# Patient Record
Sex: FEMALE | Race: WHITE | NOT HISPANIC OR LATINO | Employment: FULL TIME | URBAN - METROPOLITAN AREA
[De-identification: names, ages, dates, MRNs, and addresses within clinical notes are randomized per-mention and may not be internally consistent; named-entity substitution may affect disease eponyms.]

---

## 2018-10-17 ENCOUNTER — OFFICE VISIT (OUTPATIENT)
Dept: FAMILY MEDICINE | Facility: CLINIC | Age: 53
End: 2018-10-17
Payer: COMMERCIAL

## 2018-10-17 VITALS
OXYGEN SATURATION: 97 % | BODY MASS INDEX: 40.85 KG/M2 | SYSTOLIC BLOOD PRESSURE: 128 MMHG | HEART RATE: 95 BPM | HEIGHT: 66 IN | WEIGHT: 254.19 LBS | DIASTOLIC BLOOD PRESSURE: 66 MMHG

## 2018-10-17 DIAGNOSIS — E78.2 MIXED HYPERLIPIDEMIA: ICD-10-CM

## 2018-10-17 DIAGNOSIS — I10 ESSENTIAL HYPERTENSION: Primary | ICD-10-CM

## 2018-10-17 DIAGNOSIS — K21.9 GASTROESOPHAGEAL REFLUX DISEASE, ESOPHAGITIS PRESENCE NOT SPECIFIED: ICD-10-CM

## 2018-10-17 DIAGNOSIS — Z23 NEED FOR INFLUENZA VACCINATION: ICD-10-CM

## 2018-10-17 DIAGNOSIS — E11.8 TYPE 2 DIABETES MELLITUS WITH COMPLICATION, WITHOUT LONG-TERM CURRENT USE OF INSULIN: ICD-10-CM

## 2018-10-17 DIAGNOSIS — Z23 NEED FOR TDAP VACCINATION: ICD-10-CM

## 2018-10-17 DIAGNOSIS — J44.9 CHRONIC OBSTRUCTIVE PULMONARY DISEASE, UNSPECIFIED COPD TYPE: ICD-10-CM

## 2018-10-17 DIAGNOSIS — E66.01 CLASS 3 SEVERE OBESITY DUE TO EXCESS CALORIES WITH SERIOUS COMORBIDITY AND BODY MASS INDEX (BMI) OF 40.0 TO 44.9 IN ADULT: ICD-10-CM

## 2018-10-17 DIAGNOSIS — R45.89 DEPRESSED MOOD: ICD-10-CM

## 2018-10-17 PROBLEM — G89.29 CHRONIC BACK PAIN: Status: ACTIVE | Noted: 2018-10-17

## 2018-10-17 PROBLEM — E66.813 CLASS 3 SEVERE OBESITY DUE TO EXCESS CALORIES WITH SERIOUS COMORBIDITY AND BODY MASS INDEX (BMI) OF 40.0 TO 44.9 IN ADULT: Status: ACTIVE | Noted: 2018-10-17

## 2018-10-17 PROBLEM — M54.9 CHRONIC BACK PAIN: Status: ACTIVE | Noted: 2018-10-17

## 2018-10-17 PROCEDURE — 90715 TDAP VACCINE 7 YRS/> IM: CPT | Mod: ,,, | Performed by: NURSE PRACTITIONER

## 2018-10-17 PROCEDURE — 90471 IMMUNIZATION ADMIN: CPT | Mod: ,,, | Performed by: NURSE PRACTITIONER

## 2018-10-17 PROCEDURE — 99204 OFFICE O/P NEW MOD 45 MIN: CPT | Mod: 25,,, | Performed by: NURSE PRACTITIONER

## 2018-10-17 PROCEDURE — 90686 IIV4 VACC NO PRSV 0.5 ML IM: CPT | Mod: ,,, | Performed by: NURSE PRACTITIONER

## 2018-10-17 PROCEDURE — 90472 IMMUNIZATION ADMIN EACH ADD: CPT | Mod: ,,, | Performed by: NURSE PRACTITIONER

## 2018-10-17 RX ORDER — GABAPENTIN 300 MG/1
300 CAPSULE ORAL 2 TIMES DAILY PRN
COMMUNITY
End: 2019-04-12

## 2018-10-17 RX ORDER — ROSUVASTATIN CALCIUM 20 MG/1
20 TABLET, COATED ORAL DAILY
Qty: 90 TABLET | Refills: 1 | Status: SHIPPED | OUTPATIENT
Start: 2018-10-17 | End: 2019-05-10 | Stop reason: SDUPTHER

## 2018-10-17 RX ORDER — HYDROCODONE BITARTRATE AND ACETAMINOPHEN 10; 325 MG/1; MG/1
1 TABLET ORAL 3 TIMES DAILY
COMMUNITY
End: 2019-06-25

## 2018-10-17 RX ORDER — AMLODIPINE BESYLATE 10 MG/1
10 TABLET ORAL DAILY
COMMUNITY
End: 2018-10-17 | Stop reason: SDUPTHER

## 2018-10-17 RX ORDER — FLUTICASONE PROPIONATE AND SALMETEROL 500; 50 UG/1; UG/1
1 POWDER RESPIRATORY (INHALATION) 2 TIMES DAILY
Qty: 60 EACH | Refills: 5 | Status: SHIPPED | OUTPATIENT
Start: 2018-10-17 | End: 2020-01-22 | Stop reason: SDUPTHER

## 2018-10-17 RX ORDER — CLOTRIMAZOLE AND BETAMETHASONE DIPROPIONATE 10; .64 MG/G; MG/G
CREAM TOPICAL
COMMUNITY
End: 2021-06-29 | Stop reason: SDUPTHER

## 2018-10-17 RX ORDER — ACETAMINOPHEN 500 MG
10 TABLET ORAL NIGHTLY
COMMUNITY

## 2018-10-17 RX ORDER — VALSARTAN AND HYDROCHLOROTHIAZIDE 160; 25 MG/1; MG/1
1 TABLET ORAL DAILY
COMMUNITY
End: 2018-10-17 | Stop reason: SDUPTHER

## 2018-10-17 RX ORDER — VALSARTAN AND HYDROCHLOROTHIAZIDE 160; 25 MG/1; MG/1
1 TABLET ORAL DAILY
Qty: 90 TABLET | Refills: 1 | Status: SHIPPED | OUTPATIENT
Start: 2018-10-17 | End: 2019-05-10 | Stop reason: SDUPTHER

## 2018-10-17 RX ORDER — BUPROPION HYDROCHLORIDE 150 MG/1
150 TABLET ORAL DAILY
Qty: 30 TABLET | Refills: 2 | Status: SHIPPED | OUTPATIENT
Start: 2018-10-17 | End: 2018-11-13 | Stop reason: SDUPTHER

## 2018-10-17 RX ORDER — MONTELUKAST SODIUM 10 MG/1
10 TABLET ORAL DAILY
COMMUNITY
End: 2018-10-17 | Stop reason: SDUPTHER

## 2018-10-17 RX ORDER — PANTOPRAZOLE SODIUM 40 MG/1
40 TABLET, DELAYED RELEASE ORAL DAILY
COMMUNITY
End: 2018-10-17 | Stop reason: SDUPTHER

## 2018-10-17 RX ORDER — PANTOPRAZOLE SODIUM 40 MG/1
40 TABLET, DELAYED RELEASE ORAL DAILY
Qty: 90 TABLET | Refills: 1 | Status: SHIPPED | OUTPATIENT
Start: 2018-10-17 | End: 2019-03-15 | Stop reason: SDUPTHER

## 2018-10-17 RX ORDER — FLUTICASONE PROPIONATE AND SALMETEROL 500; 50 UG/1; UG/1
1 POWDER RESPIRATORY (INHALATION) 2 TIMES DAILY
COMMUNITY
End: 2018-10-17 | Stop reason: SDUPTHER

## 2018-10-17 RX ORDER — IPRATROPIUM BROMIDE AND ALBUTEROL SULFATE 2.5; .5 MG/3ML; MG/3ML
3 SOLUTION RESPIRATORY (INHALATION) EVERY 6 HOURS PRN
COMMUNITY
End: 2018-10-17 | Stop reason: SDUPTHER

## 2018-10-17 RX ORDER — AMLODIPINE BESYLATE 10 MG/1
10 TABLET ORAL DAILY
Qty: 90 TABLET | Refills: 1 | Status: SHIPPED | OUTPATIENT
Start: 2018-10-17 | End: 2019-07-01 | Stop reason: SDUPTHER

## 2018-10-17 RX ORDER — MONTELUKAST SODIUM 10 MG/1
10 TABLET ORAL DAILY
Qty: 90 TABLET | Refills: 1 | Status: SHIPPED | OUTPATIENT
Start: 2018-10-17 | End: 2019-02-21 | Stop reason: SDUPTHER

## 2018-10-17 RX ORDER — ROSUVASTATIN CALCIUM 20 MG/1
20 TABLET, COATED ORAL DAILY
COMMUNITY
End: 2018-10-17 | Stop reason: SDUPTHER

## 2018-10-17 RX ORDER — IPRATROPIUM BROMIDE AND ALBUTEROL SULFATE 2.5; .5 MG/3ML; MG/3ML
3 SOLUTION RESPIRATORY (INHALATION) EVERY 6 HOURS PRN
Qty: 1 BOX | Refills: 2 | Status: SHIPPED | OUTPATIENT
Start: 2018-10-17 | End: 2019-04-12 | Stop reason: SDUPTHER

## 2018-10-17 RX ORDER — BACLOFEN 10 MG/1
10 TABLET ORAL 3 TIMES DAILY PRN
COMMUNITY
End: 2019-04-12

## 2018-10-17 NOTE — PROGRESS NOTES
Subjective:       Patient ID: Kemi Dominguez is a 53 y.o. female.    Chief Complaint: Establish Care and Medication Refill    Patient is a new patient to me and to the practice here today to establish care and obtain treatment for chronic illnesses.  Patient has diagnoses of COPD, hypertension, hyperlipidemia, type 2 diabetes, chronic back pain and obesity.  Patient was previously seeing Dr. Burris and states her PCP left recently so she is in search of a new provider.  Patient states she has not had labs in a while and is not sure how her chronic conditions are at this time.  Patient states she is not great about taking her medication routinely but is attempting to improve this.  Patient does have an orthopedic that she sees in [place] that treats her chronic back pain.   Patient is a current daily smoker of 2 packs per day.  Patient states she is aware she needs to reduce the smoking and quit altogether.      Hypertension   This is a chronic problem. The current episode started more than 1 month ago. The problem has been waxing and waning since onset. The problem is controlled. Associated symptoms include anxiety, headaches, malaise/fatigue and shortness of breath. Pertinent negatives include no chest pain, palpitations or PND. Agents associated with hypertension include estrogens. Risk factors for coronary artery disease include smoking/tobacco exposure, sedentary lifestyle, obesity, diabetes mellitus, dyslipidemia and stress. Past treatments include calcium channel blockers. The current treatment provides moderate improvement. Compliance problems include diet and exercise.    Shortness of Breath   This is a recurrent problem. The current episode started more than 1 month ago. The problem occurs daily. The problem has been waxing and waning. Associated symptoms include headaches, sputum production and wheezing. Pertinent negatives include no abdominal pain, chest pain, coryza, ear pain, fever, hemoptysis,  PND, rash, rhinorrhea, sore throat, swollen glands or vomiting. The symptoms are aggravated by exercise, any activity, weather changes and smoke. Risk factors include smoking. She has tried beta agonist inhalers, body position changes, steroid inhalers and rest for the symptoms. The treatment provided moderate relief. Her past medical history is significant for COPD.   Hyperlipidemia   This is a chronic problem. The current episode started more than 1 month ago. The problem is uncontrolled. Recent lipid tests were reviewed and are high. Exacerbating diseases include diabetes and obesity. Factors aggravating her hyperlipidemia include fatty foods. Associated symptoms include shortness of breath. Pertinent negatives include no chest pain. Current antihyperlipidemic treatment includes statins and diet change. The current treatment provides mild improvement of lipids. Compliance problems include adherence to exercise and adherence to diet.  Risk factors for coronary artery disease include hypertension, obesity, dyslipidemia, diabetes mellitus, a sedentary lifestyle and stress.   Depression   Visit Type: initial  Onset of symptoms: more than 6 months ago  Progression since onset: gradually worsening (was previously taking prozac but stopped 2 months ago)  Patient presents with the following symptoms: anhedonia, excessive worry, insomnia, irritability and shortness of breath.  Patient is not experiencing: confusion, palpitations, suicidal ideas, suicidal planning and thoughts of death.  Frequency of symptoms: most days   Severity: moderate   Sleep quality: fair  Nighttime awakenings: occasional  Treatment tried: lifestyle changes and SSRI  Compliance with treatment: good  Improvement on treatment: mild        Review of Systems   Constitutional: Positive for irritability and malaise/fatigue. Negative for activity change, appetite change and fever.        Obesity   HENT: Negative for congestion, ear discharge, ear pain,  rhinorrhea, sore throat, trouble swallowing and voice change.    Eyes: Negative for photophobia, pain, discharge and visual disturbance.   Respiratory: Positive for sputum production, shortness of breath and wheezing. Negative for cough, hemoptysis and chest tightness.         COPD, smoker   Cardiovascular: Negative for chest pain, palpitations and PND.        Hypertension, hyperlipidemia   Gastrointestinal: Negative for abdominal pain, nausea and vomiting.   Endocrine: Negative for cold intolerance and heat intolerance.        Diabetes type 2   Genitourinary: Negative for difficulty urinating and dysuria.   Musculoskeletal: Positive for back pain (chronic). Negative for arthralgias and gait problem.   Skin: Negative for rash.   Allergic/Immunologic: Negative for immunocompromised state.   Neurological: Positive for headaches. Negative for speech difficulty.   Psychiatric/Behavioral: Positive for depression and dysphoric mood. Negative for confusion, self-injury and suicidal ideas. The patient has insomnia.        Past Medical History:   Diagnosis Date    COPD (chronic obstructive pulmonary disease)     Diabetes mellitus, type 2     GERD (gastroesophageal reflux disease)     Hyperlipidemia     Hypertension     Vitamin D deficiency       Past Surgical History:   Procedure Laterality Date    ADENOIDECTOMY      BREAST SURGERY Right     breast biopsy    TONSILLECTOMY         Family History   Problem Relation Age of Onset    Stroke Mother     Emphysema Mother     Diabetes Father     Coronary artery disease Father     COPD Father     Diabetes Sister        Social History     Socioeconomic History    Marital status: Single     Spouse name: None    Number of children: None    Years of education: None    Highest education level: None   Social Needs    Financial resource strain: None    Food insecurity - worry: None    Food insecurity - inability: None    Transportation needs - medical: None     Transportation needs - non-medical: None   Occupational History    None   Tobacco Use    Smoking status: Current Every Day Smoker     Packs/day: 2.00     Types: Cigarettes    Smokeless tobacco: Never Used   Substance and Sexual Activity    Alcohol use: Yes     Comment: rarely    Drug use: Yes     Types: Hydrocodone    Sexual activity: No   Other Topics Concern    None   Social History Narrative    None       Current Outpatient Medications   Medication Sig Dispense Refill    albuterol-ipratropium (DUO-NEB) 2.5 mg-0.5 mg/3 mL nebulizer solution Take 3 mLs by nebulization every 6 (six) hours as needed for Wheezing. Rescue 1 Box 2    amLODIPine (NORVASC) 10 MG tablet Take 1 tablet (10 mg total) by mouth once daily. 90 tablet 1    aspirin/salicylamide/caffeine (BC HEADACHE POWDER ORAL) Take by mouth as needed.      baclofen (LIORESAL) 10 MG tablet Take 10 mg by mouth 3 (three) times daily.      clotrimazole-betamethasone 1-0.05% (LOTRISONE) cream Apply topically as needed.      ergocalciferol, vitamin D2, (VITAMIN D2 ORAL) Take 1.25 mg by mouth once a week.      fluticasone-salmeterol 500-50 mcg/dose (ADVAIR DISKUS) 500-50 mcg/dose DsDv diskus inhaler Inhale 1 puff into the lungs 2 (two) times daily. Controller 60 each 5    gabapentin (NEURONTIN) 300 MG capsule Take 300 mg by mouth 2 (two) times daily.      HYDROcodone-acetaminophen (NORCO)  mg per tablet Take 1 tablet by mouth 3 (three) times daily.      ipratropium-albuterol (COMBIVENT RESPIMAT)  mcg/actuation inhaler Inhale 2 puffs into the lungs every 6 (six) hours as needed for Wheezing. Rescue 4 g 6    melatonin 5 mg Tab Take 1 tablet by mouth nightly.      montelukast (SINGULAIR) 10 mg tablet Take 1 tablet (10 mg total) by mouth once daily. 90 tablet 1    pantoprazole (PROTONIX) 40 MG tablet Take 1 tablet (40 mg total) by mouth once daily. 90 tablet 1    rosuvastatin (CRESTOR) 20 MG tablet Take 1 tablet (20 mg total) by mouth once  "daily. 90 tablet 1    SITagliptan-metformin (JANUMET XR) 100-1,000 mg TM24 Take 1 tablet by mouth once daily. 90 tablet 1    valsartan-hydrochlorothiazide (DIOVAN-HCT) 160-25 mg per tablet Take 1 tablet by mouth once daily. 90 tablet 1    buPROPion (WELLBUTRIN XL) 150 MG TB24 tablet Take 1 tablet (150 mg total) by mouth once daily. 30 tablet 2     No current facility-administered medications for this visit.        Review of patient's allergies indicates:  No Known Allergies  Objective:      Blood pressure 128/66, pulse 95, height 5' 6" (1.676 m), weight 115.3 kg (254 lb 3.2 oz), SpO2 97 %. Body mass index is 41.03 kg/m².   Physical Exam   Constitutional: She is oriented to person, place, and time. She appears well-developed. She is cooperative. No distress.   obesity   HENT:   Head: Normocephalic and atraumatic.   Right Ear: Tympanic membrane normal.   Left Ear: Tympanic membrane normal.   Nose: Nose normal.   Mouth/Throat: Oropharynx is clear and moist.   Eyes: Conjunctivae, EOM and lids are normal. Pupils are equal, round, and reactive to light. Lids are everted and swept, no foreign bodies found. Right pupil is round and reactive. Left pupil is round and reactive.   Neck: Trachea normal and normal range of motion. Neck supple.   Cardiovascular: Normal rate, regular rhythm, S1 normal, S2 normal and normal heart sounds.   No murmur heard.  Pulmonary/Chest: Effort normal and breath sounds normal. She has no wheezes.   Abdominal: Soft. Bowel sounds are normal. There is no rigidity and no guarding.   Musculoskeletal: Normal range of motion.   Lymphadenopathy:     She has no cervical adenopathy.     She has no axillary adenopathy.   Neurological: She is alert and oriented to person, place, and time.   Skin: Skin is warm and dry. Capillary refill takes less than 2 seconds.   Psychiatric: Her behavior is normal. Judgment and thought content normal. She exhibits a depressed mood. She expresses no homicidal and no " suicidal ideation. She expresses no suicidal plans and no homicidal plans.   Nursing note and vitals reviewed.          Assessment:       1. Essential hypertension    2. Mixed hyperlipidemia    3. Type 2 diabetes mellitus with complication, without long-term current use of insulin    4. Class 3 severe obesity due to excess calories with serious comorbidity and body mass index (BMI) of 40.0 to 44.9 in adult    5. Chronic obstructive pulmonary disease, unspecified COPD type    6. Gastroesophageal reflux disease, esophagitis presence not specified    7. Need for Tdap vaccination    8. Need for influenza vaccination    9. Depressed mood        Plan:       Kemi was seen today for establish care and medication refill.    Diagnoses and all orders for this visit:    Essential hypertension  -     valsartan-hydrochlorothiazide (DIOVAN-HCT) 160-25 mg per tablet; Take 1 tablet by mouth once daily.  -     amLODIPine (NORVASC) 10 MG tablet; Take 1 tablet (10 mg total) by mouth once daily.  -     CBC auto differential; Future  -     Comprehensive metabolic panel; Future  -     Urinalysis; Future  -     CBC auto differential  -     Comprehensive metabolic panel  -     Urinalysis  -Lifestyle changes: Reduce the amount of salt in your diet; Lose weight; Avoid drinking too much alcohol; Exercise at least 30 minutes per day most days of the week.  Continue current medications and home BP monitoring.     Mixed hyperlipidemia  -     rosuvastatin (CRESTOR) 20 MG tablet; Take 1 tablet (20 mg total) by mouth once daily.  -     Lipid panel; Future  -     Lipid panel  -Limit red meat, butter, fried foods, cheese, and other foods that have a lot of saturated fat. Consume more: lean meats, fish, fruits, vegetables, whole grains, beans, lentils, and nuts.  Weight loss, and 30-45 min of cardiovascular exercise daily.     Type 2 diabetes mellitus with complication, without long-term current use of insulin  -     SITagliptan-metformin (JANUMET  XR) 100-1,000 mg TM24; Take 1 tablet by mouth once daily.  -     Microalbumin/creatinine urine ratio; Future  -     Comprehensive metabolic panel; Future  -     Urinalysis; Future  -     Hemoglobin A1c; Future  -     Microalbumin/creatinine urine ratio  -     Comprehensive metabolic panel  -     Urinalysis  -     Hemoglobin A1c  - Discussed the following complications of DM Type 2: CAD, CVD, Retinopathy, Nephropathy, Neuropathy.    Discussed Target A1C Goal <7.0% and Target fasting blood sugars () before each meal.    Discussed Lifestyle Modifications: Low glycemic index diet, Exercise (30-45 min) daily, Weight loss, and Smoking cessation     Class 3 severe obesity due to excess calories with serious comorbidity and body mass index (BMI) of 40.0 to 44.9 in adult  -     TSH; Future  -     TSH    Chronic obstructive pulmonary disease, unspecified COPD type  -     montelukast (SINGULAIR) 10 mg tablet; Take 1 tablet (10 mg total) by mouth once daily.  -     fluticasone-salmeterol 500-50 mcg/dose (ADVAIR DISKUS) 500-50 mcg/dose DsDv diskus inhaler; Inhale 1 puff into the lungs 2 (two) times daily. Controller  -     albuterol-ipratropium (DUO-NEB) 2.5 mg-0.5 mg/3 mL nebulizer solution; Take 3 mLs by nebulization every 6 (six) hours as needed for Wheezing. Rescue  -     ipratropium-albuterol (COMBIVENT RESPIMAT)  mcg/actuation inhaler; Inhale 2 puffs into the lungs every 6 (six) hours as needed for Wheezing. Rescue    Gastroesophageal reflux disease, esophagitis presence not specified  -     pantoprazole (PROTONIX) 40 MG tablet; Take 1 tablet (40 mg total) by mouth once daily.    Need for Tdap vaccination  -     Tdap Vaccine    Need for influenza vaccination  -     Influenza - Quadrivalent (3 years & older) (PF)    Depressed mood  -     buPROPion (WELLBUTRIN XL) 150 MG TB24 tablet; Take 1 tablet (150 mg total) by mouth once daily.       Follow up in 1 month for lab review and evaluation of new depression  medication.

## 2018-10-17 NOTE — PATIENT INSTRUCTIONS
Diabetes: Activity Tips    Being more active can help you manage your diabetes. The tips on this sheet can help you get the most from your exercise. They can also help you stay safe.  Staying Active  Its important for adults to spend less time sitting and being inactive. This is especially true if you have type 2 diabetes. When you are sitting for long periods of time, get up for short sessions of light activity every 30 minutes.  You should aim for at least 150 minutes a week of exercise or physical activity. Dont let more than 2 days go by without being active.  Benefit from briskness  Brisk activity gets your heart beating faster. This can help you increase your fitness, lose extra weight, and manage your blood sugar level. Try brisk walking. Or, if you have foot or leg problems, you can try swimming or bike riding. You can break up your exercise into chunks throughout the day. Work up to at least 30 minutes of steady, brisk exercise on most days.  Warm up and cool down  Warming up and cooling down reduce your risk of injury. They also help limit muscle soreness. Do a mild version of your activity for 5 minutes before and after your routine. You can also learn stretches that will help keep your muscles loose. Your healthcare provider may show you good ways to warm up and stretch.  Do the talk-sing test  The talk-sing test is a simple way to tell how hard youre exercising. If you can talk while exercising, youre in a safe range. If youre out of breath, slow down. If you can carry a tune, its time to  the pace. Walk up a hill. Increase the resistance on your stationary bike. Or swim faster.  What about eating?  You may be told to plan your exercise for 1 to 2 hours after a meal. In most cases, you dont need to eat while being active. If you take insulin or medicine that can cause low blood sugar, test your blood sugar before exercising. And carry a fast-acting sugar that will raise your blood sugar  level quickly. This includes glucose tablets or hard candy. Use it if you feel low blood sugar symptoms.  Safety tips  These tips can help you stay safe as you become fit:  · Exercise with a friend or carry a cell phone if you have one.  · Carry or wear identification, such as a necklace or bracelet, that says you have diabetes.  · Use the proper footwear and safety equipment for your activity.  · Drink water before, during, and after exercise.  · Dress properly for the weather.  · Dont exercise in very hot or very cold weather.  · Dont exercise if you are sick.  · If you are instructed to do so, test your blood sugar before and after you exercise. Have a small carbohydrate snack if your blood sugar is low before you start exercising.   When to stop exercising and call your healthcare provider  Stop exercising and call your healthcare provider right away if you notice any of the following:  · Pain, pressure, tightness, or heaviness in the chest  · Pain or heaviness in the neck, shoulders, back, arms, legs, or feet  · Unusual shortness of breath  · Dizziness or lightheadedness  · Unusually rapid or slow pulse  · Increased joint or muscle pain  · Nausea or vomiting  Date Last Reviewed: 5/1/2016  © 0083-0816 [employer]. [address] [place] [address], [place] [address]. All rights reserved. This information is not intended as a substitute for professional medical care. Always follow your healthcare professional's instructions.        Using a Blood Sugar Log    You have diabetes. This means your body has trouble regulating a sugar called glucose. To help manage your diabetes, youll need to check your blood sugar level as directed by your healthcare provider. Keeping a log of your blood sugar levels will help you track your blood sugar readings. Its a simple and easy way to see how well you are controlling your diabetes.  Checking your blood sugar level  You can check your blood sugar level with a blood  glucose meter. Youll first prick the side of your finger with a tiny lancet to draw a tiny drop of blood onto the test strip. Some glucose meters let you use another place on your body to test. But these other places should not be used in some cases as they may be inaccurate. Follow the instructions for your glucose meter. And talk with your healthcare provider before doing the test on other places.  The strip goes into the meter first, then a drop of blood is placed on the tip of the strip. The meter then shows a reading that tells you the level of your blood sugar. Your readings should be in your target range as often as possible. This means not too high or too low. Staying in this range helps lower your risk for complications. Your healthcare provider will help you figure out the target range that is best for you.  Tracking your readings  Every time you check your blood sugar, use your log to keep track of your readings. Your meter will also probably have a memory feature that your healthcare provider can check at your next visit. You may be advised by your healthcare provider to check your blood sugar in the morning, at bedtime, and before and after meals. Be sure to write down all of your numbers. Also use your log to record things that might have affected your blood sugar. Some examples include being sick, certain medicines, being physically active, feeling stressed, or skipping meals.   Lessons learned from your readings  Tracking your blood sugar readings helps you see patterns. These patterns tell you how your actions affect your blood sugar. For instance, you may have higher numbers after eating certain foods or lower numbers after exercise. They just help you understand how to stay in your target range more often, so that your diabetes remains in good control.  Sharing your log with your healthcare team  Bring your blood sugar log and glucose meter with you to all of your healthcare appointments. This  "can help your healthcare team make changes to your treatment plan, if needed. This may involve making changes in what you eat, what medicines you take, or how much you exercise.  To learn more  The resources below can help you learn more:  · American Diabetes Association 676-397-0289 www.diabetes.org  · [employer] 490-952-0046 www.lighthouse.org  · [employer] [place] 710-040-3872 www.nei.nih.gov  · [employer] 086-735-2210 www.hormone.org  Date Last Reviewed: 5/1/2016  © 1017-4466 [employer]. [address] [place] [address]. All rights reserved. This information is not intended as a substitute for professional medical care. Always follow your healthcare professional's instructions.        Understanding Body Mass Index (BMI)  Body mass index (BMI) is a method of screening for a weight category using the ratio of your height to your weight. The BMI is a measure of overweight that is corrected for height. Knowing your BMI is a way to tell if you are at a healthy weight. The higher your BMI, the greater your risk for weight-related health problems.  What BMI means  · BMI below 18.5: Underweight  · BMI 18.5 to 24.9: Healthy weight or "ideal body weight"   · BMI 25 to 29.9: Overweight  · BMI 30 and over: Obese  · BMI 40 and over: Severe obesity   Online BMI Calculators  Find your BMI with an online BMI calculator tool, such as these from the CDC:  · BMI calculator for adults  · BMI calculator for children and teens   Using the BMI chart  To figure out your BMI, find your height and weight (or the numbers closest to them) on the table below. Follow each column of numbers to where your height and weight meet on the table. That is your BMI.    Date Last Reviewed: 7/1/2016  © 6323-5113 [employer]. [address] [place] [address]. All rights reserved. This information is not intended as a substitute for professional medical care. Always follow your " healthcare professional's instructions.

## 2018-11-07 LAB
ALBUMIN SERPL-MCNC: 3.8 G/DL (ref 3.1–4.7)
ALP SERPL-CCNC: 89 IU/L (ref 40–104)
ALT (SGPT): 18 IU/L (ref 3–33)
AST SERPL-CCNC: 15 IU/L (ref 10–40)
BASOPHILS NFR BLD: 0 K/UL (ref 0–0.2)
BASOPHILS NFR BLD: 0.5 %
BILIRUB SERPL-MCNC: 0.6 MG/DL (ref 0.3–1)
BUN SERPL-MCNC: 15 MG/DL (ref 8–20)
CALCIUM SERPL-MCNC: 9.1 MG/DL (ref 7.7–10.4)
CHLORIDE: 99 MMOL/L (ref 98–110)
CO2 SERPL-SCNC: 32.1 MMOL/L (ref 22.8–31.6)
CREATININE RANDOM URINE: 299 MG/DL
CREATININE: 0.69 MG/DL (ref 0.6–1.4)
EOSINOPHIL NFR BLD: 0.3 K/UL (ref 0–0.7)
EOSINOPHIL NFR BLD: 3.9 %
ERYTHROCYTE [DISTWIDTH] IN BLOOD BY AUTOMATED COUNT: 13 % (ref 11.7–14.9)
GLUCOSE: 129 MG/DL (ref 70–99)
GRAN #: 5.4 K/UL (ref 1.4–6.5)
GRAN%: 66.3 %
HCT VFR BLD AUTO: 44.4 % (ref 36–48)
HGB BLD-MCNC: 14.5 G/DL (ref 12–15)
IMMATURE GRANS (ABS): 0 K/UL (ref 0–1)
IMMATURE GRANULOCYTES: 0.2 %
LYMPH #: 1.7 K/UL (ref 1.2–3.4)
LYMPH%: 20.6 %
MCH RBC QN AUTO: 29.5 PG (ref 25–35)
MCHC RBC AUTO-ENTMCNC: 32.7 G/DL (ref 31–36)
MCV RBC AUTO: 90.2 FL (ref 79–98)
MICROALBUM.,U,RANDOM: 32.3 MCG/ML (ref 0–19.9)
MICROALBUMIN/CREATININE RATIO: 11 (ref 0–30)
MONO #: 0.7 K/UL (ref 0.1–0.6)
MONO%: 8.5 %
NUCLEATED RBCS: 0 %
PLATELET # BLD AUTO: 304 K/UL (ref 140–440)
PMV BLD AUTO: 10 FL (ref 8.8–12.7)
POTASSIUM SERPL-SCNC: 3.5 MMOL/L (ref 3.5–5)
PROT SERPL-MCNC: 7 G/DL (ref 6–8.2)
RBC # BLD AUTO: 4.92 M/UL (ref 3.5–5.5)
SODIUM: 138 MMOL/L (ref 134–144)
TSH SERPL DL<=0.005 MIU/L-ACNC: 2.23 ULU/ML (ref 0.3–5.6)
WBC # BLD AUTO: 8.1 K/UL (ref 5–10)

## 2018-11-08 LAB — HBA1C MFR BLD: 6.4 % (ref 3.1–6.5)

## 2018-11-13 ENCOUNTER — OFFICE VISIT (OUTPATIENT)
Dept: FAMILY MEDICINE | Facility: CLINIC | Age: 53
End: 2018-11-13
Payer: COMMERCIAL

## 2018-11-13 VITALS
SYSTOLIC BLOOD PRESSURE: 132 MMHG | OXYGEN SATURATION: 98 % | HEIGHT: 66 IN | WEIGHT: 251.13 LBS | BODY MASS INDEX: 40.36 KG/M2 | DIASTOLIC BLOOD PRESSURE: 74 MMHG | HEART RATE: 86 BPM

## 2018-11-13 DIAGNOSIS — E11.9 ENCOUNTER FOR DIABETIC FOOT EXAM: ICD-10-CM

## 2018-11-13 DIAGNOSIS — J44.9 CHRONIC OBSTRUCTIVE PULMONARY DISEASE, UNSPECIFIED COPD TYPE: ICD-10-CM

## 2018-11-13 DIAGNOSIS — E78.2 MIXED HYPERLIPIDEMIA: ICD-10-CM

## 2018-11-13 DIAGNOSIS — E66.01 CLASS 3 SEVERE OBESITY DUE TO EXCESS CALORIES WITH SERIOUS COMORBIDITY AND BODY MASS INDEX (BMI) OF 40.0 TO 44.9 IN ADULT: ICD-10-CM

## 2018-11-13 DIAGNOSIS — I10 ESSENTIAL HYPERTENSION: Primary | ICD-10-CM

## 2018-11-13 DIAGNOSIS — R45.89 DEPRESSED MOOD: ICD-10-CM

## 2018-11-13 DIAGNOSIS — E11.8 TYPE 2 DIABETES MELLITUS WITH COMPLICATION, WITHOUT LONG-TERM CURRENT USE OF INSULIN: ICD-10-CM

## 2018-11-13 PROCEDURE — 99214 OFFICE O/P EST MOD 30 MIN: CPT | Mod: ,,, | Performed by: NURSE PRACTITIONER

## 2018-11-13 RX ORDER — BUPROPION HYDROCHLORIDE 300 MG/1
300 TABLET ORAL DAILY
Qty: 30 TABLET | Refills: 2 | Status: SHIPPED | OUTPATIENT
Start: 2018-11-13 | End: 2019-01-08 | Stop reason: SDUPTHER

## 2018-11-13 NOTE — PROGRESS NOTES
Subjective:       Patient ID: Kemi Dominguez is a 53 y.o. female.    Chief Complaint: Follow-up (labs)    Patient presents today for evaluation of lab results for management of chronic health conditions.  Patient has diagnoses of COPD, hypertension, hyperlipidemia, type 2 diabetes, chronic back pain and obesity.  Patient is a current daily smoker of 2 packs per day.  Patient states she is aware she needs to reduce the smoking and quit altogether.  Patient states she is trying to reduce her amount of cigarettes smoked on a daily basis.  Patient's BMI is 40.53 and consistent with obesity.      Hypertension   This is a chronic problem. The current episode started more than 1 month ago. The problem has been waxing and waning since onset. The problem is controlled. Associated symptoms include anxiety, headaches and malaise/fatigue. Pertinent negatives include no chest pain, neck pain, palpitations, PND or shortness of breath. Agents associated with hypertension include estrogens. Risk factors for coronary artery disease include smoking/tobacco exposure, sedentary lifestyle, obesity, diabetes mellitus, dyslipidemia and stress. Past treatments include calcium channel blockers. The current treatment provides moderate improvement. Compliance problems include diet and exercise.    Shortness of Breath   This is a recurrent problem. The current episode started more than 1 month ago. The problem occurs daily. The problem has been waxing and waning. Associated symptoms include headaches and sputum production. Pertinent negatives include no abdominal pain, chest pain, coryza, ear pain, fever, hemoptysis, neck pain, PND, rash, rhinorrhea, sore throat, swollen glands, vomiting or wheezing. The symptoms are aggravated by exercise, any activity, weather changes and smoke. Risk factors include smoking. She has tried beta agonist inhalers, body position changes, steroid inhalers and rest for the symptoms. The treatment provided  moderate relief. Her past medical history is significant for COPD.   Hyperlipidemia   This is a chronic problem. The current episode started more than 1 month ago. The problem is uncontrolled. Recent lipid tests were reviewed and are high. Exacerbating diseases include diabetes and obesity. Factors aggravating her hyperlipidemia include fatty foods. Pertinent negatives include no chest pain or shortness of breath. Current antihyperlipidemic treatment includes statins and diet change. The current treatment provides mild improvement of lipids. Compliance problems include adherence to exercise and adherence to diet.  Risk factors for coronary artery disease include hypertension, obesity, dyslipidemia, diabetes mellitus, a sedentary lifestyle and stress.   Depression   Visit Type: follow-up  Patient presents with the following symptoms: anhedonia, excessive worry, insomnia and irritability.  Patient is not experiencing: confusion, palpitations, shortness of breath, suicidal ideas, suicidal planning and thoughts of death.  Frequency of symptoms: most days   Severity: moderate   Sleep quality: fair  Nighttime awakenings: occasional      Review of Systems   Constitutional: Positive for irritability and malaise/fatigue. Negative for activity change, appetite change, fever and unexpected weight change.        Obesity   HENT: Negative for congestion, ear discharge, ear pain, hearing loss, rhinorrhea, sore throat, trouble swallowing and voice change.    Eyes: Negative for photophobia, pain, discharge and visual disturbance.   Respiratory: Positive for sputum production. Negative for cough, hemoptysis, chest tightness, shortness of breath and wheezing.         COPD, smoker   Cardiovascular: Negative for chest pain, palpitations and PND.        Hypertension, hyperlipidemia   Gastrointestinal: Negative for abdominal pain, blood in stool, constipation, diarrhea, nausea and vomiting.   Endocrine: Negative for cold intolerance, heat  intolerance, polydipsia and polyuria.        Diabetes type 2   Genitourinary: Negative for difficulty urinating, dysuria, hematuria and menstrual problem.   Musculoskeletal: Positive for back pain (chronic). Negative for arthralgias, gait problem, joint swelling and neck pain.   Skin: Negative for rash.   Allergic/Immunologic: Negative for immunocompromised state.   Neurological: Positive for headaches. Negative for speech difficulty and weakness.   Psychiatric/Behavioral: Positive for depression and dysphoric mood. Negative for confusion, self-injury and suicidal ideas. The patient has insomnia.        Past Medical History:   Diagnosis Date    COPD (chronic obstructive pulmonary disease)     Diabetes mellitus, type 2     GERD (gastroesophageal reflux disease)     Hyperlipidemia     Hypertension     Vitamin D deficiency       Past Surgical History:   Procedure Laterality Date    ADENOIDECTOMY      BREAST SURGERY Right     breast biopsy    TONSILLECTOMY         Family History   Problem Relation Age of Onset    Stroke Mother     Emphysema Mother     Diabetes Father     Coronary artery disease Father     COPD Father     Diabetes Sister        Social History     Socioeconomic History    Marital status: Single     Spouse name: None    Number of children: None    Years of education: None    Highest education level: None   Social Needs    Financial resource strain: None    Food insecurity - worry: None    Food insecurity - inability: None    Transportation needs - medical: None    Transportation needs - non-medical: None   Occupational History    None   Tobacco Use    Smoking status: Current Every Day Smoker     Packs/day: 2.00     Types: Cigarettes    Smokeless tobacco: Never Used   Substance and Sexual Activity    Alcohol use: Yes     Comment: rarely    Drug use: Yes     Types: Hydrocodone    Sexual activity: No   Other Topics Concern    None   Social History Narrative    None        Current Outpatient Medications   Medication Sig Dispense Refill    albuterol-ipratropium (DUO-NEB) 2.5 mg-0.5 mg/3 mL nebulizer solution Take 3 mLs by nebulization every 6 (six) hours as needed for Wheezing. Rescue 1 Box 2    amLODIPine (NORVASC) 10 MG tablet Take 1 tablet (10 mg total) by mouth once daily. 90 tablet 1    aspirin/salicylamide/caffeine (BC HEADACHE POWDER ORAL) Take by mouth as needed.      baclofen (LIORESAL) 10 MG tablet Take 10 mg by mouth 3 (three) times daily as needed.       buPROPion (WELLBUTRIN XL) 300 MG 24 hr tablet Take 1 tablet (300 mg total) by mouth once daily. 30 tablet 2    clotrimazole-betamethasone 1-0.05% (LOTRISONE) cream Apply topically as needed.      ergocalciferol, vitamin D2, (VITAMIN D2 ORAL) Take 1.25 mg by mouth once a week.      fluticasone-salmeterol 500-50 mcg/dose (ADVAIR DISKUS) 500-50 mcg/dose DsDv diskus inhaler Inhale 1 puff into the lungs 2 (two) times daily. Controller 60 each 5    gabapentin (NEURONTIN) 300 MG capsule Take 300 mg by mouth 2 (two) times daily as needed.       HYDROcodone-acetaminophen (NORCO)  mg per tablet Take 1 tablet by mouth 3 (three) times daily.      ipratropium-albuterol (COMBIVENT RESPIMAT)  mcg/actuation inhaler Inhale 2 puffs into the lungs every 6 (six) hours as needed for Wheezing. Rescue 4 g 6    melatonin 5 mg Tab Take 1 tablet by mouth nightly.      montelukast (SINGULAIR) 10 mg tablet Take 1 tablet (10 mg total) by mouth once daily. 90 tablet 1    pantoprazole (PROTONIX) 40 MG tablet Take 1 tablet (40 mg total) by mouth once daily. 90 tablet 1    rosuvastatin (CRESTOR) 20 MG tablet Take 1 tablet (20 mg total) by mouth once daily. 90 tablet 1    SITagliptan-metformin (JANUMET XR) 100-1,000 mg TM24 Take 1 tablet by mouth once daily. 90 tablet 1    valsartan-hydrochlorothiazide (DIOVAN-HCT) 160-25 mg per tablet Take 1 tablet by mouth once daily. 90 tablet 1     No current facility-administered  "medications for this visit.        Review of patient's allergies indicates:  No Known Allergies  Objective:      Blood pressure 132/74, pulse 86, height 5' 6" (1.676 m), weight 113.9 kg (251 lb 1.6 oz), SpO2 98 %. Body mass index is 40.53 kg/m².   Physical Exam   Constitutional: She is oriented to person, place, and time. She appears well-developed. She is cooperative. No distress.   obesity   HENT:   Head: Normocephalic and atraumatic.   Right Ear: Tympanic membrane normal.   Left Ear: Tympanic membrane normal.   Nose: Nose normal.   Mouth/Throat: Oropharynx is clear and moist.   Eyes: Conjunctivae, EOM and lids are normal. Pupils are equal, round, and reactive to light. Lids are everted and swept, no foreign bodies found. Right pupil is round and reactive. Left pupil is round and reactive.   Neck: Trachea normal and normal range of motion. Neck supple.   Cardiovascular: Normal rate, regular rhythm, S1 normal, S2 normal and normal heart sounds.   No murmur heard.  Pulmonary/Chest: Effort normal and breath sounds normal. She has no wheezes.   Abdominal: Soft. Bowel sounds are normal. There is no rigidity and no guarding.   Musculoskeletal: Normal range of motion.   Lymphadenopathy:     She has no cervical adenopathy.     She has no axillary adenopathy.   Neurological: She is alert and oriented to person, place, and time.   Skin: Skin is warm and dry. Capillary refill takes less than 2 seconds.   Psychiatric: Her behavior is normal. Judgment and thought content normal. She exhibits a depressed mood. She expresses no homicidal and no suicidal ideation. She expresses no suicidal plans and no homicidal plans.   Nursing note and vitals reviewed.          Assessment:       1. Essential hypertension    2. Mixed hyperlipidemia    3. Type 2 diabetes mellitus with complication, without long-term current use of insulin    4. Class 3 severe obesity due to excess calories with serious comorbidity and body mass index (BMI) of " 40.0 to 44.9 in adult    5. Chronic obstructive pulmonary disease, unspecified COPD type    6. Depressed mood    7. Encounter for diabetic foot exam        Plan:       Kemi was seen today for follow-up.    Diagnoses and all orders for this visit:    Essential hypertension  -Lifestyle changes: Reduce the amount of salt in your diet; Lose weight; Avoid drinking too much alcohol; Exercise at least 30 minutes per day most days of the week.  Continue current medications and home BP monitoring.     Mixed hyperlipidemia  -Limit red meat, butter, fried foods, cheese, and other foods that have a lot of saturated fat. Consume more: lean meats, fish, fruits, vegetables, whole grains, beans, lentils, and nuts.  Weight loss, and 30-45 min of cardiovascular exercise daily.     Type 2 diabetes mellitus with complication, without long-term current use of insulin  - Discussed the following complications of DM Type 2: CAD, CVD, Retinopathy, Nephropathy, Neuropathy.    Discussed Target A1C Goal <7.0% and Target fasting blood sugars () before each meal.    Discussed Lifestyle Modifications: Low glycemic index diet, Exercise (30-45 min) daily, Weight loss, and Smoking cessation     Class 3 severe obesity due to excess calories with serious comorbidity and body mass index (BMI) of 40.0 to 44.9 in adult  -The patient is asked to make an attempt to improve diet and exercise patterns to aid in medical management of this problem.    Chronic obstructive pulmonary disease, unspecified COPD type  -Continue current therapy.  Reduce or eliminate smoking.    Depressed mood  -     buPROPion (WELLBUTRIN XL) 300 MG 24 hr tablet; Take 1 tablet (300 mg total) by mouth once daily.    Encounter for diabetic foot exam  -     Cancel: Ambulatory referral to Podiatry  -     Ambulatory referral to Podiatry    Labs reviewed and stable currently.   Patient's dose increase to 300 from 150 mg of wellbutrin.  Follow up in 6 weeks since medication dose  change.

## 2018-11-13 NOTE — PATIENT INSTRUCTIONS
Diabetes: Activity Tips    Being more active can help you manage your diabetes. The tips on this sheet can help you get the most from your exercise. They can also help you stay safe.  Staying Active  Its important for adults to spend less time sitting and being inactive. This is especially true if you have type 2 diabetes. When you are sitting for long periods of time, get up for short sessions of light activity every 30 minutes.  You should aim for at least 150 minutes a week of exercise or physical activity. Dont let more than 2 days go by without being active.  Benefit from briskness  Brisk activity gets your heart beating faster. This can help you increase your fitness, lose extra weight, and manage your blood sugar level. Try brisk walking. Or, if you have foot or leg problems, you can try swimming or bike riding. You can break up your exercise into chunks throughout the day. Work up to at least 30 minutes of steady, brisk exercise on most days.  Warm up and cool down  Warming up and cooling down reduce your risk of injury. They also help limit muscle soreness. Do a mild version of your activity for 5 minutes before and after your routine. You can also learn stretches that will help keep your muscles loose. Your healthcare provider may show you good ways to warm up and stretch.  Do the talk-sing test  The talk-sing test is a simple way to tell how hard youre exercising. If you can talk while exercising, youre in a safe range. If youre out of breath, slow down. If you can carry a tune, its time to  the pace. Walk up a hill. Increase the resistance on your stationary bike. Or swim faster.  What about eating?  You may be told to plan your exercise for 1 to 2 hours after a meal. In most cases, you dont need to eat while being active. If you take insulin or medicine that can cause low blood sugar, test your blood sugar before exercising. And carry a fast-acting sugar that will raise your blood sugar  level quickly. This includes glucose tablets or hard candy. Use it if you feel low blood sugar symptoms.  Safety tips  These tips can help you stay safe as you become fit:  · Exercise with a friend or carry a cell phone if you have one.  · Carry or wear identification, such as a necklace or bracelet, that says you have diabetes.  · Use the proper footwear and safety equipment for your activity.  · Drink water before, during, and after exercise.  · Dress properly for the weather.  · Dont exercise in very hot or very cold weather.  · Dont exercise if you are sick.  · If you are instructed to do so, test your blood sugar before and after you exercise. Have a small carbohydrate snack if your blood sugar is low before you start exercising.   When to stop exercising and call your healthcare provider  Stop exercising and call your healthcare provider right away if you notice any of the following:  · Pain, pressure, tightness, or heaviness in the chest  · Pain or heaviness in the neck, shoulders, back, arms, legs, or feet  · Unusual shortness of breath  · Dizziness or lightheadedness  · Unusually rapid or slow pulse  · Increased joint or muscle pain  · Nausea or vomiting  Date Last Reviewed: 5/1/2016  © 7577-3304 [employer]. [address] [place] [address], [place] [id number]. All rights reserved. This information is not intended as a substitute for professional medical care. Always follow your healthcare professional's instructions.        Taking Your Blood Pressure  Blood pressure is the force of blood against the artery wall as it moves from the heart through the blood vessels. You can take your own blood pressure reading using a digital monitor. Take your readings the same each time, using the same arm. Take readings as often as your healthcare provider instructs.  About blood pressure monitors  Blood pressure monitors are designed for certain ages and cases. You can find monitors for older adults, for pregnant  women, and for children. Make sure the one you choose is the right one for your age and situation.  The American Heart Association recommends an automatic cuff monitor that fits on your upper arm (bicep). The cuff should fit your arm size. A cuff thats too large or too small will not give an accurate reading. Measure around your upper arm to find your size.  Monitors that attach to your finger or wrist are not as accurate as monitors for your upper arm.  Ask your healthcare provider for help in choosing a monitor. Bring your monitor to your next provider visit if you need help in using it the correct way.  The steps below are general instructions for using an automatic digital monitor.  Step 1. Relax    · Take your blood pressure at the same time every day, such as in the morning or evening, or at the time your healthcare provider recommends.  · Wait at least a half-hour after smoking, eating, or exercising. Don't drink coffee, tea, soda, or other caffeinated beverages before checking your blood pressure.  · Sit comfortably at a table with both feet on the floor. Do not cross your legs or feet. Place the monitor near you.  · Rest for a few minutes before you begin.  Step 2. Wrap the cuff    · Place your arm on the table, palm up. Your arm should be at the level of your heart. Wrap the cuff around your upper arm, just above your elbow. Its best done on bare skin, not over clothing. Most cuffs will indicate where the brachial artery (the blood vessel in the middle of the arm at the inner side of the elbow) should line up with the cuff. Look in your monitor's instruction booklet for an illustration. You can also bring your cuff to your healthcare provider and have them show you how to correctly place the cuff.  Step 3. Inflate the cuff    · Push the button that starts the pump.  · The cuff will tighten, then loosen.  · The numbers will change. When they stop changing, your blood pressure reading will appear.  · Take  2 or 3 readings one minute apart.  Step 4. Write down the results of each reading    · Write down your blood pressure numbers for each reading. Note the date and time. Keep your results in one place, such as a notebook. Even if your monitor has a built-in memory, keep a hard copy of the readings.  · Remove the cuff from your arm. Turn off the machine.  · Bring your blood pressure records with your healthcare providers at each visit.  · If you start a new blood pressure medicine, note the day you started the new medicine. Also note the day if you change the dose of your medicine. This information goes on your blood pressure recording sheet. This will help your healthcare provider monitor how well the medicine changes are working.  · Ask your healthcare provider what numbers should prompt you to call him or her. Also ask what numbers should prompt you to get help right away.  Date Last Reviewed: 11/1/2016  © 3423-4324 [employer]. [address] [place] [address], [place] [address]. All rights reserved. This information is not intended as a substitute for professional medical care. Always follow your healthcare professional's instructions.

## 2018-11-14 ENCOUNTER — PATIENT MESSAGE (OUTPATIENT)
Dept: FAMILY MEDICINE | Facility: CLINIC | Age: 53
End: 2018-11-14

## 2019-01-08 ENCOUNTER — OFFICE VISIT (OUTPATIENT)
Dept: FAMILY MEDICINE | Facility: CLINIC | Age: 54
End: 2019-01-08
Payer: COMMERCIAL

## 2019-01-08 VITALS
WEIGHT: 244.13 LBS | HEIGHT: 66 IN | OXYGEN SATURATION: 97 % | HEART RATE: 85 BPM | BODY MASS INDEX: 39.24 KG/M2 | SYSTOLIC BLOOD PRESSURE: 130 MMHG | DIASTOLIC BLOOD PRESSURE: 82 MMHG

## 2019-01-08 DIAGNOSIS — F17.210 CIGARETTE NICOTINE DEPENDENCE WITHOUT COMPLICATION: ICD-10-CM

## 2019-01-08 DIAGNOSIS — R45.89 DEPRESSED MOOD: Primary | ICD-10-CM

## 2019-01-08 DIAGNOSIS — L73.9 FOLLICULITIS: ICD-10-CM

## 2019-01-08 DIAGNOSIS — E66.09 CLASS 2 OBESITY DUE TO EXCESS CALORIES WITHOUT SERIOUS COMORBIDITY WITH BODY MASS INDEX (BMI) OF 39.0 TO 39.9 IN ADULT: ICD-10-CM

## 2019-01-08 DIAGNOSIS — I10 ESSENTIAL HYPERTENSION: ICD-10-CM

## 2019-01-08 PROCEDURE — 3079F DIAST BP 80-89 MM HG: CPT | Mod: ,,, | Performed by: NURSE PRACTITIONER

## 2019-01-08 PROCEDURE — 3008F BODY MASS INDEX DOCD: CPT | Mod: ,,, | Performed by: NURSE PRACTITIONER

## 2019-01-08 PROCEDURE — 3008F PR BODY MASS INDEX (BMI) DOCUMENTED: ICD-10-PCS | Mod: ,,, | Performed by: NURSE PRACTITIONER

## 2019-01-08 PROCEDURE — 3079F PR MOST RECENT DIASTOLIC BLOOD PRESSURE 80-89 MM HG: ICD-10-PCS | Mod: ,,, | Performed by: NURSE PRACTITIONER

## 2019-01-08 PROCEDURE — 3075F PR MOST RECENT SYSTOLIC BLOOD PRESS GE 130-139MM HG: ICD-10-PCS | Mod: ,,, | Performed by: NURSE PRACTITIONER

## 2019-01-08 PROCEDURE — 99214 PR OFFICE/OUTPT VISIT, EST, LEVL IV, 30-39 MIN: ICD-10-PCS | Mod: ,,, | Performed by: NURSE PRACTITIONER

## 2019-01-08 PROCEDURE — 3075F SYST BP GE 130 - 139MM HG: CPT | Mod: ,,, | Performed by: NURSE PRACTITIONER

## 2019-01-08 PROCEDURE — 99214 OFFICE O/P EST MOD 30 MIN: CPT | Mod: ,,, | Performed by: NURSE PRACTITIONER

## 2019-01-08 RX ORDER — BUPROPION HYDROCHLORIDE 300 MG/1
300 TABLET ORAL DAILY
Qty: 30 TABLET | Refills: 5 | Status: SHIPPED | OUTPATIENT
Start: 2019-01-08 | End: 2019-07-09 | Stop reason: SDUPTHER

## 2019-01-08 RX ORDER — VARENICLINE TARTRATE 0.5 (11)-1
KIT ORAL
Qty: 1 PACKAGE | Refills: 0 | Status: SHIPPED | OUTPATIENT
Start: 2019-01-08 | End: 2019-02-12

## 2019-01-08 RX ORDER — MUPIROCIN 20 MG/G
OINTMENT TOPICAL 3 TIMES DAILY
Qty: 22 G | Refills: 1 | Status: SHIPPED | OUTPATIENT
Start: 2019-01-08 | End: 2019-04-12

## 2019-01-08 NOTE — PROGRESS NOTES
Subjective:       Patient ID: Kemi Dominguez is a 53 y.o. female.    Chief Complaint: Depression (f/u)    Patient presents today following up for depressed mood. At her last visit, patient was placed on Wellbutrin 300 mg increased from 150 mg daily.  Patient states she has improved with her mood somewhat but has not noticed a significant improvement in her mood.  Patient feels the mood is more a situational cause with her mood.  Patient is open to counseling to help with her mood.  Patient states she is ready to stop smoking.  Patient is smoking about 1.5-2 packs per day still.  Patient has taken chantix before and stopped smoking.  Patient tolerated chantix well in the past with only complaints of bad dreams.  Patient requests to try this medication again.  Patient also complains of sores in the nose for a few weeks.  Sores are worsening with congestion noted.  Patient has used moisturizing spray in the nose with has helped minimally.    Patient is obese with a BMI of 39.40.        Hypertension   This is a chronic problem. The current episode started more than 1 month ago. The problem has been waxing and waning since onset. The problem is controlled. Associated symptoms include anxiety and malaise/fatigue. Pertinent negatives include no palpitations. Agents associated with hypertension include estrogens. Risk factors for coronary artery disease include smoking/tobacco exposure, sedentary lifestyle, obesity, diabetes mellitus, dyslipidemia and stress. Past treatments include calcium channel blockers. The current treatment provides moderate improvement. Compliance problems include diet and exercise.    Depression   Visit Type: follow-up  Patient presents with the following symptoms: anhedonia, excessive worry, insomnia and irritability.  Patient is not experiencing: confusion, palpitations, suicidal ideas, suicidal planning and thoughts of death.  Frequency of symptoms: most days   Severity: moderate   Sleep  quality: fair  Nighttime awakenings: occasional    Nicotine Dependence   Presents for initial visit. Symptoms include cravings, insomnia and irritability. Preferred tobacco types include cigarettes. Preferred cigarette types include filtered. Preferred strength is light. Preferred cigarettes are non-menthol. Preferred brands include [employer]. Her urge triggers include company of smokers, disruptive behavior, drinking alcohol, drinking coffee, driving, meal time and stress. Risk factors do not include perceived media message about smoking.Her first smoke is before 6 AM. She smokes 2 packs of cigarettes per day. She started smoking when she was 15-17 years old. Past treatments include buproprion, varenicline and nicotine patch. The treatment provided complete relief. Compliance with prior treatments has been variable. Kemi is ready to quit. Kemi has tried to quit 5 or more times. There is no history of alcohol abuse and drug use.     Review of Systems   Constitutional: Positive for irritability and malaise/fatigue. Negative for activity change, appetite change and unexpected weight change.        Obesity   HENT: Negative for congestion, ear discharge, hearing loss, trouble swallowing and voice change.    Eyes: Negative for photophobia, pain, discharge and visual disturbance.   Respiratory: Negative for cough and chest tightness.         COPD, smoker   Cardiovascular: Negative for palpitations.        Hypertension, hyperlipidemia   Gastrointestinal: Negative for blood in stool, constipation, diarrhea and nausea.   Endocrine: Negative for cold intolerance, heat intolerance, polydipsia and polyuria.        Diabetes type 2   Genitourinary: Negative for difficulty urinating, dysuria, hematuria and menstrual problem.   Musculoskeletal: Positive for back pain (chronic). Negative for arthralgias, gait problem and joint swelling.   Allergic/Immunologic: Negative for immunocompromised state.   Neurological: Negative for  speech difficulty and weakness.   Psychiatric/Behavioral: Positive for depression and dysphoric mood. Negative for confusion, self-injury and suicidal ideas. The patient has insomnia.        Past Medical History:   Diagnosis Date    COPD (chronic obstructive pulmonary disease)     Diabetes mellitus, type 2     GERD (gastroesophageal reflux disease)     Hyperlipidemia     Hypertension     Vitamin D deficiency       Past Surgical History:   Procedure Laterality Date    ADENOIDECTOMY      BREAST SURGERY Right     breast biopsy    TONSILLECTOMY         Family History   Problem Relation Age of Onset    Stroke Mother     Emphysema Mother     Diabetes Father     Coronary artery disease Father     COPD Father     Diabetes Sister        Social History     Socioeconomic History    Marital status: Significant Other     Spouse name: None    Number of children: None    Years of education: None    Highest education level: None   Social Needs    Financial resource strain: None    Food insecurity - worry: None    Food insecurity - inability: None    Transportation needs - medical: None    Transportation needs - non-medical: None   Occupational History    None   Tobacco Use    Smoking status: Current Every Day Smoker     Packs/day: 2.00     Types: Cigarettes    Smokeless tobacco: Never Used   Substance and Sexual Activity    Alcohol use: Yes     Comment: rarely    Drug use: Yes     Types: Hydrocodone    Sexual activity: No   Other Topics Concern    None   Social History Narrative    None       Current Outpatient Medications   Medication Sig Dispense Refill    albuterol-ipratropium (DUO-NEB) 2.5 mg-0.5 mg/3 mL nebulizer solution Take 3 mLs by nebulization every 6 (six) hours as needed for Wheezing. Rescue 1 Box 2    amLODIPine (NORVASC) 10 MG tablet Take 1 tablet (10 mg total) by mouth once daily. 90 tablet 1    aspirin/salicylamide/caffeine (BC HEADACHE POWDER ORAL) Take by mouth as needed.       baclofen (LIORESAL) 10 MG tablet Take 10 mg by mouth 3 (three) times daily as needed.       buPROPion (WELLBUTRIN XL) 300 MG 24 hr tablet Take 1 tablet (300 mg total) by mouth once daily. 30 tablet 5    clotrimazole-betamethasone 1-0.05% (LOTRISONE) cream Apply topically as needed.      ergocalciferol, vitamin D2, (VITAMIN D2 ORAL) Take 1.25 mg by mouth once a week.      fluticasone-salmeterol 500-50 mcg/dose (ADVAIR DISKUS) 500-50 mcg/dose DsDv diskus inhaler Inhale 1 puff into the lungs 2 (two) times daily. Controller 60 each 5    gabapentin (NEURONTIN) 300 MG capsule Take 300 mg by mouth 2 (two) times daily as needed.       HYDROcodone-acetaminophen (NORCO)  mg per tablet Take 1 tablet by mouth 3 (three) times daily.      ipratropium-albuterol (COMBIVENT RESPIMAT)  mcg/actuation inhaler Inhale 2 puffs into the lungs every 6 (six) hours as needed for Wheezing. Rescue 4 g 6    melatonin 5 mg Tab Take 1 tablet by mouth nightly.      montelukast (SINGULAIR) 10 mg tablet Take 1 tablet (10 mg total) by mouth once daily. 90 tablet 1    pantoprazole (PROTONIX) 40 MG tablet Take 1 tablet (40 mg total) by mouth once daily. 90 tablet 1    rosuvastatin (CRESTOR) 20 MG tablet Take 1 tablet (20 mg total) by mouth once daily. 90 tablet 1    SITagliptan-metformin (JANUMET XR) 100-1,000 mg TM24 Take 1 tablet by mouth once daily. 90 tablet 1    valsartan-hydrochlorothiazide (DIOVAN-HCT) 160-25 mg per tablet Take 1 tablet by mouth once daily. 90 tablet 1    mupirocin (BACTROBAN) 2 % ointment by Nasal route 3 (three) times daily. 22 g 1    varenicline (CHANTIX STARTING MONTH BOX) 0.5 mg (11)- 1 mg (42) tablet Take one 0.5mg tab by mouth once daily X3 days,then increase to one 0.5mg tab twice daily X4 days,then increase to one 1mg tab twice daily 1 Package 0     No current facility-administered medications for this visit.        Review of patient's allergies indicates:  No Known Allergies  Objective:     "  Blood pressure 130/82, pulse 85, height 5' 6" (1.676 m), weight 110.7 kg (244 lb 1.6 oz), SpO2 97 %. Body mass index is 39.4 kg/m².   Physical Exam   Constitutional: She is oriented to person, place, and time. She appears well-developed. She is cooperative. No distress.   obesity   HENT:   Head: Normocephalic and atraumatic.   Right Ear: Tympanic membrane normal.   Left Ear: Tympanic membrane normal.   Nose: Nose normal.   Mouth/Throat: Oropharynx is clear and moist.   Eyes: Conjunctivae, EOM and lids are normal. Pupils are equal, round, and reactive to light. Lids are everted and swept, no foreign bodies found. Right pupil is round and reactive. Left pupil is round and reactive.   Neck: Trachea normal and normal range of motion. Neck supple.   Cardiovascular: Normal rate, regular rhythm, S1 normal, S2 normal and normal heart sounds.   No murmur heard.  Pulmonary/Chest: Effort normal. No stridor. She has no decreased breath sounds. She has wheezes (end expirator wheezes) in the right lower field and the left lower field. She has no rhonchi. She has no rales.   Abdominal: Soft. Bowel sounds are normal. There is no rigidity and no guarding.   Musculoskeletal: Normal range of motion.   Lymphadenopathy:     She has no cervical adenopathy.     She has no axillary adenopathy.   Neurological: She is alert and oriented to person, place, and time.   Skin: Skin is warm and dry. Capillary refill takes less than 2 seconds.   Psychiatric: Her behavior is normal. Judgment and thought content normal. She exhibits a depressed mood. She expresses no homicidal and no suicidal ideation. She expresses no suicidal plans and no homicidal plans.   Nursing note and vitals reviewed.          Assessment:       1. Depressed mood    2. Cigarette nicotine dependence without complication    3. Folliculitis    4. Essential hypertension    5. Class 2 obesity due to excess calories without serious comorbidity with body mass index (BMI) of 39.0 to " 39.9 in adult        Plan:       Kemi was seen today for depression.    Diagnoses and all orders for this visit:    Depressed mood  -     Ambulatory referral to Psychology  -     buPROPion (WELLBUTRIN XL) 300 MG 24 hr tablet; Take 1 tablet (300 mg total) by mouth once daily.  -Continue current medication and follow up with with counseling.     Cigarette nicotine dependence without complication  -     varenicline (CHANTIX STARTING MONTH BOX) 0.5 mg (11)- 1 mg (42) tablet; Take one 0.5mg tab by mouth once daily X3 days,then increase to one 0.5mg tab twice daily X4 days,then increase to one 1mg tab twice daily    Folliculitis  -     mupirocin (BACTROBAN) 2 % ointment; by Nasal route 3 (three) times daily.    Essential hypertension  -Lifestyle changes: Reduce the amount of salt in your diet; Lose weight; Avoid drinking too much alcohol; Exercise at least 30 minutes per day most days of the week.  Continue current medications and home BP monitoring.     Class 2 obesity due to excess calories without serious comorbidity with body mass index (BMI) of 39.0 to 39.9 in adult  -The patient is asked to make an attempt to improve diet and exercise patterns to aid in medical management of this problem.    Follow up in 1 month with smoking cessation since start of chantix.

## 2019-01-08 NOTE — PATIENT INSTRUCTIONS
Depression  Depression is one of the most common mental health problems today. It is not just a state of unhappiness or sadness. It is a true disease. The cause seems to be related to a decrease in chemicals that transmit signals in the brain. Having a family history of depression, alcoholism, or suicide increases the risk. Chronic illness, chronic pain, migraine headaches and high emotional stress also increase the risk.  Depression is something we tend to recognize in others, but may have a hard time seeing in ourselves. It can show in many physical and emotional ways:  · Loss of appetite  · Over-eating  · Not being able to sleep  · Sleeping too much  · Tiredness not related to physical exertion  · Restlessness or irritability  · Slowness of movement or speech  · Feeling depressed or withdrawn  · Loss of interest in things you once enjoyed  · Trouble concentrating, poor memory, trouble making decisions  · Thoughts of harming or killing oneself, or thoughts that life is not worth living  · Low self-esteem  The treatment for depression may include both medicine and psychotherapy. Antidepressants can reduce suffering and can improve the ability to function during the depressed period. Therapy can offer emotional support and help you understand emotional factors that may be causing the depression.  Home care  · On-going care and support helps people manage this disease.  Find a healthcare provider and therapist who meet your needs. Seek help when you feel like you may be getting ill.  · Be kind to yourself. Make it a point to do things that you enjoy (gardening, walking in nature, going to a movie, etc.). Reward yourself for small successes.  · Take care of your physical body. Eat a balanced diet (low in saturated fat and high in fruits and vegetables). Exercise at least 3 times a week for 30 minutes. Even mild-moderate exercise (like brisk walking) can make you feel better.  · Avoid alcohol, which can make  depression worse.  · Take medicine as prescribed.  · Tell each of your healthcare providers about all of the prescription drugs, over-the-counter medicines, vitamins, and supplements you take. Certain supplements interact with medicines and can result in dangerous side effects. Ask your pharmacist when you have questions about drug interactions.  · Talk with your family and trusted friends about your feelings and thoughts. Ask them to help you recognize behavior changes early so you can get help and, if needed, medicine can be adjusted.  Follow-up care  Follow up with your healthcare provider, or as advised.  Call 911  Call 911 if you:  · Have suicidal thoughts, a suicide plan, and the means to carry out the plan  · Have trouble breathing  · Are very confused  · Feel very drowsy or have trouble awakening  · Faint or lose consciousness  · Have new chest pain that becomes more severe, lasts longer, or spreads into your shoulder, arm, neck, jaw or back  When to seek medical advice  Call your healthcare provider right away if any of these occur:  · Feeling extreme depression, fear, anxiety, or anger toward yourself or others  · Feeling out of control  · Feeling that you may try to harm yourself or another  · Hearing voices that others do not hear  · Seeing things that others do not see  · Cant sleep or eat for 3 days in a row  · Friends or family express concern over your behavior and ask you to seek help  Date Last Reviewed: 9/29/2015  © 1177-6463 [employer]. [address] [place] [address], [place] [address]. All rights reserved. This information is not intended as a substitute for professional medical care. Always follow your healthcare professional's instructions.

## 2019-02-12 ENCOUNTER — OFFICE VISIT (OUTPATIENT)
Dept: FAMILY MEDICINE | Facility: CLINIC | Age: 54
End: 2019-02-12
Payer: COMMERCIAL

## 2019-02-12 VITALS
HEIGHT: 66 IN | OXYGEN SATURATION: 96 % | WEIGHT: 241.81 LBS | BODY MASS INDEX: 38.86 KG/M2 | DIASTOLIC BLOOD PRESSURE: 78 MMHG | HEART RATE: 91 BPM | SYSTOLIC BLOOD PRESSURE: 132 MMHG

## 2019-02-12 DIAGNOSIS — F17.210 CIGARETTE NICOTINE DEPENDENCE WITHOUT COMPLICATION: Primary | ICD-10-CM

## 2019-02-12 DIAGNOSIS — J44.9 CHRONIC OBSTRUCTIVE PULMONARY DISEASE, UNSPECIFIED COPD TYPE: ICD-10-CM

## 2019-02-12 DIAGNOSIS — E66.01 CLASS 2 SEVERE OBESITY DUE TO EXCESS CALORIES WITH SERIOUS COMORBIDITY AND BODY MASS INDEX (BMI) OF 39.0 TO 39.9 IN ADULT: ICD-10-CM

## 2019-02-12 DIAGNOSIS — R06.2 WHEEZING: ICD-10-CM

## 2019-02-12 PROCEDURE — 3008F PR BODY MASS INDEX (BMI) DOCUMENTED: ICD-10-PCS | Mod: ,,, | Performed by: NURSE PRACTITIONER

## 2019-02-12 PROCEDURE — 3075F PR MOST RECENT SYSTOLIC BLOOD PRESS GE 130-139MM HG: ICD-10-PCS | Mod: ,,, | Performed by: NURSE PRACTITIONER

## 2019-02-12 PROCEDURE — 3008F BODY MASS INDEX DOCD: CPT | Mod: ,,, | Performed by: NURSE PRACTITIONER

## 2019-02-12 PROCEDURE — 99213 OFFICE O/P EST LOW 20 MIN: CPT | Mod: ,,, | Performed by: NURSE PRACTITIONER

## 2019-02-12 PROCEDURE — 99213 PR OFFICE/OUTPT VISIT, EST, LEVL III, 20-29 MIN: ICD-10-PCS | Mod: ,,, | Performed by: NURSE PRACTITIONER

## 2019-02-12 PROCEDURE — 3078F PR MOST RECENT DIASTOLIC BLOOD PRESSURE < 80 MM HG: ICD-10-PCS | Mod: ,,, | Performed by: NURSE PRACTITIONER

## 2019-02-12 PROCEDURE — 3075F SYST BP GE 130 - 139MM HG: CPT | Mod: ,,, | Performed by: NURSE PRACTITIONER

## 2019-02-12 PROCEDURE — 3078F DIAST BP <80 MM HG: CPT | Mod: ,,, | Performed by: NURSE PRACTITIONER

## 2019-02-12 RX ORDER — VARENICLINE TARTRATE 1 MG/1
1 TABLET, FILM COATED ORAL 2 TIMES DAILY
Qty: 60 TABLET | Refills: 1 | Status: SHIPPED | OUTPATIENT
Start: 2019-02-12 | End: 2019-04-05 | Stop reason: SDUPTHER

## 2019-02-12 NOTE — PATIENT INSTRUCTIONS
How to Quit Smoking  Smoking is one of the hardest habits to break. About half of all people who have ever smoked have been able to quit. Most people who still smoke want to quit. Here are some of the best ways to stop smoking.    Keep trying  Most smokers make many attempts at quitting before they are successful. Its important not to give up.  Go cold turkey  Most former smokers quit cold turkey (all at once). Trying to cut back gradually doesn't seem to work as well, perhaps because it continues the smoking habit. Also, it is possible to inhale more while smoking fewer cigarettes. This results in the same amount of nicotine in your body.  Get support  Support programs can be a big help, especially for heavy smokers. These groups offer lectures, ways to change behavior, and peer support. Here are some ways to find a support program:  · Free national quitline: 800-QUIT-NOW (476-387-6487).  · Hospital quit-smoking programs.  · American Lung Association: (411.663.9888).  · American Cancer Society (679-213-7414).  Support at home is important too. Nonsmokers can offer praise and encouragement. If the smoker in your life finds it hard to quit, encourage them to keep trying.  Over-the-counter medicines  Nicotine replacement therapy may make quitting easier. Certain aids, such as the nicotine patch, gum, and lozenges, are available without a prescription. It is best to use these under a doctors care, though. The skin patch provides a steady supply of nicotine. Nicotine gum and lozenges give temporary bursts of low levels of nicotine. Both methods reduce the craving for cigarettes. Warning: If you have nausea, vomiting, dizziness, weakness, or a fast heartbeat, stop using these products and see your doctor.  Prescription medicines  After reviewing your smoking patterns and past attempts to quit, your doctor may offer a prescription medicine such as bupropion, varenicline, a nicotine inhaler, or nasal spray. Each has  "advantages and side effects. Your doctor can review these with you.  Health benefits of quitting  The benefits of quitting start right away and keep improving the longer you go without smoking. These benefits occur at any age.  So whether you are 17 or 70, quitting is a good decision. Some of the benefits include:  · 20 minutes: Blood pressure and pulse return to normal.  · 8 hours: Oxygen levels return to normal.  · 2 days: Ability to smell and taste begin to improve as damaged nerves regrow.  · 2 to 3 weeks: Circulation and lung function improve.  · 1 to 9 months: Coughing, congestion, and shortness of breath decrease; tiredness decreases.  · 1 year: Risk of heart attack decreases by half.  · 5 years: Risk of lung cancer decreases by half; risk of stroke becomes the same as a nonsmokers.  For more on how to quit smoking, try these online resources:   · [employer]  · "Clearing the Air" booklet from the National Cancer Cando: Corimmun.gov/sites/default/files/pdf/clearing-the-air-accessible.pdf  Date Last Reviewed: 3/1/2017  © 7543-2078 [employer]. [address] [place] [address]. All rights reserved. This information is not intended as a substitute for professional medical care. Always follow your healthcare professional's instructions.        Planning to Quit Smoking  Your healthcare provider may have told you that you need to give up tobacco. Only you can decide if and when you are ready to quit. Quitting is hard to do. But the benefits will be worth it. When you  decide to quit, come up with a plan thats right for you. Discuss your plan with your healthcare provider. And talk to your provider about medicines to help you quit.    Line up support  To quit smoking, youll need a plan and some help. Pick a date within the next 2 to 4 weeks to quit. Use the time between now and that date to arrange for support.  · Classes and counselors. Quit-smoking classes  people like you " through the process. Get to know others in a class, and support each other beyond the class. Telephone counseling also helps you keep on track. Ask your healthcare provider, local hospital, or public health department to put you in touch with a class and a phone counselor.  · Family and friends. Tell your family and friends about your quit date. Ask them to support your change. If they smoke, arrange to see them in smoke-free places. Forbid smoking in your home and car.     Finding something to replace cigarettes may be hard to do. Be aware that some things you choose may be as harmful as cigarettes:  · Smokeless (chewing) tobacco is just as harmful as regular tobacco. Tobacco should not be used as a substitute for cigarettes.  · Herbal medicines or teas may affect how your body handles nicotine. Talk to your healthcare provider before using these products.  · E-cigarettes have less toxins than the smoke from a regular cigarette. But the FDA says that these devices may still have substances that can cause cancer. E-cigarettes are not well regulated. They have not been studied enough to know if they are a good aid to help you stop smoking. Talk with your healthcare provider before using these products.      Quit-smoking products  Many products can help you quit smoking. Some are prescription medicines that help curb your cravings and withdrawal symptoms. Other products slowly lessen the level of nicotine your body absorbs. Nicotine is the highly addictive substance found in cigarettes, cigars, and chewing tobacco. Nicotine replacement products can help get your body used to slowly decreasing amounts of nicotine after you quit smoking. These products include a nicotine patch, gum, lozenge, nasal spray, and inhaler. Be sure you follow the directions for your medicine or product carefully. Your healthcare provider may tell you to start taking the prescription medicine a week before you plan to quit. Do not smoke while  you use nicotine products. Doing so can be very harmful to your health.  For more information  · https://smokefree.gov/dvlr-bg-yb-expert  · National Cancer [facility] Smoking Quitline: 877-44U-QUIT (695-421-9521)   Date Last Reviewed: 2/1/2017  © 2725-5528 [employer]. [address] [place] [address]. All rights reserved. This information is not intended as a substitute for professional medical care. Always follow your healthcare professional's instructions.

## 2019-02-12 NOTE — PROGRESS NOTES
Subjective:       Patient ID: Kemi Dominguez is a 53 y.o. female.    Chief Complaint: Nicotine Dependence (1 month f/u)    Patient presents today for smoking cessation follow up.  Patient was started on chantix starter pack 1 month ago to help with stopping smoking.  Patient was a 2 pack per day smoker.  Patient now smokes 7 cigarettes on average daily.  Patient states her urge to smoke has greatly decreased with the satisfaction diminishing as well.  Patient states she has had a few dreams for side effects of the chantix but no depression, suicidal thoughts, or other side effects.  Patient is obese with a BMI of 39.03      Nicotine Dependence   Presents for follow-up visit. Symptoms are negative for sore throat. Her urge triggers include company of smokers. The symptoms have been improving. Her first smoke is from 6 to 8 AM. She smokes < 1/2 a pack of cigarettes per day. Compliance with prior treatments has been good.     Review of Systems   Constitutional: Negative for activity change, appetite change, fever and unexpected weight change.        Obesity   HENT: Negative for congestion, ear discharge, ear pain, hearing loss, rhinorrhea, sore throat, trouble swallowing and voice change.    Eyes: Negative for photophobia, pain, discharge and visual disturbance.   Respiratory: Negative for cough, chest tightness, shortness of breath and wheezing.         Smoker, asthma   Cardiovascular: Negative for chest pain and palpitations.   Gastrointestinal: Negative for abdominal pain, blood in stool, constipation, diarrhea, nausea and vomiting.   Endocrine: Negative for cold intolerance, heat intolerance, polydipsia and polyuria.   Genitourinary: Negative for difficulty urinating, dysuria, hematuria and menstrual problem.   Musculoskeletal: Negative for arthralgias, gait problem, joint swelling and neck pain.   Skin: Negative for rash.   Allergic/Immunologic: Negative for immunocompromised state.   Neurological: Negative for  speech difficulty, weakness and headaches.   Psychiatric/Behavioral: Positive for dysphoric mood. Negative for confusion, self-injury and suicidal ideas.       Past Medical History:   Diagnosis Date    COPD (chronic obstructive pulmonary disease)     Diabetes mellitus, type 2     GERD (gastroesophageal reflux disease)     Hyperlipidemia     Hypertension     Vitamin D deficiency       Past Surgical History:   Procedure Laterality Date    ADENOIDECTOMY      BREAST SURGERY Right     breast biopsy    TONSILLECTOMY         Family History   Problem Relation Age of Onset    Stroke Mother     Emphysema Mother     Diabetes Father     Coronary artery disease Father     COPD Father     Diabetes Sister        Social History     Socioeconomic History    Marital status: Significant Other     Spouse name: None    Number of children: None    Years of education: None    Highest education level: None   Social Needs    Financial resource strain: None    Food insecurity - worry: None    Food insecurity - inability: None    Transportation needs - medical: None    Transportation needs - non-medical: None   Occupational History    None   Tobacco Use    Smoking status: Current Every Day Smoker     Packs/day: 2.00     Types: Cigarettes    Smokeless tobacco: Never Used   Substance and Sexual Activity    Alcohol use: Yes     Comment: rarely    Drug use: Yes     Types: Hydrocodone    Sexual activity: No   Other Topics Concern    None   Social History Narrative    None       Current Outpatient Medications   Medication Sig Dispense Refill    albuterol-ipratropium (DUO-NEB) 2.5 mg-0.5 mg/3 mL nebulizer solution Take 3 mLs by nebulization every 6 (six) hours as needed for Wheezing. Rescue 1 Box 2    amLODIPine (NORVASC) 10 MG tablet Take 1 tablet (10 mg total) by mouth once daily. 90 tablet 1    aspirin/salicylamide/caffeine (BC HEADACHE POWDER ORAL) Take by mouth as needed.      baclofen (LIORESAL) 10 MG tablet  Take 10 mg by mouth 3 (three) times daily as needed.       buPROPion (WELLBUTRIN XL) 300 MG 24 hr tablet Take 1 tablet (300 mg total) by mouth once daily. 30 tablet 5    clotrimazole-betamethasone 1-0.05% (LOTRISONE) cream Apply topically as needed.      ergocalciferol, vitamin D2, (VITAMIN D2 ORAL) Take 1.25 mg by mouth once a week.      fluticasone-salmeterol 500-50 mcg/dose (ADVAIR DISKUS) 500-50 mcg/dose DsDv diskus inhaler Inhale 1 puff into the lungs 2 (two) times daily. Controller 60 each 5    gabapentin (NEURONTIN) 300 MG capsule Take 300 mg by mouth 2 (two) times daily as needed.       HYDROcodone-acetaminophen (NORCO)  mg per tablet Take 1 tablet by mouth 3 (three) times daily.      ipratropium-albuterol (COMBIVENT RESPIMAT)  mcg/actuation inhaler Inhale 2 puffs into the lungs every 6 (six) hours as needed for Wheezing. Rescue 4 g 6    melatonin 5 mg Tab Take 1 tablet by mouth nightly.      montelukast (SINGULAIR) 10 mg tablet Take 1 tablet (10 mg total) by mouth once daily. 90 tablet 1    mupirocin (BACTROBAN) 2 % ointment by Nasal route 3 (three) times daily. 22 g 1    pantoprazole (PROTONIX) 40 MG tablet Take 1 tablet (40 mg total) by mouth once daily. 90 tablet 1    rosuvastatin (CRESTOR) 20 MG tablet Take 1 tablet (20 mg total) by mouth once daily. 90 tablet 1    SITagliptan-metformin (JANUMET XR) 100-1,000 mg TM24 Take 1 tablet by mouth once daily. 90 tablet 1    valsartan-hydrochlorothiazide (DIOVAN-HCT) 160-25 mg per tablet Take 1 tablet by mouth once daily. 90 tablet 1    varenicline (CHANTIX) 1 mg Tab Take 1 tablet (1 mg total) by mouth 2 (two) times daily. 60 tablet 1     No current facility-administered medications for this visit.        Review of patient's allergies indicates:  No Known Allergies  Objective:    HPI     Nicotine Dependence      Additional comments: 1 month f/u          Last edited by Kate Hernandez LPN on 2/12/2019  1:09 PM. (History)      Blood  "pressure 132/78, pulse 91, height 5' 6" (1.676 m), weight 109.7 kg (241 lb 12.8 oz), SpO2 96 %. Body mass index is 39.03 kg/m².   Physical Exam   Constitutional: She is oriented to person, place, and time. She appears well-developed. She is cooperative. No distress.   HENT:   Head: Normocephalic and atraumatic.   Right Ear: Tympanic membrane normal.   Left Ear: Tympanic membrane normal.   Mouth/Throat: Oropharynx is clear and moist.   Eyes: Conjunctivae, EOM and lids are normal. Pupils are equal, round, and reactive to light. Lids are everted and swept, no foreign bodies found. Right pupil is round and reactive. Left pupil is round and reactive.   Neck: Trachea normal and normal range of motion. Neck supple.   Cardiovascular: Normal rate, regular rhythm, S1 normal, S2 normal and normal heart sounds.   Pulmonary/Chest: Effort normal. She has wheezes in the left middle field and the left lower field.   Abdominal: Soft. Bowel sounds are normal. There is no rigidity and no guarding.   Musculoskeletal: Normal range of motion.   Lymphadenopathy:     She has no cervical adenopathy.     She has no axillary adenopathy.   Neurological: She is alert and oriented to person, place, and time.   Skin: Skin is warm and dry. Capillary refill takes less than 2 seconds.   Psychiatric: She has a normal mood and affect. Her behavior is normal. Judgment and thought content normal.   Nursing note and vitals reviewed.          Assessment:       1. Cigarette nicotine dependence without complication    2. Class 2 severe obesity due to excess calories with serious comorbidity and body mass index (BMI) of 39.0 to 39.9 in adult    3. Chronic obstructive pulmonary disease, unspecified COPD type    4. Wheezing        Plan:       Kemi was seen today for nicotine dependence.    Diagnoses and all orders for this visit:    Cigarette nicotine dependence without complication  -     varenicline (CHANTIX) 1 mg Tab; Take 1 tablet (1 mg total) by mouth 2 " (two) times daily.  -Continue chantix for 2 more months 2 times daily and follow up in 2 months.  Completely stop smoking within the next month.    Class 2 severe obesity due to excess calories with serious comorbidity and body mass index (BMI) of 39.0 to 39.9 in adult  -The patient is asked to make an attempt to improve diet and exercise patterns to aid in medical management of this problem.    Chronic obstructive pulmonary disease, unspecified COPD type  -Continue inhalers as prescribed.    Wheezing  -Continue inhalers as prescribed.       Follow up in 2 months for smoking cessation follow up.

## 2019-02-21 DIAGNOSIS — J44.9 CHRONIC OBSTRUCTIVE PULMONARY DISEASE, UNSPECIFIED COPD TYPE: ICD-10-CM

## 2019-02-23 RX ORDER — MONTELUKAST SODIUM 10 MG/1
10 TABLET ORAL DAILY
Qty: 90 TABLET | Refills: 1 | Status: SHIPPED | OUTPATIENT
Start: 2019-02-23 | End: 2019-08-26 | Stop reason: SDUPTHER

## 2019-03-07 DIAGNOSIS — J44.9 CHRONIC OBSTRUCTIVE PULMONARY DISEASE, UNSPECIFIED COPD TYPE: ICD-10-CM

## 2019-03-15 DIAGNOSIS — K21.9 GASTROESOPHAGEAL REFLUX DISEASE, ESOPHAGITIS PRESENCE NOT SPECIFIED: ICD-10-CM

## 2019-03-18 RX ORDER — PANTOPRAZOLE SODIUM 40 MG/1
40 TABLET, DELAYED RELEASE ORAL DAILY
Qty: 90 TABLET | Refills: 1 | Status: SHIPPED | OUTPATIENT
Start: 2019-03-18 | End: 2019-09-30 | Stop reason: SDUPTHER

## 2019-04-05 DIAGNOSIS — F17.210 CIGARETTE NICOTINE DEPENDENCE WITHOUT COMPLICATION: ICD-10-CM

## 2019-04-05 RX ORDER — VARENICLINE TARTRATE 1 MG/1
1 TABLET, FILM COATED ORAL 2 TIMES DAILY
Qty: 60 TABLET | Refills: 0 | Status: SHIPPED | OUTPATIENT
Start: 2019-04-05 | End: 2019-04-29 | Stop reason: SDUPTHER

## 2019-04-08 ENCOUNTER — OFFICE VISIT (OUTPATIENT)
Dept: PAIN MEDICINE | Facility: CLINIC | Age: 54
End: 2019-04-08
Payer: COMMERCIAL

## 2019-04-08 VITALS
SYSTOLIC BLOOD PRESSURE: 141 MMHG | HEART RATE: 89 BPM | HEIGHT: 66 IN | DIASTOLIC BLOOD PRESSURE: 82 MMHG | WEIGHT: 241 LBS | BODY MASS INDEX: 38.73 KG/M2

## 2019-04-08 DIAGNOSIS — M54.16 LUMBAR RADICULOPATHY: Primary | ICD-10-CM

## 2019-04-08 DIAGNOSIS — M51.36 DDD (DEGENERATIVE DISC DISEASE), LUMBAR: ICD-10-CM

## 2019-04-08 DIAGNOSIS — M54.16 LUMBAR RADICULITIS: Primary | ICD-10-CM

## 2019-04-08 PROCEDURE — 3079F PR MOST RECENT DIASTOLIC BLOOD PRESSURE 80-89 MM HG: ICD-10-PCS | Mod: CPTII,S$GLB,, | Performed by: ANESTHESIOLOGY

## 2019-04-08 PROCEDURE — 3008F PR BODY MASS INDEX (BMI) DOCUMENTED: ICD-10-PCS | Mod: CPTII,S$GLB,, | Performed by: ANESTHESIOLOGY

## 2019-04-08 PROCEDURE — 3077F SYST BP >= 140 MM HG: CPT | Mod: CPTII,S$GLB,, | Performed by: ANESTHESIOLOGY

## 2019-04-08 PROCEDURE — 99999 PR PBB SHADOW E&M-NEW PATIENT-LVL III: CPT | Mod: PBBFAC,,, | Performed by: ANESTHESIOLOGY

## 2019-04-08 PROCEDURE — 3008F BODY MASS INDEX DOCD: CPT | Mod: CPTII,S$GLB,, | Performed by: ANESTHESIOLOGY

## 2019-04-08 PROCEDURE — 3079F DIAST BP 80-89 MM HG: CPT | Mod: CPTII,S$GLB,, | Performed by: ANESTHESIOLOGY

## 2019-04-08 PROCEDURE — 99204 PR OFFICE/OUTPT VISIT, NEW, LEVL IV, 45-59 MIN: ICD-10-PCS | Mod: S$GLB,,, | Performed by: ANESTHESIOLOGY

## 2019-04-08 PROCEDURE — 99999 PR PBB SHADOW E&M-NEW PATIENT-LVL III: ICD-10-PCS | Mod: PBBFAC,,, | Performed by: ANESTHESIOLOGY

## 2019-04-08 PROCEDURE — 99204 OFFICE O/P NEW MOD 45 MIN: CPT | Mod: S$GLB,,, | Performed by: ANESTHESIOLOGY

## 2019-04-08 PROCEDURE — 3077F PR MOST RECENT SYSTOLIC BLOOD PRESSURE >= 140 MM HG: ICD-10-PCS | Mod: CPTII,S$GLB,, | Performed by: ANESTHESIOLOGY

## 2019-04-08 RX ORDER — IBUPROFEN 800 MG/1
800 TABLET ORAL 3 TIMES DAILY
COMMUNITY
Start: 2019-04-02 | End: 2021-11-21

## 2019-04-08 RX ORDER — TIZANIDINE HYDROCHLORIDE 4 MG/1
1 CAPSULE, GELATIN COATED ORAL 3 TIMES DAILY PRN
COMMUNITY
Start: 2019-04-02 | End: 2023-03-21 | Stop reason: SDUPTHER

## 2019-04-08 NOTE — H&P (VIEW-ONLY)
This note was completed with dictation software and grammatical errors may exist.    Referring Physician: Self, Tommyreferral    PCP: Fiorella Melchor, PARAG      CC:  Low back and left leg pain    HPI:   Kemi Dominguez is a 54 y.o. female referred to us for low back and left leg pain.  Pain has gradually worsened over past 2 months.  No recent traumatic incident.  She presents with a constant aching, throbbing, sharp pain in her lower back.  Pain radiates down her left lateral leg into her left foot.  Pain worsens standing, walking, getting.  Pain improves with rest.  She recently had updated lumbar MRI.  She currently takes baclofen and tizanidine with minimal benefit.  She is prescribed hydrocodone by her orthopedic for chronic back pain in the past.  Medicine does not help with her radicular symptoms currently.  She is currently not taking anti neuropathic medications. She was seen by outside pain provider who recommended the lumbar CORINE, however, her insurance was not covered.  She wishes to proceed with interventions with us.  She denies any worsening weakness.  No bowel bladder changes.    ROS:  CONSTITUTIONAL: No fevers, chills, night sweats, wt. loss, appetite changes  SKIN: no rashes or itching  ENT: No headaches, head trauma, vision changes, or eye pain  LYMPH NODES: None noticed   CV: No chest pain, palpitations.   RESP: No shortness of breath, dyspnea on exertion, cough, wheezing, or hemoptysis  GI: No nausea, emesis, diarrhea, constipation, melena, hematochezia, pain.    : No dysuria, hematuria, urgency, or frequency   HEME: No easy bruising, bleeding problems  PSYCHIATRIC: No depression, anxiety, psychosis, hallucinations.  NEURO: No seizures, memory loss, dizziness or difficulty sleeping  MSK:  Positive HPI      Past Medical History:   Diagnosis Date    COPD (chronic obstructive pulmonary disease)     Diabetes mellitus, type 2     GERD (gastroesophageal reflux disease)     Hyperlipidemia      "Hypertension     Vitamin D deficiency      Past Surgical History:   Procedure Laterality Date    ADENOIDECTOMY      BREAST SURGERY Right     breast biopsy    TONSILLECTOMY       Family History   Problem Relation Age of Onset    Stroke Mother     Emphysema Mother     Diabetes Father     Coronary artery disease Father     COPD Father     Diabetes Sister      Social History     Socioeconomic History    Marital status: Significant Other     Spouse name: Not on file    Number of children: Not on file    Years of education: Not on file    Highest education level: Not on file   Occupational History    Not on file   Social Needs    Financial resource strain: Not on file    Food insecurity:     Worry: Not on file     Inability: Not on file    Transportation needs:     Medical: Not on file     Non-medical: Not on file   Tobacco Use    Smoking status: Current Every Day Smoker     Packs/day: 2.00     Types: Cigarettes    Smokeless tobacco: Never Used   Substance and Sexual Activity    Alcohol use: Yes     Comment: rarely    Drug use: Yes     Types: Hydrocodone    Sexual activity: Never   Lifestyle    Physical activity:     Days per week: Not on file     Minutes per session: Not on file    Stress: Not on file   Relationships    Social connections:     Talks on phone: Not on file     Gets together: Not on file     Attends Yazidism service: Not on file     Active member of club or organization: Not on file     Attends meetings of clubs or organizations: Not on file     Relationship status: Not on file   Other Topics Concern    Not on file   Social History Narrative    Not on file         Medications/Allergies: See med card    Vitals:    04/08/19 0926   BP: (!) 141/82   Pulse: 89   Weight: 109.3 kg (241 lb)   Height: 5' 6" (1.676 m)   PainSc:   6   PainLoc: Back         Physical exam:    GENERAL: A and O x3, the patient appears well groomed and is in no acute distress.  Skin: No rashes or obvious " lesions  HEENT: normocephalic, atraumatic  CARDIOVASCULAR:  Palpable peripheral pulses  LUNGS: easy work of breathing  ABDOMEN: soft, nontender   UPPER EXTREMITIES: Normal alignment, normal range of motion, no atrophy, no skin changes,  hair growth and nail growth normal and equal bilaterally. No swelling, no tenderness.    LOWER EXTREMITIES:  Normal alignment, normal range of motion, no atrophy, no skin changes,  hair growth and nail growth normal and equal bilaterally. No swelling, no tenderness.  LUMBAR SPINE  Lumbar spine: ROM is limited with flexion extension and oblique extension with moderate increased pain.    Huy's test causes no increased pain on either side.    Supine straight leg raise is positive on the left at 60°   Internal and external rotation of the hip causes no increased pain on either side.  Myofascial exam: No tenderness to palpation across lumbar paraspinous muscles.      MENTAL STATUS: normal orientation, speech, language, and fund of knowledge for social situation.  Emotional state appropriate.    CRANIAL NERVES:  II:  PERRL bilaterally,   III,IV,VI: EOMI.    V:  Facial sensation equal bilaterally  VII:  Facial motor function normal.  VIII:  Hearing equal to finger rub bilaterally  IX/X: Gag normal, palate symmetric  XI:  Shoulder shrug equal, head turn equal  XII:  Tongue midline without fasciculations      MOTOR: Tone and bulk: normal bilateral upper and lower Strength: normal   Delt Bi Tri WE WF     R 5 5 5 5 5 5   L 5 5 5 5 5 5     IP ADD ABD Quad TA Gas HAM  R 5 5 5 5 5 5 5  L 5 5 5 5 5 5 5    SENSATION: Light touch and pinprick intact bilaterally  REFLEXES: normal, symmetric, nonbrisk.  Toes down, no clonus. No hoffmans.  GAIT: normal rise, base, steps, and arm swing.        Imaging:  MRI lumbar spine without contrast 01/23/2019 (S )  L3-4, shallow broad-based disc bulge.  Bilateral facet arthropathy.  Mild degree of spinal canal encroachment.  Moderate right and mild left  foraminal narrowing.  L4-5, broad-based disc bulge.  Bilateral facet arthropathy.  Moderate degree of spinal canal encroachment and moderate degrees of bilateral neural foraminal narrowing.  L5-S1, broad-based disc bulge.  Protruded disc margin contacts bilateral S1 nerve roots asymmetric to the left.  Moderate degree of spinal canal stenosis and bilateral neural foraminal narrowing    Assessment:  Patient referred for low back and left leg pain  1. Lumbar radiculitis    2. DDD (degenerative disc disease), lumbar          Plan:  1. I have stressed the importance of physical activity and exercise to improve overall health  2. Reviewed pertinent imaging and records with patient  3. I think that the patient's back pain and radicular leg symptoms are due to degenerative disc disease and have recommended a lumbar epidural steroid injection to the Left L5-S1 and S1 level(s).  4. May consider adding gabapentin for anti neuropathic adjunct.  5.  Follow-up after the procedure      Thank you for referring this interesting patient, and I look forward to continuing to collaborate in her care.

## 2019-04-08 NOTE — PROGRESS NOTES
This note was completed with dictation software and grammatical errors may exist.    Referring Physician: Self, Tommyreferral    PCP: Fiorella Melchor, PARAG      CC:  Low back and left leg pain    HPI:   Kemi Dominguez is a 54 y.o. female referred to us for low back and left leg pain.  Pain has gradually worsened over past 2 months.  No recent traumatic incident.  She presents with a constant aching, throbbing, sharp pain in her lower back.  Pain radiates down her left lateral leg into her left foot.  Pain worsens standing, walking, getting.  Pain improves with rest.  She recently had updated lumbar MRI.  She currently takes baclofen and tizanidine with minimal benefit.  She is prescribed hydrocodone by her orthopedic for chronic back pain in the past.  Medicine does not help with her radicular symptoms currently.  She is currently not taking anti neuropathic medications. She was seen by outside pain provider who recommended the lumbar CORINE, however, her insurance was not covered.  She wishes to proceed with interventions with us.  She denies any worsening weakness.  No bowel bladder changes.    ROS:  CONSTITUTIONAL: No fevers, chills, night sweats, wt. loss, appetite changes  SKIN: no rashes or itching  ENT: No headaches, head trauma, vision changes, or eye pain  LYMPH NODES: None noticed   CV: No chest pain, palpitations.   RESP: No shortness of breath, dyspnea on exertion, cough, wheezing, or hemoptysis  GI: No nausea, emesis, diarrhea, constipation, melena, hematochezia, pain.    : No dysuria, hematuria, urgency, or frequency   HEME: No easy bruising, bleeding problems  PSYCHIATRIC: No depression, anxiety, psychosis, hallucinations.  NEURO: No seizures, memory loss, dizziness or difficulty sleeping  MSK:  Positive HPI      Past Medical History:   Diagnosis Date    COPD (chronic obstructive pulmonary disease)     Diabetes mellitus, type 2     GERD (gastroesophageal reflux disease)     Hyperlipidemia      "Hypertension     Vitamin D deficiency      Past Surgical History:   Procedure Laterality Date    ADENOIDECTOMY      BREAST SURGERY Right     breast biopsy    TONSILLECTOMY       Family History   Problem Relation Age of Onset    Stroke Mother     Emphysema Mother     Diabetes Father     Coronary artery disease Father     COPD Father     Diabetes Sister      Social History     Socioeconomic History    Marital status: Significant Other     Spouse name: Not on file    Number of children: Not on file    Years of education: Not on file    Highest education level: Not on file   Occupational History    Not on file   Social Needs    Financial resource strain: Not on file    Food insecurity:     Worry: Not on file     Inability: Not on file    Transportation needs:     Medical: Not on file     Non-medical: Not on file   Tobacco Use    Smoking status: Current Every Day Smoker     Packs/day: 2.00     Types: Cigarettes    Smokeless tobacco: Never Used   Substance and Sexual Activity    Alcohol use: Yes     Comment: rarely    Drug use: Yes     Types: Hydrocodone    Sexual activity: Never   Lifestyle    Physical activity:     Days per week: Not on file     Minutes per session: Not on file    Stress: Not on file   Relationships    Social connections:     Talks on phone: Not on file     Gets together: Not on file     Attends Jewish service: Not on file     Active member of club or organization: Not on file     Attends meetings of clubs or organizations: Not on file     Relationship status: Not on file   Other Topics Concern    Not on file   Social History Narrative    Not on file         Medications/Allergies: See med card    Vitals:    04/08/19 0926   BP: (!) 141/82   Pulse: 89   Weight: 109.3 kg (241 lb)   Height: 5' 6" (1.676 m)   PainSc:   6   PainLoc: Back         Physical exam:    GENERAL: A and O x3, the patient appears well groomed and is in no acute distress.  Skin: No rashes or obvious " lesions  HEENT: normocephalic, atraumatic  CARDIOVASCULAR:  Palpable peripheral pulses  LUNGS: easy work of breathing  ABDOMEN: soft, nontender   UPPER EXTREMITIES: Normal alignment, normal range of motion, no atrophy, no skin changes,  hair growth and nail growth normal and equal bilaterally. No swelling, no tenderness.    LOWER EXTREMITIES:  Normal alignment, normal range of motion, no atrophy, no skin changes,  hair growth and nail growth normal and equal bilaterally. No swelling, no tenderness.  LUMBAR SPINE  Lumbar spine: ROM is limited with flexion extension and oblique extension with moderate increased pain.    Huy's test causes no increased pain on either side.    Supine straight leg raise is positive on the left at 60°   Internal and external rotation of the hip causes no increased pain on either side.  Myofascial exam: No tenderness to palpation across lumbar paraspinous muscles.      MENTAL STATUS: normal orientation, speech, language, and fund of knowledge for social situation.  Emotional state appropriate.    CRANIAL NERVES:  II:  PERRL bilaterally,   III,IV,VI: EOMI.    V:  Facial sensation equal bilaterally  VII:  Facial motor function normal.  VIII:  Hearing equal to finger rub bilaterally  IX/X: Gag normal, palate symmetric  XI:  Shoulder shrug equal, head turn equal  XII:  Tongue midline without fasciculations      MOTOR: Tone and bulk: normal bilateral upper and lower Strength: normal   Delt Bi Tri WE WF     R 5 5 5 5 5 5   L 5 5 5 5 5 5     IP ADD ABD Quad TA Gas HAM  R 5 5 5 5 5 5 5  L 5 5 5 5 5 5 5    SENSATION: Light touch and pinprick intact bilaterally  REFLEXES: normal, symmetric, nonbrisk.  Toes down, no clonus. No hoffmans.  GAIT: normal rise, base, steps, and arm swing.        Imaging:  MRI lumbar spine without contrast 01/23/2019 (S )  L3-4, shallow broad-based disc bulge.  Bilateral facet arthropathy.  Mild degree of spinal canal encroachment.  Moderate right and mild left  foraminal narrowing.  L4-5, broad-based disc bulge.  Bilateral facet arthropathy.  Moderate degree of spinal canal encroachment and moderate degrees of bilateral neural foraminal narrowing.  L5-S1, broad-based disc bulge.  Protruded disc margin contacts bilateral S1 nerve roots asymmetric to the left.  Moderate degree of spinal canal stenosis and bilateral neural foraminal narrowing    Assessment:  Patient referred for low back and left leg pain  1. Lumbar radiculitis    2. DDD (degenerative disc disease), lumbar          Plan:  1. I have stressed the importance of physical activity and exercise to improve overall health  2. Reviewed pertinent imaging and records with patient  3. I think that the patient's back pain and radicular leg symptoms are due to degenerative disc disease and have recommended a lumbar epidural steroid injection to the Left L5-S1 and S1 level(s).  4. May consider adding gabapentin for anti neuropathic adjunct.  5.  Follow-up after the procedure      Thank you for referring this interesting patient, and I look forward to continuing to collaborate in her care.

## 2019-04-12 ENCOUNTER — OFFICE VISIT (OUTPATIENT)
Dept: FAMILY MEDICINE | Facility: CLINIC | Age: 54
End: 2019-04-12
Payer: COMMERCIAL

## 2019-04-12 VITALS
HEIGHT: 66 IN | WEIGHT: 243 LBS | DIASTOLIC BLOOD PRESSURE: 70 MMHG | OXYGEN SATURATION: 97 % | HEART RATE: 90 BPM | BODY MASS INDEX: 39.05 KG/M2 | SYSTOLIC BLOOD PRESSURE: 134 MMHG

## 2019-04-12 DIAGNOSIS — E11.8 TYPE 2 DIABETES MELLITUS WITH COMPLICATION, WITHOUT LONG-TERM CURRENT USE OF INSULIN: ICD-10-CM

## 2019-04-12 DIAGNOSIS — G89.29 CHRONIC MIDLINE LOW BACK PAIN WITH LEFT-SIDED SCIATICA: ICD-10-CM

## 2019-04-12 DIAGNOSIS — E55.9 VITAMIN D DEFICIENCY: ICD-10-CM

## 2019-04-12 DIAGNOSIS — I10 ESSENTIAL HYPERTENSION: ICD-10-CM

## 2019-04-12 DIAGNOSIS — E78.2 MIXED HYPERLIPIDEMIA: ICD-10-CM

## 2019-04-12 DIAGNOSIS — Z87.891 FORMER SMOKER: ICD-10-CM

## 2019-04-12 DIAGNOSIS — Z71.6 ENCOUNTER FOR SMOKING CESSATION COUNSELING: Primary | ICD-10-CM

## 2019-04-12 DIAGNOSIS — M54.42 CHRONIC MIDLINE LOW BACK PAIN WITH LEFT-SIDED SCIATICA: ICD-10-CM

## 2019-04-12 DIAGNOSIS — J44.9 CHRONIC OBSTRUCTIVE PULMONARY DISEASE, UNSPECIFIED COPD TYPE: ICD-10-CM

## 2019-04-12 PROCEDURE — 3008F BODY MASS INDEX DOCD: CPT | Mod: ,,, | Performed by: NURSE PRACTITIONER

## 2019-04-12 PROCEDURE — 3078F DIAST BP <80 MM HG: CPT | Mod: ,,, | Performed by: NURSE PRACTITIONER

## 2019-04-12 PROCEDURE — 3078F PR MOST RECENT DIASTOLIC BLOOD PRESSURE < 80 MM HG: ICD-10-PCS | Mod: ,,, | Performed by: NURSE PRACTITIONER

## 2019-04-12 PROCEDURE — 99214 PR OFFICE/OUTPT VISIT, EST, LEVL IV, 30-39 MIN: ICD-10-PCS | Mod: ,,, | Performed by: NURSE PRACTITIONER

## 2019-04-12 PROCEDURE — 3008F PR BODY MASS INDEX (BMI) DOCUMENTED: ICD-10-PCS | Mod: ,,, | Performed by: NURSE PRACTITIONER

## 2019-04-12 PROCEDURE — 99214 OFFICE O/P EST MOD 30 MIN: CPT | Mod: ,,, | Performed by: NURSE PRACTITIONER

## 2019-04-12 PROCEDURE — 3044F PR MOST RECENT HEMOGLOBIN A1C LEVEL <7.0%: ICD-10-PCS | Mod: ,,, | Performed by: NURSE PRACTITIONER

## 2019-04-12 PROCEDURE — 3075F PR MOST RECENT SYSTOLIC BLOOD PRESS GE 130-139MM HG: ICD-10-PCS | Mod: ,,, | Performed by: NURSE PRACTITIONER

## 2019-04-12 PROCEDURE — 99406 PR TOBACCO USE CESSATION INTERMEDIATE 3-10 MINUTES: ICD-10-PCS | Mod: ,,, | Performed by: NURSE PRACTITIONER

## 2019-04-12 PROCEDURE — 3075F SYST BP GE 130 - 139MM HG: CPT | Mod: ,,, | Performed by: NURSE PRACTITIONER

## 2019-04-12 PROCEDURE — 99406 BEHAV CHNG SMOKING 3-10 MIN: CPT | Mod: ,,, | Performed by: NURSE PRACTITIONER

## 2019-04-12 PROCEDURE — 3044F HG A1C LEVEL LT 7.0%: CPT | Mod: ,,, | Performed by: NURSE PRACTITIONER

## 2019-04-12 RX ORDER — ERGOCALCIFEROL 1.25 MG/1
50000 CAPSULE ORAL WEEKLY
Qty: 12 CAPSULE | Refills: 7 | Status: SHIPPED | OUTPATIENT
Start: 2019-04-12 | End: 2019-09-25 | Stop reason: SDUPTHER

## 2019-04-12 RX ORDER — IPRATROPIUM BROMIDE AND ALBUTEROL SULFATE 2.5; .5 MG/3ML; MG/3ML
3 SOLUTION RESPIRATORY (INHALATION) EVERY 6 HOURS PRN
Qty: 1 BOX | Refills: 2 | Status: SHIPPED | OUTPATIENT
Start: 2019-04-12 | End: 2020-01-20 | Stop reason: SDUPTHER

## 2019-04-12 NOTE — PATIENT INSTRUCTIONS
How Your Back Works  A healthy back allows you to bend and stretch without pain. The spine has three natural curves, which keep your body balanced. Strong, flexible muscles support your spine. Soft, cushioning disks separate the hard bones of your spine, allowing it to bend and move.    The parts of the spine  · The vertebrae are the 24 bones that make up the spine.  · The spinous process is the part of each vertebra you can feel through your skin.  · Each of these bones has a canal that runs top to bottom. Together these canals form a tunnel called the spinal canal.  · The lamina of each vertebra forms the back of the spinal canal.  · Running through the canal are nerves.  · A foramen is a small opening where a nerve leaves the spinal canal.  · Disks serve as cushions between vertebrae. A disks soft center absorbs shock during movement.     Two vertebrae and a disk     The supporting muscles  Strong, flexible muscles help maintain your three natural curves. They hold your spine in proper alignment. This helps support your upper body. Strong core muscular including the stomach, buttock, and thigh muscles help take the strain off your back.  Date Last Reviewed: 8/31/2015 © 2000-2017 [employer]. [address] [place] [address]. All rights reserved. This information is not intended as a substitute for professional medical care. Always follow your healthcare professional's instructions.        Exercise for a Healthier Heart  You may wonder how you can improve the health of your heart. If youre thinking about exercise, youre on the right track. You dont need to become an athlete, but you do need a certain amount of brisk exercise to help strengthen your heart. If you have been diagnosed with a heart condition, your doctor may recommend exercise to help stabilize your condition. To help make exercise a habit, choose safe, fun activities.     Exercise with a friend. When activity is fun,  you're more likely to stick with it.     Be sure to check with your healthcare provider before starting an exercise program.   Why exercise?  Exercising regularly offers many healthy rewards. It can help you do all of the following:  · Improve your blood cholesterol level to help prevent further heart trouble  · Lower your blood pressure to help prevent a stroke or heart attack  · Control diabetes, or reduce your risk of getting this disease  · Improve your heart and lung function  · Reach and maintain a healthy weight  · Make your muscles stronger and more limber so you can stay active  · Prevent falls and fractures by slowing the loss of bone mass (osteoporosis)  · Manage stress better  · Reduce your blood pressure  · Improve your sense of self and your body image  Exercise tips  Ease into your routine. Set small goals. Then build on them.  Exercise on most days. Aim for a total of 150 or more minutes of moderate to  vigorous intensity activity each week. Consider 40 minutes, 3 to 4 times a week. For best results, activity should last for 40 minutes on average. It is OK to work up to the 40 minute period over time. Examples of moderate-intensity activity is walking 1 mile in 15 minutes or 30 to 45 minutes of yard work.  Step up your daily activity level. Along with your exercise program, try being more active throughout the day. Walk instead of drive. Do more household tasks or yard work.  Choose one or more activities you enjoy. Walking is one of the easiest things you can do. You can also try swimming, riding a bike, dancing, or taking an exercise class.  Stop exercising and call your doctor if you:  · Have chest pain or feel dizzy or lightheaded  · Feel burning, tightness, pressure, or heaviness in your chest, neck, shoulders, back, or arms  · Have unusual shortness of breath  · Have increased joint or muscle pain  · Have palpitations or an irregular heartbeat   Date Last Reviewed: 5/1/2016  © 3748-7561 [employer]. [address] [place] [address], [place] [address]. All rights reserved. This information is not intended as a substitute for professional medical care. Always follow your healthcare professional's instructions.

## 2019-04-12 NOTE — PROGRESS NOTES
Subjective:       Patient ID: Kemi Dominguez is a 54 y.o. female.    Chief Complaint: Nicotine Dependence    Patient presents today for smoking cessation follow up in her routine chronic medical conditions including COPD, hypertension, diabetes, hyperlipidemia, and obesity with a BMI of 39.22.  Patient states she has completely stopped smoking. Patient has stopped smoking since the end of February 2019. Patient has continued Chantix but states she will discontinue soon. Patient has completed almost 12 weeks of therapy with the Chantix. Patient has successfully stopped smoking at this point. All of her other chronic medical conditions are controlled with need for lab recheck in about 2 months. Patient is having no side effects to any medication she is taking. Patient is wheezing and has been in the last few weeks. Patient states she is scheduled to see Dr. Truong for pulmonology for evaluation of her COPD.    Nicotine Dependence   Presents for follow-up visit. Symptoms are negative for sore throat. Her urge triggers include company of smokers. The symptoms have been improving. Her first smoke is from 6 to 8 AM. She smokes < 1/2 a pack of cigarettes per day. Compliance with prior treatments has been good.     Review of Systems   Constitutional: Negative for activity change, appetite change, fever and unexpected weight change.        Obesity   HENT: Negative for congestion, ear discharge, ear pain, hearing loss, rhinorrhea, sore throat, trouble swallowing and voice change.    Eyes: Negative for photophobia, pain, discharge and visual disturbance.   Respiratory: Negative for cough, chest tightness, shortness of breath and wheezing.         Previous smoker, COPD   Cardiovascular: Negative for chest pain and palpitations.        Hypertension, hyperlipidemia   Gastrointestinal: Negative for abdominal pain, blood in stool, constipation, diarrhea, nausea and vomiting.        Vitamin d deficiency   Endocrine: Negative for  cold intolerance, heat intolerance, polydipsia and polyuria.        Diabetes type 2   Genitourinary: Negative for difficulty urinating, dysuria, hematuria and menstrual problem.   Musculoskeletal: Negative for arthralgias, gait problem, joint swelling and neck pain.   Skin: Negative for rash.   Allergic/Immunologic: Negative for immunocompromised state.   Neurological: Negative for speech difficulty, weakness and headaches.   Psychiatric/Behavioral: Positive for dysphoric mood. Negative for confusion, self-injury and suicidal ideas.       Past Medical History:   Diagnosis Date    COPD (chronic obstructive pulmonary disease)     Diabetes mellitus, type 2     GERD (gastroesophageal reflux disease)     Hyperlipidemia     Hypertension     Vitamin D deficiency       Past Surgical History:   Procedure Laterality Date    ADENOIDECTOMY      BREAST SURGERY Right     breast biopsy    TONSILLECTOMY         Family History   Problem Relation Age of Onset    Stroke Mother     Emphysema Mother     Diabetes Father     Coronary artery disease Father     COPD Father     Diabetes Sister        Social History     Socioeconomic History    Marital status: Significant Other     Spouse name: Not on file    Number of children: Not on file    Years of education: Not on file    Highest education level: Not on file   Occupational History    Not on file   Social Needs    Financial resource strain: Not on file    Food insecurity:     Worry: Not on file     Inability: Not on file    Transportation needs:     Medical: Not on file     Non-medical: Not on file   Tobacco Use    Smoking status: Former Smoker     Packs/day: 2.00     Types: Cigarettes     Last attempt to quit: 2019     Years since quittin.1    Smokeless tobacco: Never Used   Substance and Sexual Activity    Alcohol use: Yes     Comment: rarely    Drug use: Yes     Types: Hydrocodone    Sexual activity: Never   Lifestyle    Physical activity:      Days per week: Not on file     Minutes per session: Not on file    Stress: Not on file   Relationships    Social connections:     Talks on phone: Not on file     Gets together: Not on file     Attends [employer] service: Not on file     Active member of club or organization: Not on file     Attends meetings of clubs or organizations: Not on file     Relationship status: Not on file   Other Topics Concern    Not on file   Social History Narrative    Not on file       Current Outpatient Medications   Medication Sig Dispense Refill    albuterol-ipratropium (DUO-NEB) 2.5 mg-0.5 mg/3 mL nebulizer solution Take 3 mLs by nebulization every 6 (six) hours as needed for Wheezing. Rescue 1 Box 2    amLODIPine (NORVASC) 10 MG tablet Take 1 tablet (10 mg total) by mouth once daily. 90 tablet 1    aspirin/salicylamide/caffeine (BC HEADACHE POWDER ORAL) Take by mouth as needed.      buPROPion (WELLBUTRIN XL) 300 MG 24 hr tablet Take 1 tablet (300 mg total) by mouth once daily. 30 tablet 5    clotrimazole-betamethasone 1-0.05% (LOTRISONE) cream Apply topically as needed.      fluticasone-salmeterol 500-50 mcg/dose (ADVAIR DISKUS) 500-50 mcg/dose DsDv diskus inhaler Inhale 1 puff into the lungs 2 (two) times daily. Controller 60 each 5    HYDROcodone-acetaminophen (NORCO)  mg per tablet Take 1 tablet by mouth 3 (three) times daily.      ibuprofen (ADVIL,MOTRIN) 800 MG tablet Take 800 mg by mouth 3 (three) times daily.       ipratropium-albuterol (COMBIVENT RESPIMAT)  mcg/actuation inhaler Inhale 2 puffs into the lungs every 6 (six) hours as needed for Wheezing. Rescue 4 g 6    melatonin 5 mg Tab Take 1 tablet by mouth nightly.      montelukast (SINGULAIR) 10 mg tablet Take 1 tablet (10 mg total) by mouth once daily. 90 tablet 1    pantoprazole (PROTONIX) 40 MG tablet Take 1 tablet (40 mg total) by mouth once daily. 90 tablet 1    rosuvastatin (CRESTOR) 20 MG tablet Take 1 tablet (20 mg total) by mouth  "once daily. 90 tablet 1    SITagliptan-metformin (JANUMET XR) 100-1,000 mg TM24 Take 1 tablet by mouth once daily. 90 tablet 1    tiZANidine 4 mg Cap Take 1 capsule by mouth 3 (three) times daily.       valsartan-hydrochlorothiazide (DIOVAN-HCT) 160-25 mg per tablet Take 1 tablet by mouth once daily. 90 tablet 1    varenicline (CHANTIX) 1 mg Tab Take 1 tablet (1 mg total) by mouth 2 (two) times daily. 60 tablet 0    ergocalciferol (VITAMIN D2) 50,000 unit Cap Take 1 capsule (50,000 Units total) by mouth once a week. 12 capsule 7     No current facility-administered medications for this visit.        Review of patient's allergies indicates:  No Known Allergies  Objective:      Blood pressure 134/70, pulse 90, height 5' 6" (1.676 m), weight 110.2 kg (243 lb), SpO2 97 %. Body mass index is 39.22 kg/m².   Physical Exam   Constitutional: She is oriented to person, place, and time. She appears well-developed. She is cooperative. No distress.   HENT:   Head: Normocephalic and atraumatic.   Right Ear: Tympanic membrane and ear canal normal.   Left Ear: Tympanic membrane and ear canal normal.   Nose: Nose normal.   Mouth/Throat: Oropharynx is clear and moist.   Eyes: Pupils are equal, round, and reactive to light. Conjunctivae, EOM and lids are normal. Lids are everted and swept, no foreign bodies found. Right pupil is round and reactive. Left pupil is round and reactive.   Neck: Trachea normal and normal range of motion. Neck supple.   Cardiovascular: Normal rate, regular rhythm, S1 normal, S2 normal and normal heart sounds.   No murmur heard.  Pulmonary/Chest: Effort normal. No stridor. No respiratory distress. She has wheezes (light expiratory wheezing noted) in the right upper field, the right middle field, the right lower field, the left upper field, the left middle field and the left lower field. She has no rales. She exhibits no tenderness.   Abdominal: Soft. Bowel sounds are normal. There is no rigidity and no " guarding.   Musculoskeletal: Normal range of motion.   Lymphadenopathy:     She has no cervical adenopathy.     She has no axillary adenopathy.   Neurological: She is alert and oriented to person, place, and time.   Skin: Skin is warm and dry. Capillary refill takes less than 2 seconds.   Psychiatric: She has a normal mood and affect. Her behavior is normal. Judgment and thought content normal.   Nursing note and vitals reviewed.          Assessment:       1. Encounter for smoking cessation counseling    2. Former smoker    3. Chronic obstructive pulmonary disease, unspecified COPD type    4. Type 2 diabetes mellitus with complication, without long-term current use of insulin    5. Essential hypertension    6. Mixed hyperlipidemia    7. Vitamin D deficiency    8. Chronic midline low back pain with left-sided sciatica        Plan:       Kemi was seen today for nicotine dependence.    Diagnoses and all orders for this visit:    Encounter for smoking cessation counseling  -Patient has stopped smoking completely.    Former smoker  -Continue smoking abstinence.    Chronic obstructive pulmonary disease, unspecified COPD type  -     albuterol-ipratropium (DUO-NEB) 2.5 mg-0.5 mg/3 mL nebulizer solution; Take 3 mLs by nebulization every 6 (six) hours as needed for Wheezing. Rescue  -     Comprehensive metabolic panel; Future  -     Comprehensive metabolic panel    Type 2 diabetes mellitus with complication, without long-term current use of insulin  -     Microalbumin/creatinine urine ratio; Future  -     Hemoglobin A1c; Future  -     Comprehensive metabolic panel; Future  -     Microalbumin/creatinine urine ratio  -     Hemoglobin A1c  -     Comprehensive metabolic panel    Essential hypertension  -     CBC auto differential; Future  -     Comprehensive metabolic panel; Future  -     Urinalysis; Future  -     CBC auto differential  -     Comprehensive metabolic panel  -     Urinalysis    Mixed hyperlipidemia  -     Lipid  panel; Future  -     Lipid panel    Vitamin D deficiency  -     ergocalciferol (VITAMIN D2) 50,000 unit Cap; Take 1 capsule (50,000 Units total) by mouth once a week.  -     Vitamin D; Future  -     Vitamin D    Chronic midline low back pain with left-sided sciatica  -Patient is following up with Dr. Michelle for pain management.  Suggested physical therapy.  Patient would like to defer for now and see Dr. Michelle first for suggestions.    Follow up in 2 months for chronic conditions and labs to be completed 1 week prior to office visit.

## 2019-04-16 ENCOUNTER — HOSPITAL ENCOUNTER (OUTPATIENT)
Facility: AMBULARY SURGERY CENTER | Age: 54
Discharge: HOME | End: 2019-04-16
Attending: ANESTHESIOLOGY | Admitting: ANESTHESIOLOGY
Payer: COMMERCIAL

## 2019-04-16 DIAGNOSIS — M54.16 LUMBAR RADICULITIS: Primary | ICD-10-CM

## 2019-04-16 LAB — POCT GLUCOSE: 154 MG/DL (ref 70–110)

## 2019-04-16 PROCEDURE — 64484 NJX AA&/STRD TFRM EPI L/S EA: CPT | Mod: LT,,, | Performed by: ANESTHESIOLOGY

## 2019-04-16 PROCEDURE — 64483 NJX AA&/STRD TFRM EPI L/S 1: CPT | Performed by: ANESTHESIOLOGY

## 2019-04-16 PROCEDURE — 64484 NJX AA&/STRD TFRM EPI L/S EA: CPT | Performed by: ANESTHESIOLOGY

## 2019-04-16 PROCEDURE — 64484 PRA INJECT ANES/STEROID FORAMEN LUMBAR/SACRAL W IMG GUIDE ,EA ADD LEVEL: ICD-10-PCS | Mod: LT,,, | Performed by: ANESTHESIOLOGY

## 2019-04-16 PROCEDURE — 64483 PR EPIDURAL INJ, ANES/STEROID, TRANSFORAMINAL, LUMB/SACR, SNGL LEVL: ICD-10-PCS | Mod: LT,,, | Performed by: ANESTHESIOLOGY

## 2019-04-16 PROCEDURE — 64483 NJX AA&/STRD TFRM EPI L/S 1: CPT | Mod: LT,,, | Performed by: ANESTHESIOLOGY

## 2019-04-16 RX ORDER — BUPIVACAINE HYDROCHLORIDE 2.5 MG/ML
INJECTION, SOLUTION EPIDURAL; INFILTRATION; INTRACAUDAL
Status: DISCONTINUED | OUTPATIENT
Start: 2019-04-16 | End: 2019-04-16 | Stop reason: HOSPADM

## 2019-04-16 RX ORDER — DEXAMETHASONE SODIUM PHOSPHATE 10 MG/ML
INJECTION INTRAMUSCULAR; INTRAVENOUS
Status: DISCONTINUED
Start: 2019-04-16 | End: 2019-04-16 | Stop reason: HOSPADM

## 2019-04-16 RX ORDER — DEXAMETHASONE SODIUM PHOSPHATE 10 MG/ML
INJECTION INTRAMUSCULAR; INTRAVENOUS
Status: DISCONTINUED | OUTPATIENT
Start: 2019-04-16 | End: 2019-04-16 | Stop reason: HOSPADM

## 2019-04-16 RX ORDER — ALPRAZOLAM 1 MG/1
TABLET ORAL
Status: COMPLETED
Start: 2019-04-16 | End: 2019-04-16

## 2019-04-16 RX ORDER — LIDOCAINE HYDROCHLORIDE 10 MG/ML
INJECTION, SOLUTION EPIDURAL; INFILTRATION; INTRACAUDAL; PERINEURAL
Status: DISCONTINUED | OUTPATIENT
Start: 2019-04-16 | End: 2019-04-16 | Stop reason: HOSPADM

## 2019-04-16 RX ORDER — SODIUM CHLORIDE, SODIUM LACTATE, POTASSIUM CHLORIDE, CALCIUM CHLORIDE 600; 310; 30; 20 MG/100ML; MG/100ML; MG/100ML; MG/100ML
INJECTION, SOLUTION INTRAVENOUS ONCE AS NEEDED
Status: DISCONTINUED | OUTPATIENT
Start: 2019-04-16 | End: 2019-04-16 | Stop reason: HOSPADM

## 2019-04-16 RX ORDER — GABAPENTIN 300 MG/1
300 CAPSULE ORAL 2 TIMES DAILY
COMMUNITY
End: 2020-01-22

## 2019-04-16 RX ORDER — ALPRAZOLAM 1 MG/1
1 TABLET ORAL ONCE
Status: COMPLETED | OUTPATIENT
Start: 2019-04-16 | End: 2019-04-16

## 2019-04-16 RX ADMIN — ALPRAZOLAM 1 MG: 1 TABLET ORAL at 01:04

## 2019-04-16 NOTE — DISCHARGE INSTRUCTIONS
Anesthesia information    Anesthesia Safety      You have been given medicine  to sedate you during your procedure today. This may have included both a pain medicine and sleeping medicine. Most of the effects have worn off; however, you may continue to have some drowsiness for the next  24 hours. Anesthesia and pain medicines can cause nausea, sleepiness, dizziness and  constipation.    HOME CARE:  1) For the next EIGHT HOURS, you should be watched by a responsible adult to look for any worsening of your condition.  2) DO NOT DRINK any ALCOHOL for the next 24 HOURS.  3) DO NOT DRIVE or operate dangerous machinery during the next 24 HOURS.  FOLLOW UP with your doctor or this facility if you are not alert and back to your usual level of activity within 24 hrs.  GET PROMPT MEDICAL ATTENTION if any of the following occur:  -- Increased drowsiness  -- Increased weakness or dizziness  -- Repeated vomiting  -- If you cannot be awakened    Before leaving, please make sure you have all your personal belongings such as glasses, purses, wallets, keys, cell phones, jewelry, jackets etc Pain injection instructions:     This procedure may take a couple weeks to relieve pain  You may get some pain relief from the local anesthetic initally.    No driving for 24 hrs.   Activity as tolerated- gradually increase activities.  Dont lift over 10 lbs for 24 hrs   No heat at injection sites x 2 days. No heating pads, hot tubs, saunas, or swimming in any body of water or pool for 2 days.  Use ice pack for mild swelling and for comfort , apply for 20 minutes, remove for 20 minute intervals. No direct contact of ice itself  to skin.  May shower today. No tub baths for two days.      Resume Aspirin, Plavix, or Coumadin the day after the procedure unless otherwise instructed.   If diabetic,monitor your glucose carefully as steroids can increase your glucose level    Seek immediate medical help for:   Severe increase in your usual pain or  appearance of new pain.  Prolonged (mor than 8 hours) or increasing weakness or numbness in the legs or arms. Numbing medicine was injected and can affect the messages to and from the brain and legs or arms.  .    Fever above 101 ,Drainage,redness,active bleeding, or increased swelling at the injection site.  Headache, shortness of breath, chest pain, or breathing problems.

## 2019-04-16 NOTE — PLAN OF CARE
Vss, sarah po fluids, denies pain, ambulates easily. IV removed, catheter intact. Discharge instructions provided and states understanding. States ready to go home.  Discharged from facility with family per wheelchair.

## 2019-04-16 NOTE — DISCHARGE SUMMARY
[facility]  Discharge Note  Short Stay    Admit Date: 4/16/2019    Discharge Date and Time: 4/16/2019    Attending Physician: Gilberto Michelle MD     Discharge Provider: Gilberto Michelle    Diagnoses:  Active Hospital Problems    Diagnosis  POA    *Lumbar radiculitis [M54.16]  Yes      Resolved Hospital Problems   No resolved problems to display.       Hospital Course: Lumbar CORINE  Discharged Condition: Good    Final Diagnoses:   Active Hospital Problems    Diagnosis  POA    *Lumbar radiculitis [M54.16]  Yes      Resolved Hospital Problems   No resolved problems to display.       Disposition: Home or Self Care    Follow up/Patient Instructions:    Medications:  Reconciled Home Medications:      Medication List      CONTINUE taking these medications    amLODIPine 10 MG tablet  Commonly known as:  NORVASC  Take 1 tablet (10 mg total) by mouth once daily.     BC HEADACHE POWDER ORAL  Take by mouth as needed.     buPROPion 300 MG 24 hr tablet  Commonly known as:  WELLBUTRIN XL  Take 1 tablet (300 mg total) by mouth once daily.     clotrimazole-betamethasone 1-0.05% cream  Commonly known as:  LOTRISONE  Apply topically as needed.     ergocalciferol 50,000 unit Cap  Commonly known as:  VITAMIN D2  Take 1 capsule (50,000 Units total) by mouth once a week.     fluticasone-salmeterol 500-50 mcg/dose 500-50 mcg/dose Dsdv diskus inhaler  Commonly known as:  ADVAIR DISKUS  Inhale 1 puff into the lungs 2 (two) times daily. Controller     gabapentin 300 MG capsule  Commonly known as:  NEURONTIN  Take 300 mg by mouth 2 (two) times daily.     HYDROcodone-acetaminophen  mg per tablet  Commonly known as:  NORCO  Take 1 tablet by mouth 3 (three) times daily.     ibuprofen 800 MG tablet  Commonly known as:  ADVIL,MOTRIN  Take 800 mg by mouth 3 (three) times daily.     * ipratropium-albuterol  mcg/actuation inhaler  Commonly known as:  COMBIVENT RESPIMAT  Inhale 2 puffs into the lungs every 6 (six) hours as needed for  Wheezing. Rescue     * albuterol-ipratropium 2.5 mg-0.5 mg/3 mL nebulizer solution  Commonly known as:  DUO-NEB  Take 3 mLs by nebulization every 6 (six) hours as needed for Wheezing. Rescue     melatonin 5 mg Tab  Take 1 tablet by mouth nightly.     montelukast 10 mg tablet  Commonly known as:  SINGULAIR  Take 1 tablet (10 mg total) by mouth once daily.     pantoprazole 40 MG tablet  Commonly known as:  PROTONIX  Take 1 tablet (40 mg total) by mouth once daily.     rosuvastatin 20 MG tablet  Commonly known as:  CRESTOR  Take 1 tablet (20 mg total) by mouth once daily.     SITagliptan-metformin 100-1,000 mg Tm24  Commonly known as:  JANUMET XR  Take 1 tablet by mouth once daily.     tiZANidine 4 mg Cap  Take 1 capsule by mouth 3 (three) times daily.     valsartan-hydrochlorothiazide 160-25 mg per tablet  Commonly known as:  DIOVAN-HCT  Take 1 tablet by mouth once daily.     varenicline 1 mg Tab  Commonly known as:  CHANTIX  Take 1 tablet (1 mg total) by mouth 2 (two) times daily.         * This list has 2 medication(s) that are the same as other medications prescribed for you. Read the directions carefully, and ask your doctor or other care provider to review them with you.              Discharge Procedure Orders   Call MD for:  temperature >100.4     Call MD for:  persistent nausea and vomiting or diarrhea     Call MD for:  severe uncontrolled pain     Call MD for:  redness, tenderness, or signs of infection (pain, swelling, redness, odor or green/yellow discharge around incision site)     Call MD for:  difficulty breathing or increased cough     Call MD for:  severe persistent headache        Follow up with MD in 2-3 weeks    Discharge Procedure Orders (must include Diet, Follow-up, Activity):   Discharge Procedure Orders (must include Diet, Follow-up, Activity)   Call MD for:  temperature >100.4     Call MD for:  persistent nausea and vomiting or diarrhea     Call MD for:  severe uncontrolled pain     Call MD  for:  redness, tenderness, or signs of infection (pain, swelling, redness, odor or green/yellow discharge around incision site)     Call MD for:  difficulty breathing or increased cough     Call MD for:  severe persistent headache

## 2019-04-16 NOTE — OP NOTE
PROCEDURE DATE: 4/16/2019    PROCEDURE: Left L5-S1 and S1 transforaminal epidural steroid injection under fluoroscopy    DIAGNOSIS: Lumbar disc displacement without myelopathy  Post op diagnosis: Same    PHYSICIAN: Gilberto Michelle MD    MEDICATIONS INJECTED:  Dexamethasone 5mg (0.5ml) and 1.5ml 0.25% bupivicaine at each nerve root.     LOCAL ANESTHETIC INJECTED:  Lidocaine 1%. 2 ml per site.    SEDATION MEDICATIONS: None    ESTIMATED BLOOD LOSS:  None    COMPLICATIONS:  None    TECHNIQUE:   A time-out was taken to identify patient and procedure side prior to starting the procedure. The patient was placed in a prone position, prepped and draped in the usual sterile fashion using ChloraPrep and sterile towels.  The area to be injected was determined under fluoroscopic guidance in AP and oblique view.  Local anesthetic was given by raising a wheal and going down to the hub of a 25-gauge 1.5 inch needle.  In oblique view, a 5 inch 22-gauge bent-tip spinal needle was introduced towards 6 oclock position of the pedicle of each above named nerve root level.  The needle was walked medially then hinged into the neural foramen and position was confirmed in AP and lateral views.  1ml contrast dye was injected to confirm appropriate placement and that there was no vascular uptake.  After negative aspiration for blood or CSF, the medication was then injected. This was performed at the left L5-S1 and S1 level(s). The patient tolerated the procedure well.    The patient was monitored after the procedure.  Patient was given post procedure and discharge instructions to follow at home. The patient was discharged in a stable condition.

## 2019-04-17 VITALS
OXYGEN SATURATION: 94 % | HEIGHT: 66 IN | WEIGHT: 241 LBS | TEMPERATURE: 99 F | HEART RATE: 83 BPM | DIASTOLIC BLOOD PRESSURE: 81 MMHG | RESPIRATION RATE: 16 BRPM | SYSTOLIC BLOOD PRESSURE: 117 MMHG | BODY MASS INDEX: 38.73 KG/M2

## 2019-04-29 DIAGNOSIS — F17.210 CIGARETTE NICOTINE DEPENDENCE WITHOUT COMPLICATION: ICD-10-CM

## 2019-05-01 RX ORDER — VARENICLINE TARTRATE 1 MG/1
1 TABLET, FILM COATED ORAL 2 TIMES DAILY
Qty: 60 TABLET | Refills: 0 | Status: SHIPPED | OUTPATIENT
Start: 2019-05-01 | End: 2019-06-04 | Stop reason: SDUPTHER

## 2019-05-06 ENCOUNTER — OFFICE VISIT (OUTPATIENT)
Dept: PAIN MEDICINE | Facility: CLINIC | Age: 54
End: 2019-05-06
Payer: COMMERCIAL

## 2019-05-06 VITALS
SYSTOLIC BLOOD PRESSURE: 127 MMHG | BODY MASS INDEX: 39.05 KG/M2 | HEIGHT: 66 IN | DIASTOLIC BLOOD PRESSURE: 78 MMHG | HEART RATE: 81 BPM | WEIGHT: 243 LBS

## 2019-05-06 DIAGNOSIS — M54.16 LUMBAR RADICULITIS: Primary | ICD-10-CM

## 2019-05-06 DIAGNOSIS — M51.36 DDD (DEGENERATIVE DISC DISEASE), LUMBAR: ICD-10-CM

## 2019-05-06 PROCEDURE — 3074F PR MOST RECENT SYSTOLIC BLOOD PRESSURE < 130 MM HG: ICD-10-PCS | Mod: CPTII,S$GLB,, | Performed by: PHYSICIAN ASSISTANT

## 2019-05-06 PROCEDURE — 3008F BODY MASS INDEX DOCD: CPT | Mod: CPTII,S$GLB,, | Performed by: PHYSICIAN ASSISTANT

## 2019-05-06 PROCEDURE — 99999 PR PBB SHADOW E&M-EST. PATIENT-LVL V: CPT | Mod: PBBFAC,,, | Performed by: PHYSICIAN ASSISTANT

## 2019-05-06 PROCEDURE — 99999 PR PBB SHADOW E&M-EST. PATIENT-LVL V: ICD-10-PCS | Mod: PBBFAC,,, | Performed by: PHYSICIAN ASSISTANT

## 2019-05-06 PROCEDURE — 3008F PR BODY MASS INDEX (BMI) DOCUMENTED: ICD-10-PCS | Mod: CPTII,S$GLB,, | Performed by: PHYSICIAN ASSISTANT

## 2019-05-06 PROCEDURE — 3078F DIAST BP <80 MM HG: CPT | Mod: CPTII,S$GLB,, | Performed by: PHYSICIAN ASSISTANT

## 2019-05-06 PROCEDURE — 99214 PR OFFICE/OUTPT VISIT, EST, LEVL IV, 30-39 MIN: ICD-10-PCS | Mod: S$GLB,,, | Performed by: PHYSICIAN ASSISTANT

## 2019-05-06 PROCEDURE — 3074F SYST BP LT 130 MM HG: CPT | Mod: CPTII,S$GLB,, | Performed by: PHYSICIAN ASSISTANT

## 2019-05-06 PROCEDURE — 3078F PR MOST RECENT DIASTOLIC BLOOD PRESSURE < 80 MM HG: ICD-10-PCS | Mod: CPTII,S$GLB,, | Performed by: PHYSICIAN ASSISTANT

## 2019-05-06 PROCEDURE — 99214 OFFICE O/P EST MOD 30 MIN: CPT | Mod: S$GLB,,, | Performed by: PHYSICIAN ASSISTANT

## 2019-05-06 NOTE — PROGRESS NOTES
Referring Physician: No ref. provider found    PCP: Fiorella Melchor, PARAG      CC:  Low back and left leg pain    Interval History:  Kemi Dominguez is a 54 y.o. female with a history of low back pain with radiation into her left leg who presents today for f/u s/p left TF CORINE at L5-S1, S1. Reports 90% improvement in symptoms. She continues to have some numbness, tingling, burning that is tolerable. She is currently taking Gabapentin 600  Mg TID with benefit. Denies SEs. Denies LE weakness. Denies b/b changes. Pain today is rated 1/10.  Pt has been seen in the clinic before, however pt is new to me.     History below per Dr. Michelle    HPI:   Kemi Dominguez is a 54 y.o. female referred to us for low back and left leg pain.  Pain has gradually worsened over past 2 months.  No recent traumatic incident.  She presents with a constant aching, throbbing, sharp pain in her lower back.  Pain radiates down her left lateral leg into her left foot.  Pain worsens standing, walking, getting.  Pain improves with rest.  She recently had updated lumbar MRI.  She currently takes baclofen and tizanidine with minimal benefit.  She is prescribed hydrocodone by her orthopedic for chronic back pain in the past.  Medicine does not help with her radicular symptoms currently.  She is currently not taking anti neuropathic medications. She was seen by outside pain provider who recommended the lumbar CORINE, however, her insurance was not covered.  She wishes to proceed with interventions with us.  She denies any worsening weakness.  No bowel bladder changes.    ROS:  CONSTITUTIONAL: No fevers, chills, night sweats, wt. loss, appetite changes  SKIN: no rashes or itching  ENT: No headaches, head trauma, vision changes, or eye pain  LYMPH NODES: None noticed   CV: No chest pain, palpitations.   RESP: No shortness of breath, dyspnea on exertion, cough, wheezing, or hemoptysis  GI: No nausea, emesis, diarrhea, constipation, melena, hematochezia, pain.     : No dysuria, hematuria, urgency, or frequency   HEME: No easy bruising, bleeding problems  PSYCHIATRIC: No depression, anxiety, psychosis, hallucinations.  NEURO: No seizures, memory loss, dizziness or difficulty sleeping  MSK:  Positive HPI      Past Medical History:   Diagnosis Date    COPD (chronic obstructive pulmonary disease)     Diabetes mellitus, type 2     GERD (gastroesophageal reflux disease)     Hyperlipidemia     Hypertension     Vitamin D deficiency      Past Surgical History:   Procedure Laterality Date    ADENOIDECTOMY      BREAST SURGERY Right     breast biopsy    Injection,steroid,epidural,transforaminal approach Left 2019    Performed by Gilberto Michelle [facility] at [facility]    TONSILLECTOMY       Family History   Problem Relation Age of Onset    Stroke Mother     Emphysema Mother     Diabetes Father     Coronary artery disease Father     COPD Father     Diabetes Sister      Social History     Socioeconomic History    Marital status: Significant Other     Spouse name: Not on file    Number of children: Not on file    Years of education: Not on file    Highest education level: Not on file   Occupational History    Not on file   Social Needs    Financial resource strain: Not on file    Food insecurity:     Worry: Not on file     Inability: Not on file    Transportation needs:     Medical: Not on file     Non-medical: Not on file   Tobacco Use    Smoking status: Former Smoker     Packs/day: 2.00     Types: Cigarettes     Last attempt to quit: 2019     Years since quittin.1    Smokeless tobacco: Never Used   Substance and Sexual Activity    Alcohol use: Yes     Comment: rarely    Drug use: Yes     Types: Hydrocodone    Sexual activity: Never   Lifestyle    Physical activity:     Days per week: Not on file     Minutes per session: Not on file    Stress: Not on file   Relationships    Social connections:     Talks on phone: Not on file     Gets together: Not on  "file     Attends Jainism service: Not on file     Active member of club or organization: Not on file     Attends meetings of clubs or organizations: Not on file     Relationship status: Not on file   Other Topics Concern    Not on file   Social History Narrative    Not on file         Medications/Allergies: See med card    Vitals:    05/06/19 0947   BP: 127/78   Pulse: 81   Weight: 110.2 kg (243 lb)   Height: 5' 6" (1.676 m)   PainSc:   1   PainLoc: Back         Physical exam:    GENERAL: A and O x3, the patient appears well groomed and is in no acute distress.  Skin: No rashes or obvious lesions  HEENT: normocephalic, atraumatic  CARDIOVASCULAR:  RRR  LUNGS: non labored breathing  ABDOMEN: soft, nontender   UPPER EXTREMITIES: Normal alignment, normal range of motion, no atrophy, no skin changes,  hair growth and nail growth normal and equal bilaterally. No swelling, no tenderness.    LOWER EXTREMITIES:  Normal alignment, normal range of motion, no atrophy, no skin changes,  hair growth and nail growth normal and equal bilaterally. No swelling, no tenderness.  LUMBAR SPINE  Lumbar spine: ROM is limited with flexion extension and oblique extension with mildly increased pain.    Huy's test causes no increased pain on either side.    Supine straight leg raise is negative  Internal and external rotation of the hip causes no increased pain on either side.  Myofascial exam: No tenderness to palpation across lumbar paraspinous muscles.      MENTAL STATUS: normal orientation, speech, language, and fund of knowledge for social situation.  Emotional state appropriate.    CRANIAL NERVES:  II:  PERRL bilaterally,   III,IV,VI: EOMI.    V:  Facial sensation equal bilaterally  VII:  Facial motor function normal.  VIII:  Hearing equal to finger rub bilaterally  IX/X: Gag normal, palate symmetric  XI:  Shoulder shrug equal, head turn equal  XII:  Tongue midline without fasciculations      MOTOR: Tone and bulk: normal " bilateral upper and lower Strength: normal   Delt Bi Tri WE WF     R 5 5 5 5 5 5   L 5 5 5 5 5 5     IP ADD ABD Quad TA Gas HAM  R 5 5 5 5 5 5 5  L 5 5 5 5 5 5 5    SENSATION: Light touch and pinprick intact bilaterally  REFLEXES: normal, symmetric, nonbrisk.  Toes down, no clonus. No hoffmans.  GAIT: normal rise, base, steps, and arm swing.        Imaging:  MRI lumbar spine without contrast 01/23/2019 (S )  L3-4, shallow broad-based disc bulge.  Bilateral facet arthropathy.  Mild degree of spinal canal encroachment.  Moderate right and mild left foraminal narrowing.  L4-5, broad-based disc bulge.  Bilateral facet arthropathy.  Moderate degree of spinal canal encroachment and moderate degrees of bilateral neural foraminal narrowing.  L5-S1, broad-based disc bulge.  Protruded disc margin contacts bilateral S1 nerve roots asymmetric to the left.  Moderate degree of spinal canal stenosis and bilateral neural foraminal narrowing    Assessment:  Kemi Dominguez is a 54 y.o. female with   1. Lumbar radiculitis    2. DDD (degenerative disc disease), lumbar      Plan:  1. I have stressed the importance of physical activity and exercise to improve overall health  2. Monitor progress and consider repeat lumbar epidural steroid injection to the Left L5-S1 and S1 level(s).  3. Continue Gabapentin 600 mg TID. Can titrate further in the future if needed  4. F/u prn

## 2019-05-06 NOTE — PATIENT INSTRUCTIONS
Exercises To Strengthen Your Lower Back  Strong lower-back and abdominal muscles work together to support your spine. These exercises will help strengthen the muscles of the lower back. It is important that you begin exercising slowly and increase levels gradually.  Always begin any exercise program with stretching. If you feel pain while doing any of these exercises, stop and talk to your doctor about a more specific exercise program that suits your condition better.  Low Back Stretch  · Lie on your back with your knees bent and both feet on the ground.  ·   Slowly raise your left knee to your chest as you flatten your lower back against the floor. Hold for 5 seconds.  · Relax and repeat the exercise with your right knee.  · Do 10 of these exercises for each leg.  · Repeat hugging both knees to your chest at the same time.  Building Lower Back Strength  1. Kneeling Lumbar Extension: Begin on your hands and knees. Simultaneously raise and straighten your right arm and left leg until they are parallel to the ground. Hold for 2 seconds and come back slowly to a starting position. Repeat with left arm and right leg, alternating 10 times.  2. Prone Lumbar Extension: Lie face down, arms extended overhead, palms on the floor. Simultaneously raise your right arm and left leg as high as comfortably possible. Hold for 10 seconds and slowly return to start. Repeat with left arm and right leg, alternating 10 times. Gradually build up to 20 times. (Advanced: Repeat this exercise raising both arms and both legs a few inches off the floor at the same time. Hold for 5 seconds and release.)  3. Pelvic Tilt: Lie on the floor on your back with your knees bent at 90 degrees. Your feet should be flat on the floor. Inhale, exhale, then slowly contract your abdominal muscles bringing your navel toward your spine. Let your pelvis rock back until your lower back is flat on the floor. Hold for 10 seconds while breathing  smoothly.  4. Abdominal Crunch: Perform a Pelvic Tilt (above) flattening your lower back against the floor. Holding the tension in your abdominal muscles, take another breath and raise your shoulder blades off the ground (this is not a full sit-up). Keep your head in line with your body (dont bend your neck forward). Hold for 2 seconds, then slowly lower.      Back Exercises: Abdominal Lift  The Abdominal Lift strengthens your lower abdominal muscles, helping you keep your pelvis and back stable.  · Lie on the floor with both knees bent. Put your feet flat on the floor and your arms by your sides. Tighten your abdominal muscles.  · Lift one bent knee and move it toward your upper body. Keep your abdominal muscles tight and your back flat on the floor. Hold for 10 seconds.  · Repeat 3 times. Then, switch legs.         Back Exercises: Bridge  The Bridge exercise strengthens your abdominal, buttocks, and hamstring muscles. This helps keep your back stable and aligned when you walk.  · Lie on the floor with your back and palms flat. Bend your knees. Keep your feet flat on the floor.  · Contract your abdominal and buttocks muscles. Slowly lift your buttocks off the floor until there is a straight line from your knees to your shoulders.  · Hold for 5  seconds. Repeat 10 times.          Back Exercises: Hip Lift        To start, lie on your back with your knees bent and feet flat on the floor. Dont press your neck or lower back to the floor. Breathe deeply. You should feel comfortable and relaxed in this position.  · Slowly raise your hips upward.  · Tighten your abdomen and buttocks. Be careful not to arch your back.  · Hold for 5 seconds. Lower your hips to the floor.  · Repeat 10 times.  For your safety, check with your healthcare provider before starting an exercise program.       Back Exercises: Hip Rotator Stretch        To start, lie on your back with your knees bent and feet flat on the floor. Dont press your  neck or lower back to the floor. Breathe deeply. You should feel comfortable and relaxed in this position.  · Rest your right ankle on your left knee.  · Place a towel behind your left thigh and use it to pull the knee toward your chest. Feel the stretch in your buttocks.  · Hold for 30-60 seconds. Release.  · Repeat 2 times.  · Switch legs.   For your safety, check with your healthcare provider before starting an exercise program.       Back Exercises: Knee Lift        To start, lie on your back with your knees bent and feet flat on the floor. Dont press your neck or lower back to the floor. Breathe deeply. You should feel comfortable and relaxed in this position.  · Lift one bent knee and move it toward your upper body. Keep your abdominal muscles tight and your back flat on the floor.  · Hold for 10 seconds. Return to start position.  · Repeat 3 times.  · Switch legs.        Back Exercises: Leg Pull        To start, lie on your back with your knees bent and feet flat on the floor. Dont press your neck or lower back to the floor. Breathe deeply. You should feel comfortable and relaxed in this position.  · Pull one knee to your chest.  · Hold for 30-60 seconds. Return to starting position.  · Repeat 2 times.  · Switch legs.  · For a double leg pull, pull both legs to your chest at the same time. Repeat 2 times.  For your safety, check with your healthcare provider before starting an exercise program.       Back Exercises: Leg Reach        Do this exercise on your hands and knees. Keep your knees under your hips and your hands under your shoulders. Keep your spine in a neutral position (not arched or sagging). Be sure to maintain your necks natural curve.  · Extend one leg straight back. Dont arch your back or let your head or body sag.  · Hold for 5 seconds. Return to starting position.  · Repeat 5 times.  · Switch legs.       Back Exercises: Lower Back Rotation  To start, lie on your back with your knees bent  and feet flat on the floor. Dont press your neck or lower back to the floor. Breathe deeply. You should feel comfortable and relaxed in this position.  · Drop both knees to one side. Turn your head to the other side. Keep your shoulders flat on the floor.  · Hold for 20 seconds.  · Slowly switch sides.  · Repeat 2 times.                                   Back Exercises: Lower Back Stretch                        To start, sit in a chair with your feet flat on the floor. Shift your weight slightly forward to avoid rounding your back. Relax, and keep your ears, shoulders, and hips aligned.  · Sit with your feet well apart.  · Bend forward and touch the floor with the backs of your hands. Relax and let your body drop.  · Hold for 20 seconds. Return to starting position.  · Repeat 2 times.       Back Exercises: Partial Curl-Ups        To start, lie on your back with your knees bent and feet flat on the floor. Dont press your neck or lower back to the floor. Breathe deeply. You should feel comfortable and relaxed in this position.  · Cross your arms loosely.  · Tighten your abdomen and curl MCC up, keeping your head in line with your shoulders.  · Hold for 5 seconds. Uncurl to lie down.  · Repeat 5 times.       Back Exercises: Pelvic Tilt  To start, lie on your back with your knees bent and feet flat on the floor. Dont press your neck or lower back to the floor. Breathe deeply. You should feel comfortable and relaxed in this position.  · Tighten your abdomen and buttocks, and press your lower back toward the floor. This should be a small, subtle movement.  · Hold for 5 seconds. Release.  · Repeat 5 times.                           Back Exercises: Seated Rotation      To start, sit in a chair with your feet flat on the floor. Shift your weight slightly forward to avoid rounding your back. Relax, and keep your ears, shoulders, and hips aligned.  · Fold your arms, elbows just below shoulder height.  · Turn from the  waist with hips forward. Turn your head last.  · Hold for a count of 5. Return to starting position.  · Repeat 5 times on one side. Then switch sides.          Back Exercises: Side Stretch  To start, sit in a chair with your feet flat on the floor. Shift your weight slightly forward to avoid rounding your back. Relax. Keep your ears, shoulders, and hips aligned.  · Stretch your right arm overhead.  · Slowly bend to the left. Dont twist your torso.  · Hold for 20 seconds. Return to starting position.  · Repeat 2 times. Then, switch to the other side.      © 8474-0575 [employer]. [address] [place] [address], [place] [address]. All rights reserved. This information is not intended as a substitute for professional medical care. Always follow your healthcare professional's instructions.

## 2019-05-10 DIAGNOSIS — E78.2 MIXED HYPERLIPIDEMIA: ICD-10-CM

## 2019-05-10 DIAGNOSIS — E11.8 TYPE 2 DIABETES MELLITUS WITH COMPLICATION, WITHOUT LONG-TERM CURRENT USE OF INSULIN: ICD-10-CM

## 2019-05-10 DIAGNOSIS — I10 ESSENTIAL HYPERTENSION: ICD-10-CM

## 2019-05-10 RX ORDER — ROSUVASTATIN CALCIUM 20 MG/1
20 TABLET, COATED ORAL DAILY
Qty: 90 TABLET | Refills: 1 | Status: SHIPPED | OUTPATIENT
Start: 2019-05-10 | End: 2019-11-12 | Stop reason: SDUPTHER

## 2019-05-10 RX ORDER — VALSARTAN AND HYDROCHLOROTHIAZIDE 160; 25 MG/1; MG/1
1 TABLET ORAL DAILY
Qty: 90 TABLET | Refills: 1 | Status: SHIPPED | OUTPATIENT
Start: 2019-05-10 | End: 2019-11-12 | Stop reason: SDUPTHER

## 2019-06-04 DIAGNOSIS — F17.210 CIGARETTE NICOTINE DEPENDENCE WITHOUT COMPLICATION: ICD-10-CM

## 2019-06-04 RX ORDER — VARENICLINE TARTRATE 1 MG/1
1 TABLET, FILM COATED ORAL 2 TIMES DAILY
Qty: 60 TABLET | Refills: 0 | Status: SHIPPED | OUTPATIENT
Start: 2019-06-04 | End: 2019-06-25 | Stop reason: ALTCHOICE

## 2019-06-21 LAB
ALBUMIN SERPL-MCNC: 3.8 G/DL (ref 3.1–4.7)
ALP SERPL-CCNC: 85 IU/L (ref 40–104)
ALT (SGPT): 19 IU/L (ref 3–33)
AST SERPL-CCNC: 20 IU/L (ref 10–40)
BASOPHILS NFR BLD: 0.1 K/UL (ref 0–0.2)
BASOPHILS NFR BLD: 0.7 %
BILIRUB SERPL-MCNC: 0.7 MG/DL (ref 0.3–1)
BUN SERPL-MCNC: 16 MG/DL (ref 8–20)
CALCIUM SERPL-MCNC: 9.8 MG/DL (ref 7.7–10.4)
CHLORIDE: 100 MMOL/L (ref 98–110)
CO2 SERPL-SCNC: 29.9 MMOL/L (ref 22.8–31.6)
CREATININE RANDOM URINE: 63 MG/DL
CREATININE: 0.78 MG/DL (ref 0.6–1.4)
EOSINOPHIL NFR BLD: 0.4 K/UL (ref 0–0.7)
EOSINOPHIL NFR BLD: 5.2 %
ERYTHROCYTE [DISTWIDTH] IN BLOOD BY AUTOMATED COUNT: 12.9 % (ref 11.7–14.9)
GLUCOSE: 142 MG/DL (ref 70–99)
GRAN #: 3.6 K/UL (ref 1.4–6.5)
GRAN%: 53.7 %
HBA1C MFR BLD: 7.2 % (ref 3.1–6.5)
HCT VFR BLD AUTO: 40 % (ref 36–48)
HGB BLD-MCNC: 13.4 G/DL (ref 12–15)
IMMATURE GRANS (ABS): 0 K/UL (ref 0–1)
IMMATURE GRANULOCYTES: 0.3 %
LYMPH #: 2 K/UL (ref 1.2–3.4)
LYMPH%: 29.2 %
MCH RBC QN AUTO: 28.8 PG (ref 25–35)
MCHC RBC AUTO-ENTMCNC: 33.5 G/DL (ref 31–36)
MCV RBC AUTO: 85.8 FL (ref 79–98)
MICROALBUM.,U,RANDOM: <2 MCG/ML (ref 0–19.9)
MICROALBUMIN/CREATININE RATIO: NORMAL (ref 0–30)
MONO #: 0.7 K/UL (ref 0.1–0.6)
MONO%: 10.9 %
NUCLEATED RBCS: 0 %
PLATELET # BLD AUTO: 286 K/UL (ref 140–440)
PMV BLD AUTO: 9.6 FL (ref 8.8–12.7)
POTASSIUM SERPL-SCNC: 3.6 MMOL/L (ref 3.5–5)
PROT SERPL-MCNC: 6.9 G/DL (ref 6–8.2)
RBC # BLD AUTO: 4.66 M/UL (ref 3.5–5.5)
SODIUM: 140 MMOL/L (ref 134–144)
VITAMIN D, 1,25 (OH)2: 43.5 NG/ML (ref 30–100)
WBC # BLD AUTO: 6.8 K/UL (ref 5–10)

## 2019-06-25 ENCOUNTER — OFFICE VISIT (OUTPATIENT)
Dept: FAMILY MEDICINE | Facility: CLINIC | Age: 54
End: 2019-06-25
Payer: COMMERCIAL

## 2019-06-25 VITALS
OXYGEN SATURATION: 97 % | HEART RATE: 95 BPM | DIASTOLIC BLOOD PRESSURE: 70 MMHG | SYSTOLIC BLOOD PRESSURE: 120 MMHG | HEIGHT: 66 IN | WEIGHT: 252 LBS | BODY MASS INDEX: 40.5 KG/M2

## 2019-06-25 DIAGNOSIS — I10 ESSENTIAL HYPERTENSION: ICD-10-CM

## 2019-06-25 DIAGNOSIS — Z87.891 FORMER SMOKER: ICD-10-CM

## 2019-06-25 DIAGNOSIS — E11.8 TYPE 2 DIABETES MELLITUS WITH COMPLICATION, WITHOUT LONG-TERM CURRENT USE OF INSULIN: Primary | ICD-10-CM

## 2019-06-25 DIAGNOSIS — E78.2 MIXED HYPERLIPIDEMIA: ICD-10-CM

## 2019-06-25 DIAGNOSIS — R51.9 RECURRENT HEADACHE: ICD-10-CM

## 2019-06-25 DIAGNOSIS — E66.01 CLASS 3 SEVERE OBESITY DUE TO EXCESS CALORIES WITH SERIOUS COMORBIDITY AND BODY MASS INDEX (BMI) OF 40.0 TO 44.9 IN ADULT: ICD-10-CM

## 2019-06-25 PROCEDURE — 3045F PR MOST RECENT HEMOGLOBIN A1C LEVEL 7.0-9.0%: ICD-10-PCS | Mod: ,,, | Performed by: NURSE PRACTITIONER

## 2019-06-25 PROCEDURE — 3074F SYST BP LT 130 MM HG: CPT | Mod: ,,, | Performed by: NURSE PRACTITIONER

## 2019-06-25 PROCEDURE — 99214 PR OFFICE/OUTPT VISIT, EST, LEVL IV, 30-39 MIN: ICD-10-PCS | Mod: ,,, | Performed by: NURSE PRACTITIONER

## 2019-06-25 PROCEDURE — 99214 OFFICE O/P EST MOD 30 MIN: CPT | Mod: ,,, | Performed by: NURSE PRACTITIONER

## 2019-06-25 PROCEDURE — 3074F PR MOST RECENT SYSTOLIC BLOOD PRESSURE < 130 MM HG: ICD-10-PCS | Mod: ,,, | Performed by: NURSE PRACTITIONER

## 2019-06-25 PROCEDURE — 3008F BODY MASS INDEX DOCD: CPT | Mod: ,,, | Performed by: NURSE PRACTITIONER

## 2019-06-25 PROCEDURE — 3078F DIAST BP <80 MM HG: CPT | Mod: ,,, | Performed by: NURSE PRACTITIONER

## 2019-06-25 PROCEDURE — 3008F PR BODY MASS INDEX (BMI) DOCUMENTED: ICD-10-PCS | Mod: ,,, | Performed by: NURSE PRACTITIONER

## 2019-06-25 PROCEDURE — 3078F PR MOST RECENT DIASTOLIC BLOOD PRESSURE < 80 MM HG: ICD-10-PCS | Mod: ,,, | Performed by: NURSE PRACTITIONER

## 2019-06-25 PROCEDURE — 3045F PR MOST RECENT HEMOGLOBIN A1C LEVEL 7.0-9.0%: CPT | Mod: ,,, | Performed by: NURSE PRACTITIONER

## 2019-06-25 RX ORDER — SUMATRIPTAN 50 MG/1
TABLET, FILM COATED ORAL
Qty: 30 TABLET | Refills: 1 | Status: ON HOLD | OUTPATIENT
Start: 2019-06-25 | End: 2019-08-23

## 2019-06-25 NOTE — PATIENT INSTRUCTIONS
Diabetes: Activity Tips    Being more active can help you manage your diabetes. The tips on this sheet can help you get the most from your exercise. They can also help you stay safe.  Staying Active  Its important for adults to spend less time sitting and being inactive. This is especially true if you have type 2 diabetes. When you are sitting for long periods of time, get up for short sessions of light activity every 30 minutes.  You should aim for at least 150 minutes a week of exercise or physical activity. Dont let more than 2 days go by without being active.  Benefit from briskness  Brisk activity gets your heart beating faster. This can help you increase your fitness, lose extra weight, and manage your blood sugar level. Try brisk walking. Or, if you have foot or leg problems, you can try swimming or bike riding. You can break up your exercise into chunks throughout the day. Work up to at least 30 minutes of steady, brisk exercise on most days.  Warm up and cool down  Warming up and cooling down reduce your risk of injury. They also help limit muscle soreness. Do a mild version of your activity for 5 minutes before and after your routine. You can also learn stretches that will help keep your muscles loose. Your healthcare provider may show you good ways to warm up and stretch.  Do the talk-sing test  The talk-sing test is a simple way to tell how hard youre exercising. If you can talk while exercising, youre in a safe range. If youre out of breath, slow down. If you can carry a tune, its time to  the pace. Walk up a hill. Increase the resistance on your stationary bike. Or swim faster.  What about eating?  You may be told to plan your exercise for 1 to 2 hours after a meal. In most cases, you dont need to eat while being active. If you take insulin or medicine that can cause low blood sugar, test your blood sugar before exercising. And carry a fast-acting sugar that will raise your blood sugar  level quickly. This includes glucose tablets or hard candy. Use it if you feel low blood sugar symptoms.  Safety tips  These tips can help you stay safe as you become fit:  · Exercise with a friend or carry a cell phone if you have one.  · Carry or wear identification, such as a necklace or bracelet, that says you have diabetes.  · Use the proper footwear and safety equipment for your activity.  · Drink water before, during, and after exercise.  · Dress properly for the weather.  · Dont exercise in very hot or very cold weather.  · Dont exercise if you are sick.  · If you are instructed to do so, test your blood sugar before and after you exercise. Have a small carbohydrate snack if your blood sugar is low before you start exercising.   When to stop exercising and call your healthcare provider  Stop exercising and call your healthcare provider right away if you notice any of the following:  · Pain, pressure, tightness, or heaviness in the chest  · Pain or heaviness in the neck, shoulders, back, arms, legs, or feet  · Unusual shortness of breath  · Dizziness or lightheadedness  · Unusually rapid or slow pulse  · Increased joint or muscle pain  · Nausea or vomiting  Date Last Reviewed: 5/1/2016  © 9475-8501 [employer]. [address] [place] [address], [place] [id number]. All rights reserved. This information is not intended as a substitute for professional medical care. Always follow your healthcare professional's instructions.        Diet: Diabetes  Food is an important tool that you can use to control diabetes and stay healthy. Eating well-balanced meals in the correct amounts will help you control your blood glucose levels and prevent low blood sugar reactions. It will also help you reduce the health risks of diabetes. There is no one specific diet that is right for everyone with diabetes. But there are general guidelines to follow. A registered dietitian (RD) will create a tailored diet approach thats just  right for you. He or she will also help you plan healthy meals and snacks. If you have any questions, call your dietitian for advice.     Guidelines for success  Talk with your healthcare provider before starting a diabetes diet or weight loss program. If you haven't talked with a dietitian yet, ask your provider for a referral. The following guidelines can help you succeed:  · Select foods from the 6 food groups below. Your dietitian will help you find food choices within each group. He or she will also show you serving sizes and how many servings you can have at each meal.  ¨ Grains, beans, and starchy vegetables  ¨ Vegetables  ¨ Fruit  ¨ Milk or yogurt  ¨ Meat, poultry, fish, or tofu  ¨ Healthy fats  · Check your blood sugar levels as directed by your provider. Take any medicine as prescribed by your provider.  · Learn to read food labels and pick the right portion sizes.  · Eat only the amount of food in your meal plan. Eat about the same amount of food at regular times each day. Dont skip meals. Eat meals 4 to 5 hours apart, with snacks in between.  · Limit alcohol. It raises blood sugar levels. Drink water or calorie-free diet drinks that use safe sweeteners.  · Eat less fat to help lower your risk of heart disease. Use nonfat or low-fat dairy products and lean meats. Avoid fried foods. Use cooking oils that are unsaturated, such as olive, canola, or peanut oil.  · Talk with your dietitian about safe sugar substitutes.  · Avoid added salt. It can contribute to high blood pressure, which can cause heart disease. People with diabetes already have a risk of high blood pressure and heart disease.  · Stay at a healthy weight. If you need to lose weight, cut down on your portion sizes. But dont skip meals. Exercise is an important part of any weight management program. Talk with your provider about an exercise program thats right for you.  · For more information about the best diet plan for you, talk with a  registered dietitian (RD). To find an RD in your area, contact:  ¨ Academy of Nutrition and Dietetics www.eatright.org  ¨ The American Diabetes Association [phone] www.diabetes.org  Date Last Reviewed: 8/1/2016 © 2000-2017 [employer]. [address] [place] [address], [place] [address]. All rights reserved. This information is not intended as a substitute for professional medical care. Always follow your healthcare professional's instructions.

## 2019-06-25 NOTE — PROGRESS NOTES
Subjective:       Patient ID: Kemi Dominguez is a 54 y.o. female.    Chief Complaint: Follow-up (labs)    Patient presents today following up for chronic conditions including hypertension, hyperlipidemia, diabetes, and depressed mood.  Patient has stopped smoking since her last visit.  Patient's last cigarette was about 1.5-2 months ago.  Patient states she will not start smoking again and is currently trying to get her partner to stop smoking.    Patient is tolerating all medications well without side effects.  Patient's labs have been received and will be reviewed today.  Patient is obese with a BMI of 40.67      Depression   Visit Type: follow-up  Patient presents with the following symptoms: anhedonia, excessive worry, insomnia and irritability.  Patient is not experiencing: confusion, palpitations, suicidal ideas, suicidal planning and thoughts of death.  Frequency of symptoms: most days   Severity: mild   Sleep quality: fair  Nighttime awakenings: occasional    Hypertension   This is a chronic problem. The current episode started more than 1 month ago. The problem has been waxing and waning since onset. The problem is controlled. Associated symptoms include anxiety, headaches and malaise/fatigue. Pertinent negatives include no blurred vision, chest pain, neck pain or palpitations. Agents associated with hypertension include estrogens. Risk factors for coronary artery disease include smoking/tobacco exposure, sedentary lifestyle, obesity, diabetes mellitus, dyslipidemia and stress. Past treatments include calcium channel blockers, angiotensin blockers and direct vasodilators. The current treatment provides significant improvement. Compliance problems include diet and exercise.  There is no history of angina. There is no history of chronic renal disease.   Nicotine Dependence   Presents for follow-up visit. Symptoms include cravings, insomnia and irritability. Preferred tobacco types include cigarettes.  Preferred cigarette types include filtered. Preferred strength is light. Preferred cigarettes are non-menthol. Preferred brands include [employer]. Her urge triggers include company of smokers, disruptive behavior, drinking alcohol, drinking coffee, driving, meal time and stress. Risk factors do not include perceived media message about smoking.The symptoms have been resolved (Patient has completely stopped smoking). Number of cigarettes per day: 0. She started smoking when she was 15-17 years old. Past treatments include buproprion, varenicline and nicotine patch. The treatment provided complete relief. Compliance with prior treatments has been good. Kemi is ready to quit. Kemi has tried to quit 5 or more times. There is no history of alcohol abuse and drug use.   Headache    This is a recurrent problem. The current episode started more than 1 month ago. The problem occurs intermittently. The problem has been waxing and waning. The pain is located in the left unilateral region. The pain does not radiate. The pain quality is similar to prior headaches. The quality of the pain is described as pulsating. The pain is moderate. Associated symptoms include back pain (chronic), insomnia, phonophobia and photophobia. Pertinent negatives include no blurred vision, coughing, eye pain, hearing loss, nausea, neck pain, rhinorrhea, seizures, sinus pressure, visual change, vomiting or weakness. The symptoms are aggravated by activity, bright light and caffeine withdrawal. She has tried NSAIDs (bc powders) for the symptoms. The treatment provided significant relief. There is no history of recent head traumas.   Diabetes   She presents for her follow-up diabetic visit. She has type 2 diabetes mellitus. Her disease course has been stable. Hypoglycemia symptoms include headaches. Pertinent negatives for hypoglycemia include no confusion, hunger, mood changes, seizures or speech difficulty. Pertinent negatives for diabetes include  no blurred vision, no chest pain, no foot ulcerations, no polydipsia, no polyuria, no visual change and no weakness. There are no hypoglycemic complications. Symptoms are worsening. There are no diabetic complications. Risk factors for coronary artery disease include hypertension, stress, sedentary lifestyle, obesity, dyslipidemia and diabetes mellitus. Current diabetic treatment includes oral agent (dual therapy). She is compliant with treatment most of the time. Her weight is stable. She is following a generally healthy diet. Meal planning includes avoidance of concentrated sweets. She participates in exercise intermittently. Her home blood glucose trend is fluctuating minimally. An ACE inhibitor/angiotensin II receptor blocker is being taken. She does not see a podiatrist.Eye exam is not current.     Review of Systems   Constitutional: Positive for irritability and malaise/fatigue. Negative for activity change, appetite change and unexpected weight change.        Obesity   HENT: Negative for congestion, ear discharge, hearing loss, rhinorrhea, sinus pressure, trouble swallowing and voice change.    Eyes: Positive for photophobia. Negative for blurred vision, pain, discharge and visual disturbance.   Respiratory: Negative for cough, chest tightness and wheezing.         COPD, former smoker   Cardiovascular: Negative for chest pain and palpitations.        Hypertension, hyperlipidemia   Gastrointestinal: Negative for blood in stool, constipation, diarrhea, nausea and vomiting.   Endocrine: Negative for cold intolerance, heat intolerance, polydipsia and polyuria.        Diabetes type 2   Genitourinary: Negative for difficulty urinating, dysuria, hematuria and menstrual problem.   Musculoskeletal: Positive for back pain (chronic). Negative for arthralgias, gait problem, joint swelling and neck pain.   Allergic/Immunologic: Negative for immunocompromised state.   Neurological: Positive for headaches. Negative for  seizures, speech difficulty and weakness.   Psychiatric/Behavioral: Positive for depression and dysphoric mood. Negative for confusion, self-injury and suicidal ideas. The patient has insomnia.        Past Medical History:   Diagnosis Date    COPD (chronic obstructive pulmonary disease)     Diabetes mellitus, type 2     GERD (gastroesophageal reflux disease)     Hyperlipidemia     Hypertension     Vitamin D deficiency       Past Surgical History:   Procedure Laterality Date    ADENOIDECTOMY      BREAST SURGERY Right     breast biopsy    Injection,steroid,epidural,transforaminal approach Left 2019    Performed by Gilberto Michlele [facility] at [facility]    TONSILLECTOMY         Family History   Problem Relation Age of Onset    Stroke Mother     Emphysema Mother     Diabetes Father     Coronary artery disease Father     COPD Father     Diabetes Sister        Social History     Socioeconomic History    Marital status: Significant Other     Spouse name: Not on file    Number of children: Not on file    Years of education: Not on file    Highest education level: Not on file   Occupational History    Not on file   Social Needs    Financial resource strain: Not on file    Food insecurity:     Worry: Not on file     Inability: Not on file    Transportation needs:     Medical: Not on file     Non-medical: Not on file   Tobacco Use    Smoking status: Former Smoker     Packs/day: 2.00     Types: Cigarettes     Last attempt to quit: 2019     Years since quittin.3    Smokeless tobacco: Never Used   Substance and Sexual Activity    Alcohol use: Yes     Comment: rarely    Drug use: Yes     Types: Hydrocodone    Sexual activity: Never   Lifestyle    Physical activity:     Days per week: Not on file     Minutes per session: Not on file    Stress: Not on file   Relationships    Social connections:     Talks on phone: Not on file     Gets together: Not on file     Attends [employer] service: Not on file      Active member of club or organization: Not on file     Attends meetings of clubs or organizations: Not on file     Relationship status: Not on file   Other Topics Concern    Not on file   Social History Narrative    Not on file       Current Outpatient Medications   Medication Sig Dispense Refill    albuterol-ipratropium (DUO-NEB) 2.5 mg-0.5 mg/3 mL nebulizer solution Take 3 mLs by nebulization every 6 (six) hours as needed for Wheezing. Rescue 1 Box 2    amLODIPine (NORVASC) 10 MG tablet Take 1 tablet (10 mg total) by mouth once daily. 90 tablet 1    aspirin/salicylamide/caffeine (BC HEADACHE POWDER ORAL) Take by mouth as needed.      buPROPion (WELLBUTRIN XL) 300 MG 24 hr tablet Take 1 tablet (300 mg total) by mouth once daily. 30 tablet 5    clotrimazole-betamethasone 1-0.05% (LOTRISONE) cream Apply topically as needed.      ergocalciferol (VITAMIN D2) 50,000 unit Cap Take 1 capsule (50,000 Units total) by mouth once a week. 12 capsule 7    fluticasone-salmeterol 500-50 mcg/dose (ADVAIR DISKUS) 500-50 mcg/dose DsDv diskus inhaler Inhale 1 puff into the lungs 2 (two) times daily. Controller 60 each 5    gabapentin (NEURONTIN) 300 MG capsule Take 300 mg by mouth 2 (two) times daily.      ibuprofen (ADVIL,MOTRIN) 800 MG tablet Take 800 mg by mouth 3 (three) times daily.       ipratropium-albuterol (COMBIVENT RESPIMAT)  mcg/actuation inhaler Inhale 2 puffs into the lungs every 6 (six) hours as needed for Wheezing. Rescue 4 g 6    melatonin 5 mg Tab Take 1 tablet by mouth nightly.      montelukast (SINGULAIR) 10 mg tablet Take 1 tablet (10 mg total) by mouth once daily. 90 tablet 1    pantoprazole (PROTONIX) 40 MG tablet Take 1 tablet (40 mg total) by mouth once daily. 90 tablet 1    rosuvastatin (CRESTOR) 20 MG tablet Take 1 tablet (20 mg total) by mouth once daily. 90 tablet 1    SITagliptan-metformin (JANUMET XR) 100-1,000 mg TM24 Take 1 tablet by mouth once daily. 90 tablet 1     "tiZANidine 4 mg Cap Take 1 capsule by mouth 3 (three) times daily as needed.       valsartan-hydrochlorothiazide (DIOVAN-HCT) 160-25 mg per tablet Take 1 tablet by mouth once daily. 90 tablet 1    empagliflozin (JARDIANCE) 10 mg Tab Take 10 mg by mouth once daily. 90 tablet 1    sumatriptan (IMITREX) 50 MG tablet 1 tab PO at onset of headache.  Repeat in 2 hours if not relieved.  Max 2 tabs in 24 hours. 30 tablet 1     No current facility-administered medications for this visit.        Review of patient's allergies indicates:  No Known Allergies  Objective:      Blood pressure 120/70, pulse 95, height 5' 6" (1.676 m), weight 114.3 kg (252 lb), SpO2 97 %. Body mass index is 40.67 kg/m².   Physical Exam   Constitutional: She is oriented to person, place, and time. She appears well-developed. She is cooperative. No distress.   obesity   HENT:   Head: Normocephalic and atraumatic.   Right Ear: Tympanic membrane normal.   Left Ear: Tympanic membrane normal.   Nose: Nose normal.   Mouth/Throat: Oropharynx is clear and moist.   Eyes: Pupils are equal, round, and reactive to light. Conjunctivae, EOM and lids are normal. Lids are everted and swept, no foreign bodies found. Right pupil is round and reactive. Left pupil is round and reactive.   Neck: Trachea normal and normal range of motion. Neck supple.   Cardiovascular: Normal rate, regular rhythm, S1 normal, S2 normal and normal heart sounds.   No murmur heard.  Pulmonary/Chest: Effort normal and breath sounds normal. No stridor. She has no decreased breath sounds. She has no wheezes. She has no rhonchi. She has no rales.   Abdominal: Soft. Bowel sounds are normal. There is no rigidity and no guarding.   Musculoskeletal: Normal range of motion.   Lymphadenopathy:     She has no cervical adenopathy.     She has no axillary adenopathy.   Neurological: She is alert and oriented to person, place, and time.   Skin: Skin is warm and dry. Capillary refill takes less than 2 " seconds.   Psychiatric: Her speech is normal and behavior is normal. Judgment and thought content normal. She exhibits a depressed mood (controlled). She expresses no homicidal and no suicidal ideation. She expresses no suicidal plans and no homicidal plans.   Nursing note and vitals reviewed.          Assessment:       1. Type 2 diabetes mellitus with complication, without long-term current use of insulin    2. Recurrent headache    3. Essential hypertension    4. Mixed hyperlipidemia    5. Class 3 severe obesity due to excess calories with serious comorbidity and body mass index (BMI) of 40.0 to 44.9 in adult    6. Former smoker        Plan:       Kemi was seen today for follow-up.    Diagnoses and all orders for this visit:    Type 2 diabetes mellitus with complication, without long-term current use of insulin  -     empagliflozin (JARDIANCE) 10 mg Tab; Take 10 mg by mouth once daily.  -Continue Janumet XR and add jardiance daily.  -Diet changes.  - Discussed the following complications of DM Type 2: CAD, CVD, Retinopathy, Nephropathy, Neuropathy.    Discussed Target A1C Goal <7.0% and Target fasting blood sugars () before each meal.    Discussed Lifestyle Modifications: Low glycemic index diet, Exercise (30-45 min) daily, Weight loss, and Smoking cessation     Recurrent headache  -     sumatriptan (IMITREX) 50 MG tablet; 1 tab PO at onset of headache.  Repeat in 2 hours if not relieved.  Max 2 tabs in 24 hours.  -Wean off of taking BC powders everytime you have a headache.  This could be causing rebound headaches.  Educated on alternating medications to treat a headache.    Essential hypertension  -Lifestyle changes: Reduce the amount of salt in your diet; Lose weight; Avoid drinking too much alcohol; Exercise at least 30 minutes per day most days of the week.  Continue current medications and home BP monitoring.   -Controlled.  Continue current medications.    Mixed hyperlipidemia  -Limit red meat,  butter, fried foods, cheese, and other foods that have a lot of saturated fat. Consume more: lean meats, fish, fruits, vegetables, whole grains, beans, lentils, and nuts.  Weight loss, and 30-45 min of cardiovascular exercise daily.     Class 3 severe obesity due to excess calories with serious comorbidity and body mass index (BMI) of 40.0 to 44.9 in adult  -The patient is asked to make an attempt to improve diet and exercise patterns to aid in medical management of this problem.    Former smoker  -Continue smoking abstinence.    Follow up in 3 months for in office HBA1C.

## 2019-07-01 DIAGNOSIS — I10 ESSENTIAL HYPERTENSION: ICD-10-CM

## 2019-07-01 RX ORDER — VARENICLINE TARTRATE 1 MG/1
1 TABLET, FILM COATED ORAL 2 TIMES DAILY
COMMUNITY
End: 2019-07-01 | Stop reason: SDUPTHER

## 2019-07-01 RX ORDER — AMLODIPINE BESYLATE 10 MG/1
10 TABLET ORAL DAILY
Qty: 90 TABLET | Refills: 1 | Status: SHIPPED | OUTPATIENT
Start: 2019-07-01 | End: 2019-12-16 | Stop reason: SDUPTHER

## 2019-07-01 RX ORDER — VARENICLINE TARTRATE 1 MG/1
1 TABLET, FILM COATED ORAL 2 TIMES DAILY
Qty: 60 TABLET | Refills: 0 | Status: ON HOLD | OUTPATIENT
Start: 2019-07-01 | End: 2019-08-23

## 2019-07-09 DIAGNOSIS — R45.89 DEPRESSED MOOD: ICD-10-CM

## 2019-07-09 RX ORDER — BUPROPION HYDROCHLORIDE 300 MG/1
300 TABLET ORAL DAILY
Qty: 30 TABLET | Refills: 5 | Status: SHIPPED | OUTPATIENT
Start: 2019-07-09 | End: 2020-01-22 | Stop reason: SDUPTHER

## 2019-07-20 DIAGNOSIS — R92.8 ABNORMAL MAMMOGRAPHY: Primary | ICD-10-CM

## 2019-08-05 ENCOUNTER — TELEPHONE (OUTPATIENT)
Dept: PAIN MEDICINE | Facility: CLINIC | Age: 54
End: 2019-08-05

## 2019-08-05 NOTE — TELEPHONE ENCOUNTER
----- Message from Gaudencio Ag sent at 8/5/2019  1:04 PM CDT -----  Contact: same  Patient called in and stated she is trying to schedule an epidural.  Patient call back number is [phone]

## 2019-08-05 NOTE — TELEPHONE ENCOUNTER
Pt requesting repeat lumbar epidural steroid injection to the Left L5-S1 and S1 level(s). Please advise if ok to schedule. Last injection 4/16/19

## 2019-08-09 DIAGNOSIS — M54.16 LUMBAR RADICULOPATHY: Primary | ICD-10-CM

## 2019-08-13 ENCOUNTER — HOSPITAL ENCOUNTER (OUTPATIENT)
Dept: RADIOLOGY | Facility: HOSPITAL | Age: 54
Discharge: HOME | End: 2019-08-13
Attending: NURSE PRACTITIONER
Payer: COMMERCIAL

## 2019-08-13 VITALS — WEIGHT: 252 LBS | HEIGHT: 66 IN | BODY MASS INDEX: 40.5 KG/M2

## 2019-08-13 DIAGNOSIS — R92.8 ABNORMAL MAMMOGRAPHY: ICD-10-CM

## 2019-08-13 PROCEDURE — 77065 DX MAMMO INCL CAD UNI: CPT | Mod: TC,LT

## 2019-08-14 DIAGNOSIS — J44.9 CHRONIC OBSTRUCTIVE PULMONARY DISEASE, UNSPECIFIED COPD TYPE: ICD-10-CM

## 2019-08-23 ENCOUNTER — HOSPITAL ENCOUNTER (OUTPATIENT)
Facility: AMBULARY SURGERY CENTER | Age: 54
Discharge: HOME | End: 2019-08-23
Attending: ANESTHESIOLOGY | Admitting: ANESTHESIOLOGY
Payer: COMMERCIAL

## 2019-08-23 DIAGNOSIS — M54.16 LUMBAR RADICULITIS: Primary | ICD-10-CM

## 2019-08-23 LAB — GLUCOSE SERPL-MCNC: 138 MG/DL (ref 70–110)

## 2019-08-23 PROCEDURE — 64483 NJX AA&/STRD TFRM EPI L/S 1: CPT | Performed by: ANESTHESIOLOGY

## 2019-08-23 PROCEDURE — 64483 PR EPIDURAL INJ, ANES/STEROID, TRANSFORAMINAL, LUMB/SACR, SNGL LEVL: ICD-10-PCS | Mod: LT,,, | Performed by: ANESTHESIOLOGY

## 2019-08-23 PROCEDURE — 64484 NJX AA&/STRD TFRM EPI L/S EA: CPT | Performed by: ANESTHESIOLOGY

## 2019-08-23 PROCEDURE — 64484 NJX AA&/STRD TFRM EPI L/S EA: CPT | Mod: LT,,, | Performed by: ANESTHESIOLOGY

## 2019-08-23 PROCEDURE — 64483 NJX AA&/STRD TFRM EPI L/S 1: CPT | Mod: LT,,, | Performed by: ANESTHESIOLOGY

## 2019-08-23 PROCEDURE — 64484 PRA INJECT ANES/STEROID FORAMEN LUMBAR/SACRAL W IMG GUIDE ,EA ADD LEVEL: ICD-10-PCS | Mod: LT,,, | Performed by: ANESTHESIOLOGY

## 2019-08-23 RX ORDER — DEXAMETHASONE SODIUM PHOSPHATE 10 MG/ML
INJECTION INTRAMUSCULAR; INTRAVENOUS
Status: DISCONTINUED | OUTPATIENT
Start: 2019-08-23 | End: 2019-08-23 | Stop reason: HOSPADM

## 2019-08-23 RX ORDER — LIDOCAINE HYDROCHLORIDE 10 MG/ML
INJECTION, SOLUTION EPIDURAL; INFILTRATION; INTRACAUDAL; PERINEURAL
Status: DISCONTINUED | OUTPATIENT
Start: 2019-08-23 | End: 2019-08-23 | Stop reason: HOSPADM

## 2019-08-23 RX ORDER — SODIUM CHLORIDE, SODIUM LACTATE, POTASSIUM CHLORIDE, CALCIUM CHLORIDE 600; 310; 30; 20 MG/100ML; MG/100ML; MG/100ML; MG/100ML
INJECTION, SOLUTION INTRAVENOUS ONCE AS NEEDED
Status: DISCONTINUED | OUTPATIENT
Start: 2019-08-23 | End: 2019-08-23 | Stop reason: HOSPADM

## 2019-08-23 RX ORDER — BUPIVACAINE HYDROCHLORIDE 2.5 MG/ML
INJECTION, SOLUTION EPIDURAL; INFILTRATION; INTRACAUDAL
Status: DISCONTINUED | OUTPATIENT
Start: 2019-08-23 | End: 2019-08-23 | Stop reason: HOSPADM

## 2019-08-23 RX ORDER — ALPRAZOLAM 1 MG/1
1 TABLET, ORALLY DISINTEGRATING ORAL ONCE
Status: COMPLETED | OUTPATIENT
Start: 2019-08-23 | End: 2019-08-23

## 2019-08-23 RX ADMIN — ALPRAZOLAM 1 MG: 1 TABLET, ORALLY DISINTEGRATING ORAL at 11:08

## 2019-08-23 NOTE — PLAN OF CARE
Patient awake alert and standing up and ambulating without dizziness or weakness. She denies pain or nausea. Vital signs stable and injection sites without swelling, redness or  Drainage. Patient states she is ready to go home. All belongings listed on pre op check list have been returned to patient. Patient's significant other present and states she is ready to take p[atient home and that she is driving patient home.

## 2019-08-23 NOTE — DISCHARGE SUMMARY
[facility]  Discharge Note  Short Stay    Admit Date: 8/23/2019    Discharge Date and Time: 8/23/2019    Attending Physician: Gilberto Michelle MD     Discharge Provider: Gilberto Michelle    Diagnoses:  Active Hospital Problems    Diagnosis  POA    *Lumbar radiculitis [M54.16]  Yes      Resolved Hospital Problems   No resolved problems to display.       Hospital Course: Lumbar CORINE  Discharged Condition: Good    Final Diagnoses:   Active Hospital Problems    Diagnosis  POA    *Lumbar radiculitis [M54.16]  Yes      Resolved Hospital Problems   No resolved problems to display.       Disposition: Home or Self Care    Follow up/Patient Instructions:    Medications:  Reconciled Home Medications:      Medication List      CONTINUE taking these medications    * albuterol-ipratropium 2.5 mg-0.5 mg/3 mL nebulizer solution  Commonly known as:  DUO-NEB  Take 3 mLs by nebulization every 6 (six) hours as needed for Wheezing. Rescue     * ipratropium-albuterol  mcg/actuation inhaler  Commonly known as:  COMBIVENT RESPIMAT  Inhale 2 puffs into the lungs every 6 (six) hours as needed for Wheezing. Rescue     amLODIPine 10 MG tablet  Commonly known as:  NORVASC  Take 1 tablet (10 mg total) by mouth once daily.     BC HEADACHE POWDER ORAL  Take by mouth as needed.     buPROPion 300 MG 24 hr tablet  Commonly known as:  WELLBUTRIN XL  Take 1 tablet (300 mg total) by mouth once daily.     clotrimazole-betamethasone 1-0.05% cream  Commonly known as:  LOTRISONE  Apply topically as needed.     empagliflozin 10 mg Tab  Commonly known as:  JARDIANCE  Take 10 mg by mouth once daily.     ergocalciferol 50,000 unit Cap  Commonly known as:  VITAMIN D2  Take 1 capsule (50,000 Units total) by mouth once a week.     fluticasone-salmeterol 500-50 mcg/dose 500-50 mcg/dose Dsdv diskus inhaler  Commonly known as:  ADVAIR DISKUS  Inhale 1 puff into the lungs 2 (two) times daily. Controller     gabapentin 300 MG capsule  Commonly known as:   NEURONTIN  Take 300 mg by mouth 2 (two) times daily.     ibuprofen 800 MG tablet  Commonly known as:  ADVIL,MOTRIN  Take 800 mg by mouth 3 (three) times daily.     melatonin 5 mg Tab  Take 1 tablet by mouth nightly.     montelukast 10 mg tablet  Commonly known as:  SINGULAIR  Take 1 tablet (10 mg total) by mouth once daily.     pantoprazole 40 MG tablet  Commonly known as:  PROTONIX  Take 1 tablet (40 mg total) by mouth once daily.     rosuvastatin 20 MG tablet  Commonly known as:  CRESTOR  Take 1 tablet (20 mg total) by mouth once daily.     SITagliptan-metformin 100-1,000 mg Tm24  Commonly known as:  JANUMET XR  Take 1 tablet by mouth once daily.     tiZANidine 4 mg Cap  Take 1 capsule by mouth 3 (three) times daily as needed.     valsartan-hydrochlorothiazide 160-25 mg per tablet  Commonly known as:  DIOVAN-HCT  Take 1 tablet by mouth once daily.         * This list has 2 medication(s) that are the same as other medications prescribed for you. Read the directions carefully, and ask your doctor or other care provider to review them with you.              Discharge Procedure Orders   Call MD for:  temperature >100.4     Call MD for:  persistent nausea and vomiting or diarrhea     Call MD for:  severe uncontrolled pain     Call MD for:  redness, tenderness, or signs of infection (pain, swelling, redness, odor or green/yellow discharge around incision site)     Call MD for:  difficulty breathing or increased cough     Call MD for:  severe persistent headache        Follow up with MD in 2-3 weeks    Discharge Procedure Orders (must include Diet, Follow-up, Activity):   Discharge Procedure Orders (must include Diet, Follow-up, Activity)   Call MD for:  temperature >100.4     Call MD for:  persistent nausea and vomiting or diarrhea     Call MD for:  severe uncontrolled pain     Call MD for:  redness, tenderness, or signs of infection (pain, swelling, redness, odor or green/yellow discharge around incision site)     Call  MD for:  difficulty breathing or increased cough     Call MD for:  severe persistent headache

## 2019-08-23 NOTE — OP NOTE
PROCEDURE DATE: 8/23/2019    PROCEDURE: Left L5-S1 and S1 transforaminal epidural steroid injection under fluoroscopy    DIAGNOSIS: Lumbar disc displacement without myelopathy  Post op diagnosis: Same    PHYSICIAN: Gilberto Michelle MD    MEDICATIONS INJECTED:  Dexamethasone 5mg (0.5ml) and 1.5ml 0.25% bupivicaine at each nerve root.     LOCAL ANESTHETIC INJECTED:  Lidocaine 1%. 2 ml per site.    SEDATION MEDICATIONS: None    ESTIMATED BLOOD LOSS:  None    COMPLICATIONS:  None    TECHNIQUE:   A time-out was taken to identify patient and procedure side prior to starting the procedure. The patient was placed in a prone position, prepped and draped in the usual sterile fashion using ChloraPrep and sterile towels.  The area to be injected was determined under fluoroscopic guidance in AP and oblique view.  Local anesthetic was given by raising a wheal and going down to the hub of a 25-gauge 1.5 inch needle.  In oblique view, a 5 inch 22-gauge bent-tip spinal needle was introduced towards 6 oclock position of the pedicle of each above named nerve root level.  The needle was walked medially then hinged into the neural foramen and position was confirmed in AP and lateral views.  1ml contrast dye was injected to confirm appropriate placement and that there was no vascular uptake.  After negative aspiration for blood or CSF, the medication was then injected. This was performed at the left L5-S1 and S1 level(s). The patient tolerated the procedure well.    The patient was monitored after the procedure.  Patient was given post procedure and discharge instructions to follow at home. The patient was discharged in a stable condition.

## 2019-08-23 NOTE — H&P
CC: low back and leg pain    HPI: The patient is a 54 y.o. female with a history of lumbar DDD here for lumbar Wendy. There are no major changes in history and physical from 19 by Jessie.    Past Medical History:   Diagnosis Date    COPD (chronic obstructive pulmonary disease)     Diabetes mellitus, type 2     GERD (gastroesophageal reflux disease)     Hyperlipidemia     Hypertension     Vitamin D deficiency        Past Surgical History:   Procedure Laterality Date    ADENOIDECTOMY      BREAST SURGERY Right     breast biopsy    Injection,steroid,epidural,transforaminal approach Left 2019    Performed by Gilberto Michelle MD at [facility] OR    TONSILLECTOMY         Family History   Problem Relation Age of Onset    Stroke Mother     Emphysema Mother     Diabetes Father     Coronary artery disease Father     COPD Father     Diabetes Sister        Social History     Socioeconomic History    Marital status: Significant Other     Spouse name: Not on file    Number of children: Not on file    Years of education: Not on file    Highest education level: Not on file   Occupational History    Not on file   Social Needs    Financial resource strain: Not on file    Food insecurity:     Worry: Not on file     Inability: Not on file    Transportation needs:     Medical: Not on file     Non-medical: Not on file   Tobacco Use    Smoking status: Former Smoker     Packs/day: 2.00     Types: Cigarettes     Last attempt to quit: 2019     Years since quittin.4    Smokeless tobacco: Never Used   Substance and Sexual Activity    Alcohol use: Yes     Comment: rarely    Drug use: Yes     Types: Hydrocodone    Sexual activity: Never   Lifestyle    Physical activity:     Days per week: Not on file     Minutes per session: Not on file    Stress: Not on file   Relationships    Social connections:     Talks on phone: Not on file     Gets together: Not on file     Attends Yazdanism service: Not on file      "Active member of club or organization: Not on file     Attends meetings of clubs or organizations: Not on file     Relationship status: Not on file   Other Topics Concern    Not on file   Social History Narrative    Not on file       Current Facility-Administered Medications   Medication Dose Route Frequency Provider Last Rate Last Dose    bupivacaine (PF) 0.25% (2.5 mg/ml) injection    PRN Gilberto Michelle MD   3 mL at 08/23/19 1152    dexAMETHasone sodium phos (PF) injection    PRN Gilberto Michelle MD   10 mg at 08/23/19 1152    iohexol (OMNIPAQUE 300) injection    PRN Gilberto Michelle MD   5 mL at 08/23/19 1151    lactated ringers infusion   Intravenous Once PRN Gilberto Michelle MD        lidocaine (PF) 10 mg/ml (1%) injection    PRN Gilberto Michelle MD   5 mL at 08/23/19 1150       Review of patient's allergies indicates:  No Known Allergies    Vitals:    08/22/19 0940 08/23/19 1150   BP:  (!) 126/59   Pulse:  90   Resp:  18   Temp:  98.8 °F (37.1 °C)   TempSrc:  Skin   SpO2:  96%   Weight: 114.3 kg (252 lb)    Height: 5' 6" (1.676 m)        REVIEW OF SYSTEMS:     GENERAL: No weight loss, malaise or fevers.  HEENT:  No recent changes in vision or hearing  NECK: Negative for lumps, no difficulty with swallowing.  RESPIRATORY: Negative for cough, wheezing or shortness of breath, patient denies any recent URI.  CARDIOVASCULAR: Negative for chest pain, leg swelling or palpitations.  GI: Negative for abdominal discomfort, blood in stools or black stools or change in bowel habits.  MUSCULOSKELETAL: See HPI.  SKIN: Negative for lesions, rash, and itching.  PSYCH: No suicidal or homicidal ideations, no current mood disturbances.  HEMATOLOGY/LYMPHOLOGY: Negative for prolonged bleeding, bruising easily or swollen nodes. Patient is not currently taking any anti-coagulants  ENDO: No history of diabetes or thyroid dysfunction  NEURO: No history of syncope, paralysis, seizures or tremors.All other reviewed and negative other than " HPI.    Physical exam:  Gen: A and O x3, pleasant, well-groomed  Skin: No rashes or obvious lesions  HEENT: PERRLA, no obvious deformities on ears or in canals. No thyroid masses, trachea midline, no palpable lymph nodes in neck, axilla.  CVS: Regular rate and rhythm, normal S1 and S2, no murmurs.  Resp: Clear to auscultation bilaterally.  Abdomen: Soft, NT/ND, normal bowel sounds present.  Musculoskeletal/Neuro: Moving all extremities    Assessment:  Lumbar radiculitis  -     Place in Outpatient; Standing  -     Vital signs; Standing  -     Insert peripheral IV; Standing  -     Verify informed consent; Standing  -     Notify physician ; Standing  -     Notify physician ; Standing  -     Notify physician (specify); Standing  -     Diet NPO; Standing    Other orders  -     lactated ringers infusion  -     IP VTE LOW RISK PATIENT; Standing  -     ALPRAZolam dissolvable tablet 1 mg  -     lidocaine (PF) 10 mg/ml (1%) injection  -     iohexol (OMNIPAQUE 300) injection  -     bupivacaine (PF) 0.25% (2.5 mg/ml) injection  -     dexAMETHasone sodium phos (PF) injection          PLAN: Lumbar CORINE      This patient has been cleared for surgery in an ambulatory surgical facility    ASA 3,  Mallampatti Score 3  No history of anesthetic complications  Plan for RN IV sedation

## 2019-08-23 NOTE — DISCHARGE INSTRUCTIONS
Before leaving, please make sure you have all your personal belongings such as glasses, purses, wallets, keys, cell phones, jewelry, jackets etc    Anesthesia information    Anesthesia Safety      You have been given medicine  to sedate you during your procedure today. This may have included both a pain medicine and sleeping medicine. Most of the effects have worn off; however, you may continue to have some drowsiness for the next  24 hours. Anesthesia and pain medicines can cause nausea, sleepiness, dizziness and  constipation.    HOME CARE:  1) For the next EIGHT HOURS, you should be watched by a responsible adult to look for any worsening of your condition.  2) DO NOT DRINK any ALCOHOL for the next 24 HOURS.  3) DO NOT DRIVE or operate dangerous machinery during the next 24 HOURS.  FOLLOW UP with your doctor or this facility if you are not alert and back to your usual level of activity within 24 hrs.  GET PROMPT MEDICAL ATTENTION if any of the following occur:  -- Increased drowsiness  -- Increased weakness or dizziness  -- Repeated vomiting  -- If you cannot be awakened    Pain injection instructions:     This procedure may take a couple weeks to relieve pain  You may get some pain relief from the local anesthetic initally.    No driving for 24 hrs.   Activity as tolerated- gradually increase activities.  Dont lift over 10 lbs for 24 hrs   No heat at injection sites x 2 days. No heating pads, hot tubs, saunas, or swimming in any body of water or pool for 2 days.  Use ice pack for mild swelling and for comfort , apply for 20 minutes, remove for 20 minute intervals. No direct contact of ice itself  to skin.  May shower today. Do not allow shower water to hit on injection sites for 2 days.No tub baths for two days.      Resume Aspirin, Plavix, or Coumadin the day after the procedure unless otherwise instructed.   If diabetic,monitor your glucose carefully as steroids can increase your glucose level    Seek  immediate medical help for:   Severe increase in your usual pain or appearance of new pain.  Prolonged (more than 8 hours) or increasing weakness or numbness in the legs or arms. Numbing medicine was injected and can affect the messages to and from the brain and legs or arms.  .    Fever above 100.4F,Drainage,redness,active bleeding, or increased swelling at the injection site.  Headache, shortness of breath, chest pain, or breathing problems.

## 2019-08-26 DIAGNOSIS — J44.9 CHRONIC OBSTRUCTIVE PULMONARY DISEASE, UNSPECIFIED COPD TYPE: ICD-10-CM

## 2019-08-26 RX ORDER — MONTELUKAST SODIUM 10 MG/1
10 TABLET ORAL DAILY
Qty: 90 TABLET | Refills: 1 | Status: SHIPPED | OUTPATIENT
Start: 2019-08-26 | End: 2020-01-20 | Stop reason: SDUPTHER

## 2019-08-27 VITALS
SYSTOLIC BLOOD PRESSURE: 109 MMHG | WEIGHT: 252 LBS | DIASTOLIC BLOOD PRESSURE: 67 MMHG | HEART RATE: 87 BPM | BODY MASS INDEX: 40.5 KG/M2 | HEIGHT: 66 IN | OXYGEN SATURATION: 94 % | RESPIRATION RATE: 18 BRPM | TEMPERATURE: 98 F

## 2019-09-13 ENCOUNTER — OFFICE VISIT (OUTPATIENT)
Dept: PAIN MEDICINE | Facility: CLINIC | Age: 54
End: 2019-09-13
Payer: COMMERCIAL

## 2019-09-13 VITALS
HEART RATE: 84 BPM | SYSTOLIC BLOOD PRESSURE: 132 MMHG | HEIGHT: 66 IN | BODY MASS INDEX: 40.5 KG/M2 | WEIGHT: 252 LBS | DIASTOLIC BLOOD PRESSURE: 79 MMHG

## 2019-09-13 DIAGNOSIS — M51.36 DDD (DEGENERATIVE DISC DISEASE), LUMBAR: Primary | ICD-10-CM

## 2019-09-13 DIAGNOSIS — M54.16 LUMBAR RADICULITIS: ICD-10-CM

## 2019-09-13 PROCEDURE — 3008F BODY MASS INDEX DOCD: CPT | Mod: CPTII,S$GLB,, | Performed by: PHYSICIAN ASSISTANT

## 2019-09-13 PROCEDURE — 99214 PR OFFICE/OUTPT VISIT, EST, LEVL IV, 30-39 MIN: ICD-10-PCS | Mod: S$GLB,,, | Performed by: PHYSICIAN ASSISTANT

## 2019-09-13 PROCEDURE — 3075F SYST BP GE 130 - 139MM HG: CPT | Mod: CPTII,S$GLB,, | Performed by: PHYSICIAN ASSISTANT

## 2019-09-13 PROCEDURE — 99999 PR PBB SHADOW E&M-EST. PATIENT-LVL III: CPT | Mod: PBBFAC,,, | Performed by: PHYSICIAN ASSISTANT

## 2019-09-13 PROCEDURE — 99999 PR PBB SHADOW E&M-EST. PATIENT-LVL III: ICD-10-PCS | Mod: PBBFAC,,, | Performed by: PHYSICIAN ASSISTANT

## 2019-09-13 PROCEDURE — 99214 OFFICE O/P EST MOD 30 MIN: CPT | Mod: S$GLB,,, | Performed by: PHYSICIAN ASSISTANT

## 2019-09-13 PROCEDURE — 3075F PR MOST RECENT SYSTOLIC BLOOD PRESS GE 130-139MM HG: ICD-10-PCS | Mod: CPTII,S$GLB,, | Performed by: PHYSICIAN ASSISTANT

## 2019-09-13 PROCEDURE — 3078F DIAST BP <80 MM HG: CPT | Mod: CPTII,S$GLB,, | Performed by: PHYSICIAN ASSISTANT

## 2019-09-13 PROCEDURE — 3078F PR MOST RECENT DIASTOLIC BLOOD PRESSURE < 80 MM HG: ICD-10-PCS | Mod: CPTII,S$GLB,, | Performed by: PHYSICIAN ASSISTANT

## 2019-09-13 PROCEDURE — 3008F PR BODY MASS INDEX (BMI) DOCUMENTED: ICD-10-PCS | Mod: CPTII,S$GLB,, | Performed by: PHYSICIAN ASSISTANT

## 2019-09-13 NOTE — PROGRESS NOTES
Referring Physician: No ref. provider found    PCP: Fiorella Melchor, PARAG      CC:  Low back and left leg pain    Interval History:  Kemi Dominguez is a 54 y.o. female with a history of low back pain with radiation into her left leg who presents today for f/u s/p left TF CORINE at L5-S1, S1. Reports 90% improvement in symptoms. Numbness, tingling, burning is resolved. She discontinued Gabapentin 600 mg TID. Denies SEs. Denies LE weakness. Denies b/b changes. Pain today is rated 0/10.    HPI:   Kemi Dominguez is a 54 y.o. female referred to us for low back and left leg pain.  Pain has gradually worsened over past 2 months.  No recent traumatic incident.  She presents with a constant aching, throbbing, sharp pain in her lower back.  Pain radiates down her left lateral leg into her left foot.  Pain worsens standing, walking, getting.  Pain improves with rest.  She recently had updated lumbar MRI.  She currently takes baclofen and tizanidine with minimal benefit.  She is prescribed hydrocodone by her orthopedic for chronic back pain in the past.  Medicine does not help with her radicular symptoms currently.  She is currently not taking anti neuropathic medications. She was seen by outside pain provider who recommended the lumbar CORINE, however, her insurance was not covered.  She wishes to proceed with interventions with us.  She denies any worsening weakness.  No bowel bladder changes.    ROS:  CONSTITUTIONAL: No fevers, chills, night sweats, wt. loss, appetite changes  SKIN: no rashes or itching  ENT: No headaches, head trauma, vision changes, or eye pain  LYMPH NODES: None noticed   CV: No chest pain, palpitations.   RESP: No shortness of breath, dyspnea on exertion, cough, wheezing, or hemoptysis  GI: No nausea, emesis, diarrhea, constipation, melena, hematochezia, pain.    : No dysuria, hematuria, urgency, or frequency   HEME: No easy bruising, bleeding problems  PSYCHIATRIC: No depression, anxiety, psychosis,  hallucinations.  NEURO: No seizures, memory loss, dizziness or difficulty sleeping  MSK:  Positive HPI      Past Medical History:   Diagnosis Date    COPD (chronic obstructive pulmonary disease)     Diabetes mellitus, type 2     GERD (gastroesophageal reflux disease)     Hyperlipidemia     Hypertension     Vitamin D deficiency      Past Surgical History:   Procedure Laterality Date    ADENOIDECTOMY      BREAST SURGERY Right     breast biopsy    Injection,steroid,epidural,transforaminal approach Left 2019    [facility]    Injection,steroid,epidural,transforaminal approach Left 2019    [facility] at [facility]    TONSILLECTOMY       Family History   Problem Relation Age of Onset    Stroke Mother     Emphysema Mother     Diabetes Father     Coronary artery disease Father     COPD Father     Diabetes Sister      Social History     Socioeconomic History    Marital status: Significant Other     Spouse name: Not on file    Number of children: Not on file    Years of education: Not on file    Highest education level: Not on file   Occupational History    Not on file   Social Needs    Financial resource strain: Not on file    Food insecurity:     Worry: Not on file     Inability: Not on file    Transportation needs:     Medical: Not on file     Non-medical: Not on file   Tobacco Use    Smoking status: Former Smoker     Packs/day: 2.00     Types: Cigarettes     Last attempt to quit: 2019     Years since quittin.5    Smokeless tobacco: Never Used   Substance and Sexual Activity    Alcohol use: Yes     Comment: rarely    Drug use: Yes     Types: Hydrocodone    Sexual activity: Never   Lifestyle    Physical activity:     Days per week: Not on file     Minutes per session: Not on file    Stress: Not on file   Relationships    Social connections:     Talks on phone: Not on file     Gets together: Not on file     Attends Caodaism service:  "Not on file     Active member of club or organization: Not on file     Attends meetings of clubs or organizations: Not on file     Relationship status: Not on file   Other Topics Concern    Not on file   Social History Narrative    Not on file         Medications/Allergies: See med card    Vitals:    09/13/19 1052   BP: 132/79   Pulse: 84   Weight: 114.3 kg (252 lb)   Height: 5' 6" (1.676 m)   PainSc: 0-No pain   PainLoc: Back     Physical exam:    GENERAL: A and O x3, the patient appears well groomed and is in no acute distress.  Skin: No rashes or obvious lesions  HEENT: normocephalic, atraumatic  CARDIOVASCULAR:  RRR  LUNGS: non labored breathing  ABDOMEN: soft, nontender   UPPER EXTREMITIES: Normal alignment, normal range of motion, no atrophy, no skin changes,  hair growth and nail growth normal and equal bilaterally. No swelling, no tenderness.    LOWER EXTREMITIES:  Normal alignment, normal range of motion, no atrophy, no skin changes,  hair growth and nail growth normal and equal bilaterally. No swelling, no tenderness.  LUMBAR SPINE  Lumbar spine: ROM is limited with flexion extension and oblique extension with mildly increased pain.    Huy's test causes no increased pain on either side.    Supine straight leg raise is negative  Internal and external rotation of the hip causes no increased pain on either side.  Myofascial exam: No tenderness to palpation across lumbar paraspinous muscles.      MENTAL STATUS: normal orientation, speech, language, and fund of knowledge for social situation.  Emotional state appropriate.    CRANIAL NERVES:  II:  PERRL bilaterally,   III,IV,VI: EOMI.    V:  Facial sensation equal bilaterally  VII:  Facial motor function normal.  VIII:  Hearing equal to finger rub bilaterally  IX/X: Gag normal, palate symmetric  XI:  Shoulder shrug equal, head turn equal  XII:  Tongue midline without fasciculations      MOTOR: Tone and bulk: normal bilateral upper and lower Strength: " normal   Delt Bi Tri WE WF     R 5 5 5 5 5 5   L 5 5 5 5 5 5     IP ADD ABD Quad TA Gas HAM  R 5 5 5 5 5 5 5  L 5 5 5 5 5 5 5    SENSATION: Light touch and pinprick intact bilaterally  REFLEXES: normal, symmetric, nonbrisk.  Toes down, no clonus. No hoffmans.  GAIT: normal rise, base, steps, and arm swing.        Imaging:  MRI lumbar spine without contrast 01/23/2019 (S )  L3-4, shallow broad-based disc bulge.  Bilateral facet arthropathy.  Mild degree of spinal canal encroachment.  Moderate right and mild left foraminal narrowing.  L4-5, broad-based disc bulge.  Bilateral facet arthropathy.  Moderate degree of spinal canal encroachment and moderate degrees of bilateral neural foraminal narrowing.  L5-S1, broad-based disc bulge.  Protruded disc margin contacts bilateral S1 nerve roots asymmetric to the left.  Moderate degree of spinal canal stenosis and bilateral neural foraminal narrowing    Assessment:  Kemi Dominguez is a 54 y.o. female with   1. DDD (degenerative disc disease), lumbar    2. Lumbar radiculitis      Plan:  1. I have stressed the importance of physical activity and exercise to improve overall health  2. Monitor progress and consider repeat lumbar epidural steroid injection to the Left L5-S1 and S1 level(s).  3. Consider restarting Gabapentin in the future if radicular pain returns  4. F/u prn

## 2019-09-25 ENCOUNTER — OFFICE VISIT (OUTPATIENT)
Dept: FAMILY MEDICINE | Facility: CLINIC | Age: 54
End: 2019-09-25
Payer: COMMERCIAL

## 2019-09-25 VITALS
SYSTOLIC BLOOD PRESSURE: 124 MMHG | WEIGHT: 245 LBS | HEART RATE: 87 BPM | OXYGEN SATURATION: 99 % | HEIGHT: 66 IN | BODY MASS INDEX: 39.37 KG/M2 | DIASTOLIC BLOOD PRESSURE: 72 MMHG

## 2019-09-25 DIAGNOSIS — E66.01 CLASS 2 SEVERE OBESITY DUE TO EXCESS CALORIES WITH SERIOUS COMORBIDITY AND BODY MASS INDEX (BMI) OF 39.0 TO 39.9 IN ADULT: ICD-10-CM

## 2019-09-25 DIAGNOSIS — E11.8 TYPE 2 DIABETES MELLITUS WITH COMPLICATION, WITHOUT LONG-TERM CURRENT USE OF INSULIN: Primary | ICD-10-CM

## 2019-09-25 DIAGNOSIS — R51.9 RECURRENT HEADACHE: ICD-10-CM

## 2019-09-25 DIAGNOSIS — E55.9 VITAMIN D DEFICIENCY: ICD-10-CM

## 2019-09-25 DIAGNOSIS — J44.9 CHRONIC OBSTRUCTIVE PULMONARY DISEASE, UNSPECIFIED COPD TYPE: ICD-10-CM

## 2019-09-25 DIAGNOSIS — I10 ESSENTIAL HYPERTENSION: ICD-10-CM

## 2019-09-25 DIAGNOSIS — E78.2 MIXED HYPERLIPIDEMIA: ICD-10-CM

## 2019-09-25 PROBLEM — E66.812 CLASS 2 SEVERE OBESITY DUE TO EXCESS CALORIES WITH SERIOUS COMORBIDITY AND BODY MASS INDEX (BMI) OF 39.0 TO 39.9 IN ADULT: Status: ACTIVE | Noted: 2018-10-17

## 2019-09-25 PROBLEM — E66.813 CLASS 3 SEVERE OBESITY DUE TO EXCESS CALORIES WITH SERIOUS COMORBIDITY AND BODY MASS INDEX (BMI) OF 40.0 TO 44.9 IN ADULT: Status: RESOLVED | Noted: 2018-10-17 | Resolved: 2019-09-25

## 2019-09-25 LAB — HBA1C MFR BLD: 7.5 %

## 2019-09-25 PROCEDURE — 99999 PR PBB SHADOW E&M-EST. PATIENT-LVL IV: ICD-10-PCS | Mod: PBBFAC,,, | Performed by: NURSE PRACTITIONER

## 2019-09-25 PROCEDURE — 3045F PR MOST RECENT HEMOGLOBIN A1C LEVEL 7.0-9.0%: CPT | Mod: S$GLB,,, | Performed by: NURSE PRACTITIONER

## 2019-09-25 PROCEDURE — 83036 POCT HEMOGLOBIN A1C: ICD-10-PCS | Mod: QW,S$GLB,, | Performed by: NURSE PRACTITIONER

## 2019-09-25 PROCEDURE — 83036 HEMOGLOBIN GLYCOSYLATED A1C: CPT | Mod: QW,S$GLB,, | Performed by: NURSE PRACTITIONER

## 2019-09-25 PROCEDURE — 3074F PR MOST RECENT SYSTOLIC BLOOD PRESSURE < 130 MM HG: ICD-10-PCS | Mod: S$GLB,,, | Performed by: NURSE PRACTITIONER

## 2019-09-25 PROCEDURE — 3078F PR MOST RECENT DIASTOLIC BLOOD PRESSURE < 80 MM HG: ICD-10-PCS | Mod: S$GLB,,, | Performed by: NURSE PRACTITIONER

## 2019-09-25 PROCEDURE — 99214 OFFICE O/P EST MOD 30 MIN: CPT | Mod: S$GLB,,, | Performed by: NURSE PRACTITIONER

## 2019-09-25 PROCEDURE — 3045F PR MOST RECENT HEMOGLOBIN A1C LEVEL 7.0-9.0%: ICD-10-PCS | Mod: S$GLB,,, | Performed by: NURSE PRACTITIONER

## 2019-09-25 PROCEDURE — 3008F PR BODY MASS INDEX (BMI) DOCUMENTED: ICD-10-PCS | Mod: S$GLB,,, | Performed by: NURSE PRACTITIONER

## 2019-09-25 PROCEDURE — 3078F DIAST BP <80 MM HG: CPT | Mod: S$GLB,,, | Performed by: NURSE PRACTITIONER

## 2019-09-25 PROCEDURE — 3008F BODY MASS INDEX DOCD: CPT | Mod: S$GLB,,, | Performed by: NURSE PRACTITIONER

## 2019-09-25 PROCEDURE — 3074F SYST BP LT 130 MM HG: CPT | Mod: S$GLB,,, | Performed by: NURSE PRACTITIONER

## 2019-09-25 PROCEDURE — 99214 PR OFFICE/OUTPT VISIT, EST, LEVL IV, 30-39 MIN: ICD-10-PCS | Mod: S$GLB,,, | Performed by: NURSE PRACTITIONER

## 2019-09-25 PROCEDURE — 99999 PR PBB SHADOW E&M-EST. PATIENT-LVL IV: CPT | Mod: PBBFAC,,, | Performed by: NURSE PRACTITIONER

## 2019-09-25 RX ORDER — ERGOCALCIFEROL 1.25 MG/1
50000 CAPSULE ORAL WEEKLY
Qty: 12 CAPSULE | Refills: 1 | Status: SHIPPED | OUTPATIENT
Start: 2019-09-25 | End: 2020-01-22 | Stop reason: SDUPTHER

## 2019-09-25 NOTE — PATIENT INSTRUCTIONS
Diabetes: Activity Tips    Being more active can help you manage your diabetes. The tips on this sheet can help you get the most from your exercise. They can also help you stay safe.  Staying Active  Its important for adults to spend less time sitting and being inactive. This is especially true if you have type 2 diabetes. When you are sitting for long periods of time, get up for short sessions of light activity every 30 minutes.  You should aim for at least 150 minutes a week of exercise or physical activity. Dont let more than 2 days go by without being active.  Benefit from briskness  Brisk activity gets your heart beating faster. This can help you increase your fitness, lose extra weight, and manage your blood sugar level. Try brisk walking. Or, if you have foot or leg problems, you can try swimming or bike riding. You can break up your exercise into chunks throughout the day. Work up to at least 30 minutes of steady, brisk exercise on most days.  Warm up and cool down  Warming up and cooling down reduce your risk of injury. They also help limit muscle soreness. Do a mild version of your activity for 5 minutes before and after your routine. You can also learn stretches that will help keep your muscles loose. Your healthcare provider may show you good ways to warm up and stretch.  Do the talk-sing test  The talk-sing test is a simple way to tell how hard youre exercising. If you can talk while exercising, youre in a safe range. If youre out of breath, slow down. If you can carry a tune, its time to  the pace. Walk up a hill. Increase the resistance on your stationary bike. Or swim faster.  What about eating?  You may be told to plan your exercise for 1 to 2 hours after a meal. In most cases, you dont need to eat while being active. If you take insulin or medicine that can cause low blood sugar, test your blood sugar before exercising. And carry a fast-acting sugar that will raise your blood sugar  level quickly. This includes glucose tablets or hard candy. Use it if you feel low blood sugar symptoms.  Safety tips  These tips can help you stay safe as you become fit:  · Exercise with a friend or carry a cell phone if you have one.  · Carry or wear identification, such as a necklace or bracelet, that says you have diabetes.  · Use the proper footwear and safety equipment for your activity.  · Drink water before, during, and after exercise.  · Dress properly for the weather.  · Dont exercise in very hot or very cold weather.  · Dont exercise if you are sick.  · If you are instructed to do so, test your blood sugar before and after you exercise. Have a small carbohydrate snack if your blood sugar is low before you start exercising.   When to stop exercising and call your healthcare provider  Stop exercising and call your healthcare provider right away if you notice any of the following:  · Pain, pressure, tightness, or heaviness in the chest  · Pain or heaviness in the neck, shoulders, back, arms, legs, or feet  · Unusual shortness of breath  · Dizziness or lightheadedness  · Unusually rapid or slow pulse  · Increased joint or muscle pain  · Nausea or vomiting  Date Last Reviewed: 5/1/2016  © 4865-7882 [employer]. [address] [place] [address], [place] [id number]. All rights reserved. This information is not intended as a substitute for professional medical care. Always follow your healthcare professional's instructions.        Taking Your Blood Pressure  Blood pressure is the force of blood against the artery wall as it moves from the heart through the blood vessels. You can take your own blood pressure reading using a digital monitor. Take your readings the same each time, using the same arm. Take readings as often as your healthcare provider instructs.  About blood pressure monitors  Blood pressure monitors are designed for certain ages and cases. You can find monitors for older adults, for pregnant  women, and for children. Make sure the one you choose is the right one for your age and situation.  The American Heart Association recommends an automatic cuff monitor that fits on your upper arm (bicep). The cuff should fit your arm size. A cuff thats too large or too small will not give an accurate reading. Measure around your upper arm to find your size.  Monitors that attach to your finger or wrist are not as accurate as monitors for your upper arm.  Ask your healthcare provider for help in choosing a monitor. Bring your monitor to your next provider visit if you need help in using it the correct way.  The steps below are general instructions for using an automatic digital monitor.  Step 1. Relax    · Take your blood pressure at the same time every day, such as in the morning or evening, or at the time your healthcare provider recommends.  · Wait at least a half-hour after smoking, eating, or exercising. Don't drink coffee, tea, soda, or other caffeinated beverages before checking your blood pressure.  · Sit comfortably at a table with both feet on the floor. Do not cross your legs or feet. Place the monitor near you.  · Rest for a few minutes before you begin.  Step 2. Wrap the cuff    · Place your arm on the table, palm up. Your arm should be at the level of your heart. Wrap the cuff around your upper arm, just above your elbow. Its best done on bare skin, not over clothing. Most cuffs will indicate where the brachial artery (the blood vessel in the middle of the arm at the inner side of the elbow) should line up with the cuff. Look in your monitor's instruction booklet for an illustration. You can also bring your cuff to your healthcare provider and have them show you how to correctly place the cuff.  Step 3. Inflate the cuff    · Push the button that starts the pump.  · The cuff will tighten, then loosen.  · The numbers will change. When they stop changing, your blood pressure reading will appear.  · Take  "2 or 3 readings one minute apart.  Step 4. Write down the results of each reading    · Write down your blood pressure numbers for each reading. Note the date and time. Keep your results in one place, such as a notebook. Even if your monitor has a built-in memory, keep a hard copy of the readings.  · Remove the cuff from your arm. Turn off the machine.  · Bring your blood pressure records with your healthcare providers at each visit.  · If you start a new blood pressure medicine, note the day you started the new medicine. Also note the day if you change the dose of your medicine. This information goes on your blood pressure recording sheet. This will help your healthcare provider monitor how well the medicine changes are working.  · Ask your healthcare provider what numbers should prompt you to call him or her. Also ask what numbers should prompt you to get help right away.  Date Last Reviewed: 11/1/2016  © 4713-7679 [employer]. [address] [place] [address]. All rights reserved. This information is not intended as a substitute for professional medical care. Always follow your healthcare professional's instructions.        Controlling Your Cholesterol  Cholesterol is a waxy substance. It travels in your blood through the blood vessels. When you have high cholesterol, it builds up in the walls of the blood vessels. This makes the vessels narrower. Blood flow decreases. You are then at greater risk for having a heart attack or a stroke.  Good and bad cholesterol  Lipids are fats. Blood is mostly water. Fat and water don't mix. So our bodies need lipoproteins (lipids inside a protein shell) to carry the lipids. The protein shell carries its lipids through the bloodstream. There are two main kinds of lipoproteins:  · LDL (low-density lipoprotein) is known as "bad cholesterol." It mainly carries cholesterol. It delivers this cholesterol to body cells. Excess LDL cholesterol will build up in " "artery walls. This increases your risk for heart disease and stroke.  · HDL (high-density lipoprotein) is known as "good cholesterol." This protein shell collects excess cholesterol that LDLs have left behind on blood vessel walls. That's why high levels of HDL cholesterol can decrease your risk of heart disease and stroke.  Controlling cholesterol levels  Total cholesterol includes LDL and HDL cholesterol, as well as other fats in the bloodstream. If your total cholesterol is high, follow the steps below to help lower your total cholesterol level:  · Eat less unhealthy fat:  ¨ Cut back on saturated fats and trans (also called hydrogenated) fats by selecting lean cuts of meat, low-fat dairy, and using oils instead of solid fats. Limit baked goods, processed meats, and fried foods. A diet thats high in these fats increases your bad cholesterol. It's not enough to just cut back on foods containing cholesterol.  ¨ Eat about 2 servings of fish per week. Most fish contain omega-3 fatty acids. These help lower blood cholesterol.  ¨ Eat more whole grains and soluble fiber (such as oat bran). These lower overall cholesterol.  · Be active:  ¨ Choose an activity you enjoy. Walking, swimming, and riding a bike are some good ways to be active.  ¨ Start at a level where you feel comfortable. Increase your time and pace a little each week.  ¨ Work up to 40 minutes of moderate to high intensity physical activity at least 3 to 4 days per week.  ¨ Remember, some activity is better than none.  ¨ If you haven't been exercising regularly, start slowly. Check with your doctor to make sure the exercise plan is right for you.  · Quit smoking. Quitting smoking can improve your lipid levels. It also lowers your risk for heart disease and stroke.  · Weight management. If you are overweight or obese, your health care provider will work with you to lose weight and lower your BMI (body mass index) to a normal or near-normal level. Making diet " changes and increasing physical activity can help.  · Take medication as directed. Many people need medication to get their LDL levels to a safe level. Medication to lower cholesterol levels is effective and safe. (But taking medication is not a substitute for exercise or watching your diet!) Your doctor can tell you whether you might benefit from a cholesterol-lowering medication.  Date Last Reviewed: 5/11/2015  © 5558-1129 [employer]. [address] [place] [address], [place] [address]. All rights reserved. This information is not intended as a substitute for professional medical care. Always follow your healthcare professional's instructions.

## 2019-09-25 NOTE — PROGRESS NOTES
Subjective:       Patient ID: Kemi Dominguez is a 54 y.o. female.    Chief Complaint: Diabetes    Patient presents today following up for chronic conditions including hypertension, hyperlipidemia, diabetes, and depressed mood.   Patient is obese with a BMI of 39.54.  Patient has lost 7 lbs since her last visit 2 3 months ago.  Patient was started on Jardiance 10 mg at her last visit.  Patient has tolerated this well with no side effects.  Patient does state that she has been drinking about 8 [employer] can drinks per day.    Depression   Visit Type: follow-up  Patient presents with the following symptoms: anhedonia and excessive worry.  Patient is not experiencing: confusion, palpitations, suicidal ideas, suicidal planning and thoughts of death.  Frequency of symptoms: most days   Severity: mild   Sleep quality: fair  Nighttime awakenings: occasional    Hypertension   This is a chronic problem. The current episode started more than 1 month ago. The problem has been waxing and waning since onset. The problem is controlled. Associated symptoms include anxiety, headaches and malaise/fatigue. Pertinent negatives include no blurred vision, chest pain, neck pain or palpitations. Agents associated with hypertension include estrogens. Risk factors for coronary artery disease include smoking/tobacco exposure, sedentary lifestyle, obesity, diabetes mellitus, dyslipidemia and stress. Past treatments include calcium channel blockers, angiotensin blockers and direct vasodilators. The current treatment provides significant improvement. Compliance problems include diet and exercise.  There is no history of angina. There is no history of chronic renal disease.   Headache    This is a recurrent problem. The current episode started more than 1 month ago. The problem occurs intermittently. The problem has been waxing and waning. The pain is located in the left unilateral region. The pain does not radiate. The pain quality is similar  to prior headaches. The quality of the pain is described as pulsating. The pain is moderate. Associated symptoms include back pain (chronic), phonophobia and photophobia. Pertinent negatives include no blurred vision, coughing, eye pain, hearing loss, nausea, neck pain, rhinorrhea, seizures, sinus pressure, visual change, vomiting or weakness. The symptoms are aggravated by activity, bright light and caffeine withdrawal. She has tried NSAIDs (bc powders) for the symptoms. The treatment provided significant relief. There is no history of recent head traumas.   Diabetes   She presents for her follow-up diabetic visit. She has type 2 diabetes mellitus. Her disease course has been stable. Hypoglycemia symptoms include headaches. Pertinent negatives for hypoglycemia include no confusion, hunger, mood changes, seizures or speech difficulty. Pertinent negatives for diabetes include no blurred vision, no chest pain, no foot ulcerations, no polydipsia, no polyuria, no visual change and no weakness. There are no hypoglycemic complications. Symptoms are worsening. There are no diabetic complications. Risk factors for coronary artery disease include hypertension, stress, sedentary lifestyle, obesity, dyslipidemia and diabetes mellitus. Current diabetic treatment includes oral agent (dual therapy). She is compliant with treatment most of the time. Her weight is stable. She is following a generally healthy diet. Meal planning includes avoidance of concentrated sweets. She participates in exercise intermittently. Her home blood glucose trend is fluctuating minimally. An ACE inhibitor/angiotensin II receptor blocker is being taken. She does not see a podiatrist.Eye exam is not current.     Review of Systems   Constitutional: Positive for malaise/fatigue. Negative for activity change, appetite change and unexpected weight change.        Obesity   HENT: Negative for congestion, ear discharge, hearing loss, rhinorrhea, sinus pressure,  trouble swallowing and voice change.    Eyes: Positive for photophobia. Negative for blurred vision, pain, discharge and visual disturbance.   Respiratory: Negative for cough, chest tightness and wheezing.         COPD, former smoker   Cardiovascular: Negative for chest pain and palpitations.        Hypertension, hyperlipidemia   Gastrointestinal: Negative for blood in stool, constipation, diarrhea, nausea and vomiting.   Endocrine: Negative for cold intolerance, heat intolerance, polydipsia and polyuria.        Diabetes type 2   Genitourinary: Negative for difficulty urinating, dysuria, hematuria and menstrual problem.   Musculoskeletal: Positive for back pain (chronic). Negative for arthralgias, gait problem, joint swelling and neck pain.   Skin: Negative for color change and rash.   Allergic/Immunologic: Negative for immunocompromised state.   Neurological: Positive for headaches. Negative for seizures, speech difficulty and weakness.   Psychiatric/Behavioral: Positive for depression and dysphoric mood. Negative for confusion, self-injury and suicidal ideas.       Past Medical History:   Diagnosis Date    Class 3 severe obesity due to excess calories with serious comorbidity and body mass index (BMI) of 40.0 to 44.9 in adult 10/17/2018    COPD (chronic obstructive pulmonary disease)     Diabetes mellitus, type 2     GERD (gastroesophageal reflux disease)     Hyperlipidemia     Hypertension     Vitamin D deficiency       Past Surgical History:   Procedure Laterality Date    ADENOIDECTOMY      BREAST SURGERY Right     breast biopsy    TONSILLECTOMY      TRANSFORAMINAL EPIDURAL INJECTION OF STEROID Left 4/16/2019    Procedure: Injection,steroid,epidural,transforaminal approach;  Surgeon: Gilberto Michelle MD;  Location: [facility] OR;  Service: Pain Management;  Laterality: Left;  L5-S1, S1    TRANSFORAMINAL EPIDURAL INJECTION OF STEROID Left 8/23/2019    Procedure: Injection,steroid,epidural,transforaminal  approach;  Surgeon: Gilberto Michelle MD;  Location: [facility] OR;  Service: Pain Management;  Laterality: Left;  L5-S1, S1       Family History   Problem Relation Age of Onset    Stroke Mother     Emphysema Mother     Diabetes Father     Coronary artery disease Father     COPD Father     Diabetes Sister        Social History     Socioeconomic History    Marital status: Significant Other     Spouse name: Not on file    Number of children: Not on file    Years of education: Not on file    Highest education level: Not on file   Occupational History    Not on file   Social Needs    Financial resource strain: Not on file    Food insecurity:     Worry: Not on file     Inability: Not on file    Transportation needs:     Medical: Not on file     Non-medical: Not on file   Tobacco Use    Smoking status: Former Smoker     Packs/day: 2.00     Types: Cigarettes     Last attempt to quit: 2019     Years since quittin.5    Smokeless tobacco: Never Used   Substance and Sexual Activity    Alcohol use: Yes     Comment: rarely    Drug use: Yes     Types: Hydrocodone    Sexual activity: Never   Lifestyle    Physical activity:     Days per week: Not on file     Minutes per session: Not on file    Stress: Not at all   Relationships    Social connections:     Talks on phone: Not on file     Gets together: Not on file     Attends L[employer] day service: Not on file     Active member of club or organization: Not on file     Attends meetings of clubs or organizations: Not on file     Relationship status: Not on file   Other Topics Concern    Not on file   Social History Narrative    Not on file       Current Outpatient Medications   Medication Sig Dispense Refill    albuterol-ipratropium (DUO-NEB) 2.5 mg-0.5 mg/3 mL nebulizer solution Take 3 mLs by nebulization every 6 (six) hours as needed for Wheezing. Rescue 1 Box 2    amLODIPine (NORVASC) 10 MG tablet Take 1 tablet (10 mg total) by mouth once daily. 90 tablet 1     "aspirin/salicylamide/caffeine (BC HEADACHE POWDER ORAL) Take by mouth as needed.      buPROPion (WELLBUTRIN XL) 300 MG 24 hr tablet Take 1 tablet (300 mg total) by mouth once daily. 30 tablet 5    clotrimazole-betamethasone 1-0.05% (LOTRISONE) cream Apply topically as needed.      ergocalciferol (VITAMIN D2) 50,000 unit Cap Take 1 capsule (50,000 Units total) by mouth once a week. 12 capsule 1    fluticasone-salmeterol 500-50 mcg/dose (ADVAIR DISKUS) 500-50 mcg/dose DsDv diskus inhaler Inhale 1 puff into the lungs 2 (two) times daily. Controller 60 each 5    gabapentin (NEURONTIN) 300 MG capsule Take 300 mg by mouth 2 (two) times daily.      ibuprofen (ADVIL,MOTRIN) 800 MG tablet Take 800 mg by mouth 3 (three) times daily.       ipratropium-albuterol (COMBIVENT RESPIMAT)  mcg/actuation inhaler Inhale 2 puffs into the lungs every 6 (six) hours as needed for Wheezing. Rescue 4 g 6    melatonin 5 mg Tab Take 1 tablet by mouth nightly.      montelukast (SINGULAIR) 10 mg tablet Take 1 tablet (10 mg total) by mouth once daily. 90 tablet 1    pantoprazole (PROTONIX) 40 MG tablet Take 1 tablet (40 mg total) by mouth once daily. 90 tablet 1    rosuvastatin (CRESTOR) 20 MG tablet Take 1 tablet (20 mg total) by mouth once daily. 90 tablet 1    SITagliptan-metformin (JANUMET XR) 100-1,000 mg TM24 Take 1 tablet by mouth once daily. 90 tablet 1    tiZANidine 4 mg Cap Take 1 capsule by mouth 3 (three) times daily as needed.       valsartan-hydrochlorothiazide (DIOVAN-HCT) 160-25 mg per tablet Take 1 tablet by mouth once daily. 90 tablet 1    empagliflozin (JARDIANCE) 25 mg Tab Take 25 mg by mouth once daily. 90 tablet 1     No current facility-administered medications for this visit.        Review of patient's allergies indicates:  No Known Allergies  Objective:      Blood pressure 124/72, pulse 87, height 5' 6" (1.676 m), weight 111.1 kg (245 lb), SpO2 99 %. Body mass index is 39.54 kg/m².   Physical Exam "   Constitutional: She is oriented to person, place, and time. She appears well-developed. She is cooperative. No distress.   obesity   HENT:   Head: Normocephalic and atraumatic.   Right Ear: Tympanic membrane and external ear normal.   Left Ear: Tympanic membrane and external ear normal.   Nose: Nose normal.   Mouth/Throat: Oropharynx is clear and moist.   Eyes: Pupils are equal, round, and reactive to light. Conjunctivae, EOM and lids are normal. Lids are everted and swept, no foreign bodies found. Right pupil is round and reactive. Left pupil is round and reactive.   Neck: Trachea normal and normal range of motion. Neck supple.   Cardiovascular: Normal rate, regular rhythm, S1 normal, S2 normal, normal heart sounds and intact distal pulses.   No murmur heard.  Pulmonary/Chest: Effort normal. No stridor. No respiratory distress. She has no decreased breath sounds. She has wheezes (Light inspiratory wheezes throughout). She has no rhonchi. She has no rales.   Abdominal: Soft. Bowel sounds are normal. There is no rigidity and no guarding.   Musculoskeletal: Normal range of motion.   Lymphadenopathy:     She has no cervical adenopathy.     She has no axillary adenopathy.   Neurological: She is alert and oriented to person, place, and time.   Skin: Skin is warm and dry. Capillary refill takes less than 2 seconds.   Psychiatric: Her speech is normal and behavior is normal. Judgment and thought content normal. She exhibits a depressed mood (controlled). She expresses no homicidal and no suicidal ideation. She expresses no suicidal plans and no homicidal plans.   Nursing note and vitals reviewed.          Assessment:       1. Type 2 diabetes mellitus with complication, without long-term current use of insulin    2. Essential hypertension    3. Mixed hyperlipidemia    4. Chronic obstructive pulmonary disease, unspecified COPD type    5. Vitamin D deficiency    6. Recurrent headache    7. Class 2 severe obesity due to  excess calories with serious comorbidity and body mass index (BMI) of 39.0 to 39.9 in adult        Plan:       Kemi was seen today for diabetes.    Diagnoses and all orders for this visit:    Type 2 diabetes mellitus with complication, without long-term current use of insulin  -     Hemoglobin A1C, POCT  -     empagliflozin (JARDIANCE) 25 mg Tab; Take 25 mg by mouth once daily.  -     Microalbumin/creatinine urine ratio; Future  -     Hemoglobin A1c; Future  -     Comprehensive metabolic panel; Future  - Discussed the following complications of DM Type 2: CAD, CVD, Retinopathy, Nephropathy, Neuropathy.    Discussed Target A1C Goal <7.0% and Target fasting blood sugars () before each meal.    Discussed Lifestyle Modifications: Low glycemic index diet, Exercise (30-45 min) daily, Weight loss, and Smoking cessation     Essential hypertension  -     CBC auto differential; Future  -     Urinalysis; Future  -Lifestyle changes: Reduce the amount of salt in your diet; Lose weight; Avoid drinking too much alcohol; Exercise at least 30 minutes per day most days of the week.  Continue current medications and home BP monitoring.     Mixed hyperlipidemia  -     Lipid panel; Future  -Limit red meat, butter, fried foods, cheese, and other foods that have a lot of saturated fat. Consume more: lean meats, fish, fruits, vegetables, whole grains, beans, lentils, and nuts.  Weight loss, and 30-45 min of cardiovascular exercise daily.     Chronic obstructive pulmonary disease, unspecified COPD type  -Inhaler as prescribed.  -Patient has not been taking.    Vitamin D deficiency  -     ergocalciferol (VITAMIN D2) 50,000 unit Cap; Take 1 capsule (50,000 Units total) by mouth once a week.  -     Vitamin D; Future    Recurrent headache  -     Ambulatory Referral to Neurology    Class 2 severe obesity due to excess calories with serious comorbidity and body mass index (BMI) of 39.0 to 39.9 in adult  -The patient is asked to make an  attempt to improve diet and exercise patterns to aid in medical management of this problem.    Follow-up in 3 months to recheck hemoglobin A1c regarding diabetes and chronic conditions of blood pressure, hyperlipidemia, COPD, vitamin-D deficiency, and obesity.  Patient referred to Neurology please follow-up as referred.

## 2019-09-30 DIAGNOSIS — K21.9 GASTROESOPHAGEAL REFLUX DISEASE, ESOPHAGITIS PRESENCE NOT SPECIFIED: ICD-10-CM

## 2019-10-01 RX ORDER — PANTOPRAZOLE SODIUM 40 MG/1
40 TABLET, DELAYED RELEASE ORAL DAILY
Qty: 90 TABLET | Refills: 1 | Status: SHIPPED | OUTPATIENT
Start: 2019-10-01 | End: 2020-01-22 | Stop reason: SDUPTHER

## 2019-11-12 DIAGNOSIS — I10 ESSENTIAL HYPERTENSION: ICD-10-CM

## 2019-11-12 DIAGNOSIS — E78.2 MIXED HYPERLIPIDEMIA: ICD-10-CM

## 2019-11-12 DIAGNOSIS — E11.8 TYPE 2 DIABETES MELLITUS WITH COMPLICATION, WITHOUT LONG-TERM CURRENT USE OF INSULIN: ICD-10-CM

## 2019-11-13 RX ORDER — ROSUVASTATIN CALCIUM 20 MG/1
20 TABLET, COATED ORAL DAILY
Qty: 90 TABLET | Refills: 1 | Status: SHIPPED | OUTPATIENT
Start: 2019-11-13 | End: 2020-01-22 | Stop reason: SDUPTHER

## 2019-11-13 RX ORDER — VALSARTAN AND HYDROCHLOROTHIAZIDE 160; 25 MG/1; MG/1
1 TABLET ORAL DAILY
Qty: 90 TABLET | Refills: 1 | Status: SHIPPED | OUTPATIENT
Start: 2019-11-13 | End: 2020-01-22 | Stop reason: SDUPTHER

## 2019-12-03 ENCOUNTER — TELEPHONE (OUTPATIENT)
Dept: FAMILY MEDICINE | Facility: CLINIC | Age: 54
End: 2019-12-03

## 2019-12-03 NOTE — LETTER
December 9, 2019               [facility] Family / Internal Medicine  Family Medicine  [address] [place] [address] [place] [address]  Phone: [phone]  Fax: [phone]   December 9, 2019     Patient: Kemi Dominguez   YOB: 1965   Date of Visit: 12/3/2019       To Whom it May Concern:    Kemi Dominguez was seen in my clinic and prescribed Chantix for smoking cessation on 1/8/2019.  Patient successfully quit smoking on 2/25/2019.  Patient is smoke free since this quit date.    If you have any questions or concerns, please don't hesitate to call.    Sincerely,         PARAG Grimes

## 2019-12-09 ENCOUNTER — PATIENT MESSAGE (OUTPATIENT)
Dept: FAMILY MEDICINE | Facility: CLINIC | Age: 54
End: 2019-12-09

## 2019-12-16 DIAGNOSIS — I10 ESSENTIAL HYPERTENSION: ICD-10-CM

## 2019-12-16 RX ORDER — AMLODIPINE BESYLATE 10 MG/1
10 TABLET ORAL DAILY
Qty: 90 TABLET | Refills: 1 | Status: SHIPPED | OUTPATIENT
Start: 2019-12-16 | End: 2020-01-22 | Stop reason: SDUPTHER

## 2019-12-20 DIAGNOSIS — J44.9 CHRONIC OBSTRUCTIVE PULMONARY DISEASE, UNSPECIFIED COPD TYPE: ICD-10-CM

## 2020-01-07 ENCOUNTER — PATIENT MESSAGE (OUTPATIENT)
Dept: FAMILY MEDICINE | Facility: CLINIC | Age: 55
End: 2020-01-07

## 2020-01-07 DIAGNOSIS — J44.9 CHRONIC OBSTRUCTIVE PULMONARY DISEASE, UNSPECIFIED COPD TYPE: ICD-10-CM

## 2020-01-17 ENCOUNTER — LAB VISIT (OUTPATIENT)
Dept: LAB | Facility: HOSPITAL | Age: 55
End: 2020-01-17
Attending: NURSE PRACTITIONER
Payer: COMMERCIAL

## 2020-01-17 DIAGNOSIS — I10 ESSENTIAL HYPERTENSION: ICD-10-CM

## 2020-01-17 DIAGNOSIS — E55.9 VITAMIN D DEFICIENCY: ICD-10-CM

## 2020-01-17 DIAGNOSIS — E78.2 MIXED HYPERLIPIDEMIA: ICD-10-CM

## 2020-01-17 DIAGNOSIS — E11.8 TYPE 2 DIABETES MELLITUS WITH COMPLICATION, WITHOUT LONG-TERM CURRENT USE OF INSULIN: ICD-10-CM

## 2020-01-17 LAB
25(OH)D3+25(OH)D2 SERPL-MCNC: 43 NG/ML (ref 30–96)
ALBUMIN SERPL BCP-MCNC: 3.9 G/DL (ref 3.5–5.2)
ALBUMIN/CREAT UR: 4 UG/MG (ref 0–30)
ALP SERPL-CCNC: 88 U/L (ref 55–135)
ALT SERPL W/O P-5'-P-CCNC: 25 U/L (ref 10–44)
ANION GAP SERPL CALC-SCNC: 12 MMOL/L (ref 8–16)
AST SERPL-CCNC: 21 U/L (ref 10–40)
BACTERIA #/AREA URNS HPF: NEGATIVE /HPF
BASOPHILS # BLD AUTO: 0.05 K/UL (ref 0–0.2)
BASOPHILS NFR BLD: 0.5 % (ref 0–1.9)
BILIRUB SERPL-MCNC: 0.8 MG/DL (ref 0.1–1)
BILIRUB UR QL STRIP: NEGATIVE
BUN SERPL-MCNC: 13 MG/DL (ref 6–20)
CALCIUM SERPL-MCNC: 9.7 MG/DL (ref 8.7–10.5)
CHLORIDE SERPL-SCNC: 100 MMOL/L (ref 95–110)
CHOLEST SERPL-MCNC: 138 MG/DL (ref 120–199)
CHOLEST/HDLC SERPL: 2.5 {RATIO} (ref 2–5)
CLARITY UR: CLEAR
CO2 SERPL-SCNC: 27 MMOL/L (ref 23–29)
COLOR UR: YELLOW
CREAT SERPL-MCNC: 0.8 MG/DL (ref 0.5–1.4)
CREAT UR-MCNC: 115 MG/DL (ref 15–325)
DIFFERENTIAL METHOD: NORMAL
EOSINOPHIL # BLD AUTO: 0.5 K/UL (ref 0–0.5)
EOSINOPHIL NFR BLD: 4.9 % (ref 0–8)
ERYTHROCYTE [DISTWIDTH] IN BLOOD BY AUTOMATED COUNT: 13.5 % (ref 11.5–14.5)
EST. GFR  (AFRICAN AMERICAN): >60 ML/MIN/1.73 M^2
EST. GFR  (NON AFRICAN AMERICAN): >60 ML/MIN/1.73 M^2
ESTIMATED AVG GLUCOSE: 166 MG/DL (ref 68–131)
GLUCOSE SERPL-MCNC: 140 MG/DL (ref 70–110)
GLUCOSE UR QL STRIP: ABNORMAL
HBA1C MFR BLD HPLC: 7.4 % (ref 4.5–6.2)
HCT VFR BLD AUTO: 42.3 % (ref 37–48.5)
HDLC SERPL-MCNC: 55 MG/DL (ref 40–75)
HDLC SERPL: 39.9 % (ref 20–50)
HGB BLD-MCNC: 13.7 G/DL (ref 12–16)
HGB UR QL STRIP: NEGATIVE
HYALINE CASTS #/AREA URNS LPF: 3 /LPF
IMM GRANULOCYTES # BLD AUTO: 0.03 K/UL (ref 0–0.04)
IMM GRANULOCYTES NFR BLD AUTO: 0.3 % (ref 0–0.5)
KETONES UR QL STRIP: NEGATIVE
LDLC SERPL CALC-MCNC: 60.6 MG/DL (ref 63–159)
LEUKOCYTE ESTERASE UR QL STRIP: NEGATIVE
LYMPHOCYTES # BLD AUTO: 2.3 K/UL (ref 1–4.8)
LYMPHOCYTES NFR BLD: 24.5 % (ref 18–48)
MCH RBC QN AUTO: 28.3 PG (ref 27–31)
MCHC RBC AUTO-ENTMCNC: 32.4 G/DL (ref 32–36)
MCV RBC AUTO: 87 FL (ref 82–98)
MICROALBUMIN UR DL<=1MG/L-MCNC: 4.6 UG/ML
MICROSCOPIC COMMENT: ABNORMAL
MONOCYTES # BLD AUTO: 0.8 K/UL (ref 0.3–1)
MONOCYTES NFR BLD: 8.4 % (ref 4–15)
NEUTROPHILS # BLD AUTO: 5.6 K/UL (ref 1.8–7.7)
NEUTROPHILS NFR BLD: 61.4 % (ref 38–73)
NITRITE UR QL STRIP: NEGATIVE
NONHDLC SERPL-MCNC: 83 MG/DL
NRBC BLD-RTO: 0 /100 WBC
PH UR STRIP: 6 [PH] (ref 5–8)
PLATELET # BLD AUTO: 293 K/UL (ref 150–350)
PMV BLD AUTO: 9.7 FL (ref 9.2–12.9)
POTASSIUM SERPL-SCNC: 3.3 MMOL/L (ref 3.5–5.1)
PROT SERPL-MCNC: 7.3 G/DL (ref 6–8.4)
PROT UR QL STRIP: NEGATIVE
RBC # BLD AUTO: 4.84 M/UL (ref 4–5.4)
RBC #/AREA URNS HPF: 1 /HPF (ref 0–4)
SODIUM SERPL-SCNC: 139 MMOL/L (ref 136–145)
SP GR UR STRIP: >1.03 (ref 1–1.03)
SQUAMOUS #/AREA URNS HPF: 1 /HPF
TRIGL SERPL-MCNC: 112 MG/DL (ref 30–150)
URN SPEC COLLECT METH UR: ABNORMAL
UROBILINOGEN UR STRIP-ACNC: NEGATIVE EU/DL
WBC # BLD AUTO: 9.18 K/UL (ref 3.9–12.7)
WBC #/AREA URNS HPF: 0 /HPF (ref 0–5)
YEAST URNS QL MICRO: ABNORMAL

## 2020-01-17 PROCEDURE — 82043 UR ALBUMIN QUANTITATIVE: CPT

## 2020-01-17 PROCEDURE — 85025 COMPLETE CBC W/AUTO DIFF WBC: CPT

## 2020-01-17 PROCEDURE — 83036 HEMOGLOBIN GLYCOSYLATED A1C: CPT

## 2020-01-17 PROCEDURE — 81001 URINALYSIS AUTO W/SCOPE: CPT

## 2020-01-17 PROCEDURE — 80061 LIPID PANEL: CPT

## 2020-01-17 PROCEDURE — 80053 COMPREHEN METABOLIC PANEL: CPT

## 2020-01-17 PROCEDURE — 36415 COLL VENOUS BLD VENIPUNCTURE: CPT

## 2020-01-17 PROCEDURE — 82306 VITAMIN D 25 HYDROXY: CPT

## 2020-01-20 DIAGNOSIS — E11.8 TYPE 2 DIABETES MELLITUS WITH COMPLICATION, WITHOUT LONG-TERM CURRENT USE OF INSULIN: ICD-10-CM

## 2020-01-20 DIAGNOSIS — J44.9 CHRONIC OBSTRUCTIVE PULMONARY DISEASE, UNSPECIFIED COPD TYPE: ICD-10-CM

## 2020-01-20 RX ORDER — MONTELUKAST SODIUM 10 MG/1
10 TABLET ORAL DAILY
Qty: 90 TABLET | Refills: 1 | Status: SHIPPED | OUTPATIENT
Start: 2020-01-20 | End: 2020-01-22 | Stop reason: SDUPTHER

## 2020-01-20 RX ORDER — IPRATROPIUM BROMIDE AND ALBUTEROL SULFATE 2.5; .5 MG/3ML; MG/3ML
3 SOLUTION RESPIRATORY (INHALATION) EVERY 6 HOURS PRN
Qty: 1 BOX | Refills: 2 | Status: SHIPPED | OUTPATIENT
Start: 2020-01-20 | End: 2022-07-25 | Stop reason: SDUPTHER

## 2020-01-22 ENCOUNTER — TELEPHONE (OUTPATIENT)
Dept: FAMILY MEDICINE | Facility: CLINIC | Age: 55
End: 2020-01-22

## 2020-01-22 ENCOUNTER — OFFICE VISIT (OUTPATIENT)
Dept: FAMILY MEDICINE | Facility: CLINIC | Age: 55
End: 2020-01-22
Payer: COMMERCIAL

## 2020-01-22 VITALS
DIASTOLIC BLOOD PRESSURE: 70 MMHG | TEMPERATURE: 99 F | HEIGHT: 66 IN | OXYGEN SATURATION: 98 % | SYSTOLIC BLOOD PRESSURE: 128 MMHG | WEIGHT: 241 LBS | HEART RATE: 96 BPM | BODY MASS INDEX: 38.73 KG/M2

## 2020-01-22 DIAGNOSIS — E55.9 VITAMIN D DEFICIENCY: ICD-10-CM

## 2020-01-22 DIAGNOSIS — I10 ESSENTIAL HYPERTENSION: ICD-10-CM

## 2020-01-22 DIAGNOSIS — E78.2 MIXED HYPERLIPIDEMIA: ICD-10-CM

## 2020-01-22 DIAGNOSIS — E11.8 TYPE 2 DIABETES MELLITUS WITH COMPLICATION, WITHOUT LONG-TERM CURRENT USE OF INSULIN: Primary | ICD-10-CM

## 2020-01-22 DIAGNOSIS — K21.9 GASTROESOPHAGEAL REFLUX DISEASE, ESOPHAGITIS PRESENCE NOT SPECIFIED: ICD-10-CM

## 2020-01-22 DIAGNOSIS — R45.89 DEPRESSED MOOD: ICD-10-CM

## 2020-01-22 DIAGNOSIS — Z12.11 COLON CANCER SCREENING: ICD-10-CM

## 2020-01-22 DIAGNOSIS — Z23 NEED FOR SHINGLES VACCINE: ICD-10-CM

## 2020-01-22 DIAGNOSIS — R09.82 POST-NASAL DRAINAGE: ICD-10-CM

## 2020-01-22 DIAGNOSIS — J44.9 CHRONIC OBSTRUCTIVE PULMONARY DISEASE, UNSPECIFIED COPD TYPE: ICD-10-CM

## 2020-01-22 DIAGNOSIS — Z23 NEED FOR PNEUMOCOCCAL VACCINATION: ICD-10-CM

## 2020-01-22 PROCEDURE — 3008F BODY MASS INDEX DOCD: CPT | Mod: S$GLB,,, | Performed by: NURSE PRACTITIONER

## 2020-01-22 PROCEDURE — 99214 OFFICE O/P EST MOD 30 MIN: CPT | Mod: 25,S$GLB,, | Performed by: NURSE PRACTITIONER

## 2020-01-22 PROCEDURE — 99214 PR OFFICE/OUTPT VISIT, EST, LEVL IV, 30-39 MIN: ICD-10-PCS | Mod: 25,S$GLB,, | Performed by: NURSE PRACTITIONER

## 2020-01-22 PROCEDURE — 90471 PNEUMOCOCCAL POLYSACCHARIDE VACCINE 23-VALENT =>2YO SQ IM: ICD-10-PCS | Mod: S$GLB,,, | Performed by: NURSE PRACTITIONER

## 2020-01-22 PROCEDURE — 3078F DIAST BP <80 MM HG: CPT | Mod: S$GLB,,, | Performed by: NURSE PRACTITIONER

## 2020-01-22 PROCEDURE — 3074F SYST BP LT 130 MM HG: CPT | Mod: S$GLB,,, | Performed by: NURSE PRACTITIONER

## 2020-01-22 PROCEDURE — 3008F PR BODY MASS INDEX (BMI) DOCUMENTED: ICD-10-PCS | Mod: S$GLB,,, | Performed by: NURSE PRACTITIONER

## 2020-01-22 PROCEDURE — 90471 IMMUNIZATION ADMIN: CPT | Mod: S$GLB,,, | Performed by: NURSE PRACTITIONER

## 2020-01-22 PROCEDURE — 90732 PPSV23 VACC 2 YRS+ SUBQ/IM: CPT | Mod: S$GLB,,, | Performed by: NURSE PRACTITIONER

## 2020-01-22 PROCEDURE — 3074F PR MOST RECENT SYSTOLIC BLOOD PRESSURE < 130 MM HG: ICD-10-PCS | Mod: S$GLB,,, | Performed by: NURSE PRACTITIONER

## 2020-01-22 PROCEDURE — 3078F PR MOST RECENT DIASTOLIC BLOOD PRESSURE < 80 MM HG: ICD-10-PCS | Mod: S$GLB,,, | Performed by: NURSE PRACTITIONER

## 2020-01-22 PROCEDURE — 90732 PNEUMOCOCCAL POLYSACCHARIDE VACCINE 23-VALENT =>2YO SQ IM: ICD-10-PCS | Mod: S$GLB,,, | Performed by: NURSE PRACTITIONER

## 2020-01-22 RX ORDER — PANTOPRAZOLE SODIUM 40 MG/1
40 TABLET, DELAYED RELEASE ORAL DAILY
Qty: 90 TABLET | Refills: 1 | Status: SHIPPED | OUTPATIENT
Start: 2020-01-22 | End: 2020-08-05

## 2020-01-22 RX ORDER — ERGOCALCIFEROL 1.25 MG/1
50000 CAPSULE ORAL WEEKLY
Qty: 12 CAPSULE | Refills: 1 | Status: SHIPPED | OUTPATIENT
Start: 2020-01-22 | End: 2020-06-11 | Stop reason: SDUPTHER

## 2020-01-22 RX ORDER — AMLODIPINE BESYLATE 10 MG/1
10 TABLET ORAL DAILY
Qty: 90 TABLET | Refills: 1 | Status: SHIPPED | OUTPATIENT
Start: 2020-01-22 | End: 2020-08-05

## 2020-01-22 RX ORDER — MONTELUKAST SODIUM 10 MG/1
10 TABLET ORAL DAILY
Qty: 90 TABLET | Refills: 1 | Status: SHIPPED | OUTPATIENT
Start: 2020-01-22 | End: 2020-08-05

## 2020-01-22 RX ORDER — BUPROPION HYDROCHLORIDE 300 MG/1
300 TABLET ORAL DAILY
Qty: 30 TABLET | Refills: 5 | Status: SHIPPED | OUTPATIENT
Start: 2020-01-22 | End: 2020-05-27 | Stop reason: SDUPTHER

## 2020-01-22 RX ORDER — METFORMIN HYDROCHLORIDE 500 MG/1
1000 TABLET, EXTENDED RELEASE ORAL
Qty: 180 TABLET | Refills: 1 | Status: SHIPPED | OUTPATIENT
Start: 2020-01-22 | End: 2020-07-22

## 2020-01-22 RX ORDER — AZELASTINE 1 MG/ML
1 SPRAY, METERED NASAL 2 TIMES DAILY
Qty: 30 ML | Refills: 2 | Status: SHIPPED | OUTPATIENT
Start: 2020-01-22 | End: 2021-01-14

## 2020-01-22 RX ORDER — METFORMIN HYDROCHLORIDE EXTENDED-RELEASE TABLETS 1000 MG/1
1000 TABLET, FILM COATED, EXTENDED RELEASE ORAL DAILY
Qty: 90 TABLET | Refills: 1 | Status: SHIPPED | OUTPATIENT
Start: 2020-01-22 | End: 2020-01-22

## 2020-01-22 RX ORDER — FLUTICASONE PROPIONATE 50 MCG
1 SPRAY, SUSPENSION (ML) NASAL 2 TIMES DAILY
Qty: 16 G | Refills: 2 | Status: SHIPPED | OUTPATIENT
Start: 2020-01-22 | End: 2020-07-09

## 2020-01-22 RX ORDER — VALSARTAN AND HYDROCHLOROTHIAZIDE 160; 25 MG/1; MG/1
1 TABLET ORAL DAILY
Qty: 90 TABLET | Refills: 1 | Status: SHIPPED | OUTPATIENT
Start: 2020-01-22 | End: 2020-07-06

## 2020-01-22 RX ORDER — FLUTICASONE PROPIONATE AND SALMETEROL 500; 50 UG/1; UG/1
1 POWDER RESPIRATORY (INHALATION) 2 TIMES DAILY
Qty: 60 EACH | Refills: 5 | Status: SHIPPED | OUTPATIENT
Start: 2020-01-22 | End: 2021-02-26 | Stop reason: SDUPTHER

## 2020-01-22 RX ORDER — ROSUVASTATIN CALCIUM 20 MG/1
20 TABLET, COATED ORAL DAILY
Qty: 90 TABLET | Refills: 1 | Status: SHIPPED | OUTPATIENT
Start: 2020-01-22 | End: 2020-08-05

## 2020-01-22 NOTE — PROGRESS NOTES
Subjective:       Patient ID: Kemi Dominguez is a 54 y.o. female.    Chief Complaint: Follow-up (lab review) and Diabetes    Patient presents today following up for chronic conditions including hypertension, hyperlipidemia, diabetes, and depressed mood.   Patient has lost an additional 4 lb since her last visit 3 months ago.  At her last visit, patient's Jardiance was increased to 25 mg.  Patient has tolerated this dose well with no side effects.  Patient's hemoglobin A1c went down from 7.5% to 7.4%.  Patient has not made any significant diet or exercise changes in the last 3 months.  Patient will be instructed on diet and exercise along with medication changes at today's visit.  Patient is obese with a BMI of 38.90.    Diabetes   She presents for her follow-up diabetic visit. She has type 2 diabetes mellitus. Her disease course has been stable. Pertinent negatives for hypoglycemia include no confusion, hunger, mood changes, seizures or speech difficulty. Pertinent negatives for diabetes include no blurred vision, no foot ulcerations, no polydipsia and no polyuria. There are no hypoglycemic complications. Symptoms are worsening. There are no diabetic complications. Risk factors for coronary artery disease include hypertension, stress, sedentary lifestyle, obesity, dyslipidemia and diabetes mellitus. Current diabetic treatment includes oral agent (dual therapy). She is compliant with treatment most of the time. Her weight is stable. She is following a generally healthy diet. Meal planning includes avoidance of concentrated sweets. She participates in exercise intermittently. Her home blood glucose trend is fluctuating minimally. An ACE inhibitor/angiotensin II receptor blocker is being taken. She does not see a podiatrist.Eye exam is not current.   Depression   Visit Type: follow-up  Patient presents with the following symptoms: anhedonia and excessive worry.  Patient is not experiencing: confusion, palpitations,  suicidal ideas, suicidal planning and thoughts of death.  Frequency of symptoms: most days   Severity: mild   Sleep quality: fair  Nighttime awakenings: occasional    Hypertension   This is a chronic problem. The current episode started more than 1 month ago. The problem has been waxing and waning since onset. The problem is controlled. Associated symptoms include anxiety and malaise/fatigue. Pertinent negatives include no blurred vision or palpitations. Agents associated with hypertension include estrogens. Risk factors for coronary artery disease include smoking/tobacco exposure, sedentary lifestyle, obesity, diabetes mellitus, dyslipidemia and stress. Past treatments include calcium channel blockers, angiotensin blockers and direct vasodilators. The current treatment provides significant improvement. Compliance problems include diet and exercise.  There is no history of angina. There is no history of chronic renal disease.   Headache    This is a recurrent problem. The current episode started more than 1 month ago. The problem occurs intermittently. The problem has been waxing and waning. The pain is located in the left unilateral region. The pain does not radiate. The pain quality is similar to prior headaches. The quality of the pain is described as pulsating. The pain is moderate. Associated symptoms include back pain (chronic), phonophobia and photophobia. Pertinent negatives include no blurred vision, eye pain, hearing loss, rhinorrhea, seizures or sinus pressure. The symptoms are aggravated by activity, bright light and caffeine withdrawal. She has tried NSAIDs (bc powders) for the symptoms. The treatment provided significant relief. There is no history of recent head traumas.     Review of Systems   Constitutional: Positive for malaise/fatigue. Negative for activity change, appetite change and unexpected weight change.        Obesity   HENT: Negative for ear discharge, hearing loss, rhinorrhea, sinus  pressure, trouble swallowing and voice change.    Eyes: Positive for photophobia. Negative for blurred vision, pain, discharge and visual disturbance.   Respiratory: Negative for chest tightness and wheezing.         COPD, former smoker   Cardiovascular: Negative for palpitations.        Hypertension, hyperlipidemia   Gastrointestinal: Negative for blood in stool, constipation and diarrhea.   Endocrine: Negative for cold intolerance, heat intolerance, polydipsia and polyuria.        Diabetes type 2   Genitourinary: Negative for difficulty urinating, dysuria, hematuria and menstrual problem.   Musculoskeletal: Positive for back pain (chronic). Negative for gait problem.   Skin: Negative for color change.   Allergic/Immunologic: Negative for immunocompromised state.   Neurological: Negative for seizures and speech difficulty.   Psychiatric/Behavioral: Positive for depression and dysphoric mood. Negative for confusion, self-injury and suicidal ideas.       Past Medical History:   Diagnosis Date    Class 3 severe obesity due to excess calories with serious comorbidity and body mass index (BMI) of 40.0 to 44.9 in adult 10/17/2018    COPD (chronic obstructive pulmonary disease)     Diabetes mellitus, type 2     GERD (gastroesophageal reflux disease)     Hyperlipidemia     Hypertension     Vitamin D deficiency       Past Surgical History:   Procedure Laterality Date    ADENOIDECTOMY      BREAST SURGERY Right     breast biopsy    TONSILLECTOMY      TRANSFORAMINAL EPIDURAL INJECTION OF STEROID Left 4/16/2019    Procedure: Injection,steroid,epidural,transforaminal approach;  Surgeon: Gilberto Michelle MD;  Location: [facility] OR;  Service: Pain Management;  Laterality: Left;  L5-S1, S1    TRANSFORAMINAL EPIDURAL INJECTION OF STEROID Left 8/23/2019    Procedure: Injection,steroid,epidural,transforaminal approach;  Surgeon: Gilberto Michelle MD;  Location: [facility] OR;  Service: Pain Management;  Laterality: Left;  L5-S1, S1        Family History   Problem Relation Age of Onset    Stroke Mother     Emphysema Mother     Diabetes Father     Coronary artery disease Father     COPD Father     Diabetes Sister        Social History     Socioeconomic History    Marital status: Significant Other     Spouse name: Not on file    Number of children: Not on file    Years of education: Not on file    Highest education level: Not on file   Occupational History    Not on file   Social Needs    Financial resource strain: Not on file    Food insecurity:     Worry: Not on file     Inability: Not on file    Transportation needs:     Medical: Not on file     Non-medical: Not on file   Tobacco Use    Smoking status: Former Smoker     Packs/day: 2.00     Types: Cigarettes     Last attempt to quit: 2019     Years since quittin.9    Smokeless tobacco: Never Used   Substance and Sexual Activity    Alcohol use: Yes     Comment: rarely    Drug use: Yes     Types: Hydrocodone    Sexual activity: Never   Lifestyle    Physical activity:     Days per week: Not on file     Minutes per session: Not on file    Stress: Not at all   Relationships    Social connections:     Talks on phone: Not on file     Gets together: Not on file     Attends Sabianist service: Not on file     Active member of club or organization: Not on file     Attends meetings of clubs or organizations: Not on file     Relationship status: Not on file   Other Topics Concern    Not on file   Social History Narrative    Not on file       Current Outpatient Medications   Medication Sig Dispense Refill    albuterol-ipratropium (DUO-NEB) 2.5 mg-0.5 mg/3 mL nebulizer solution Take 3 mLs by nebulization every 6 (six) hours as needed for Wheezing. Rescue 1 Box 2    amLODIPine (NORVASC) 10 MG tablet Take 1 tablet (10 mg total) by mouth once daily. 90 tablet 1    aspirin/salicylamide/caffeine (BC HEADACHE POWDER ORAL) Take by mouth as needed.      buPROPion (WELLBUTRIN XL)  300 MG 24 hr tablet Take 1 tablet (300 mg total) by mouth once daily. 30 tablet 5    clotrimazole-betamethasone 1-0.05% (LOTRISONE) cream Apply topically as needed.      empagliflozin (JARDIANCE) 25 mg Tab Take 25 mg by mouth once daily. 90 tablet 1    ergocalciferol (VITAMIN D2) 50,000 unit Cap Take 1 capsule (50,000 Units total) by mouth once a week. 12 capsule 1    fluticasone-salmeterol diskus inhaler 500-50 mcg Inhale 1 puff into the lungs 2 (two) times daily. Controller 60 each 5    ibuprofen (ADVIL,MOTRIN) 800 MG tablet Take 800 mg by mouth 3 (three) times daily.       ipratropium-albuterol (COMBIVENT RESPIMAT)  mcg/actuation inhaler Inhale 2 puffs into the lungs every 6 (six) hours as needed for Wheezing. Rescue 4 g 6    melatonin 5 mg Tab Take 10 mg by mouth nightly.       montelukast (SINGULAIR) 10 mg tablet Take 1 tablet (10 mg total) by mouth once daily. 90 tablet 1    pantoprazole (PROTONIX) 40 MG tablet Take 1 tablet (40 mg total) by mouth once daily. 90 tablet 1    rosuvastatin (CRESTOR) 20 MG tablet Take 1 tablet (20 mg total) by mouth once daily. 90 tablet 1    SITagliptan-metformin (JANUMET XR) 100-1,000 mg TM24 Take 1 tablet by mouth once daily. 90 tablet 1    tiZANidine 4 mg Cap Take 1 capsule by mouth 3 (three) times daily as needed.       valsartan-hydrochlorothiazide (DIOVAN-HCT) 160-25 mg per tablet Take 1 tablet by mouth once daily. 90 tablet 1    azelastine (ASTELIN) 137 mcg (0.1 %) nasal spray 1 spray (137 mcg total) by Nasal route 2 (two) times daily. 30 mL 2    fluticasone propionate (FLONASE) 50 mcg/actuation nasal spray 1 spray (50 mcg total) by Each Nostril route 2 (two) times daily. 16 g 2    metFORMIN (FORTAMET) 1,000 mg 24 hr tablet Take 1 tablet (1,000 mg total) by mouth once daily. 90 tablet 1    varicella-zoster gE-AS01B, PF, (SHINGRIX, PF,) 50 mcg/0.5 mL injection Inject 0.5 mLs into the muscle once. for 1 dose 0.5 mL 0     No current  "facility-administered medications for this visit.        Review of patient's allergies indicates:  No Known Allergies  Objective:      Blood pressure 128/70, pulse 96, temperature 98.7 °F (37.1 °C), height 5' 6" (1.676 m), weight 109.3 kg (241 lb), SpO2 98 %. Body mass index is 38.9 kg/m².   Physical Exam   Constitutional: She is oriented to person, place, and time. She appears well-developed. She is cooperative. No distress.   obesity   HENT:   Head: Normocephalic and atraumatic.   Right Ear: Tympanic membrane and external ear normal.   Left Ear: Tympanic membrane and external ear normal.   Nose: Nose normal.   Mouth/Throat: Oropharynx is clear and moist.   Eyes: Pupils are equal, round, and reactive to light. Conjunctivae, EOM and lids are normal. Lids are everted and swept, no foreign bodies found. Right pupil is round and reactive. Left pupil is round and reactive.   Neck: Trachea normal and normal range of motion. Neck supple.   Cardiovascular: Normal rate, regular rhythm, S1 normal, S2 normal, normal heart sounds and intact distal pulses.   No murmur heard.  Pulmonary/Chest: Effort normal. No stridor. No respiratory distress. She has no decreased breath sounds. She has wheezes (mild RLL inspirator wheeze). She has no rhonchi. She has no rales.   Abdominal: Soft. Bowel sounds are normal. There is no rigidity and no guarding.   Musculoskeletal: Normal range of motion.   Lymphadenopathy:     She has no cervical adenopathy.     She has no axillary adenopathy.   Neurological: She is alert and oriented to person, place, and time.   Skin: Skin is warm and dry. Capillary refill takes less than 2 seconds.   Psychiatric: Her speech is normal and behavior is normal. Judgment and thought content normal. She exhibits a depressed mood (controlled). She expresses no homicidal and no suicidal ideation. She expresses no suicidal plans and no homicidal plans.   Nursing note and vitals reviewed.          Assessment:       1. " Type 2 diabetes mellitus with complication, without long-term current use of insulin    2. Essential hypertension    3. Mixed hyperlipidemia    4. Gastroesophageal reflux disease, esophagitis presence not specified    5. Chronic obstructive pulmonary disease, unspecified COPD type    6. Vitamin D deficiency    7. Depressed mood    8. Post-nasal drainage    9. Need for pneumococcal vaccination    10. Need for shingles vaccine    11. Colon cancer screening        Plan:       Kemi was seen today for follow-up and diabetes.    Diagnoses and all orders for this visit:    Type 2 diabetes mellitus with complication, without long-term current use of insulin  -     metFORMIN (FORTAMET) 1,000 mg 24 hr tablet; Take 1 tablet (1,000 mg total) by mouth once daily.  -     SITagliptan-metformin (JANUMET XR) 100-1,000 mg TM24; Take 1 tablet by mouth once daily.  -     empagliflozin (JARDIANCE) 25 mg Tab; Take 25 mg by mouth once daily.    Essential hypertension  -     valsartan-hydrochlorothiazide (DIOVAN-HCT) 160-25 mg per tablet; Take 1 tablet by mouth once daily.  -     amLODIPine (NORVASC) 10 MG tablet; Take 1 tablet (10 mg total) by mouth once daily.    Mixed hyperlipidemia  -     rosuvastatin (CRESTOR) 20 MG tablet; Take 1 tablet (20 mg total) by mouth once daily.    Gastroesophageal reflux disease, esophagitis presence not specified  -     pantoprazole (PROTONIX) 40 MG tablet; Take 1 tablet (40 mg total) by mouth once daily.    Chronic obstructive pulmonary disease, unspecified COPD type  -     montelukast (SINGULAIR) 10 mg tablet; Take 1 tablet (10 mg total) by mouth once daily.  -     ipratropium-albuterol (COMBIVENT RESPIMAT)  mcg/actuation inhaler; Inhale 2 puffs into the lungs every 6 (six) hours as needed for Wheezing. Rescue  -     fluticasone-salmeterol diskus inhaler 500-50 mcg; Inhale 1 puff into the lungs 2 (two) times daily. Controller    Vitamin D deficiency  -     ergocalciferol (VITAMIN D2) 50,000 unit  Cap; Take 1 capsule (50,000 Units total) by mouth once a week.    Depressed mood  -     buPROPion (WELLBUTRIN XL) 300 MG 24 hr tablet; Take 1 tablet (300 mg total) by mouth once daily.    Post-nasal drainage  -     fluticasone propionate (FLONASE) 50 mcg/actuation nasal spray; 1 spray (50 mcg total) by Each Nostril route 2 (two) times daily.  -     azelastine (ASTELIN) 137 mcg (0.1 %) nasal spray; 1 spray (137 mcg total) by Nasal route 2 (two) times daily.    Need for pneumococcal vaccination  -     (In Office Administered) Pneumococcal Polysaccharide Vaccine (23 Valent) (SQ/IM)    Need for shingles vaccine  -     varicella-zoster gE-AS01B, PF, (SHINGRIX, PF,) 50 mcg/0.5 mL injection; Inject 0.5 mLs into the muscle once. for 1 dose    Colon cancer screening  -     Ambulatory referral to Gastroenterology     Adding 1000 mg of metformin for 2nd dose with dinner.    Dietary changes advised.    Follow-up in 3 months for in office hemoglobin A1c since medication has been adjusted.

## 2020-01-22 NOTE — TELEPHONE ENCOUNTER
Pharmacy called & said you rx'd Metformin ER 1000 mg but the pt.'s ins. does not cover that & it's costly. Do you want to change it?

## 2020-01-22 NOTE — PATIENT INSTRUCTIONS
Medicines for Acid Reflux  Your healthcare provider has told you that you have acid reflux. This condition causes stomach acid to wash up into your throat. For most people, acid reflux is troubling but not dangerous. But left untreated, acid reflux sometimes damages the esophagus. Medicines can help control acid reflux and limit your risk of future problems.  Medicines for acid reflux  Your healthcare provider may prescribe medicine to help treat your acid reflux. Medicine will be based on your symptoms and any test results. Your provider will explain how to take your medicine. You will also be told about possible side effects.  Reducing stomach acid  Your provider may suggest antacids that you can buy over the counter. Antacids can give fast relief. Or you may be told to take a type of medicine called H2 blockers. These are available over the counter and by prescription (for higher doses).  Blocking stomach acid  In more severe cases, your healthcare provider may suggest stronger medicines such as proton pump inhibitors (PPIs). These keep the stomach from making acid. They are often prescribed for long-term use.  Other medicines  In some cases medicines to reduce or block stomach acid may not work. Then you may be switched to another type of medicine that helps your stomach empty better.     Date Last Reviewed: 10/1/2016  © 9875-2503 [employer]. [address] [place] [address], [place] [address]. All rights reserved. This information is not intended as a substitute for professional medical care. Always follow your healthcare professional's instructions.        Controlling Your Cholesterol  Cholesterol is a waxy substance. It travels in your blood through the blood vessels. When you have high cholesterol, it builds up in the walls of the blood vessels. This makes the vessels narrower. Blood flow decreases. You are then at greater risk for having a heart attack or a stroke.  Good and bad cholesterol  Lipids  "are fats. Blood is mostly water. Fat and water don't mix. So our bodies need lipoproteins (lipids inside a protein shell) to carry the lipids. The protein shell carries its lipids through the bloodstream. There are two main kinds of lipoproteins:  · LDL (low-density lipoprotein) is known as "bad cholesterol." It mainly carries cholesterol. It delivers this cholesterol to body cells. Excess LDL cholesterol will build up in artery walls. This increases your risk for heart disease and stroke.  · HDL (high-density lipoprotein) is known as "good cholesterol." This protein shell collects excess cholesterol that LDLs have left behind on blood vessel walls. That's why high levels of HDL cholesterol can decrease your risk of heart disease and stroke.  Controlling cholesterol levels  Total cholesterol includes LDL and HDL cholesterol, as well as other fats in the bloodstream. If your total cholesterol is high, follow the steps below to help lower your total cholesterol level:  · Eat less unhealthy fat:  ¨ Cut back on saturated fats and trans (also called hydrogenated) fats by selecting lean cuts of meat, low-fat dairy, and using oils instead of solid fats. Limit baked goods, processed meats, and fried foods. A diet thats high in these fats increases your bad cholesterol. It's not enough to just cut back on foods containing cholesterol.  ¨ Eat about 2 servings of fish per week. Most fish contain omega-3 fatty acids. These help lower blood cholesterol.  ¨ Eat more whole grains and soluble fiber (such as oat bran). These lower overall cholesterol.  · Be active:  ¨ Choose an activity you enjoy. Walking, swimming, and riding a bike are some good ways to be active.  ¨ Start at a level where you feel comfortable. Increase your time and pace a little each week.  ¨ Work up to 40 minutes of moderate to high intensity physical activity at least 3 to 4 days per week.  ¨ Remember, some activity is better than none.  ¨ If you haven't been " exercising regularly, start slowly. Check with your doctor to make sure the exercise plan is right for you.  · Quit smoking. Quitting smoking can improve your lipid levels. It also lowers your risk for heart disease and stroke.  · Weight management. If you are overweight or obese, your health care provider will work with you to lose weight and lower your BMI (body mass index) to a normal or near-normal level. Making diet changes and increasing physical activity can help.  · Take medication as directed. Many people need medication to get their LDL levels to a safe level. Medication to lower cholesterol levels is effective and safe. (But taking medication is not a substitute for exercise or watching your diet!) Your doctor can tell you whether you might benefit from a cholesterol-lowering medication.  Date Last Reviewed: 5/11/2015  © 5729-9766 [employer]. [address] [place] [address], [place] [address]. All rights reserved. This information is not intended as a substitute for professional medical care. Always follow your healthcare professional's instructions.

## 2020-04-16 ENCOUNTER — TELEPHONE (OUTPATIENT)
Dept: PAIN MEDICINE | Facility: CLINIC | Age: 55
End: 2020-04-16

## 2020-04-16 NOTE — TELEPHONE ENCOUNTER
----- Message from Halle Stinson sent at 4/16/2020 10:33 AM CDT -----  Contact: self [phone]   Patient is requesting a call back from the nurse to schedule an inj.    Please call the patient upon request at phone number [phone].

## 2020-04-16 NOTE — TELEPHONE ENCOUNTER
Spoke to pt discussed risks of scheduling injections with patient at this time. Pt states she will wait until after the covid-19 slows down

## 2020-04-23 DIAGNOSIS — Z01.84 ANTIBODY RESPONSE EXAMINATION: ICD-10-CM

## 2020-05-01 ENCOUNTER — LAB VISIT (OUTPATIENT)
Dept: LAB | Facility: HOSPITAL | Age: 55
End: 2020-05-01
Attending: INTERNAL MEDICINE
Payer: COMMERCIAL

## 2020-05-01 DIAGNOSIS — Z01.84 ANTIBODY RESPONSE EXAMINATION: ICD-10-CM

## 2020-05-01 LAB — SARS-COV-2 IGG SERPLBLD QL IA.RAPID: NEGATIVE

## 2020-05-01 PROCEDURE — 36415 COLL VENOUS BLD VENIPUNCTURE: CPT

## 2020-05-01 PROCEDURE — 86769 SARS-COV-2 COVID-19 ANTIBODY: CPT

## 2020-05-27 ENCOUNTER — OFFICE VISIT (OUTPATIENT)
Dept: FAMILY MEDICINE | Facility: CLINIC | Age: 55
End: 2020-05-27
Payer: COMMERCIAL

## 2020-05-27 VITALS
HEIGHT: 66 IN | WEIGHT: 248 LBS | HEART RATE: 88 BPM | OXYGEN SATURATION: 97 % | TEMPERATURE: 98 F | SYSTOLIC BLOOD PRESSURE: 130 MMHG | BODY MASS INDEX: 39.86 KG/M2 | DIASTOLIC BLOOD PRESSURE: 72 MMHG

## 2020-05-27 DIAGNOSIS — E11.8 TYPE 2 DIABETES MELLITUS WITH COMPLICATION, WITHOUT LONG-TERM CURRENT USE OF INSULIN: Primary | ICD-10-CM

## 2020-05-27 DIAGNOSIS — E78.2 MIXED HYPERLIPIDEMIA: ICD-10-CM

## 2020-05-27 DIAGNOSIS — R45.89 DEPRESSED MOOD: ICD-10-CM

## 2020-05-27 DIAGNOSIS — E66.01 CLASS 3 SEVERE OBESITY DUE TO EXCESS CALORIES WITH SERIOUS COMORBIDITY AND BODY MASS INDEX (BMI) OF 40.0 TO 44.9 IN ADULT: ICD-10-CM

## 2020-05-27 DIAGNOSIS — E55.9 VITAMIN D DEFICIENCY: ICD-10-CM

## 2020-05-27 DIAGNOSIS — I10 ESSENTIAL HYPERTENSION: ICD-10-CM

## 2020-05-27 LAB — HBA1C MFR BLD: 8.2 %

## 2020-05-27 PROCEDURE — 3052F PR MOST RECENT HEMOGLOBIN A1C LEVEL 8.0 - < 9.0%: ICD-10-PCS | Mod: S$GLB,,, | Performed by: NURSE PRACTITIONER

## 2020-05-27 PROCEDURE — 83036 POCT HEMOGLOBIN A1C: ICD-10-PCS | Mod: QW,S$GLB,, | Performed by: NURSE PRACTITIONER

## 2020-05-27 PROCEDURE — 3078F PR MOST RECENT DIASTOLIC BLOOD PRESSURE < 80 MM HG: ICD-10-PCS | Mod: S$GLB,,, | Performed by: NURSE PRACTITIONER

## 2020-05-27 PROCEDURE — 3075F SYST BP GE 130 - 139MM HG: CPT | Mod: S$GLB,,, | Performed by: NURSE PRACTITIONER

## 2020-05-27 PROCEDURE — 3008F BODY MASS INDEX DOCD: CPT | Mod: S$GLB,,, | Performed by: NURSE PRACTITIONER

## 2020-05-27 PROCEDURE — 3008F PR BODY MASS INDEX (BMI) DOCUMENTED: ICD-10-PCS | Mod: S$GLB,,, | Performed by: NURSE PRACTITIONER

## 2020-05-27 PROCEDURE — 3075F PR MOST RECENT SYSTOLIC BLOOD PRESS GE 130-139MM HG: ICD-10-PCS | Mod: S$GLB,,, | Performed by: NURSE PRACTITIONER

## 2020-05-27 PROCEDURE — 99214 PR OFFICE/OUTPT VISIT, EST, LEVL IV, 30-39 MIN: ICD-10-PCS | Mod: S$GLB,,, | Performed by: NURSE PRACTITIONER

## 2020-05-27 PROCEDURE — 3052F HG A1C>EQUAL 8.0%<EQUAL 9.0%: CPT | Mod: S$GLB,,, | Performed by: NURSE PRACTITIONER

## 2020-05-27 PROCEDURE — 3078F DIAST BP <80 MM HG: CPT | Mod: S$GLB,,, | Performed by: NURSE PRACTITIONER

## 2020-05-27 PROCEDURE — 83036 HEMOGLOBIN GLYCOSYLATED A1C: CPT | Mod: QW,S$GLB,, | Performed by: NURSE PRACTITIONER

## 2020-05-27 PROCEDURE — 99214 OFFICE O/P EST MOD 30 MIN: CPT | Mod: S$GLB,,, | Performed by: NURSE PRACTITIONER

## 2020-05-27 RX ORDER — BUPROPION HYDROCHLORIDE 300 MG/1
300 TABLET ORAL DAILY
Qty: 90 TABLET | Refills: 1 | Status: SHIPPED | OUTPATIENT
Start: 2020-05-27 | End: 2020-09-18

## 2020-05-27 NOTE — PROGRESS NOTES
Subjective:       Patient ID: Kemi Dominguez is a 55 y.o. female.    Chief Complaint: Diabetes    Patient presents today following up for chronic conditions including hypertension, hyperlipidemia, diabetes, and depressed mood.   Patient is stable on her current medications.  At her last visit, patient's metformin was increased to the max dose of metformin daily.  Patient is taking this in addition to Janumet and Jardiance.  Patient did not take the additional metformin prescribed at her last visit and continued on what she was taking previously.  Patient is not on any other diabetes medication.  Over the last 3-4 months, diet has been liberal.  Patient has reduced her intake of soft drinks over the last 5-6 weeks she states.  Patient has gained 8 lb since her last visit.  Hemoglobin A1c in office will be obtained today.  Patient is obese with a BMI of 40.03    Diabetes   She presents for her follow-up diabetic visit. She has type 2 diabetes mellitus. No MedicAlert identification noted. The initial diagnosis of diabetes was made 8 years ago. Her disease course has been stable. Hypoglycemia symptoms include headaches. Pertinent negatives for hypoglycemia include no confusion, dizziness, hunger, mood changes, nervousness/anxiousness, pallor, seizures, sleepiness, speech difficulty, sweats or tremors. Pertinent negatives for diabetes include no blurred vision, no chest pain, no fatigue, no foot paresthesias, no foot ulcerations, no polydipsia, no polyphagia, no polyuria, no visual change, no weakness and no weight loss. There are no hypoglycemic complications. Pertinent negatives for hypoglycemia complications include no blackouts, no hospitalization, no nocturnal hypoglycemia, no required assistance and no required glucagon injection. Symptoms are worsening. There are no diabetic complications. Pertinent negatives for diabetic complications include no autonomic neuropathy, CVA, heart disease, impotence, nephropathy,  peripheral neuropathy, PVD or retinopathy. Risk factors for coronary artery disease include hypertension, stress, sedentary lifestyle, obesity, dyslipidemia and diabetes mellitus. Current diabetic treatment includes oral agent (dual therapy). She is compliant with treatment most of the time. Her weight is stable. She is following a generally healthy diet. Meal planning includes avoidance of concentrated sweets. She has not had a previous visit with a dietitian. She participates in exercise intermittently. There is no compliance with monitoring of blood glucose. Her home blood glucose trend is fluctuating minimally. An ACE inhibitor/angiotensin II receptor blocker is being taken. She does not see a podiatrist.Eye exam is not current.   Depression   Visit Type: follow-up  Patient presents with the following symptoms: anhedonia and excessive worry.  Patient is not experiencing: confusion, impotence, nervousness/anxiety, palpitations, suicidal ideas, suicidal planning, thoughts of death and weight loss.  Frequency of symptoms: most days   Severity: mild   Sleep quality: fair  Nighttime awakenings: occasional    Hypertension   This is a chronic problem. The current episode started more than 1 month ago. The problem has been waxing and waning since onset. The problem is controlled. Associated symptoms include anxiety, headaches and malaise/fatigue. Pertinent negatives include no blurred vision, chest pain, palpitations or sweats. Agents associated with hypertension include estrogens. Risk factors for coronary artery disease include smoking/tobacco exposure, sedentary lifestyle, obesity, diabetes mellitus, dyslipidemia and stress. Past treatments include calcium channel blockers, angiotensin blockers and direct vasodilators. The current treatment provides significant improvement. Compliance problems include diet and exercise.  There is no history of angina, CVA, PVD or retinopathy. There is no history of chronic renal  disease.   Headache    This is a recurrent problem. The current episode started more than 1 month ago. The problem occurs intermittently. The problem has been waxing and waning. The pain is located in the left unilateral region. The pain does not radiate. The pain quality is similar to prior headaches. The quality of the pain is described as pulsating. The pain is moderate. Associated symptoms include back pain (chronic), phonophobia and photophobia. Pertinent negatives include no blurred vision, dizziness, eye pain, hearing loss, rhinorrhea, seizures, sinus pressure, visual change, weakness or weight loss. The symptoms are aggravated by activity, bright light and caffeine withdrawal. She has tried NSAIDs (bc powders) for the symptoms. The treatment provided significant relief. There is no history of recent head traumas.     Review of Systems   Constitutional: Positive for malaise/fatigue. Negative for activity change, appetite change, fatigue, unexpected weight change and weight loss.        Obesity   HENT: Negative for ear discharge, hearing loss, rhinorrhea, sinus pressure, trouble swallowing and voice change.    Eyes: Positive for photophobia. Negative for blurred vision, pain, discharge and visual disturbance.   Respiratory: Negative for chest tightness and wheezing.         COPD, former smoker   Cardiovascular: Negative for chest pain and palpitations.        Hypertension, hyperlipidemia   Gastrointestinal: Negative for blood in stool, constipation and diarrhea.   Endocrine: Negative for cold intolerance, heat intolerance, polydipsia, polyphagia and polyuria.        Diabetes type 2   Genitourinary: Negative for difficulty urinating, dysuria, hematuria, impotence and menstrual problem.   Musculoskeletal: Positive for back pain (chronic). Negative for gait problem.   Skin: Negative for color change and pallor.   Allergic/Immunologic: Negative for immunocompromised state.   Neurological: Positive for headaches.  Negative for dizziness, tremors, seizures, speech difficulty and weakness.   Psychiatric/Behavioral: Positive for depression and dysphoric mood (controlled). Negative for confusion, self-injury and suicidal ideas. The patient is not nervous/anxious.        Past Medical History:   Diagnosis Date    Class 3 severe obesity due to excess calories with serious comorbidity and body mass index (BMI) of 40.0 to 44.9 in adult 10/17/2018    COPD (chronic obstructive pulmonary disease)     Diabetes mellitus, type 2     GERD (gastroesophageal reflux disease)     Hyperlipidemia     Hypertension     Vitamin D deficiency       Past Surgical History:   Procedure Laterality Date    ADENOIDECTOMY      BREAST SURGERY Right     breast biopsy    TONSILLECTOMY      TRANSFORAMINAL EPIDURAL INJECTION OF STEROID Left 4/16/2019    Procedure: Injection,steroid,epidural,transforaminal approach;  Surgeon: Gilberto Michelle MD;  Location: [facility] OR;  Service: Pain Management;  Laterality: Left;  L5-S1, S1    TRANSFORAMINAL EPIDURAL INJECTION OF STEROID Left 8/23/2019    Procedure: Injection,steroid,epidural,transforaminal approach;  Surgeon: Gilberto Michelle MD;  Location: [facility] OR;  Service: Pain Management;  Laterality: Left;  L5-S1, S1       Family History   Problem Relation Age of Onset    Stroke Mother     Emphysema Mother     Diabetes Father     Coronary artery disease Father     COPD Father     Diabetes Sister        Social History     Socioeconomic History    Marital status: Significant Other     Spouse name: Not on file    Number of children: Not on file    Years of education: Not on file    Highest education level: Not on file   Occupational History    Not on file   Social Needs    Financial resource strain: Not hard at all    Food insecurity:     Worry: Never true     Inability: Never true    Transportation needs:     Medical: No     Non-medical: No   Tobacco Use    Smoking status: Former Smoker     Packs/day: 2.00      Types: Cigarettes     Last attempt to quit: 2019     Years since quittin.2    Smokeless tobacco: Never Used   Substance and Sexual Activity    Alcohol use: Yes     Frequency: Monthly or less     Drinks per session: 1 or 2     Binge frequency: Never     Comment: rarely    Drug use: Yes     Types: Hydrocodone    Sexual activity: Never   Lifestyle    Physical activity:     Days per week: 0 days     Minutes per session: Not on file    Stress: To some extent   Relationships    Social connections:     Talks on phone: More than three times a week     Gets together: Once a week     Attends Mandaen service: Not on file     Active member of club or organization: No     Attends meetings of clubs or organizations: Never     Relationship status: Living with partner   Other Topics Concern    Not on file   Social History Narrative    Not on file       Current Outpatient Medications   Medication Sig Dispense Refill    albuterol-ipratropium (DUO-NEB) 2.5 mg-0.5 mg/3 mL nebulizer solution Take 3 mLs by nebulization every 6 (six) hours as needed for Wheezing. Rescue 1 Box 2    amLODIPine (NORVASC) 10 MG tablet Take 1 tablet (10 mg total) by mouth once daily. 90 tablet 1    aspirin/salicylamide/caffeine (BC HEADACHE POWDER ORAL) Take by mouth as needed.      azelastine (ASTELIN) 137 mcg (0.1 %) nasal spray 1 spray (137 mcg total) by Nasal route 2 (two) times daily. 30 mL 2    buPROPion (WELLBUTRIN XL) 300 MG 24 hr tablet Take 1 tablet (300 mg total) by mouth once daily. 90 tablet 1    clotrimazole-betamethasone 1-0.05% (LOTRISONE) cream Apply topically as needed.      empagliflozin (JARDIANCE) 25 mg Tab Take 25 mg by mouth once daily. 90 tablet 1    fluticasone propionate (FLONASE) 50 mcg/actuation nasal spray 1 spray (50 mcg total) by Each Nostril route 2 (two) times daily. 16 g 2    fluticasone-salmeterol diskus inhaler 500-50 mcg Inhale 1 puff into the lungs 2 (two) times daily. Controller 60 each 5     "ibuprofen (ADVIL,MOTRIN) 800 MG tablet Take 800 mg by mouth 3 (three) times daily.       ipratropium-albuterol (COMBIVENT RESPIMAT)  mcg/actuation inhaler Inhale 2 puffs into the lungs every 6 (six) hours as needed for Wheezing. Rescue 4 g 6    melatonin 5 mg Tab Take 10 mg by mouth nightly.       metFORMIN (GLUCOPHAGE-XR) 500 MG 24 hr tablet Take 2 tablets (1,000 mg total) by mouth daily with dinner or evening meal. 180 tablet 1    montelukast (SINGULAIR) 10 mg tablet Take 1 tablet (10 mg total) by mouth once daily. 90 tablet 1    pantoprazole (PROTONIX) 40 MG tablet Take 1 tablet (40 mg total) by mouth once daily. 90 tablet 1    rosuvastatin (CRESTOR) 20 MG tablet Take 1 tablet (20 mg total) by mouth once daily. 90 tablet 1    SITagliptan-metformin (JANUMET XR) 100-1,000 mg TM24 Take 1 tablet by mouth once daily. 90 tablet 1    tiZANidine 4 mg Cap Take 1 capsule by mouth 3 (three) times daily as needed.       valsartan-hydrochlorothiazide (DIOVAN-HCT) 160-25 mg per tablet Take 1 tablet by mouth once daily. 90 tablet 1    ergocalciferol (VITAMIN D2) 50,000 unit Cap Take 1 capsule (50,000 Units total) by mouth once a week. (Patient not taking: Reported on 5/27/2020) 12 capsule 1     No current facility-administered medications for this visit.        Review of patient's allergies indicates:  No Known Allergies  Objective:      Blood pressure 130/72, pulse 88, temperature 97.6 °F (36.4 °C), height 5' 6" (1.676 m), weight 112.5 kg (248 lb), SpO2 97 %. Body mass index is 40.03 kg/m².   Physical Exam   Constitutional: She is oriented to person, place, and time. She appears well-developed. She is cooperative. No distress.   obesity   HENT:   Head: Normocephalic and atraumatic.   Right Ear: Tympanic membrane and external ear normal.   Left Ear: Tympanic membrane and external ear normal.   Nose: Nose normal.   Mouth/Throat: Oropharynx is clear and moist.   Eyes: Pupils are equal, round, and reactive to " light. Conjunctivae, EOM and lids are normal. Lids are everted and swept, no foreign bodies found. Right pupil is round and reactive. Left pupil is round and reactive.   Neck: Trachea normal and normal range of motion. Neck supple.   Cardiovascular: Normal rate, regular rhythm, S1 normal, S2 normal, normal heart sounds and intact distal pulses.   No murmur heard.  Pulmonary/Chest: Effort normal and breath sounds normal. No stridor. No respiratory distress. She has no decreased breath sounds. She has no wheezes. She has no rhonchi. She has no rales.   Abdominal: Soft. Bowel sounds are normal. There is no rigidity and no guarding.   Musculoskeletal: Normal range of motion.   Lymphadenopathy:     She has no cervical adenopathy.     She has no axillary adenopathy.   Neurological: She is alert and oriented to person, place, and time.   Skin: Skin is warm and dry. Capillary refill takes less than 2 seconds.   Psychiatric: Her speech is normal and behavior is normal. Judgment and thought content normal. She exhibits a depressed mood (controlled). She expresses no homicidal and no suicidal ideation. She expresses no suicidal plans and no homicidal plans.   Nursing note and vitals reviewed.          Assessment:       1. Type 2 diabetes mellitus with complication, without long-term current use of insulin    2. Depressed mood    3. Essential hypertension    4. Mixed hyperlipidemia    5. Vitamin D deficiency    6. Class 3 severe obesity due to excess calories with serious comorbidity and body mass index (BMI) of 40.0 to 44.9 in adult        Plan:       Kemi was seen today for diabetes.    Diagnoses and all orders for this visit:    Type 2 diabetes mellitus with complication, without long-term current use of insulin  -     Hemoglobin A1C, POCT  -     Hemoglobin A1C; Future  -     Microalbumin/creatinine urine ratio; Future  -     Hemoglobin A1C  -     Microalbumin/creatinine urine ratio   Discussed the following complications  of DM Type 2: CAD, CVD, Retinopathy, Nephropathy, Neuropathy.    Discussed Target A1C Goal <7.0% and Target fasting blood sugars () before each meal.    Discussed Lifestyle Modifications: Low glycemic index diet, Exercise (30-45 min) daily, Weight loss, and Smoking cessation     Depressed mood  -     buPROPion (WELLBUTRIN XL) 300 MG 24 hr tablet; Take 1 tablet (300 mg total) by mouth once daily.    Essential hypertension  -     CBC auto differential; Future  -     Comprehensive metabolic panel; Future  -     Urinalysis; Future  -     CBC auto differential  -     Comprehensive metabolic panel  -     Urinalysis  Lifestyle changes: Reduce the amount of salt in your diet; Lose weight; Avoid drinking too much alcohol; Exercise at least 30 minutes per day most days of the week.  Continue current medications and home BP monitoring.     Mixed hyperlipidemia  -     Lipid Panel; Future  -     Lipid Panel  Limit red meat, butter, fried foods, cheese, and other foods that have a lot of saturated fat. Consume more: lean meats, fish, fruits, vegetables, whole grains, beans, lentils, and nuts.  Weight loss, and 30-45 min of cardiovascular exercise daily.     Vitamin D deficiency  -     Vitamin D; Future  -     Vitamin D    Class 3 severe obesity due to excess calories with serious comorbidity and body mass index (BMI) of 40.0 to 44.9 in adult  The patient is asked to make an attempt to improve diet and exercise patterns to aid in medical management of this problem.     Encouraged taking metformin as prescribed.  Patient did not take the additional metformin prescribed at her last visit and thought that dietary changes could accomplish a reduced hemoglobin A1c.  Hemoglobin A1c is up to 8.2% from 7.4% at her last visit.  Patient indicates that she will take the additional metformin to help reduce her A1c.  Patient would like to try this 1st to avoid adding injectable medications.    Follow-up in 3 months with repeat labs to  evaluate chronic conditions and manage medications.

## 2020-05-27 NOTE — PATIENT INSTRUCTIONS
Diabetes: Activity Tips    Being more active can help you manage your diabetes. The tips on this sheet can help you get the most from your exercise. They can also help you stay safe.  Staying Active  Its important for adults to spend less time sitting and being inactive. This is especially true if you have type 2 diabetes. When you are sitting for long periods of time, get up for short sessions of light activity every 30 minutes.  You should aim for at least 150 minutes a week of exercise or physical activity. Dont let more than 2 days go by without being active.  Benefit from briskness  Brisk activity gets your heart beating faster. This can help you increase your fitness, lose extra weight, and manage your blood sugar level. Try brisk walking. Or, if you have foot or leg problems, you can try swimming or bike riding. You can break up your exercise into chunks throughout the day. Work up to at least 30 minutes of steady, brisk exercise on most days.  Warm up and cool down  Warming up and cooling down reduce your risk of injury. They also help limit muscle soreness. Do a mild version of your activity for 5 minutes before and after your routine. You can also learn stretches that will help keep your muscles loose. Your healthcare provider may show you good ways to warm up and stretch.  Do the talk-sing test  The talk-sing test is a simple way to tell how hard youre exercising. If you can talk while exercising, youre in a safe range. If youre out of breath, slow down. If you can carry a tune, its time to  the pace. Walk up a hill. Increase the resistance on your stationary bike. Or swim faster.  What about eating?  You may be told to plan your exercise for 1 to 2 hours after a meal. In most cases, you dont need to eat while being active. If you take insulin or medicine that can cause low blood sugar, test your blood sugar before exercising. And carry a fast-acting sugar that will raise your blood sugar  level quickly. This includes glucose tablets or hard candy. Use it if you feel low blood sugar symptoms.  Safety tips  These tips can help you stay safe as you become fit:  · Exercise with a friend or carry a cell phone if you have one.  · Carry or wear identification, such as a necklace or bracelet, that says you have diabetes.  · Use the proper footwear and safety equipment for your activity.  · Drink water before, during, and after exercise.  · Dress properly for the weather.  · Dont exercise in very hot or very cold weather.  · Dont exercise if you are sick.  · If you are instructed to do so, test your blood sugar before and after you exercise. Have a small carbohydrate snack if your blood sugar is low before you start exercising.   When to stop exercising and call your healthcare provider  Stop exercising and call your healthcare provider right away if you notice any of the following:  · Pain, pressure, tightness, or heaviness in the chest  · Pain or heaviness in the neck, shoulders, back, arms, legs, or feet  · Unusual shortness of breath  · Dizziness or lightheadedness  · Unusually rapid or slow pulse  · Increased joint or muscle pain  · Nausea or vomiting  Date Last Reviewed: 5/1/2016  © 4202-2466 [employer]. [address] [place] [address], [place] [address]. All rights reserved. This information is not intended as a substitute for professional medical care. Always follow your healthcare professional's instructions.        Using a Blood Sugar Log    You have diabetes. This means your body has trouble regulating a sugar called glucose. To help manage your diabetes, youll need to check your blood sugar level as directed by your healthcare provider. Keeping a log of your blood sugar levels will help you track your blood sugar readings. Its a simple and easy way to see how well you are controlling your diabetes.  Checking your blood sugar level  You can check your blood sugar level with a blood  glucose meter. Youll first prick the side of your finger with a tiny lancet to draw a tiny drop of blood onto the test strip. Some glucose meters let you use another place on your body to test. But these other places should not be used in some cases as they may be inaccurate. Follow the instructions for your glucose meter. And talk with your healthcare provider before doing the test on other places.  The strip goes into the meter first, then a drop of blood is placed on the tip of the strip. The meter then shows a reading that tells you the level of your blood sugar. Your readings should be in your target range as often as possible. This means not too high or too low. Staying in this range helps lower your risk for complications. Your healthcare provider will help you figure out the target range that is best for you.  Tracking your readings  Every time you check your blood sugar, use your log to keep track of your readings. Your meter will also probably have a memory feature that your healthcare provider can check at your next visit. You may be advised by your healthcare provider to check your blood sugar in the morning, at bedtime, and before and after meals. Be sure to write down all of your numbers. Also use your log to record things that might have affected your blood sugar. Some examples include being sick, certain medicines, being physically active, feeling stressed, or skipping meals.   Lessons learned from your readings  Tracking your blood sugar readings helps you see patterns. These patterns tell you how your actions affect your blood sugar. For instance, you may have higher numbers after eating certain foods or lower numbers after exercise. They just help you understand how to stay in your target range more often, so that your diabetes remains in good control.  Sharing your log with your healthcare team  Bring your blood sugar log and glucose meter with you to all of your healthcare appointments. This  can help your healthcare team make changes to your treatment plan, if needed. This may involve making changes in what you eat, what medicines you take, or how much you exercise.  To learn more  The resources below can help you learn more:  · American Diabetes Association 782-563-0785 www.diabetes.org  · [employer] 530-259-9608 www.lighthouse.org  · [employer] [place] 240-540-6027 www.nei.nih.gov  · [employer] 181-265-6074 www.hormone.org  Date Last Reviewed: 5/1/2016  © 6432-5054 [employer]. [address] [place] [address], [place] [address]. All rights reserved. This information is not intended as a substitute for professional medical care. Always follow your healthcare professional's instructions.        Depression  Depression is one of the most common mental health problems today. It is not just a state of unhappiness or sadness. It is a true disease. The cause seems to be related to a decrease in chemicals that transmit signals in the brain. Having a family history of depression, alcoholism, or suicide increases the risk. Chronic illness, chronic pain, migraine headaches and high emotional stress also increase the risk.  Depression is something we tend to recognize in others, but may have a hard time seeing in ourselves. It can show in many physical and emotional ways:  · Loss of appetite  · Over-eating  · Not being able to sleep  · Sleeping too much  · Tiredness not related to physical exertion  · Restlessness or irritability  · Slowness of movement or speech  · Feeling depressed or withdrawn  · Loss of interest in things you once enjoyed  · Trouble concentrating, poor memory, trouble making decisions  · Thoughts of harming or killing oneself, or thoughts that life is not worth living  · Low self-esteem  The treatment for depression may include both medicine and psychotherapy. Antidepressants can reduce suffering and can improve the ability to function during the depressed  period. Therapy can offer emotional support and help you understand emotional factors that may be causing the depression.  Home care  · On-going care and support helps people manage this disease.  Find a healthcare provider and therapist who meet your needs. Seek help when you feel like you may be getting ill.  · Be kind to yourself. Make it a point to do things that you enjoy (gardening, walking in nature, going to a movie, etc.). Reward yourself for small successes.  · Take care of your physical body. Eat a balanced diet (low in saturated fat and high in fruits and vegetables). Exercise at least 3 times a week for 30 minutes. Even mild-moderate exercise (like brisk walking) can make you feel better.  · Avoid alcohol, which can make depression worse.  · Take medicine as prescribed.  · Tell each of your healthcare providers about all of the prescription drugs, over-the-counter medicines, vitamins, and supplements you take. Certain supplements interact with medicines and can result in dangerous side effects. Ask your pharmacist when you have questions about drug interactions.  · Talk with your family and trusted friends about your feelings and thoughts. Ask them to help you recognize behavior changes early so you can get help and, if needed, medicine can be adjusted.  Follow-up care  Follow up with your healthcare provider, or as advised.  Call 911  Call 911 if you:  · Have suicidal thoughts, a suicide plan, and the means to carry out the plan  · Have trouble breathing  · Are very confused  · Feel very drowsy or have trouble awakening  · Faint or lose consciousness  · Have new chest pain that becomes more severe, lasts longer, or spreads into your shoulder, arm, neck, jaw or back  When to seek medical advice  Call your healthcare provider right away if any of these occur:  · Feeling extreme depression, fear, anxiety, or anger toward yourself or others  · Feeling out of control  · Feeling that you may try to harm  yourself or another  · Hearing voices that others do not hear  · Seeing things that others do not see  · Cant sleep or eat for 3 days in a row  · Friends or family express concern over your behavior and ask you to seek help  Date Last Reviewed: 9/29/2015  © 2512-8653 [employer]. [address] [place] [address], [place] [address]. All rights reserved. This information is not intended as a substitute for professional medical care. Always follow your healthcare professional's instructions.

## 2020-06-11 DIAGNOSIS — E55.9 VITAMIN D DEFICIENCY: ICD-10-CM

## 2020-06-11 RX ORDER — ERGOCALCIFEROL 1.25 MG/1
50000 CAPSULE ORAL WEEKLY
Qty: 12 CAPSULE | Refills: 1 | Status: SHIPPED | OUTPATIENT
Start: 2020-06-11 | End: 2020-08-27

## 2020-08-22 LAB
25(OH)D3 SERPL-MCNC: 122 NG/ML (ref 30–100)
ALBUMIN SERPL-MCNC: 4.3 G/DL (ref 3.6–5.1)
ALBUMIN/CREAT UR: 20 MCG/MG CREAT
ALBUMIN/GLOB SERPL: 1.8 (CALC) (ref 1–2.5)
ALP SERPL-CCNC: 77 U/L (ref 37–153)
ALT SERPL-CCNC: 17 U/L (ref 6–29)
APPEARANCE UR: CLEAR
AST SERPL-CCNC: 17 U/L (ref 10–35)
BASOPHILS # BLD AUTO: 31 CELLS/UL (ref 0–200)
BASOPHILS NFR BLD AUTO: 0.5 %
BILIRUB SERPL-MCNC: 0.5 MG/DL (ref 0.2–1.2)
BILIRUB UR QL STRIP: NEGATIVE
BUN SERPL-MCNC: 18 MG/DL (ref 7–25)
BUN/CREAT SERPL: NORMAL (CALC) (ref 6–22)
CALCIUM SERPL-MCNC: 10.3 MG/DL (ref 8.6–10.4)
CHLORIDE SERPL-SCNC: 103 MMOL/L (ref 98–110)
CHOLEST SERPL-MCNC: 125 MG/DL
CHOLEST/HDLC SERPL: 2.3 (CALC)
CO2 SERPL-SCNC: 25 MMOL/L (ref 20–32)
COLOR UR: YELLOW
CREAT SERPL-MCNC: 0.67 MG/DL (ref 0.5–1.05)
CREAT UR-MCNC: 115 MG/DL (ref 20–275)
EOSINOPHIL # BLD AUTO: 180 CELLS/UL (ref 15–500)
EOSINOPHIL NFR BLD AUTO: 2.9 %
ERYTHROCYTE [DISTWIDTH] IN BLOOD BY AUTOMATED COUNT: 13.9 % (ref 11–15)
GFRSERPLBLD MDRD-ARVRAT: 100 ML/MIN/1.73M2
GLOBULIN SER CALC-MCNC: 2.4 G/DL (CALC) (ref 1.9–3.7)
GLUCOSE SERPL-MCNC: 89 MG/DL (ref 65–99)
GLUCOSE UR QL STRIP: ABNORMAL
HBA1C MFR BLD: 6.1 % OF TOTAL HGB
HCT VFR BLD AUTO: 42.4 % (ref 35–45)
HDLC SERPL-MCNC: 55 MG/DL
HGB BLD-MCNC: 13.8 G/DL (ref 11.7–15.5)
HGB UR QL STRIP: NEGATIVE
KETONES UR QL STRIP: ABNORMAL
LDLC SERPL CALC-MCNC: 54 MG/DL (CALC)
LEUKOCYTE ESTERASE UR QL STRIP: NEGATIVE
LYMPHOCYTES # BLD AUTO: 1414 CELLS/UL (ref 850–3900)
LYMPHOCYTES NFR BLD AUTO: 22.8 %
MCH RBC QN AUTO: 27.4 PG (ref 27–33)
MCHC RBC AUTO-ENTMCNC: 32.5 G/DL (ref 32–36)
MCV RBC AUTO: 84.3 FL (ref 80–100)
MICROALBUMIN UR-MCNC: 2.3 MG/DL
MONOCYTES # BLD AUTO: 577 CELLS/UL (ref 200–950)
MONOCYTES NFR BLD AUTO: 9.3 %
NEUTROPHILS # BLD AUTO: 3999 CELLS/UL (ref 1500–7800)
NEUTROPHILS NFR BLD AUTO: 64.5 %
NITRITE UR QL STRIP: NEGATIVE
NONHDLC SERPL-MCNC: 70 MG/DL (CALC)
PH UR STRIP: ABNORMAL [PH] (ref 5–8)
PLATELET # BLD AUTO: 299 THOUSAND/UL (ref 140–400)
PMV BLD REES-ECKER: 10 FL (ref 7.5–12.5)
POTASSIUM SERPL-SCNC: 3.9 MMOL/L (ref 3.5–5.3)
PROT SERPL-MCNC: 6.7 G/DL (ref 6.1–8.1)
PROT UR QL STRIP: NEGATIVE
RBC # BLD AUTO: 5.03 MILLION/UL (ref 3.8–5.1)
SODIUM SERPL-SCNC: 141 MMOL/L (ref 135–146)
SP GR UR STRIP: 1.04 (ref 1–1.03)
TRIGL SERPL-MCNC: 82 MG/DL
WBC # BLD AUTO: 6.2 THOUSAND/UL (ref 3.8–10.8)

## 2020-08-27 ENCOUNTER — OFFICE VISIT (OUTPATIENT)
Dept: FAMILY MEDICINE | Facility: CLINIC | Age: 55
End: 2020-08-27
Payer: COMMERCIAL

## 2020-08-27 VITALS
HEART RATE: 73 BPM | HEIGHT: 66 IN | BODY MASS INDEX: 34.23 KG/M2 | WEIGHT: 213 LBS | OXYGEN SATURATION: 97 % | TEMPERATURE: 98 F | DIASTOLIC BLOOD PRESSURE: 70 MMHG | SYSTOLIC BLOOD PRESSURE: 110 MMHG

## 2020-08-27 DIAGNOSIS — E78.2 MIXED HYPERLIPIDEMIA: ICD-10-CM

## 2020-08-27 DIAGNOSIS — E55.9 VITAMIN D DEFICIENCY: ICD-10-CM

## 2020-08-27 DIAGNOSIS — E11.8 TYPE 2 DIABETES MELLITUS WITH COMPLICATION, WITHOUT LONG-TERM CURRENT USE OF INSULIN: Primary | ICD-10-CM

## 2020-08-27 DIAGNOSIS — Z23 NEED FOR INFLUENZA VACCINATION: ICD-10-CM

## 2020-08-27 DIAGNOSIS — E66.09 CLASS 1 OBESITY DUE TO EXCESS CALORIES WITH SERIOUS COMORBIDITY AND BODY MASS INDEX (BMI) OF 34.0 TO 34.9 IN ADULT: ICD-10-CM

## 2020-08-27 DIAGNOSIS — K21.9 GASTROESOPHAGEAL REFLUX DISEASE, ESOPHAGITIS PRESENCE NOT SPECIFIED: ICD-10-CM

## 2020-08-27 DIAGNOSIS — I10 ESSENTIAL HYPERTENSION: ICD-10-CM

## 2020-08-27 DIAGNOSIS — R45.89 DEPRESSED MOOD: ICD-10-CM

## 2020-08-27 PROCEDURE — 3008F PR BODY MASS INDEX (BMI) DOCUMENTED: ICD-10-PCS | Mod: S$GLB,,, | Performed by: NURSE PRACTITIONER

## 2020-08-27 PROCEDURE — 90471 FLU VACCINE (QUAD) GREATER THAN OR EQUAL TO 3YO PRESERVATIVE FREE IM: ICD-10-PCS | Mod: S$GLB,,, | Performed by: NURSE PRACTITIONER

## 2020-08-27 PROCEDURE — 90686 IIV4 VACC NO PRSV 0.5 ML IM: CPT | Mod: S$GLB,,, | Performed by: NURSE PRACTITIONER

## 2020-08-27 PROCEDURE — 3044F PR MOST RECENT HEMOGLOBIN A1C LEVEL <7.0%: ICD-10-PCS | Mod: S$GLB,,, | Performed by: NURSE PRACTITIONER

## 2020-08-27 PROCEDURE — 90686 FLU VACCINE (QUAD) GREATER THAN OR EQUAL TO 3YO PRESERVATIVE FREE IM: ICD-10-PCS | Mod: S$GLB,,, | Performed by: NURSE PRACTITIONER

## 2020-08-27 PROCEDURE — 99214 OFFICE O/P EST MOD 30 MIN: CPT | Mod: 25,S$GLB,, | Performed by: NURSE PRACTITIONER

## 2020-08-27 PROCEDURE — 90471 IMMUNIZATION ADMIN: CPT | Mod: S$GLB,,, | Performed by: NURSE PRACTITIONER

## 2020-08-27 PROCEDURE — 3078F PR MOST RECENT DIASTOLIC BLOOD PRESSURE < 80 MM HG: ICD-10-PCS | Mod: S$GLB,,, | Performed by: NURSE PRACTITIONER

## 2020-08-27 PROCEDURE — 3008F BODY MASS INDEX DOCD: CPT | Mod: S$GLB,,, | Performed by: NURSE PRACTITIONER

## 2020-08-27 PROCEDURE — 3074F PR MOST RECENT SYSTOLIC BLOOD PRESSURE < 130 MM HG: ICD-10-PCS | Mod: S$GLB,,, | Performed by: NURSE PRACTITIONER

## 2020-08-27 PROCEDURE — 3074F SYST BP LT 130 MM HG: CPT | Mod: S$GLB,,, | Performed by: NURSE PRACTITIONER

## 2020-08-27 PROCEDURE — 3078F DIAST BP <80 MM HG: CPT | Mod: S$GLB,,, | Performed by: NURSE PRACTITIONER

## 2020-08-27 PROCEDURE — 99214 PR OFFICE/OUTPT VISIT, EST, LEVL IV, 30-39 MIN: ICD-10-PCS | Mod: 25,S$GLB,, | Performed by: NURSE PRACTITIONER

## 2020-08-27 PROCEDURE — 3044F HG A1C LEVEL LT 7.0%: CPT | Mod: S$GLB,,, | Performed by: NURSE PRACTITIONER

## 2020-08-27 RX ORDER — DEXTROSE 4 G
1 TABLET,CHEWABLE ORAL DAILY
Qty: 1 EACH | Refills: 0 | Status: SHIPPED | OUTPATIENT
Start: 2020-08-27 | End: 2023-08-29

## 2020-08-27 RX ORDER — LANCETS 33 GAUGE
1 EACH MISCELLANEOUS DAILY
Qty: 100 EACH | Refills: 3 | Status: SHIPPED | OUTPATIENT
Start: 2020-08-27

## 2020-08-27 NOTE — PROGRESS NOTES
Subjective:       Patient ID: Kemi Dominguez is a 55 y.o. female.    Chief Complaint: Diabetes (lab review), Hypertension, and Hyperlipidemia    Patient presents today following up for chronic conditions including hypertension, hyperlipidemia, diabetes, and depressed mood.   Patient is stable on her current medications.    Patient has lost 35 lbs since her last visit 3 months ago.  Patient's labs have significantly improved with the weight loss and medication regimen.  Patient has made significant diet improvements with meal prepping and exercise to help achieve these goals.  Patient is obese with a BMI of 34.38      Diabetes  She presents for her follow-up diabetic visit. She has type 2 diabetes mellitus. No MedicAlert identification noted. Her disease course has been stable. Hypoglycemia symptoms include headaches. Pertinent negatives for hypoglycemia include no confusion, dizziness, hunger, mood changes, nervousness/anxiousness, pallor, seizures, sleepiness, speech difficulty, sweats or tremors. Pertinent negatives for diabetes include no blurred vision, no chest pain, no fatigue, no foot paresthesias, no foot ulcerations, no polydipsia, no polyphagia, no polyuria, no visual change, no weakness and no weight loss. There are no hypoglycemic complications. Pertinent negatives for hypoglycemia complications include no blackouts, no hospitalization, no nocturnal hypoglycemia, no required assistance and no required glucagon injection. Symptoms are worsening. There are no diabetic complications. Pertinent negatives for diabetic complications include no autonomic neuropathy, CVA, heart disease, impotence, nephropathy, peripheral neuropathy, PVD or retinopathy. Risk factors for coronary artery disease include hypertension, stress, sedentary lifestyle, obesity, dyslipidemia and diabetes mellitus. Current diabetic treatment includes oral agent (triple therapy) (metformin, janumet, jardiance). She is compliant with  treatment most of the time. Her weight is stable. She is following a generally healthy diet. Meal planning includes avoidance of concentrated sweets. She has not had a previous visit with a dietitian. She participates in exercise intermittently. There is no compliance with monitoring of blood glucose. Her home blood glucose trend is decreasing steadily. An ACE inhibitor/angiotensin II receptor blocker is being taken. She does not see a podiatrist.Eye exam is not current.   Depression  Visit Type: follow-up  Patient presents with the following symptoms: anhedonia and excessive worry.  Patient is not experiencing: confusion, impotence, nervousness/anxiety, palpitations, suicidal ideas, suicidal planning, thoughts of death and weight loss.  Frequency of symptoms: most days   Severity: mild   Sleep quality: fair  Nighttime awakenings: occasional    Hypertension  This is a chronic problem. The current episode started more than 1 month ago. The problem has been waxing and waning since onset. The problem is controlled. Associated symptoms include anxiety, headaches and malaise/fatigue. Pertinent negatives include no blurred vision, chest pain, palpitations or sweats. Agents associated with hypertension include estrogens. Risk factors for coronary artery disease include smoking/tobacco exposure, sedentary lifestyle, obesity, diabetes mellitus, dyslipidemia and stress. Past treatments include calcium channel blockers, angiotensin blockers and direct vasodilators. The current treatment provides significant improvement. Compliance problems include diet and exercise.  There is no history of angina, CVA, PVD or retinopathy. There is no history of chronic renal disease.   Headache   This is a recurrent problem. The current episode started more than 1 month ago. The problem occurs intermittently. The problem has been waxing and waning. The pain is located in the left unilateral region. The pain does not radiate. The pain quality  is similar to prior headaches. The quality of the pain is described as pulsating. The pain is moderate. Associated symptoms include back pain (chronic), phonophobia and photophobia. Pertinent negatives include no blurred vision, dizziness, eye pain, hearing loss, rhinorrhea, seizures, sinus pressure, visual change, weakness or weight loss. The symptoms are aggravated by activity, bright light and caffeine withdrawal. She has tried NSAIDs (bc powders) for the symptoms. The treatment provided significant relief. There is no history of recent head traumas.     Review of Systems   Constitutional: Positive for malaise/fatigue. Negative for activity change, appetite change, fatigue, unexpected weight change and weight loss.        Obesity   HENT: Negative for ear discharge, hearing loss, rhinorrhea, sinus pressure, trouble swallowing and voice change.    Eyes: Positive for photophobia. Negative for blurred vision, pain, discharge and visual disturbance.   Respiratory: Negative for chest tightness and wheezing.         COPD, former smoker   Cardiovascular: Negative for chest pain and palpitations.        Hypertension, hyperlipidemia   Gastrointestinal: Negative for blood in stool, constipation and diarrhea.   Endocrine: Negative for cold intolerance, heat intolerance, polydipsia, polyphagia and polyuria.        Diabetes type 2   Genitourinary: Negative for difficulty urinating, dysuria, hematuria, impotence and menstrual problem.   Musculoskeletal: Positive for back pain (chronic). Negative for gait problem.   Skin: Negative for color change and pallor.   Allergic/Immunologic: Negative for immunocompromised state.   Neurological: Positive for headaches. Negative for dizziness, tremors, seizures, speech difficulty and weakness.   Psychiatric/Behavioral: Positive for depression and dysphoric mood (controlled). Negative for confusion, self-injury and suicidal ideas. The patient is not nervous/anxious.        Past Medical  History:   Diagnosis Date    Class 3 severe obesity due to excess calories with serious comorbidity and body mass index (BMI) of 40.0 to 44.9 in adult 10/17/2018    COPD (chronic obstructive pulmonary disease)     Diabetes mellitus, type 2     GERD (gastroesophageal reflux disease)     Hyperlipidemia     Hypertension     Vitamin D deficiency       Past Surgical History:   Procedure Laterality Date    ADENOIDECTOMY      BREAST SURGERY Right     breast biopsy    TONSILLECTOMY      TRANSFORAMINAL EPIDURAL INJECTION OF STEROID Left 2019    Procedure: Injection,steroid,epidural,transforaminal approach;  Surgeon: Gilberto Michelle MD;  Location: [facility] OR;  Service: Pain Management;  Laterality: Left;  L5-S1, S1    TRANSFORAMINAL EPIDURAL INJECTION OF STEROID Left 2019    Procedure: Injection,steroid,epidural,transforaminal approach;  Surgeon: Gilberto Michelle MD;  Location: [facility] OR;  Service: Pain Management;  Laterality: Left;  L5-S1, S1       Family History   Problem Relation Age of Onset    Stroke Mother     Emphysema Mother     Diabetes Father     Coronary artery disease Father     COPD Father     Diabetes Sister        Social History     Socioeconomic History    Marital status: Significant Other     Spouse name: Not on file    Number of children: Not on file    Years of education: Not on file    Highest education level: Not on file   Occupational History    Not on file   Social Needs    Financial resource strain: Not hard at all    Food insecurity     Worry: Never true     Inability: Never true    Transportation needs     Medical: No     Non-medical: No   Tobacco Use    Smoking status: Former Smoker     Packs/day: 2.00     Types: Cigarettes     Quit date: 2019     Years since quittin.5    Smokeless tobacco: Never Used   Substance and Sexual Activity    Alcohol use: Yes     Frequency: Monthly or less     Drinks per session: 1 or 2     Binge frequency: Never     Comment: rarely     Drug use: Yes     Types: Hydrocodone    Sexual activity: Never   Lifestyle    Physical activity     Days per week: 0 days     Minutes per session: Not on file    Stress: To some extent   Relationships    Social connections     Talks on phone: More than three times a week     Gets together: Once a week     Attends Hoahaoism service: Not on file     Active member of club or organization: No     Attends meetings of clubs or organizations: Never     Relationship status: Living with partner   Other Topics Concern    Not on file   Social History Narrative    Not on file       Current Outpatient Medications   Medication Sig Dispense Refill    albuterol-ipratropium (DUO-NEB) 2.5 mg-0.5 mg/3 mL nebulizer solution Take 3 mLs by nebulization every 6 (six) hours as needed for Wheezing. Rescue 1 Box 2    amLODIPine (NORVASC) 10 MG tablet TAKE 1 TABLET BY MOUTH EVERY DAY 90 tablet 1    aspirin/salicylamide/caffeine (BC HEADACHE POWDER ORAL) Take by mouth as needed.      azelastine (ASTELIN) 137 mcg (0.1 %) nasal spray 1 spray (137 mcg total) by Nasal route 2 (two) times daily. 30 mL 2    buPROPion (WELLBUTRIN XL) 300 MG 24 hr tablet Take 1 tablet (300 mg total) by mouth once daily. 90 tablet 1    clotrimazole-betamethasone 1-0.05% (LOTRISONE) cream Apply topically as needed.      COMBIVENT RESPIMAT  mcg/actuation inhaler TAKE 2 PUFFS BY MOUTH EVERY 6 HOURS AS NEEDED FOR WHEEZE 4 g 3    fluticasone propionate (FLONASE) 50 mcg/actuation nasal spray USE 1 SPRAY IN EACH NOSTRIL TWICE DAILY 16 mL 2    fluticasone-salmeterol diskus inhaler 500-50 mcg Inhale 1 puff into the lungs 2 (two) times daily. Controller 60 each 5    JANUMET -1,000 mg TM24 TAKE 1 TABLET BY MOUTH EVERY DAY 90 tablet 1    JARDIANCE 25 mg tablet TAKE 1 TABLET BY MOUTH EVERY DAY 90 tablet 1    melatonin 5 mg Tab Take 10 mg by mouth nightly.       metFORMIN (GLUCOPHAGE-XR) 500 MG XR 24hr tablet TAKE TWO TABLETS BY MOUTH DAILY WITH DINNER  "OR EVENING MEAL 180 tablet 1    montelukast (SINGULAIR) 10 mg tablet TAKE 1 TABLET BY MOUTH EVERY DAY 90 tablet 1    pantoprazole (PROTONIX) 40 MG tablet TAKE 1 TABLET BY MOUTH EVERY DAY 90 tablet 1    rosuvastatin (CRESTOR) 20 MG tablet TAKE 1 TABLET BY MOUTH EVERY DAY 90 tablet 1    tiZANidine 4 mg Cap Take 1 capsule by mouth 3 (three) times daily as needed.       valsartan-hydrochlorothiazide (DIOVAN-HCT) 160-25 mg per tablet TAKE 1 TABLET BY MOUTH EVERY DAY 90 tablet 1    blood sugar diagnostic Strp 1 each by Misc.(Non-Drug; Combo Route) route once daily. 100 each 2    blood-glucose meter Misc 1 each by Misc.(Non-Drug; Combo Route) route once daily. 1 each 0    ibuprofen (ADVIL,MOTRIN) 800 MG tablet Take 800 mg by mouth 3 (three) times daily.       lancets 33 gauge Misc 1 lancet by Misc.(Non-Drug; Combo Route) route once daily. 100 each 3     No current facility-administered medications for this visit.        Review of patient's allergies indicates:  No Known Allergies  Objective:      Blood pressure 110/70, pulse 73, temperature 97.5 °F (36.4 °C), height 5' 6" (1.676 m), weight 96.6 kg (213 lb), SpO2 97 %. Body mass index is 34.38 kg/m².   Physical Exam  Vitals signs and nursing note reviewed.   Constitutional:       General: She is not in acute distress.     Appearance: Normal appearance. She is well-developed. She is obese. She is not ill-appearing.      Comments: obesity   HENT:      Head: Normocephalic and atraumatic.      Right Ear: Tympanic membrane and external ear normal.      Left Ear: Tympanic membrane and external ear normal.      Nose: Nose normal.      Mouth/Throat:      Mouth: Mucous membranes are moist.   Eyes:      General: Lids are normal. Lids are everted, no foreign bodies appreciated.      Conjunctiva/sclera: Conjunctivae normal.      Pupils: Pupils are equal, round, and reactive to light.      Right eye: Pupil is round and reactive.      Left eye: Pupil is round and reactive. "   Neck:      Musculoskeletal: Normal range of motion and neck supple.      Trachea: Trachea normal.   Cardiovascular:      Rate and Rhythm: Normal rate and regular rhythm.      Heart sounds: Normal heart sounds, S1 normal and S2 normal. No murmur.   Pulmonary:      Effort: Pulmonary effort is normal. No respiratory distress.      Breath sounds: Normal breath sounds. No stridor. No decreased breath sounds, wheezing, rhonchi or rales.   Abdominal:      General: Bowel sounds are normal.      Palpations: Abdomen is soft. Abdomen is not rigid. There is no mass.      Tenderness: There is no guarding.   Musculoskeletal: Normal range of motion.   Lymphadenopathy:      Cervical: No cervical adenopathy.   Skin:     General: Skin is warm and dry.      Capillary Refill: Capillary refill takes less than 2 seconds.   Neurological:      General: No focal deficit present.      Mental Status: She is alert and oriented to person, place, and time. Mental status is at baseline.   Psychiatric:         Attention and Perception: Attention normal.         Mood and Affect: Mood is depressed (controlled).         Speech: Speech normal.         Behavior: Behavior normal. Behavior is cooperative.         Thought Content: Thought content normal. Thought content does not include homicidal or suicidal ideation. Thought content does not include homicidal or suicidal plan.         Judgment: Judgment normal.             Assessment:       1. Type 2 diabetes mellitus with complication, without long-term current use of insulin    2. Depressed mood    3. Essential hypertension    4. Mixed hyperlipidemia    5. Vitamin D deficiency    6. Gastroesophageal reflux disease, esophagitis presence not specified    7. Need for influenza vaccination    8. Class 1 obesity due to excess calories with serious comorbidity and body mass index (BMI) of 34.0 to 34.9 in adult        Plan:       Kemi was seen today for diabetes, hypertension and  hyperlipidemia.    Diagnoses and all orders for this visit:    Type 2 diabetes mellitus with complication, without long-term current use of insulin  -     blood-glucose meter Misc; 1 each by Misc.(Non-Drug; Combo Route) route once daily.  -     blood sugar diagnostic Strp; 1 each by Misc.(Non-Drug; Combo Route) route once daily.  -     lancets 33 gauge Misc; 1 lancet by Misc.(Non-Drug; Combo Route) route once daily.  -     Hemoglobin A1C; Future  -     Microalbumin/creatinine urine ratio; Future  -     Comprehensive metabolic panel; Future  -     Hemoglobin A1C  -     Microalbumin/creatinine urine ratio  -     Comprehensive metabolic panel     Discussed the following complications of DM Type 2: CAD, CVD, Retinopathy, Nephropathy, Neuropathy.    Discussed Target A1C Goal <7.0% and Target fasting blood sugars () before each meal.    Discussed Lifestyle Modifications: Low glycemic index diet, Exercise (30-45 min) daily, Weight loss, and Smoking cessation     Depressed mood  Stable  Continue medication.    Essential hypertension  -     CBC auto differential; Future  -     Comprehensive metabolic panel; Future  -     Urinalysis; Future  -     CBC auto differential  -     Comprehensive metabolic panel  -     Urinalysis  Controlled,  Lifestyle changes: Reduce the amount of salt in your diet; Lose weight; Avoid drinking too much alcohol; Exercise at least 30 minutes per day most days of the week.  Continue current medications and home BP monitoring.     Mixed hyperlipidemia  -     Lipid Panel; Future  -     Lipid Panel  Stable and controlled. Continue current medication.  Limit red meat, butter, fried foods, cheese, and other foods that have a lot of saturated fat. Consume more: lean meats, fish, fruits, vegetables, whole grains, beans, lentils, and nuts.  Weight loss, and 30-45 min of cardiovascular exercise daily.     Vitamin D deficiency  -     Vitamin D; Future  -     Vitamin D  Stop weekly vitamin d.  Vitamin d  is too high.   Stop for 1 week and then resume vitamin d 2000 units OTC once daily.    Gastroesophageal reflux disease, esophagitis presence not specified  Stable.  Continue current medication.    Need for influenza vaccination  -     Influenza - Quadrivalent (PF)  Completed.    Class 1 obesity due to excess calories with serious comorbidity and body mass index (BMI) of 34.0 to 34.9 in adult  The patient is asked to make an attempt to improve diet and exercise patterns to aid in medical management of this problem.      Follow up in 3 months for annual physical.  Follow up in 6 months for chronic conditions with labs.

## 2020-08-27 NOTE — PATIENT INSTRUCTIONS
Taking Your Blood Pressure  Blood pressure is the force of blood against the artery wall as it moves from the heart through the blood vessels. You can take your own blood pressure reading using a digital monitor. Take your readings the same each time, using the same arm. Take readings as often as your healthcare provider instructs.  About blood pressure monitors  Blood pressure monitors are designed for certain ages and cases. You can find monitors for older adults, for pregnant women, and for children. Make sure the one you choose is the right one for your age and situation.  The American Heart Association recommends an automatic cuff monitor that fits on your upper arm (bicep). The cuff should fit your arm size. A cuff thats too large or too small will not give an accurate reading. Measure around your upper arm to find your size.  Monitors that attach to your finger or wrist are not as accurate as monitors for your upper arm.  Ask your healthcare provider for help in choosing a monitor. Bring your monitor to your next provider visit if you need help in using it the correct way.  The steps below are general instructions for using an automatic digital monitor.  Step 1. Relax    · Take your blood pressure at the same time every day, such as in the morning or evening, or at the time your healthcare provider recommends.  · Wait at least a half-hour after smoking, eating, or exercising. Don't drink coffee, tea, soda, or other caffeinated beverages before checking your blood pressure.  · Sit comfortably at a table with both feet on the floor. Do not cross your legs or feet. Place the monitor near you.  · Rest for a few minutes before you begin.  Step 2. Wrap the cuff    · Place your arm on the table, palm up. Your arm should be at the level of your heart. Wrap the cuff around your upper arm, just above your elbow. Its best done on bare skin, not over clothing. Most cuffs will indicate where the brachial artery (the  blood vessel in the middle of the arm at the inner side of the elbow) should line up with the cuff. Look in your monitor's instruction booklet for an illustration. You can also bring your cuff to your healthcare provider and have them show you how to correctly place the cuff.  Step 3. Inflate the cuff    · Push the button that starts the pump.  · The cuff will tighten, then loosen.  · The numbers will change. When they stop changing, your blood pressure reading will appear.  · Take 2 or 3 readings one minute apart.  Step 4. Write down the results of each reading    · Write down your blood pressure numbers for each reading. Note the date and time. Keep your results in one place, such as a notebook. Even if your monitor has a built-in memory, keep a hard copy of the readings.  · Remove the cuff from your arm. Turn off the machine.  · Bring your blood pressure records with your healthcare providers at each visit.  · If you start a new blood pressure medicine, note the day you started the new medicine. Also note the day if you change the dose of your medicine. This information goes on your blood pressure recording sheet. This will help your healthcare provider monitor how well the medicine changes are working.  · Ask your healthcare provider what numbers should prompt you to call him or her. Also ask what numbers should prompt you to get help right away.  Date Last Reviewed: 11/1/2016 © 2000-2017 [employer]. [address] [place] [address]. All rights reserved. This information is not intended as a substitute for professional medical care. Always follow your healthcare professional's instructions.        Understanding Your Cholesterol Numbers  The higher your blood cholesterol, the greater your risk for heart attack (acute myocardial infarction), cardiovascular disease, or stroke. High cholesterol can cause any artery in your body to become narrow. Thats why you need to know your cholesterol  level. If its high, you can take steps to bring it down. Eating the right foods and getting enough exercise can help. Some people also need medicine to control their cholesterol. Your healthcare provider can help you get started on a plan to control your cholesterol.        Blood flows easily when arteries are clear.      Less blood flows when cholesterol builds up in artery walls.   Checking your cholesterol  Your cholesterol is checked with a simple blood test. The results tell you how much cholesterol you have in your blood. Get checked as often as your healthcare provider suggests. If you have diabetes or high cholesterol, you may need your blood tested as often as every 3 months. As you work to lower your cholesterol, your numbers will change slowly. But they will change. Be patient and stay on track. Discuss your numbers with your healthcare provider.   Your total cholesterol number  A blood test will give you a number for the total amount of cholesterol in your blood. The higher this number, the more likely it is that cholesterol will build up in your blood vessels. Even if your cholesterol is just slightly high, you are at increased risk for health problems.  My total cholesterol is: ________________  Your lipid numbers  Total cholesterol is just one part of the story. Cholesterol is made up of different kinds of fats (lipids). If your total cholesterol is high, knowing your lipid profile is important. The 2 most important lipids are HDL and LDL. Lipids are checked after you have fasted. This means you dont eat for a certain amount of time before the test is done. Along with cholesterol, triglyceride can also lead to blocked arteries. Triglycerides are another type of fat. Knowing your HDL, LDL, and triglyceride numbers, as well as your total cholesterol gives you a more complete picture of your cholesterol level:  · HDL is called the good cholesterol. It moves out of the bloodstream and does not block  your blood vessels. HDL levels are affected by how much you exercise and what you eat.My HDL cholesterol is: ________________  · LDL is called the bad cholesterol. This is because it can stick to your artery walls and block blood flow. LDL levels are most affected by what you eat and by your genes.My LDL cholesterol is: ________________  · Triglyceride is a type of fat the body uses to store energy. Too much triglyceride can increase your risk for heart disease. Triglyceride levels should be under 150.My triglyceride is:  ________________  Based on your other health issues and risk factors, your healthcare provider may have specific goals for treating your cholesterol. You may need to use different kinds of medicines that work on the different types of cholesterol. Work with your provider to know what your goals of treatment are. Stick with your treatment plan to reach the goals you set.  High-risk groups  Some people may need to take medicines called statins to control their cholesterol. This is in addition to eating a healthy diet and getting regular exercise.  Statins can help you stay healthy. They can also help prevent a heart attack or stroke. You may need to take a statin if you are in one of these groups:  · Adults who have had a heart attack or stroke. Or adults who have had peripheral vascular disease, a ministroke (transient ischemic attack), or stable or unstable angina. This group also includes people who have had a procedure to restore blood flow through a blocked artery. These procedures include percutaneous coronary intervention, angioplasty, stent, and open-heart bypass surgery.  · Adults who have diabetes. Or adults who are at higher risk of having a heart attack or stroke and have an LDL cholesterol level of 70 to 189 mg/dL  · Adults who are 21 years old or older and have an LDL cholesterol level of 190 mg/dL or higher.  If you are in a high-risk group, talk with your healthcare provider about  your treatment goals. Make sure you understand why these goals are important, based on your own health history and your family history of heart disease or high cholesterol.  Make a plan to have regular cholesterol checks. You may need to fast before getting this test. Also ask your provider about any side effects your medicines may cause. Let your provider know about any side effects you have. You may need to take more than one medicine to reach the cholesterol goals that you and your provider decide on.  Date Last Reviewed: 10/1/2016  © 3089-2478 [employer]. [address] [place] [address]. All rights reserved. This information is not intended as a substitute for professional medical care. Always follow your healthcare professional's instructions.        Diet: Diabetes  Food is an important tool that you can use to control diabetes and stay healthy. Eating well-balanced meals in the correct amounts will help you control your blood glucose levels and prevent low blood sugar reactions. It will also help you reduce the health risks of diabetes. There is no one specific diet that is right for everyone with diabetes. But there are general guidelines to follow. A registered dietitian (RD) will create a tailored diet approach thats just right for you. He or she will also help you plan healthy meals and snacks. If you have any questions, call your dietitian for advice.     Guidelines for success  Talk with your healthcare provider before starting a diabetes diet or weight loss program. If you haven't talked with a dietitian yet, ask your provider for a referral. The following guidelines can help you succeed:  · Select foods from the 6 food groups below. Your dietitian will help you find food choices within each group. He or she will also show you serving sizes and how many servings you can have at each meal.  ¨ Grains, beans, and starchy vegetables  ¨ Vegetables  ¨ Fruit  ¨ Milk or yogurt  ¨ Meat,  poultry, fish, or tofu  ¨ Healthy fats  · Check your blood sugar levels as directed by your provider. Take any medicine as prescribed by your provider.  · Learn to read food labels and pick the right portion sizes.  · Eat only the amount of food in your meal plan. Eat about the same amount of food at regular times each day. Dont skip meals. Eat meals 4 to 5 hours apart, with snacks in between.  · Limit alcohol. It raises blood sugar levels. Drink water or calorie-free diet drinks that use safe sweeteners.  · Eat less fat to help lower your risk of heart disease. Use nonfat or low-fat dairy products and lean meats. Avoid fried foods. Use cooking oils that are unsaturated, such as olive, canola, or peanut oil.  · Talk with your dietitian about safe sugar substitutes.  · Avoid added salt. It can contribute to high blood pressure, which can cause heart disease. People with diabetes already have a risk of high blood pressure and heart disease.  · Stay at a healthy weight. If you need to lose weight, cut down on your portion sizes. But dont skip meals. Exercise is an important part of any weight management program. Talk with your provider about an exercise program thats right for you.  · For more information about the best diet plan for you, talk with a registered dietitian (RD). To find an RD in your area, contact:  ¨ Academy of Nutrition and Dietetics www.eatright.org  ¨ The American Diabetes Association 862-486-0856 www.diabetes.org  Date Last Reviewed: 8/1/2016 © 2000-2017 [employer]. [address] [place] [address], [place] [address]. All rights reserved. This information is not intended as a substitute for professional medical care. Always follow your healthcare professional's instructions.

## 2020-11-18 ENCOUNTER — OFFICE VISIT (OUTPATIENT)
Dept: FAMILY MEDICINE | Facility: CLINIC | Age: 55
End: 2020-11-18
Payer: COMMERCIAL

## 2020-11-18 ENCOUNTER — PATIENT MESSAGE (OUTPATIENT)
Dept: FAMILY MEDICINE | Facility: CLINIC | Age: 55
End: 2020-11-18

## 2020-11-18 VITALS
HEART RATE: 83 BPM | WEIGHT: 201 LBS | DIASTOLIC BLOOD PRESSURE: 66 MMHG | HEIGHT: 66 IN | TEMPERATURE: 98 F | OXYGEN SATURATION: 99 % | SYSTOLIC BLOOD PRESSURE: 112 MMHG | BODY MASS INDEX: 32.3 KG/M2

## 2020-11-18 DIAGNOSIS — E66.09 CLASS 1 OBESITY DUE TO EXCESS CALORIES WITH SERIOUS COMORBIDITY AND BODY MASS INDEX (BMI) OF 32.0 TO 32.9 IN ADULT: ICD-10-CM

## 2020-11-18 DIAGNOSIS — Z01.419 WELL FEMALE EXAM WITH ROUTINE GYNECOLOGICAL EXAM: ICD-10-CM

## 2020-11-18 DIAGNOSIS — Z11.4 ENCOUNTER FOR SCREENING FOR HIV: ICD-10-CM

## 2020-11-18 DIAGNOSIS — Z00.00 ANNUAL PHYSICAL EXAM: Primary | ICD-10-CM

## 2020-11-18 DIAGNOSIS — Z12.11 COLON CANCER SCREENING: ICD-10-CM

## 2020-11-18 DIAGNOSIS — Z11.59 NEED FOR HEPATITIS C SCREENING TEST: ICD-10-CM

## 2020-11-18 PROCEDURE — 3074F PR MOST RECENT SYSTOLIC BLOOD PRESSURE < 130 MM HG: ICD-10-PCS | Mod: S$GLB,,, | Performed by: NURSE PRACTITIONER

## 2020-11-18 PROCEDURE — 3078F PR MOST RECENT DIASTOLIC BLOOD PRESSURE < 80 MM HG: ICD-10-PCS | Mod: S$GLB,,, | Performed by: NURSE PRACTITIONER

## 2020-11-18 PROCEDURE — 3074F SYST BP LT 130 MM HG: CPT | Mod: S$GLB,,, | Performed by: NURSE PRACTITIONER

## 2020-11-18 PROCEDURE — 99396 PREV VISIT EST AGE 40-64: CPT | Mod: S$GLB,,, | Performed by: NURSE PRACTITIONER

## 2020-11-18 PROCEDURE — 1126F PR PAIN SEVERITY QUANTIFIED, NO PAIN PRESENT: ICD-10-PCS | Mod: S$GLB,,, | Performed by: NURSE PRACTITIONER

## 2020-11-18 PROCEDURE — 3008F PR BODY MASS INDEX (BMI) DOCUMENTED: ICD-10-PCS | Mod: S$GLB,,, | Performed by: NURSE PRACTITIONER

## 2020-11-18 PROCEDURE — 3078F DIAST BP <80 MM HG: CPT | Mod: S$GLB,,, | Performed by: NURSE PRACTITIONER

## 2020-11-18 PROCEDURE — 99396 PR PREVENTIVE VISIT,EST,40-64: ICD-10-PCS | Mod: S$GLB,,, | Performed by: NURSE PRACTITIONER

## 2020-11-18 PROCEDURE — 3008F BODY MASS INDEX DOCD: CPT | Mod: S$GLB,,, | Performed by: NURSE PRACTITIONER

## 2020-11-18 PROCEDURE — 1126F AMNT PAIN NOTED NONE PRSNT: CPT | Mod: S$GLB,,, | Performed by: NURSE PRACTITIONER

## 2020-11-18 NOTE — PATIENT INSTRUCTIONS
Prevention Guidelines, Women Ages 50 to 64  Screening tests and vaccines are an important part of managing your health. Health counseling is essential, too. Below are guidelines for these, for women ages 50 to 64. Talk with your healthcare provider to make sure youre up to date on what you need.  Screening Who needs it How often   Type 2 diabetes or prediabetes All adults beginning at age 45 and adults without symptoms at any age who are overweight or obese and have 1 or more additional risk factors for diabetes. At  least every 3 years   Alcohol misuse All women in this age group At routine exams   Blood pressure All women in this age group Every 2 years if your blood pressure is less than 120/80 mm Hg; yearly if your systolic blood pressure is 120 to 139 mm Hg, or your diastolic blood pressure reading is 80 to 89 mm Hg   Breast cancer All women in this age group Yearly mammogram and clinical breast exam1   Cervical cancer All women in this age group, except women who have had a complete hysterectomy Pap test every 3 years or Pap test with human papillomavirus (HPV) test every 5 years   Chlamydia Women at increased risk for infection At routine exams   Colorectal cancer All women in this age group Flexible sigmoidoscopy every 5 years, or colonoscopy every 10 years, or double-contrast barium enema every 5 years; yearly fecal occult blood test or fecal immunochemical test; or a stool DNA test as often as your health care provider advises; talk with your health care provider about which tests are best for you   Depression All women in this age group At routine exams   Gonorrhea Sexually active women at increased risk for infection At routine exams   Hepatitis C Anyone at increased risk; 1 time for those born between 1945 and 1965 At routine exams   High cholesterol or triglycerides All women in this age group who are at risk for coronary artery disease At least every 5 years   HIV All women At routine exams   Lung  cancer Adults age 55 to 80 who have smoked Yearly screening in smokers with 30 pack-year history of smoking or who quit within 15 years   Obesity All women in this age group At routine exams   Osteoporosis Women who are postmenopausal Ask your healthcare provider   Syphilis Women at increased risk for infection - talk with your healthcare provider At routine exams   Tuberculosis Women at increased risk for infection - talk with your healthcare provider Ask your healthcare provider   Vision All women in this age group Ask your healthcare provider   Vaccine Who needs it How often   Chickenpox (varicella) All women in this age group who have no record of this infection or vaccine 2 doses; the second dose should be given at least 4 weeks after the first dose   Hepatitis A Women at increased risk for infection - talk with your healthcare provider 2 doses given at least 6 months apart   Hepatitis B Women at increased risk for infection - talk with your healthcare provider 3 doses over 6 months; second dose should be given 1 month after the first dose; the third dose should be given at least 2 months after the second dose and at least 4 months after the first dose   Haemophilus influenzaeType B (HIB) Women at increased risk for infection - talk with your healthcare provider 1 to 3 doses   Influenza (flu) All women in this age group Once a year   Measles, mumps, rubella (MMR) Women in this age group through their late 50s who have no record of these infections or vaccines 1 dose   Meningococcal Women at increased risk for infection - talk with your healthcare provider 1 or more doses   Pneumococcal conjugate vaccine (PCV13) and pneumococcal polysaccharide vaccine (PPSV23) Women at increased risk for infection - talk with your healthcare provider PCV13: 1 dose ages 19 to 65 (protects against 13 types of pneumococcal bacteria)  PPSV23: 1 to 2 doses through age 64, or 1 dose at 65 or older (protects against 23 types of  pneumococcal bacteria)   Tetanus/diphtheria/pertussis (Td/Tdap) booster All women in this age group Td every 10 years, or a one-time dose of Tdap instead of a Td booster after age 18, then Td every 10 years   Zoster All women ages 60 and older 1 dose   Counseling Who needs it How often   BRCA gene mutation testing for breast and ovarian cancer susceptibility Women with increased risk for having gene mutation When your risk is known   Breast cancer and chemoprevention Women at high risk for breast cancer When your risk is known   Diet and exercise Women who are overweight or obese When diagnosed, and then at routine exams   Sexually transmitted infection prevention Women at increased risk for infection - talk with your healthcare provider At routine exams   Use of daily aspirin Women ages 55 and up in this age group who are at risk for cardiovascular health problems such as stroke When your risk is known   Use of tobacco and the health effects it can cause All women in this age group Every exam   1American Cancer Society  Date Last Reviewed: 1/26/2016  © 1284-9489 [employer]. [address] [place] [address], [place] [address]. All rights reserved. This information is not intended as a substitute for professional medical care. Always follow your healthcare professional's instructions.

## 2020-11-18 NOTE — PROGRESS NOTES
HPI: Kemi Dominguez  is a 55 y.o. female who presents for annual physical .  No complaints at this time.     Review of Systems   Constitutional: Negative for activity change, appetite change and fever.        Obesity   HENT: Negative for congestion, ear discharge, ear pain, sore throat, trouble swallowing and voice change.    Eyes: Negative for photophobia, pain, discharge and visual disturbance.   Respiratory: Negative for cough, chest tightness and shortness of breath.    Cardiovascular: Negative for chest pain and palpitations.   Gastrointestinal: Negative for abdominal pain, nausea and vomiting.   Endocrine: Negative for cold intolerance and heat intolerance.   Genitourinary: Negative for difficulty urinating and dysuria.   Musculoskeletal: Negative for arthralgias and gait problem.   Skin: Negative for rash.   Allergic/Immunologic: Negative for immunocompromised state.   Neurological: Negative for speech difficulty and headaches.   Psychiatric/Behavioral: Negative for confusion, self-injury and suicidal ideas.     Review of patient's allergies indicates:  No Known Allergies  Past Medical History:   Diagnosis Date    Class 3 severe obesity due to excess calories with serious comorbidity and body mass index (BMI) of 40.0 to 44.9 in adult 10/17/2018    COPD (chronic obstructive pulmonary disease)     Diabetes mellitus, type 2     GERD (gastroesophageal reflux disease)     Hyperlipidemia     Hypertension     Vitamin D deficiency      Past Surgical History:   Procedure Laterality Date    ADENOIDECTOMY      BREAST SURGERY Right     breast biopsy    TONSILLECTOMY      TRANSFORAMINAL EPIDURAL INJECTION OF STEROID Left 4/16/2019    Procedure: Injection,steroid,epidural,transforaminal approach;  Surgeon: Gilberto Michelle MD;  Location: [facility];  Service: Pain Management;  Laterality: Left;  L5-S1, S1    TRANSFORAMINAL EPIDURAL INJECTION OF STEROID Left 8/23/2019    Procedure:  Injection,steroid,epidural,transforaminal approach;  Surgeon: Gilberto Michelle MD;  Location: [facility] OR;  Service: Pain Management;  Laterality: Left;  L5-S1, S1     Family History   Problem Relation Age of Onset    Stroke Mother     Emphysema Mother     Diabetes Father     Coronary artery disease Father     COPD Father     Diabetes Sister      Social History     Tobacco Use    Smoking status: Former Smoker     Packs/day: 2.00     Types: Cigarettes     Quit date: 2019     Years since quittin.7    Smokeless tobacco: Never Used   Substance Use Topics    Alcohol use: Yes     Frequency: Monthly or less     Drinks per session: 1 or 2     Binge frequency: Never     Comment: rarely    Drug use: Yes     Types: Hydrocodone      Health Maintenance Topics with due status: Not Due       Topic Last Completion Date    TETANUS VACCINE 10/17/2018    Mammogram 2019    Lipid Panel 2020    Hemoglobin A1c 2020    Low Dose Statin 2020     Immunization History   Administered Date(s) Administered    Influenza 2019    Influenza - Quadrivalent - PF *Preferred* (6 months and older) 10/17/2018, 2020    Influenza A (H1N1) 2009 Monovalent - IM - PF 2009    Pneumococcal Polysaccharide - 23 Valent 2020    Tdap 10/17/2018    Zoster Recombinant 2020, 2020     OBJECTIVE:      Vitals:    20 1324   BP: 112/66   Pulse: 83   Temp: 97.7 °F (36.5 °C)     Physical Exam  Vitals signs and nursing note reviewed.   Constitutional:       General: She is not in acute distress.     Appearance: Normal appearance. She is well-developed. She is obese. She is not ill-appearing.   HENT:      Head: Normocephalic and atraumatic.      Right Ear: Tympanic membrane, ear canal and external ear normal.      Left Ear: Tympanic membrane, ear canal and external ear normal.      Nose: Nose normal.      Mouth/Throat:      Mouth: Mucous membranes are moist.      Pharynx: Oropharynx is clear.    Eyes:      General: Lids are normal. Lids are everted, no foreign bodies appreciated.      Conjunctiva/sclera: Conjunctivae normal.      Pupils: Pupils are equal, round, and reactive to light.      Right eye: Pupil is round and reactive.      Left eye: Pupil is round and reactive.   Neck:      Musculoskeletal: Normal range of motion and neck supple. No muscular tenderness.      Trachea: Trachea normal.   Cardiovascular:      Rate and Rhythm: Normal rate and regular rhythm.      Pulses: Normal pulses.      Heart sounds: Normal heart sounds, S1 normal and S2 normal.   Pulmonary:      Effort: Pulmonary effort is normal.      Breath sounds: Normal breath sounds.   Abdominal:      General: Abdomen is flat. Bowel sounds are normal.      Palpations: Abdomen is soft. Abdomen is not rigid.      Tenderness: There is no guarding.   Musculoskeletal: Normal range of motion.   Lymphadenopathy:      Cervical: No cervical adenopathy.   Skin:     General: Skin is warm and dry.      Capillary Refill: Capillary refill takes less than 2 seconds.   Neurological:      General: No focal deficit present.      Mental Status: She is alert and oriented to person, place, and time. Mental status is at baseline.      Cranial Nerves: No cranial nerve deficit.   Psychiatric:         Mood and Affect: Mood normal.         Behavior: Behavior normal. Behavior is cooperative.         Thought Content: Thought content normal.         Judgment: Judgment normal.        Assessment:       1. Annual physical exam    2. Colon cancer screening    3. Well female exam with routine gynecological exam    4. Encounter for screening for HIV    5. Need for hepatitis C screening test    6. Class 1 obesity due to excess calories with serious comorbidity and body mass index (BMI) of 32.0 to 32.9 in adult        Plan:       Annual physical exam  Completed.    Colon cancer screening  -     Ambulatory referral/consult to Gastroenterology; Future; Expected date:  11/25/2020    Well female exam with routine gynecological exam  -     Ambulatory referral/consult to Obstetrics / Gynecology; Future; Expected date: 11/25/2020    Encounter for screening for HIV  -     HIV 1/2 Ag/Ab (4th Gen); Future; Expected date: 02/18/2021    Need for hepatitis C screening test  -     Hepatitis C Antibody; Future; Expected date: 02/18/2021    Class 1 obesity due to excess calories with serious comorbidity and body mass index (BMI) of 32.0 to 32.9 in adult  The patient is asked to make an attempt to improve diet and exercise patterns to aid in medical management of this problem.      Patient counseled on age appropriate medical preventative services, age appropriate cancer screenings, nutrition, healthy diet, consistent exercise regimen and maintaining an active lifestyle.      Counseled on age appropriate vaccines and discussed upcoming health care needs based on age/gender.  Spent time with patient counseling on need for a good patient/doctor relationship moving forward.  Discussed use of common OTC medications and supplements.  Discussed common dietary aids and use of caffeine and the need for good sleep hygiene and stress management.    Follow up in about 3 months (around 2/18/2021) for diabetes, cholesterol, htn.      11/18/2020 SE Crenshaw, FNP-C

## 2020-11-19 NOTE — TELEPHONE ENCOUNTER
Please reach out and find out what we need to sent to the gastric group to have patient to have a colonoscopy without a visit prior to the gastro group.

## 2020-11-20 DIAGNOSIS — Z12.31 SCREENING MAMMOGRAM FOR HIGH-RISK PATIENT: Primary | ICD-10-CM

## 2020-11-20 DIAGNOSIS — R92.8 ABNORMAL MAMMOGRAM: Primary | ICD-10-CM

## 2020-12-23 ENCOUNTER — IMMUNIZATION (OUTPATIENT)
Dept: FAMILY MEDICINE | Facility: CLINIC | Age: 55
End: 2020-12-23
Payer: COMMERCIAL

## 2020-12-23 ENCOUNTER — HOSPITAL ENCOUNTER (OUTPATIENT)
Dept: RADIOLOGY | Facility: HOSPITAL | Age: 55
Discharge: HOME | End: 2020-12-23
Attending: NURSE PRACTITIONER
Payer: COMMERCIAL

## 2020-12-23 VITALS — BODY MASS INDEX: 32.31 KG/M2 | HEIGHT: 66 IN | WEIGHT: 201.06 LBS

## 2020-12-23 DIAGNOSIS — R92.8 ABNORMAL MAMMOGRAM: ICD-10-CM

## 2020-12-23 DIAGNOSIS — Z23 NEED FOR VACCINATION: ICD-10-CM

## 2020-12-23 PROCEDURE — 91300 COVID-19, MRNA, LNP-S, PF, 30 MCG/0.3 ML DOSE VACCINE: ICD-10-PCS | Mod: S$GLB,,, | Performed by: INTERNAL MEDICINE

## 2020-12-23 PROCEDURE — 91300 COVID-19, MRNA, LNP-S, PF, 30 MCG/0.3 ML DOSE VACCINE: CPT | Mod: S$GLB,,, | Performed by: INTERNAL MEDICINE

## 2020-12-23 PROCEDURE — 0001A COVID-19, MRNA, LNP-S, PF, 30 MCG/0.3 ML DOSE VACCINE: ICD-10-PCS | Mod: S$GLB,,, | Performed by: INTERNAL MEDICINE

## 2020-12-23 PROCEDURE — 77062 BREAST TOMOSYNTHESIS BI: CPT | Mod: TC,PO

## 2020-12-23 PROCEDURE — 0001A COVID-19, MRNA, LNP-S, PF, 30 MCG/0.3 ML DOSE VACCINE: CPT | Mod: S$GLB,,, | Performed by: INTERNAL MEDICINE

## 2021-01-11 ENCOUNTER — IMMUNIZATION (OUTPATIENT)
Dept: FAMILY MEDICINE | Facility: CLINIC | Age: 56
End: 2021-01-11
Payer: COMMERCIAL

## 2021-01-11 DIAGNOSIS — Z23 NEED FOR VACCINATION: ICD-10-CM

## 2021-01-11 PROCEDURE — 91300 COVID-19, MRNA, LNP-S, PF, 30 MCG/0.3 ML DOSE VACCINE: CPT | Mod: S$GLB,,, | Performed by: INTERNAL MEDICINE

## 2021-01-11 PROCEDURE — 0002A COVID-19, MRNA, LNP-S, PF, 30 MCG/0.3 ML DOSE VACCINE: CPT | Mod: CV19,S$GLB,, | Performed by: INTERNAL MEDICINE

## 2021-01-11 PROCEDURE — 0002A COVID-19, MRNA, LNP-S, PF, 30 MCG/0.3 ML DOSE VACCINE: ICD-10-PCS | Mod: CV19,S$GLB,, | Performed by: INTERNAL MEDICINE

## 2021-01-11 PROCEDURE — 91300 COVID-19, MRNA, LNP-S, PF, 30 MCG/0.3 ML DOSE VACCINE: ICD-10-PCS | Mod: S$GLB,,, | Performed by: INTERNAL MEDICINE

## 2021-03-20 LAB
25(OH)D3 SERPL-MCNC: 47 NG/ML (ref 30–100)
ALBUMIN SERPL-MCNC: 4.3 G/DL (ref 3.6–5.1)
ALBUMIN/CREAT UR: 15 MCG/MG CREAT
ALBUMIN/GLOB SERPL: 1.8 (CALC) (ref 1–2.5)
ALP SERPL-CCNC: 74 U/L (ref 37–153)
ALT SERPL-CCNC: 17 U/L (ref 6–29)
APPEARANCE UR: CLEAR
AST SERPL-CCNC: 15 U/L (ref 10–35)
BASOPHILS # BLD AUTO: 30 CELLS/UL (ref 0–200)
BASOPHILS NFR BLD AUTO: 0.5 %
BILIRUB SERPL-MCNC: 0.4 MG/DL (ref 0.2–1.2)
BILIRUB UR QL STRIP: NEGATIVE
BUN SERPL-MCNC: 19 MG/DL (ref 7–25)
BUN/CREAT SERPL: NORMAL (CALC) (ref 6–22)
CALCIUM SERPL-MCNC: 9.8 MG/DL (ref 8.6–10.4)
CHLORIDE SERPL-SCNC: 104 MMOL/L (ref 98–110)
CHOLEST SERPL-MCNC: 167 MG/DL
CHOLEST/HDLC SERPL: 2.3 (CALC)
CO2 SERPL-SCNC: 30 MMOL/L (ref 20–32)
COLOR UR: YELLOW
CREAT SERPL-MCNC: 0.56 MG/DL (ref 0.5–1.05)
CREAT UR-MCNC: 88 MG/DL (ref 20–275)
EOSINOPHIL # BLD AUTO: 171 CELLS/UL (ref 15–500)
EOSINOPHIL NFR BLD AUTO: 2.9 %
ERYTHROCYTE [DISTWIDTH] IN BLOOD BY AUTOMATED COUNT: 13 % (ref 11–15)
GFRSERPLBLD MDRD-ARVRAT: 106 ML/MIN/1.73M2
GLOBULIN SER CALC-MCNC: 2.4 G/DL (CALC) (ref 1.9–3.7)
GLUCOSE SERPL-MCNC: 83 MG/DL (ref 65–99)
GLUCOSE UR QL STRIP: ABNORMAL
HBA1C MFR BLD: 5.7 % OF TOTAL HGB
HCT VFR BLD AUTO: 41.6 % (ref 35–45)
HDLC SERPL-MCNC: 73 MG/DL
HGB BLD-MCNC: 13.6 G/DL (ref 11.7–15.5)
HGB UR QL STRIP: NEGATIVE
KETONES UR QL STRIP: NEGATIVE
LDLC SERPL CALC-MCNC: 80 MG/DL (CALC)
LEUKOCYTE ESTERASE UR QL STRIP: NEGATIVE
LYMPHOCYTES # BLD AUTO: 1369 CELLS/UL (ref 850–3900)
LYMPHOCYTES NFR BLD AUTO: 23.2 %
MCH RBC QN AUTO: 27.7 PG (ref 27–33)
MCHC RBC AUTO-ENTMCNC: 32.7 G/DL (ref 32–36)
MCV RBC AUTO: 84.7 FL (ref 80–100)
MICROALBUMIN UR-MCNC: 1.3 MG/DL
MONOCYTES # BLD AUTO: 572 CELLS/UL (ref 200–950)
MONOCYTES NFR BLD AUTO: 9.7 %
NEUTROPHILS # BLD AUTO: 3758 CELLS/UL (ref 1500–7800)
NEUTROPHILS NFR BLD AUTO: 63.7 %
NITRITE UR QL STRIP: NEGATIVE
NONHDLC SERPL-MCNC: 94 MG/DL (CALC)
PH UR STRIP: 5.5 [PH] (ref 5–8)
PLATELET # BLD AUTO: 313 THOUSAND/UL (ref 140–400)
PMV BLD REES-ECKER: 9.4 FL (ref 7.5–12.5)
POTASSIUM SERPL-SCNC: 4 MMOL/L (ref 3.5–5.3)
PROT SERPL-MCNC: 6.7 G/DL (ref 6.1–8.1)
PROT UR QL STRIP: NEGATIVE
RBC # BLD AUTO: 4.91 MILLION/UL (ref 3.8–5.1)
SODIUM SERPL-SCNC: 142 MMOL/L (ref 135–146)
SP GR UR STRIP: 1.04 (ref 1–1.03)
TRIGL SERPL-MCNC: 64 MG/DL
WBC # BLD AUTO: 5.9 THOUSAND/UL (ref 3.8–10.8)

## 2021-03-22 LAB
HCV AB S/CO SERPL IA: 0.02
HCV AB SERPL QL IA: NORMAL
HIV 1+2 AB+HIV1 P24 AG SERPL QL IA: NORMAL

## 2021-03-23 ENCOUNTER — OFFICE VISIT (OUTPATIENT)
Dept: FAMILY MEDICINE | Facility: CLINIC | Age: 56
End: 2021-03-23
Payer: COMMERCIAL

## 2021-03-23 DIAGNOSIS — E11.8 TYPE 2 DIABETES MELLITUS WITH COMPLICATION, WITHOUT LONG-TERM CURRENT USE OF INSULIN: ICD-10-CM

## 2021-03-23 DIAGNOSIS — I10 ESSENTIAL HYPERTENSION: Primary | ICD-10-CM

## 2021-03-23 DIAGNOSIS — E66.01 CLASS 2 SEVERE OBESITY DUE TO EXCESS CALORIES WITH SERIOUS COMORBIDITY AND BODY MASS INDEX (BMI) OF 39.0 TO 39.9 IN ADULT: ICD-10-CM

## 2021-03-23 DIAGNOSIS — E78.2 MIXED HYPERLIPIDEMIA: ICD-10-CM

## 2021-03-23 PROCEDURE — 99213 OFFICE O/P EST LOW 20 MIN: CPT | Mod: 95,,, | Performed by: NURSE PRACTITIONER

## 2021-03-23 PROCEDURE — 3044F HG A1C LEVEL LT 7.0%: CPT | Mod: ,,, | Performed by: NURSE PRACTITIONER

## 2021-03-23 PROCEDURE — 99213 PR OFFICE/OUTPT VISIT, EST, LEVL III, 20-29 MIN: ICD-10-PCS | Mod: 95,,, | Performed by: NURSE PRACTITIONER

## 2021-03-23 PROCEDURE — 3044F PR MOST RECENT HEMOGLOBIN A1C LEVEL <7.0%: ICD-10-PCS | Mod: ,,, | Performed by: NURSE PRACTITIONER

## 2021-06-26 LAB
ALBUMIN SERPL-MCNC: 4.3 G/DL (ref 3.6–5.1)
ALBUMIN/GLOB SERPL: 1.9 (CALC) (ref 1–2.5)
ALP SERPL-CCNC: 70 U/L (ref 37–153)
ALT SERPL-CCNC: 17 U/L (ref 6–29)
AST SERPL-CCNC: 13 U/L (ref 10–35)
BILIRUB SERPL-MCNC: 0.5 MG/DL (ref 0.2–1.2)
BUN SERPL-MCNC: 20 MG/DL (ref 7–25)
BUN/CREAT SERPL: NORMAL (CALC) (ref 6–22)
CALCIUM SERPL-MCNC: 9.6 MG/DL (ref 8.6–10.4)
CHLORIDE SERPL-SCNC: 102 MMOL/L (ref 98–110)
CHOLEST SERPL-MCNC: 146 MG/DL
CHOLEST/HDLC SERPL: 2.2 (CALC)
CO2 SERPL-SCNC: 29 MMOL/L (ref 20–32)
CREAT SERPL-MCNC: 0.66 MG/DL (ref 0.5–1.05)
GLOBULIN SER CALC-MCNC: 2.3 G/DL (CALC) (ref 1.9–3.7)
GLUCOSE SERPL-MCNC: 65 MG/DL (ref 65–99)
HBA1C MFR BLD: 5.8 % OF TOTAL HGB
HDLC SERPL-MCNC: 65 MG/DL
LDLC SERPL CALC-MCNC: 63 MG/DL (CALC)
NONHDLC SERPL-MCNC: 81 MG/DL (CALC)
POTASSIUM SERPL-SCNC: 3.9 MMOL/L (ref 3.5–5.3)
PROT SERPL-MCNC: 6.6 G/DL (ref 6.1–8.1)
SODIUM SERPL-SCNC: 141 MMOL/L (ref 135–146)
TRIGL SERPL-MCNC: 101 MG/DL

## 2021-06-29 ENCOUNTER — OFFICE VISIT (OUTPATIENT)
Dept: FAMILY MEDICINE | Facility: CLINIC | Age: 56
End: 2021-06-29
Payer: COMMERCIAL

## 2021-06-29 ENCOUNTER — HOSPITAL ENCOUNTER (OUTPATIENT)
Dept: RADIOLOGY | Facility: HOSPITAL | Age: 56
Discharge: HOME | End: 2021-06-29
Attending: NURSE PRACTITIONER
Payer: COMMERCIAL

## 2021-06-29 VITALS
WEIGHT: 215 LBS | HEIGHT: 66 IN | BODY MASS INDEX: 34.55 KG/M2 | OXYGEN SATURATION: 98 % | TEMPERATURE: 98 F | HEART RATE: 78 BPM | DIASTOLIC BLOOD PRESSURE: 70 MMHG | SYSTOLIC BLOOD PRESSURE: 110 MMHG

## 2021-06-29 DIAGNOSIS — J44.9 CHRONIC OBSTRUCTIVE PULMONARY DISEASE, UNSPECIFIED COPD TYPE: ICD-10-CM

## 2021-06-29 DIAGNOSIS — R09.82 POST-NASAL DRAINAGE: ICD-10-CM

## 2021-06-29 DIAGNOSIS — E55.9 VITAMIN D DEFICIENCY: ICD-10-CM

## 2021-06-29 DIAGNOSIS — E78.2 MIXED HYPERLIPIDEMIA: ICD-10-CM

## 2021-06-29 DIAGNOSIS — E11.8 TYPE 2 DIABETES MELLITUS WITH COMPLICATION, WITHOUT LONG-TERM CURRENT USE OF INSULIN: ICD-10-CM

## 2021-06-29 DIAGNOSIS — I10 ESSENTIAL HYPERTENSION: Primary | ICD-10-CM

## 2021-06-29 DIAGNOSIS — R45.89 DEPRESSED MOOD: ICD-10-CM

## 2021-06-29 DIAGNOSIS — L30.9 DERMATITIS: ICD-10-CM

## 2021-06-29 DIAGNOSIS — E66.09 CLASS 1 OBESITY DUE TO EXCESS CALORIES WITH SERIOUS COMORBIDITY AND BODY MASS INDEX (BMI) OF 34.0 TO 34.9 IN ADULT: ICD-10-CM

## 2021-06-29 DIAGNOSIS — K21.9 GASTROESOPHAGEAL REFLUX DISEASE WITHOUT ESOPHAGITIS: ICD-10-CM

## 2021-06-29 PROCEDURE — 1126F AMNT PAIN NOTED NONE PRSNT: CPT | Mod: S$GLB,,, | Performed by: NURSE PRACTITIONER

## 2021-06-29 PROCEDURE — 99214 PR OFFICE/OUTPT VISIT, EST, LEVL IV, 30-39 MIN: ICD-10-PCS | Mod: S$GLB,,, | Performed by: NURSE PRACTITIONER

## 2021-06-29 PROCEDURE — 99214 OFFICE O/P EST MOD 30 MIN: CPT | Mod: S$GLB,,, | Performed by: NURSE PRACTITIONER

## 2021-06-29 PROCEDURE — 71046 X-RAY EXAM CHEST 2 VIEWS: CPT | Mod: TC,PO

## 2021-06-29 PROCEDURE — 3044F PR MOST RECENT HEMOGLOBIN A1C LEVEL <7.0%: ICD-10-PCS | Mod: S$GLB,,, | Performed by: NURSE PRACTITIONER

## 2021-06-29 PROCEDURE — 3074F PR MOST RECENT SYSTOLIC BLOOD PRESSURE < 130 MM HG: ICD-10-PCS | Mod: S$GLB,,, | Performed by: NURSE PRACTITIONER

## 2021-06-29 PROCEDURE — 3078F PR MOST RECENT DIASTOLIC BLOOD PRESSURE < 80 MM HG: ICD-10-PCS | Mod: S$GLB,,, | Performed by: NURSE PRACTITIONER

## 2021-06-29 PROCEDURE — 3044F HG A1C LEVEL LT 7.0%: CPT | Mod: S$GLB,,, | Performed by: NURSE PRACTITIONER

## 2021-06-29 PROCEDURE — 3074F SYST BP LT 130 MM HG: CPT | Mod: S$GLB,,, | Performed by: NURSE PRACTITIONER

## 2021-06-29 PROCEDURE — 1126F PR PAIN SEVERITY QUANTIFIED, NO PAIN PRESENT: ICD-10-PCS | Mod: S$GLB,,, | Performed by: NURSE PRACTITIONER

## 2021-06-29 PROCEDURE — 3078F DIAST BP <80 MM HG: CPT | Mod: S$GLB,,, | Performed by: NURSE PRACTITIONER

## 2021-06-29 PROCEDURE — 3008F BODY MASS INDEX DOCD: CPT | Mod: S$GLB,,, | Performed by: NURSE PRACTITIONER

## 2021-06-29 PROCEDURE — 3008F PR BODY MASS INDEX (BMI) DOCUMENTED: ICD-10-PCS | Mod: S$GLB,,, | Performed by: NURSE PRACTITIONER

## 2021-06-29 RX ORDER — PANTOPRAZOLE SODIUM 40 MG/1
40 TABLET, DELAYED RELEASE ORAL DAILY
Qty: 90 TABLET | Refills: 1 | Status: SHIPPED | OUTPATIENT
Start: 2021-06-29 | End: 2022-01-26

## 2021-06-29 RX ORDER — MONTELUKAST SODIUM 10 MG/1
10 TABLET ORAL DAILY
Qty: 90 TABLET | Refills: 1 | Status: SHIPPED | OUTPATIENT
Start: 2021-06-29 | End: 2022-01-26

## 2021-06-29 RX ORDER — ROSUVASTATIN CALCIUM 20 MG/1
20 TABLET, COATED ORAL DAILY
Qty: 90 TABLET | Refills: 1 | Status: SHIPPED | OUTPATIENT
Start: 2021-06-29 | End: 2021-12-21

## 2021-06-29 RX ORDER — SITAGLIPTIN AND METFORMIN HYDROCHLORIDE 1000; 100 MG/1; MG/1
1 TABLET, FILM COATED, EXTENDED RELEASE ORAL DAILY
Qty: 90 TABLET | Refills: 1 | Status: SHIPPED | OUTPATIENT
Start: 2021-06-29 | End: 2022-01-26

## 2021-06-29 RX ORDER — CLOTRIMAZOLE AND BETAMETHASONE DIPROPIONATE 10; .64 MG/G; MG/G
CREAM TOPICAL 2 TIMES DAILY PRN
Qty: 45 G | Refills: 2 | Status: SHIPPED | OUTPATIENT
Start: 2021-06-29 | End: 2022-06-30

## 2021-06-29 RX ORDER — EMPAGLIFLOZIN 25 MG/1
25 TABLET, FILM COATED ORAL DAILY
Qty: 90 TABLET | Refills: 1 | Status: SHIPPED | OUTPATIENT
Start: 2021-06-29 | End: 2022-01-26

## 2021-06-29 RX ORDER — AMLODIPINE BESYLATE 10 MG/1
10 TABLET ORAL DAILY
Qty: 90 TABLET | Refills: 1 | Status: SHIPPED | OUTPATIENT
Start: 2021-06-29 | End: 2022-01-26

## 2021-06-29 RX ORDER — BUPROPION HYDROCHLORIDE 300 MG/1
300 TABLET ORAL DAILY
Qty: 30 TABLET | Refills: 5 | Status: SHIPPED | OUTPATIENT
Start: 2021-06-29 | End: 2022-01-03

## 2021-06-29 RX ORDER — METFORMIN HYDROCHLORIDE 500 MG/1
500 TABLET, EXTENDED RELEASE ORAL NIGHTLY
Qty: 90 TABLET | Refills: 1 | Status: SHIPPED | OUTPATIENT
Start: 2021-06-29 | End: 2021-12-14

## 2021-06-29 RX ORDER — AZELASTINE 1 MG/ML
SPRAY, METERED NASAL
Qty: 30 ML | Refills: 2 | Status: SHIPPED | OUTPATIENT
Start: 2021-06-29

## 2021-07-07 ENCOUNTER — PATIENT MESSAGE (OUTPATIENT)
Dept: FAMILY MEDICINE | Facility: CLINIC | Age: 56
End: 2021-07-07

## 2021-08-13 ENCOUNTER — CLINICAL SUPPORT (OUTPATIENT)
Dept: OTHER | Facility: CLINIC | Age: 56
End: 2021-08-13
Payer: COMMERCIAL

## 2021-08-13 DIAGNOSIS — Z00.8 ENCOUNTER FOR OTHER GENERAL EXAMINATION: ICD-10-CM

## 2021-08-14 VITALS — HEIGHT: 66 IN | BODY MASS INDEX: 34.7 KG/M2

## 2021-08-14 LAB
GLUCOSE SERPL-MCNC: 112 MG/DL (ref 60–140)
HDLC SERPL-MCNC: 68 MG/DL
POC CHOLESTEROL, LDL (DOCK): 55 MG/DL
POC CHOLESTEROL, TOTAL: 138 MG/DL
TRIGL SERPL-MCNC: 78 MG/DL

## 2021-09-09 ENCOUNTER — HOSPITAL ENCOUNTER (OUTPATIENT)
Dept: PREADMISSION TESTING | Facility: HOSPITAL | Age: 56
Discharge: HOME | End: 2021-09-09
Attending: INTERNAL MEDICINE
Payer: COMMERCIAL

## 2021-09-09 VITALS
BODY MASS INDEX: 35.36 KG/M2 | HEIGHT: 66 IN | WEIGHT: 220 LBS | HEART RATE: 92 BPM | SYSTOLIC BLOOD PRESSURE: 110 MMHG | DIASTOLIC BLOOD PRESSURE: 67 MMHG | OXYGEN SATURATION: 100 % | TEMPERATURE: 98 F | RESPIRATION RATE: 17 BRPM

## 2021-09-09 DIAGNOSIS — Z01.818 PREOP TESTING: Primary | ICD-10-CM

## 2021-09-09 LAB
ERYTHROCYTE [DISTWIDTH] IN BLOOD BY AUTOMATED COUNT: 13.3 % (ref 11.5–14.5)
HCT VFR BLD AUTO: 40.2 % (ref 37–48.5)
HGB BLD-MCNC: 13.4 G/DL (ref 12–16)
MCH RBC QN AUTO: 29.3 PG (ref 27–31)
MCHC RBC AUTO-ENTMCNC: 33.3 G/DL (ref 32–36)
MCV RBC AUTO: 88 FL (ref 82–98)
PLATELET # BLD AUTO: 285 K/UL (ref 150–450)
PMV BLD AUTO: 9.2 FL (ref 9.2–12.9)
RBC # BLD AUTO: 4.58 M/UL (ref 4–5.4)
SARS-COV-2 RDRP RESP QL NAA+PROBE: NEGATIVE
WBC # BLD AUTO: 5.57 K/UL (ref 3.9–12.7)

## 2021-09-09 PROCEDURE — 93005 ELECTROCARDIOGRAM TRACING: CPT | Performed by: INTERNAL MEDICINE

## 2021-09-09 PROCEDURE — 36415 COLL VENOUS BLD VENIPUNCTURE: CPT | Performed by: INTERNAL MEDICINE

## 2021-09-09 PROCEDURE — 85027 COMPLETE CBC AUTOMATED: CPT | Performed by: INTERNAL MEDICINE

## 2021-09-09 PROCEDURE — 93010 EKG 12-LEAD: ICD-10-PCS | Mod: ,,, | Performed by: INTERNAL MEDICINE

## 2021-09-09 PROCEDURE — 93010 ELECTROCARDIOGRAM REPORT: CPT | Mod: ,,, | Performed by: INTERNAL MEDICINE

## 2021-09-09 PROCEDURE — U0002 COVID-19 LAB TEST NON-CDC: HCPCS | Performed by: INTERNAL MEDICINE

## 2021-09-10 ENCOUNTER — ANESTHESIA EVENT (OUTPATIENT)
Dept: SURGERY | Facility: HOSPITAL | Age: 56
End: 2021-09-10
Payer: COMMERCIAL

## 2021-09-10 ENCOUNTER — HOSPITAL ENCOUNTER (OUTPATIENT)
Facility: HOSPITAL | Age: 56
Discharge: HOME | End: 2021-09-10
Attending: INTERNAL MEDICINE | Admitting: INTERNAL MEDICINE
Payer: COMMERCIAL

## 2021-09-10 ENCOUNTER — ANESTHESIA (OUTPATIENT)
Dept: SURGERY | Facility: HOSPITAL | Age: 56
End: 2021-09-10
Payer: COMMERCIAL

## 2021-09-10 VITALS
DIASTOLIC BLOOD PRESSURE: 60 MMHG | SYSTOLIC BLOOD PRESSURE: 111 MMHG | RESPIRATION RATE: 17 BRPM | TEMPERATURE: 98 F | HEART RATE: 77 BPM | OXYGEN SATURATION: 100 %

## 2021-09-10 DIAGNOSIS — Z13.9 ENCOUNTER FOR SCREENING: ICD-10-CM

## 2021-09-10 LAB — GLUCOSE SERPL-MCNC: 128 MG/DL (ref 70–110)

## 2021-09-10 PROCEDURE — 37000009 HC ANESTHESIA EA ADD 15 MINS: Performed by: INTERNAL MEDICINE

## 2021-09-10 PROCEDURE — 45380 COLONOSCOPY AND BIOPSY: CPT | Performed by: INTERNAL MEDICINE

## 2021-09-10 PROCEDURE — 27200043 HC FORCEPS, BIOPSY: Performed by: INTERNAL MEDICINE

## 2021-09-10 PROCEDURE — 63600175 PHARM REV CODE 636 W HCPCS: Performed by: NURSE ANESTHETIST, CERTIFIED REGISTERED

## 2021-09-10 PROCEDURE — 37000008 HC ANESTHESIA 1ST 15 MINUTES: Performed by: INTERNAL MEDICINE

## 2021-09-10 PROCEDURE — 82962 GLUCOSE BLOOD TEST: CPT | Performed by: INTERNAL MEDICINE

## 2021-09-10 PROCEDURE — 27000671 HC TUBING MICROBORE EXT: Performed by: ANESTHESIOLOGY

## 2021-09-10 PROCEDURE — 25000003 PHARM REV CODE 250: Performed by: NURSE ANESTHETIST, CERTIFIED REGISTERED

## 2021-09-10 RX ORDER — LIDOCAINE HYDROCHLORIDE 20 MG/ML
INJECTION, SOLUTION EPIDURAL; INFILTRATION; INTRACAUDAL; PERINEURAL
Status: DISCONTINUED | OUTPATIENT
Start: 2021-09-10 | End: 2021-09-10

## 2021-09-10 RX ORDER — SODIUM CHLORIDE 9 MG/ML
INJECTION, SOLUTION INTRAVENOUS CONTINUOUS PRN
Status: DISCONTINUED | OUTPATIENT
Start: 2021-09-10 | End: 2021-09-10

## 2021-09-10 RX ORDER — PROPOFOL 10 MG/ML
VIAL (ML) INTRAVENOUS
Status: DISCONTINUED | OUTPATIENT
Start: 2021-09-10 | End: 2021-09-10

## 2021-09-10 RX ADMIN — PROPOFOL 50 MG: 10 INJECTION, EMULSION INTRAVENOUS at 08:09

## 2021-09-10 RX ADMIN — LIDOCAINE HYDROCHLORIDE 60 MG: 20 INJECTION, SOLUTION EPIDURAL; INFILTRATION; INTRACAUDAL; PERINEURAL at 08:09

## 2021-09-10 RX ADMIN — PROPOFOL 20 MG: 10 INJECTION, EMULSION INTRAVENOUS at 08:09

## 2021-09-10 RX ADMIN — PROPOFOL 60 MG: 10 INJECTION, EMULSION INTRAVENOUS at 08:09

## 2021-09-10 RX ADMIN — SODIUM CHLORIDE: 0.9 INJECTION, SOLUTION INTRAVENOUS at 08:09

## 2021-09-10 RX ADMIN — PROPOFOL 30 MG: 10 INJECTION, EMULSION INTRAVENOUS at 08:09

## 2021-10-14 ENCOUNTER — IMMUNIZATION (OUTPATIENT)
Dept: FAMILY MEDICINE | Facility: CLINIC | Age: 56
End: 2021-10-14
Payer: COMMERCIAL

## 2021-10-14 DIAGNOSIS — Z23 NEED FOR VACCINATION: Primary | ICD-10-CM

## 2021-10-14 PROCEDURE — 91300 COVID-19, MRNA, LNP-S, PF, 30 MCG/0.3 ML DOSE VACCINE: CPT | Mod: ,,, | Performed by: FAMILY MEDICINE

## 2021-10-14 PROCEDURE — 0001A PR IMMUNIZ ADMIN, SARS-COV-2 COVID-19 VACC, 30MCG/0.3ML, 1ST DOSE: CPT | Mod: CV19,,, | Performed by: FAMILY MEDICINE

## 2021-10-14 PROCEDURE — 91300 COVID-19, MRNA, LNP-S, PF, 30 MCG/0.3 ML DOSE VACCINE: ICD-10-PCS | Mod: ,,, | Performed by: FAMILY MEDICINE

## 2021-10-14 PROCEDURE — 0001A PR IMMUNIZ ADMIN, SARS-COV-2 COVID-19 VACC, 30MCG/0.3ML, 1ST DOSE: ICD-10-PCS | Mod: CV19,,, | Performed by: FAMILY MEDICINE

## 2021-11-21 ENCOUNTER — HOSPITAL ENCOUNTER (EMERGENCY)
Facility: HOSPITAL | Age: 56
Discharge: HOME | End: 2021-11-21
Attending: EMERGENCY MEDICINE
Payer: COMMERCIAL

## 2021-11-21 VITALS
RESPIRATION RATE: 16 BRPM | BODY MASS INDEX: 36.96 KG/M2 | WEIGHT: 230 LBS | TEMPERATURE: 98 F | HEART RATE: 94 BPM | DIASTOLIC BLOOD PRESSURE: 82 MMHG | SYSTOLIC BLOOD PRESSURE: 158 MMHG | HEIGHT: 66 IN | OXYGEN SATURATION: 98 %

## 2021-11-21 DIAGNOSIS — S93.602A FOOT SPRAIN, LEFT, INITIAL ENCOUNTER: Primary | ICD-10-CM

## 2021-11-21 DIAGNOSIS — M79.672 FOOT PAIN, LEFT: ICD-10-CM

## 2021-11-21 PROCEDURE — 25000003 PHARM REV CODE 250: Performed by: EMERGENCY MEDICINE

## 2021-11-21 PROCEDURE — 99283 EMERGENCY DEPT VISIT LOW MDM: CPT

## 2021-11-21 RX ORDER — KETOROLAC TROMETHAMINE 10 MG/1
10 TABLET, FILM COATED ORAL
Status: COMPLETED | OUTPATIENT
Start: 2021-11-21 | End: 2021-11-21

## 2021-11-21 RX ORDER — IBUPROFEN 600 MG/1
600 TABLET ORAL EVERY 6 HOURS PRN
Qty: 20 TABLET | Refills: 0 | Status: SHIPPED | OUTPATIENT
Start: 2021-11-21 | End: 2023-08-29

## 2021-11-21 RX ORDER — HYDROCODONE BITARTRATE AND ACETAMINOPHEN 5; 325 MG/1; MG/1
1 TABLET ORAL EVERY 4 HOURS PRN
Qty: 10 TABLET | Refills: 0 | Status: SHIPPED | OUTPATIENT
Start: 2021-11-21 | End: 2023-08-29

## 2021-11-21 RX ADMIN — KETOROLAC TROMETHAMINE 10 MG: 10 TABLET, FILM COATED ORAL at 03:11

## 2021-12-07 ENCOUNTER — OFFICE VISIT (OUTPATIENT)
Dept: PODIATRY | Facility: CLINIC | Age: 56
End: 2021-12-07
Payer: COMMERCIAL

## 2021-12-07 VITALS — HEIGHT: 66 IN | WEIGHT: 227 LBS | OXYGEN SATURATION: 98 % | BODY MASS INDEX: 36.48 KG/M2 | HEART RATE: 76 BPM

## 2021-12-07 DIAGNOSIS — M79.672 LEFT FOOT PAIN: Primary | ICD-10-CM

## 2021-12-07 DIAGNOSIS — S93.602A FOOT SPRAIN, LEFT, INITIAL ENCOUNTER: ICD-10-CM

## 2021-12-07 DIAGNOSIS — E11.9 ENCOUNTER FOR DIABETIC FOOT EXAM: ICD-10-CM

## 2021-12-07 DIAGNOSIS — S99.922A INJURY OF LEFT FOOT, INITIAL ENCOUNTER: ICD-10-CM

## 2021-12-07 PROCEDURE — 3066F PR DOCUMENTATION OF TREATMENT FOR NEPHROPATHY: ICD-10-PCS | Mod: CPTII,S$GLB,, | Performed by: PODIATRIST

## 2021-12-07 PROCEDURE — 3061F NEG MICROALBUMINURIA REV: CPT | Mod: CPTII,S$GLB,, | Performed by: PODIATRIST

## 2021-12-07 PROCEDURE — 99214 OFFICE O/P EST MOD 30 MIN: CPT | Mod: S$GLB,,, | Performed by: PODIATRIST

## 2021-12-07 PROCEDURE — 3061F PR NEG MICROALBUMINURIA RESULT DOCUMENTED/REVIEW: ICD-10-PCS | Mod: CPTII,S$GLB,, | Performed by: PODIATRIST

## 2021-12-07 PROCEDURE — 3066F NEPHROPATHY DOC TX: CPT | Mod: CPTII,S$GLB,, | Performed by: PODIATRIST

## 2021-12-07 PROCEDURE — 99214 PR OFFICE/OUTPT VISIT, EST, LEVL IV, 30-39 MIN: ICD-10-PCS | Mod: S$GLB,,, | Performed by: PODIATRIST

## 2021-12-07 RX ORDER — ZOSTER VACCINE RECOMBINANT, ADJUVANTED 50 MCG/0.5
KIT INTRAMUSCULAR
COMMUNITY

## 2021-12-13 PROBLEM — Z13.9 ENCOUNTER FOR SCREENING: Status: RESOLVED | Noted: 2021-09-10 | Resolved: 2021-12-13

## 2022-03-22 ENCOUNTER — TELEPHONE (OUTPATIENT)
Dept: FAMILY MEDICINE | Facility: CLINIC | Age: 57
End: 2022-03-22
Payer: COMMERCIAL

## 2022-03-22 DIAGNOSIS — E55.9 VITAMIN D DEFICIENCY: ICD-10-CM

## 2022-03-22 DIAGNOSIS — E11.8 TYPE 2 DIABETES MELLITUS WITH COMPLICATION, WITHOUT LONG-TERM CURRENT USE OF INSULIN: Primary | ICD-10-CM

## 2022-03-22 DIAGNOSIS — I10 ESSENTIAL HYPERTENSION: ICD-10-CM

## 2022-03-22 DIAGNOSIS — Z79.4 LONG TERM (CURRENT) USE OF INSULIN: ICD-10-CM

## 2022-03-22 DIAGNOSIS — E78.2 MIXED HYPERLIPIDEMIA: ICD-10-CM

## 2022-03-23 DIAGNOSIS — Z12.31 ENCOUNTER FOR SCREENING MAMMOGRAM FOR MALIGNANT NEOPLASM OF BREAST: Primary | ICD-10-CM

## 2022-03-23 DIAGNOSIS — R92.8 ABNORMAL MAMMOGRAM: Primary | ICD-10-CM

## 2022-05-24 ENCOUNTER — HOSPITAL ENCOUNTER (OUTPATIENT)
Dept: RADIOLOGY | Facility: HOSPITAL | Age: 57
Discharge: HOME | End: 2022-05-24
Attending: NURSE PRACTITIONER
Payer: COMMERCIAL

## 2022-05-24 DIAGNOSIS — R92.8 ABNORMAL MAMMOGRAM: ICD-10-CM

## 2022-05-24 PROCEDURE — 77062 BREAST TOMOSYNTHESIS BI: CPT | Mod: TC,PO

## 2022-05-31 ENCOUNTER — PATIENT MESSAGE (OUTPATIENT)
Dept: FAMILY MEDICINE | Facility: CLINIC | Age: 57
End: 2022-05-31

## 2022-07-22 ENCOUNTER — PATIENT MESSAGE (OUTPATIENT)
Dept: FAMILY MEDICINE | Facility: CLINIC | Age: 57
End: 2022-07-22

## 2022-07-25 ENCOUNTER — OFFICE VISIT (OUTPATIENT)
Dept: FAMILY MEDICINE | Facility: CLINIC | Age: 57
End: 2022-07-25
Payer: COMMERCIAL

## 2022-07-25 DIAGNOSIS — J44.9 CHRONIC OBSTRUCTIVE PULMONARY DISEASE, UNSPECIFIED COPD TYPE: ICD-10-CM

## 2022-07-25 DIAGNOSIS — U07.1 COVID-19: Primary | ICD-10-CM

## 2022-07-25 DIAGNOSIS — R09.82 POST-NASAL DRAINAGE: ICD-10-CM

## 2022-07-25 PROCEDURE — 1160F PR REVIEW ALL MEDS BY PRESCRIBER/CLIN PHARMACIST DOCUMENTED: ICD-10-PCS | Mod: CPTII,95,, | Performed by: NURSE PRACTITIONER

## 2022-07-25 PROCEDURE — 99213 OFFICE O/P EST LOW 20 MIN: CPT | Mod: 95,,, | Performed by: NURSE PRACTITIONER

## 2022-07-25 PROCEDURE — 1160F RVW MEDS BY RX/DR IN RCRD: CPT | Mod: CPTII,95,, | Performed by: NURSE PRACTITIONER

## 2022-07-25 PROCEDURE — 1159F MED LIST DOCD IN RCRD: CPT | Mod: CPTII,95,, | Performed by: NURSE PRACTITIONER

## 2022-07-25 PROCEDURE — 1159F PR MEDICATION LIST DOCUMENTED IN MEDICAL RECORD: ICD-10-PCS | Mod: CPTII,95,, | Performed by: NURSE PRACTITIONER

## 2022-07-25 PROCEDURE — 99213 PR OFFICE/OUTPT VISIT, EST, LEVL III, 20-29 MIN: ICD-10-PCS | Mod: 95,,, | Performed by: NURSE PRACTITIONER

## 2022-07-25 RX ORDER — IPRATROPIUM BROMIDE AND ALBUTEROL SULFATE 2.5; .5 MG/3ML; MG/3ML
3 SOLUTION RESPIRATORY (INHALATION) EVERY 6 HOURS PRN
Qty: 1 EACH | Refills: 2 | Status: SHIPPED | OUTPATIENT
Start: 2022-07-25 | End: 2023-12-05 | Stop reason: SDUPTHER

## 2022-07-25 RX ORDER — FLUTICASONE PROPIONATE 50 MCG
1 SPRAY, SUSPENSION (ML) NASAL 2 TIMES DAILY
Qty: 48 ML | Refills: 1 | Status: SHIPPED | OUTPATIENT
Start: 2022-07-25

## 2022-07-25 RX ORDER — FLUTICASONE PROPIONATE AND SALMETEROL 500; 50 UG/1; UG/1
1 POWDER RESPIRATORY (INHALATION) 2 TIMES DAILY
Qty: 90 EACH | Refills: 1 | Status: SHIPPED | OUTPATIENT
Start: 2022-07-25 | End: 2022-10-13

## 2022-07-25 NOTE — PROGRESS NOTES
Subjective:        The chief complaint leading to consultation is: covid 19, cough  The patient location is:  Home  Visit type: Virtual visit with synchronous audio/video or audio only  This was a video visit in lieu of in-person visit due to the coronavirus emergency. Patient acknowledged and consented to the video visit encounter.     Patient presents today with 4 day history cough, fever, and wheezing.  Patient tested positive for COVID-19 at home.  Patient is requesting refill on her rescue inhalers and is slowly improving.  Patient's last fever was 2 days ago.  Patient will be educated on medications that she can take to help with symptoms.    Cough  This is a new problem. The current episode started in the past 7 days. The problem has been gradually improving. The problem occurs every few hours. The cough is non-productive. Associated symptoms include headaches, myalgias, nasal congestion, postnasal drip, rhinorrhea, shortness of breath and wheezing. Pertinent negatives include no chest pain, chills, ear congestion, ear pain, fever, heartburn, hemoptysis, rash, sore throat, sweats or weight loss. The symptoms are aggravated by lying down. Risk factors for lung disease include smoking/tobacco exposure. She has tried OTC cough suppressant, a beta-agonist inhaler, leukotriene antagonists and rest for the symptoms. The treatment provided moderate relief. Her past medical history is significant for asthma and COPD. There is no history of bronchiectasis, bronchitis, emphysema, environmental allergies or pneumonia.   Wheezing   This is a recurrent problem. The current episode started more than 1 month ago. The problem occurs daily. The problem has been waxing and waning. Associated symptoms include headaches, rhinorrhea and shortness of breath. Pertinent negatives include no abdominal pain, chest pain, chills, coughing, ear pain, fever, hemoptysis, rash, sore throat or vomiting. The symptoms are aggravated by any  activity. She has tried beta agonist inhalers for the symptoms. The treatment provided moderate relief. Her past medical history is significant for asthma and COPD. There is no history of pneumonia.       Past Surgical History:   Procedure Laterality Date    ADENOIDECTOMY      BREAST SURGERY Right     breast biopsy    COLONOSCOPY N/A 9/10/2021    Procedure: COLONOSCOPY;  Surgeon: Holland Royal MD;  Location: [facility];  Service: Endoscopy;  Laterality: N/A;    TONSILLECTOMY      TRANSFORAMINAL EPIDURAL INJECTION OF STEROID Left 4/16/2019    Procedure: Injection,steroid,epidural,transforaminal approach;  Surgeon: Gilberto Michelle MD;  Location: [facility] OR;  Service: Pain Management;  Laterality: Left;  L5-S1, S1    TRANSFORAMINAL EPIDURAL INJECTION OF STEROID Left 8/23/2019    Procedure: Injection,steroid,epidural,transforaminal approach;  Surgeon: Gilberto Michelle MD;  Location: [facility] OR;  Service: Pain Management;  Laterality: Left;  L5-S1, S1     Past Medical History:   Diagnosis Date    Class 3 severe obesity due to excess calories with serious comorbidity and body mass index (BMI) of 40.0 to 44.9 in adult 10/17/2018    COPD (chronic obstructive pulmonary disease)     Diabetes mellitus, type 2     GERD (gastroesophageal reflux disease)     Hyperlipidemia     Hypertension     Sleep apnea     Vitamin D deficiency      Family History   Problem Relation Age of Onset    Stroke Mother     Emphysema Mother     Diabetes Father     Coronary artery disease Father     COPD Father     Diabetes Sister         Social History:   Marital Status: Significant Other  Alcohol History:  reports current alcohol use.  Tobacco History:  reports that she quit smoking about 3 years ago. Her smoking use included cigarettes. She smoked 2.00 packs per day. She has never used smokeless tobacco.  Drug History:  reports current drug use. Drug: Hydrocodone.    Review of patient's allergies indicates:  No Known Allergies    Current  Outpatient Medications   Medication Sig Dispense Refill    albuterol-ipratropium (DUO-NEB) 2.5 mg-0.5 mg/3 mL nebulizer solution Take 3 mLs by nebulization every 6 (six) hours as needed for Wheezing. Rescue 1 each 2    amLODIPine (NORVASC) 10 MG tablet TAKE 1 TABLET BY MOUTH EVERY DAY 90 tablet 1    aspirin/salicylamide/caffeine (BC HEADACHE POWDER ORAL) Take by mouth as needed.      azelastine (ASTELIN) 137 mcg (0.1 %) nasal spray USE 1 SPRAY IN EACH NOSTRIL TWICE DAILY 30 mL 2    blood sugar diagnostic Strp 1 each by Misc.(Non-Drug; Combo Route) route once daily. 100 each 2    blood-glucose meter Misc 1 each by Misc.(Non-Drug; Combo Route) route once daily. 1 each 0    buPROPion (WELLBUTRIN XL) 300 MG 24 hr tablet TAKE 1 TABLET BY MOUTH EVERY DAY 90 tablet 0    clotrimazole-betamethasone 1-0.05% (LOTRISONE) cream APPLY TOPICALLY 2 (TWO) TIMES DAILY AS NEEDED (RASH). 15 g 0    COMBIVENT RESPIMAT  mcg/actuation inhaler INHALE 2 PUFFS BY MOUTH EVERY 6 HOURS AS NEEDED FOR WHEEZE 4 each 5    fluticasone propionate (FLONASE) 50 mcg/actuation nasal spray 1 spray (50 mcg total) by Each Nostril route 2 (two) times daily. 48 mL 1    fluticasone-salmeterol diskus inhaler 500-50 mcg Inhale 1 puff into the lungs 2 (two) times daily. Controller 90 each 1    HYDROcodone-acetaminophen (NORCO) 5-325 mg per tablet Take 1 tablet by mouth every 4 (four) hours as needed for Pain. 10 tablet 0    ibuprofen (ADVIL,MOTRIN) 600 MG tablet Take 1 tablet (600 mg total) by mouth every 6 (six) hours as needed for Pain. 20 tablet 0    JANUMET -1,000 mg TM24 TAKE 1 TABLET BY MOUTH EVERY DAY 90 tablet 1    JARDIANCE 25 mg tablet TAKE 1 TABLET BY MOUTH EVERY DAY 90 tablet 1    lancets 33 gauge Misc 1 lancet by Misc.(Non-Drug; Combo Route) route once daily. 100 each 3    melatonin 5 mg Tab Take 10 mg by mouth nightly.       metFORMIN (GLUCOPHAGE-XR) 500 MG ER 24hr tablet TAKE TWO TABLETS BY MOUTH DAILY WITH DINNER OR  EVENING MEAL 180 tablet 1    montelukast (SINGULAIR) 10 mg tablet TAKE 1 TABLET BY MOUTH EVERY DAY 90 tablet 1    pantoprazole (PROTONIX) 40 MG tablet TAKE 1 TABLET BY MOUTH EVERY DAY 90 tablet 1    rosuvastatin (CRESTOR) 20 MG tablet TAKE 1 TABLET BY MOUTH EVERY DAY 90 tablet 0    tiZANidine 4 mg Cap Take 1 capsule by mouth 3 (three) times daily as needed.       valsartan-hydrochlorothiazide (DIOVAN-HCT) 160-25 mg per tablet TAKE 1 TABLET BY MOUTH EVERY DAY 90 tablet 1    varicella-zoster [id number], PF, (SHINGRIX, PF,) 50 mcg/0.5 mL injection Shingrix (PF) 50 mcg/0.5 mL intramuscular suspension, kit   PHARMACY ADMINISTERED       No current facility-administered medications for this visit.       Review of Systems   Constitutional: Positive for activity change and fatigue. Negative for appetite change, chills, fever and weight loss.   HENT: Positive for postnasal drip and rhinorrhea. Negative for congestion, ear discharge, ear pain, sore throat, trouble swallowing and voice change.    Eyes: Negative for photophobia, pain, discharge and visual disturbance.   Respiratory: Positive for shortness of breath and wheezing. Negative for cough, hemoptysis and chest tightness.    Cardiovascular: Negative for chest pain and palpitations.   Gastrointestinal: Negative for abdominal pain, heartburn, nausea and vomiting.   Endocrine: Negative for cold intolerance and heat intolerance.   Genitourinary: Negative for difficulty urinating and dysuria.   Musculoskeletal: Positive for myalgias. Negative for arthralgias and gait problem.   Skin: Negative for rash.   Allergic/Immunologic: Negative for environmental allergies and immunocompromised state.   Neurological: Positive for headaches. Negative for speech difficulty.   Psychiatric/Behavioral: Negative for confusion, self-injury and suicidal ideas.         Objective:        Physical Exam:   Physical Exam  Vitals and nursing note reviewed.   Constitutional:       General: She is  not in acute distress.     Appearance: Normal appearance. She is well-developed. She is not ill-appearing.   HENT:      Head: Normocephalic and atraumatic.      Nose: Congestion present.   Eyes:      General: Lids are normal. Lids are everted, no foreign bodies appreciated.      Conjunctiva/sclera: Conjunctivae normal.      Pupils: Pupils are equal, round, and reactive to light.      Right eye: Pupil is round and reactive.      Left eye: Pupil is round and reactive.   Neck:      Trachea: Trachea normal.   Cardiovascular:      Heart sounds: S1 normal and S2 normal.   Pulmonary:      Effort: Pulmonary effort is normal. No respiratory distress.      Breath sounds: Normal breath sounds.   Abdominal:      General: Abdomen is flat.      Palpations: Abdomen is not rigid.   Musculoskeletal:         General: Normal range of motion.      Cervical back: Normal range of motion and neck supple. No muscular tenderness.   Lymphadenopathy:      Cervical: No cervical adenopathy.   Skin:     General: Skin is warm and dry.      Capillary Refill: Capillary refill takes less than 2 seconds.   Neurological:      General: No focal deficit present.      Mental Status: She is alert and oriented to person, place, and time. Mental status is at baseline.   Psychiatric:         Mood and Affect: Mood normal.         Behavior: Behavior normal. Behavior is cooperative.              Assessment:       1. COVID-19    2. Chronic obstructive pulmonary disease, unspecified COPD type    3. Post-nasal drainage      Plan:   COVID-19  Continue symptomatic treatment.  Paxlovid offered but patient does not want this. She is improving overall    Chronic obstructive pulmonary disease, unspecified COPD type  -     fluticasone-salmeterol diskus inhaler 500-50 mcg; Inhale 1 puff into the lungs 2 (two) times daily. Controller  Dispense: 90 each; Refill: 1  -     albuterol-ipratropium (DUO-NEB) 2.5 mg-0.5 mg/3 mL nebulizer solution; Take 3 mLs by nebulization every  6 (six) hours as needed for Wheezing. Rescue  Dispense: 1 each; Refill: 2    Post-nasal drainage  -     fluticasone propionate (FLONASE) 50 mcg/actuation nasal spray; 1 spray (50 mcg total) by Each Nostril route 2 (two) times daily.  Dispense: 48 mL; Refill: 1      Follow up if symptoms worsen or fail to improve in 2 weeks.    Total time spent with patient: 20 minutes    Each patient to whom he or she provides medical services by telemedicine is:  (1) informed of the relationship between the physician and patient and the respective role of any other health care provider with respect to management of the patient; and (2) notified that he or she may decline to receive medical services by telemedicine and may withdraw from such care at any time.

## 2022-08-02 ENCOUNTER — PATIENT MESSAGE (OUTPATIENT)
Dept: FAMILY MEDICINE | Facility: CLINIC | Age: 57
End: 2022-08-02

## 2022-08-02 LAB
25(OH)D3 SERPL-MCNC: 52 NG/ML (ref 30–100)
ALBUMIN SERPL-MCNC: 4.3 G/DL (ref 3.6–5.1)
ALBUMIN/CREAT UR: 15 MCG/MG CREAT
ALBUMIN/GLOB SERPL: 1.7 (CALC) (ref 1–2.5)
ALP SERPL-CCNC: 73 U/L (ref 37–153)
ALT SERPL-CCNC: 26 U/L (ref 6–29)
APPEARANCE UR: CLEAR
AST SERPL-CCNC: 23 U/L (ref 10–35)
BASOPHILS # BLD AUTO: 38 CELLS/UL (ref 0–200)
BASOPHILS NFR BLD AUTO: 0.6 %
BILIRUB SERPL-MCNC: 0.4 MG/DL (ref 0.2–1.2)
BILIRUB UR QL STRIP: NEGATIVE
BUN SERPL-MCNC: 17 MG/DL (ref 7–25)
BUN/CREAT SERPL: ABNORMAL (CALC) (ref 6–22)
CALCIUM SERPL-MCNC: 9.7 MG/DL (ref 8.6–10.4)
CHLORIDE SERPL-SCNC: 103 MMOL/L (ref 98–110)
CHOLEST SERPL-MCNC: 140 MG/DL
CHOLEST/HDLC SERPL: 2.3 (CALC)
CO2 SERPL-SCNC: 21 MMOL/L (ref 20–32)
COLOR UR: YELLOW
CREAT SERPL-MCNC: 0.67 MG/DL (ref 0.5–1.03)
CREAT UR-MCNC: 105 MG/DL (ref 20–275)
EGFR: 103 ML/MIN/1.73M2
EOSINOPHIL # BLD AUTO: 233 CELLS/UL (ref 15–500)
EOSINOPHIL NFR BLD AUTO: 3.7 %
ERYTHROCYTE [DISTWIDTH] IN BLOOD BY AUTOMATED COUNT: 13.3 % (ref 11–15)
GLOBULIN SER CALC-MCNC: 2.5 G/DL (CALC) (ref 1.9–3.7)
GLUCOSE SERPL-MCNC: 105 MG/DL (ref 65–99)
GLUCOSE UR QL STRIP: ABNORMAL
HBA1C MFR BLD: 6.3 % OF TOTAL HGB
HCT VFR BLD AUTO: 40.2 % (ref 35–45)
HDLC SERPL-MCNC: 61 MG/DL
HGB BLD-MCNC: 13.9 G/DL (ref 11.7–15.5)
HGB UR QL STRIP: NEGATIVE
KETONES UR QL STRIP: ABNORMAL
LDLC SERPL CALC-MCNC: 58 MG/DL (CALC)
LEUKOCYTE ESTERASE UR QL STRIP: NEGATIVE
LYMPHOCYTES # BLD AUTO: 1474 CELLS/UL (ref 850–3900)
LYMPHOCYTES NFR BLD AUTO: 23.4 %
MCH RBC QN AUTO: 28.3 PG (ref 27–33)
MCHC RBC AUTO-ENTMCNC: 34.6 G/DL (ref 32–36)
MCV RBC AUTO: 81.9 FL (ref 80–100)
MICROALBUMIN UR-MCNC: 1.6 MG/DL
MONOCYTES # BLD AUTO: 649 CELLS/UL (ref 200–950)
MONOCYTES NFR BLD AUTO: 10.3 %
NEUTROPHILS # BLD AUTO: 3906 CELLS/UL (ref 1500–7800)
NEUTROPHILS NFR BLD AUTO: 62 %
NITRITE UR QL STRIP: NEGATIVE
NONHDLC SERPL-MCNC: 79 MG/DL (CALC)
PH UR STRIP: 5.5 [PH] (ref 5–8)
PLATELET # BLD AUTO: 328 THOUSAND/UL (ref 140–400)
PMV BLD REES-ECKER: 9.4 FL (ref 7.5–12.5)
POTASSIUM SERPL-SCNC: 3.8 MMOL/L (ref 3.5–5.3)
PROT SERPL-MCNC: 6.8 G/DL (ref 6.1–8.1)
PROT UR QL STRIP: NEGATIVE
RBC # BLD AUTO: 4.91 MILLION/UL (ref 3.8–5.1)
SODIUM SERPL-SCNC: 141 MMOL/L (ref 135–146)
SP GR UR STRIP: 1.04 (ref 1–1.03)
TRIGL SERPL-MCNC: 125 MG/DL
WBC # BLD AUTO: 6.3 THOUSAND/UL (ref 3.8–10.8)

## 2022-08-03 ENCOUNTER — OFFICE VISIT (OUTPATIENT)
Dept: FAMILY MEDICINE | Facility: CLINIC | Age: 57
End: 2022-08-03
Payer: COMMERCIAL

## 2022-08-03 VITALS — DIASTOLIC BLOOD PRESSURE: 84 MMHG | SYSTOLIC BLOOD PRESSURE: 132 MMHG

## 2022-08-03 DIAGNOSIS — J44.9 CHRONIC OBSTRUCTIVE PULMONARY DISEASE, UNSPECIFIED COPD TYPE: Primary | ICD-10-CM

## 2022-08-03 DIAGNOSIS — E55.9 VITAMIN D DEFICIENCY: ICD-10-CM

## 2022-08-03 DIAGNOSIS — R45.89 DEPRESSED MOOD: ICD-10-CM

## 2022-08-03 DIAGNOSIS — I10 ESSENTIAL HYPERTENSION: ICD-10-CM

## 2022-08-03 DIAGNOSIS — K21.9 GASTROESOPHAGEAL REFLUX DISEASE WITHOUT ESOPHAGITIS: ICD-10-CM

## 2022-08-03 DIAGNOSIS — E11.8 TYPE 2 DIABETES MELLITUS WITH COMPLICATION, WITHOUT LONG-TERM CURRENT USE OF INSULIN: ICD-10-CM

## 2022-08-03 DIAGNOSIS — E66.09 CLASS 1 OBESITY DUE TO EXCESS CALORIES WITH SERIOUS COMORBIDITY AND BODY MASS INDEX (BMI) OF 34.0 TO 34.9 IN ADULT: ICD-10-CM

## 2022-08-03 DIAGNOSIS — E78.2 MIXED HYPERLIPIDEMIA: ICD-10-CM

## 2022-08-03 PROCEDURE — 3075F PR MOST RECENT SYSTOLIC BLOOD PRESS GE 130-139MM HG: ICD-10-PCS | Mod: CPTII,95,, | Performed by: NURSE PRACTITIONER

## 2022-08-03 PROCEDURE — 3044F PR MOST RECENT HEMOGLOBIN A1C LEVEL <7.0%: ICD-10-PCS | Mod: CPTII,95,, | Performed by: NURSE PRACTITIONER

## 2022-08-03 PROCEDURE — 1160F RVW MEDS BY RX/DR IN RCRD: CPT | Mod: CPTII,95,, | Performed by: NURSE PRACTITIONER

## 2022-08-03 PROCEDURE — 3079F PR MOST RECENT DIASTOLIC BLOOD PRESSURE 80-89 MM HG: ICD-10-PCS | Mod: CPTII,95,, | Performed by: NURSE PRACTITIONER

## 2022-08-03 PROCEDURE — 1160F PR REVIEW ALL MEDS BY PRESCRIBER/CLIN PHARMACIST DOCUMENTED: ICD-10-PCS | Mod: CPTII,95,, | Performed by: NURSE PRACTITIONER

## 2022-08-03 PROCEDURE — 3066F NEPHROPATHY DOC TX: CPT | Mod: CPTII,95,, | Performed by: NURSE PRACTITIONER

## 2022-08-03 PROCEDURE — 3066F PR DOCUMENTATION OF TREATMENT FOR NEPHROPATHY: ICD-10-PCS | Mod: CPTII,95,, | Performed by: NURSE PRACTITIONER

## 2022-08-03 PROCEDURE — 1159F MED LIST DOCD IN RCRD: CPT | Mod: CPTII,95,, | Performed by: NURSE PRACTITIONER

## 2022-08-03 PROCEDURE — 1159F PR MEDICATION LIST DOCUMENTED IN MEDICAL RECORD: ICD-10-PCS | Mod: CPTII,95,, | Performed by: NURSE PRACTITIONER

## 2022-08-03 PROCEDURE — 3079F DIAST BP 80-89 MM HG: CPT | Mod: CPTII,95,, | Performed by: NURSE PRACTITIONER

## 2022-08-03 PROCEDURE — 3044F HG A1C LEVEL LT 7.0%: CPT | Mod: CPTII,95,, | Performed by: NURSE PRACTITIONER

## 2022-08-03 PROCEDURE — 99214 OFFICE O/P EST MOD 30 MIN: CPT | Mod: 95,,, | Performed by: NURSE PRACTITIONER

## 2022-08-03 PROCEDURE — 3075F SYST BP GE 130 - 139MM HG: CPT | Mod: CPTII,95,, | Performed by: NURSE PRACTITIONER

## 2022-08-03 PROCEDURE — 99214 PR OFFICE/OUTPT VISIT, EST, LEVL IV, 30-39 MIN: ICD-10-PCS | Mod: 95,,, | Performed by: NURSE PRACTITIONER

## 2022-08-03 PROCEDURE — 3061F PR NEG MICROALBUMINURIA RESULT DOCUMENTED/REVIEW: ICD-10-PCS | Mod: CPTII,95,, | Performed by: NURSE PRACTITIONER

## 2022-08-03 PROCEDURE — 3061F NEG MICROALBUMINURIA REV: CPT | Mod: CPTII,95,, | Performed by: NURSE PRACTITIONER

## 2022-08-03 RX ORDER — ROSUVASTATIN CALCIUM 20 MG/1
20 TABLET, COATED ORAL DAILY
Qty: 90 TABLET | Refills: 0 | Status: SHIPPED | OUTPATIENT
Start: 2022-08-03 | End: 2022-12-05

## 2022-08-03 RX ORDER — METFORMIN HYDROCHLORIDE 500 MG/1
TABLET, EXTENDED RELEASE ORAL
Qty: 180 TABLET | Refills: 1 | Status: SHIPPED | OUTPATIENT
Start: 2022-08-03 | End: 2023-05-23 | Stop reason: SDUPTHER

## 2022-08-03 NOTE — PROGRESS NOTES
Subjective:        The chief complaint leading to consultation is: diabetes, htn, cholesterol  The patient location is:  Home  Visit type: Virtual visit with synchronous audio/video or audio only  This was a video visit in lieu of in-person visit due to the coronavirus emergency. Patient acknowledged and consented to the video visit encounter.     Patient presents today following up for chronic conditions including hypertension, hyperlipidemia, diabetes, and depressed mood.   Patient is stable on her current medications.    Labs have been received and reviewed.  These will be reviewed with patient today.  They are stable and within acceptable range.  Urine is concentrated and patient will be encouraged to make sure to stay adequately hydrated with current medications.  Patient is obese with a BMI of 34.70      Diabetes  She presents for her follow-up diabetic visit. She has type 2 diabetes mellitus. No MedicAlert identification noted. Her disease course has been stable. Pertinent negatives for hypoglycemia include no confusion, dizziness, headaches, hunger, mood changes, nervousness/anxiousness, pallor, seizures, sleepiness, speech difficulty, sweats or tremors. Pertinent negatives for diabetes include no chest pain, no fatigue, no foot paresthesias, no foot ulcerations, no polydipsia, no polyphagia, no polyuria and no weakness. There are no hypoglycemic complications. Pertinent negatives for hypoglycemia complications include no blackouts, no hospitalization, no nocturnal hypoglycemia, no required assistance and no required glucagon injection. Symptoms are worsening. There are no diabetic complications. Pertinent negatives for diabetic complications include no autonomic neuropathy, CVA, heart disease, impotence, nephropathy, peripheral neuropathy, PVD or retinopathy. Risk factors for coronary artery disease include hypertension, stress, sedentary lifestyle, obesity, dyslipidemia and diabetes mellitus. Current  diabetic treatment includes oral agent (triple therapy) (metformin, janumet, jardiance). She is compliant with treatment most of the time. Her weight is stable. She is following a generally healthy diet. Meal planning includes avoidance of concentrated sweets. She has not had a previous visit with a dietitian. She participates in exercise intermittently. There is no compliance with monitoring of blood glucose. Her home blood glucose trend is decreasing steadily. An ACE inhibitor/angiotensin II receptor blocker is being taken. She does not see a podiatrist.Eye exam is not current.   Hypertension  This is a chronic problem. The current episode started more than 1 month ago. The problem has been waxing and waning since onset. The problem is controlled. Associated symptoms include anxiety and malaise/fatigue. Pertinent negatives include no chest pain, headaches, neck pain, palpitations, shortness of breath or sweats. Agents associated with hypertension include estrogens. Risk factors for coronary artery disease include smoking/tobacco exposure, sedentary lifestyle, obesity, diabetes mellitus, dyslipidemia and stress. Past treatments include calcium channel blockers, angiotensin blockers and direct vasodilators. The current treatment provides significant improvement. Compliance problems include diet and exercise.  There is no history of angina, CVA, PVD or retinopathy. There is no history of chronic renal disease.   Hyperlipidemia  This is a chronic problem. The current episode started more than 1 month ago. The problem is controlled. Recent lipid tests were reviewed and are variable. Exacerbating diseases include diabetes. She has no history of chronic renal disease. Factors aggravating her hyperlipidemia include fatty foods. Pertinent negatives include no chest pain, focal weakness, leg pain or shortness of breath. Current antihyperlipidemic treatment includes statins. The current treatment provides significant  improvement of lipids. Compliance problems include adherence to exercise and adherence to diet.  Risk factors for coronary artery disease include diabetes mellitus, dyslipidemia, obesity, a sedentary lifestyle and stress.   Depression  Visit Type: follow-up  Patient presents with the following symptoms: anhedonia and excessive worry.  Patient is not experiencing: confusion, impotence, nervousness/anxiety, palpitations, shortness of breath, suicidal ideas, suicidal planning and thoughts of death.  Frequency of symptoms: most days   Severity: mild   Sleep quality: fair  Nighttime awakenings: occasional        Past Surgical History:   Procedure Laterality Date    ADENOIDECTOMY      BREAST SURGERY Right     breast biopsy    COLONOSCOPY N/A 9/10/2021    Procedure: COLONOSCOPY;  Surgeon: Holland Royal MD;  Location: [facility] [place], [place];  Service: Endoscopy;  Laterality: N/A;    TONSILLECTOMY      TRANSFORAMINAL EPIDURAL INJECTION OF STEROID Left 4/16/2019    Procedure: Injection,steroid,epidural,transforaminal approach;  Surgeon: Gilberto Michelle MD;  Location: [facility];  Service: Pain Management;  Laterality: Left;  L5-S1, S1    TRANSFORAMINAL EPIDURAL INJECTION OF STEROID Left 8/23/2019    Procedure: Injection,steroid,epidural,transforaminal approach;  Surgeon: Gilberto Michelle MD;  Location: [facility];  Service: Pain Management;  Laterality: Left;  L5-S1, S1     Past Medical History:   Diagnosis Date    Class 3 severe obesity due to excess calories with serious comorbidity and body mass index (BMI) of 40.0 to 44.9 in adult 10/17/2018    COPD (chronic obstructive pulmonary disease)     Diabetes mellitus, type 2     GERD (gastroesophageal reflux disease)     Hyperlipidemia     Hypertension     Sleep apnea     Vitamin D deficiency      Family History   Problem Relation Age of Onset    Stroke Mother     Emphysema Mother     Diabetes Father     Coronary artery disease Father     COPD Father     Diabetes Sister          Social History:   Marital Status: Significant Other  Alcohol History:  reports current alcohol use.  Tobacco History:  reports that she quit smoking about 3 years ago. Her smoking use included cigarettes. She smoked 2.00 packs per day. She has never used smokeless tobacco.  Drug History:  reports current drug use. Drug: Hydrocodone.    Review of patient's allergies indicates:  No Known Allergies    Current Outpatient Medications   Medication Sig Dispense Refill    albuterol-ipratropium (DUO-NEB) 2.5 mg-0.5 mg/3 mL nebulizer solution Take 3 mLs by nebulization every 6 (six) hours as needed for Wheezing. Rescue 1 each 2    amLODIPine (NORVASC) 10 MG tablet TAKE 1 TABLET BY MOUTH EVERY DAY 90 tablet 1    aspirin/salicylamide/caffeine (BC HEADACHE POWDER ORAL) Take by mouth as needed.      azelastine (ASTELIN) 137 mcg (0.1 %) nasal spray USE 1 SPRAY IN EACH NOSTRIL TWICE DAILY 30 mL 2    blood sugar diagnostic Strp 1 each by Misc.(Non-Drug; Combo Route) route once daily. 100 each 2    blood-glucose meter Misc 1 each by Misc.(Non-Drug; Combo Route) route once daily. 1 each 0    buPROPion (WELLBUTRIN XL) 300 MG 24 hr tablet TAKE 1 TABLET BY MOUTH EVERY DAY 90 tablet 0    clotrimazole-betamethasone 1-0.05% (LOTRISONE) cream APPLY TOPICALLY 2 (TWO) TIMES DAILY AS NEEDED (RASH). 15 g 0    COMBIVENT RESPIMAT  mcg/actuation inhaler INHALE 2 PUFFS BY MOUTH EVERY 6 HOURS AS NEEDED FOR WHEEZE 4 each 5    fluticasone propionate (FLONASE) 50 mcg/actuation nasal spray 1 spray (50 mcg total) by Each Nostril route 2 (two) times daily. 48 mL 1    fluticasone-salmeterol diskus inhaler 500-50 mcg Inhale 1 puff into the lungs 2 (two) times daily. Controller 90 each 1    HYDROcodone-acetaminophen (NORCO) 5-325 mg per tablet Take 1 tablet by mouth every 4 (four) hours as needed for Pain. 10 tablet 0    ibuprofen (ADVIL,MOTRIN) 600 MG tablet Take 1 tablet (600 mg total) by mouth every 6 (six) hours as needed for Pain.  20 tablet 0    JANUMET -1,000 mg TM24 TAKE 1 TABLET BY MOUTH EVERY DAY 90 tablet 1    JARDIANCE 25 mg tablet TAKE 1 TABLET BY MOUTH EVERY DAY 90 tablet 1    lancets 33 gauge Misc 1 lancet by Misc.(Non-Drug; Combo Route) route once daily. 100 each 3    melatonin 5 mg Tab Take 10 mg by mouth nightly.       metFORMIN (GLUCOPHAGE-XR) 500 MG ER 24hr tablet TAKE TWO TABLETS BY MOUTH DAILY WITH DINNER OR EVENING MEAL 180 tablet 1    montelukast (SINGULAIR) 10 mg tablet TAKE 1 TABLET BY MOUTH EVERY DAY 90 tablet 1    pantoprazole (PROTONIX) 40 MG tablet TAKE 1 TABLET BY MOUTH EVERY DAY 90 tablet 1    rosuvastatin (CRESTOR) 20 MG tablet Take 1 tablet (20 mg total) by mouth once daily. 90 tablet 0    tiZANidine 4 mg Cap Take 1 capsule by mouth 3 (three) times daily as needed.       valsartan-hydrochlorothiazide (DIOVAN-HCT) 160-25 mg per tablet TAKE 1 TABLET BY MOUTH EVERY DAY 90 tablet 1    varicella-zoster gE-AS01B, PF, (SHINGRIX, PF,) 50 mcg/0.5 mL injection Shingrix (PF) 50 mcg/0.5 mL intramuscular suspension, kit   PHARMACY ADMINISTERED       No current facility-administered medications for this visit.       Review of Systems   Constitutional: Positive for malaise/fatigue. Negative for activity change, appetite change, fatigue, fever and unexpected weight change.        Obese   HENT: Negative for congestion, ear discharge, ear pain, hearing loss, rhinorrhea, sinus pressure, sore throat, trouble swallowing and voice change.    Eyes: Negative for photophobia, pain, discharge and visual disturbance.   Respiratory: Negative for cough, chest tightness, shortness of breath and wheezing.         COPD, former smoker   Cardiovascular: Negative for chest pain and palpitations.        Hypertension, hyperlipidemia   Gastrointestinal: Negative for abdominal pain, blood in stool, constipation, diarrhea, nausea and vomiting.   Endocrine: Negative for cold intolerance, heat intolerance, polydipsia, polyphagia and  polyuria.        Diabetes type 2   Genitourinary: Negative for difficulty urinating, dysuria, hematuria, impotence and menstrual problem.   Musculoskeletal: Positive for back pain (chronic). Negative for arthralgias, gait problem, joint swelling and neck pain.   Skin: Negative for color change, pallor and rash.   Allergic/Immunologic: Negative for immunocompromised state.   Neurological: Negative for dizziness, tremors, focal weakness, seizures, speech difficulty, weakness and headaches.   Psychiatric/Behavioral: Positive for depression. Negative for confusion, dysphoric mood, self-injury and suicidal ideas. The patient is not nervous/anxious.          Objective:        Physical Exam:   Physical Exam  Constitutional:       General: She is not in acute distress.     Appearance: Normal appearance. She is not ill-appearing.   HENT:      Head: Normocephalic and atraumatic.      Right Ear: External ear normal.      Left Ear: External ear normal.      Nose: Nose normal.      Mouth/Throat:      Mouth: Mucous membranes are moist.   Eyes:      Extraocular Movements: Extraocular movements intact.      Conjunctiva/sclera: Conjunctivae normal.   Cardiovascular:      Pulses: Normal pulses.      Heart sounds: No murmur heard.  Pulmonary:      Effort: No respiratory distress.      Breath sounds: Normal breath sounds. No stridor. No wheezing.   Abdominal:      General: There is no distension.      Palpations: Abdomen is soft.      Tenderness: There is no abdominal tenderness.   Musculoskeletal:         General: No swelling. Normal range of motion.      Cervical back: Normal range of motion.   Skin:     Coloration: Skin is not jaundiced or pale.      Findings: No bruising or erythema.   Neurological:      General: No focal deficit present.      Mental Status: She is alert. Mental status is at baseline.      Cranial Nerves: No cranial nerve deficit.   Psychiatric:         Mood and Affect: Mood normal.         Behavior: Behavior  normal.         Thought Content: Thought content normal.         Judgment: Judgment normal.              Assessment:       1. Chronic obstructive pulmonary disease, unspecified COPD type    2. Type 2 diabetes mellitus with complication, without long-term current use of insulin    3. Essential hypertension    4. Mixed hyperlipidemia    5. Vitamin D deficiency    6. Gastroesophageal reflux disease without esophagitis    7. Depressed mood    8. Class 1 obesity due to excess calories with serious comorbidity and body mass index (BMI) of 34.0 to 34.9 in adult      Plan:   Chronic obstructive pulmonary disease, unspecified COPD type  Stable.  Continue current inhalers    Type 2 diabetes mellitus with complication, without long-term current use of insulin  -     metFORMIN (GLUCOPHAGE-XR) 500 MG ER 24hr tablet; TAKE TWO TABLETS BY MOUTH DAILY WITH DINNER OR EVENING MEAL  Dispense: 180 tablet; Refill: 1  -     Hemoglobin A1C; Future; Expected date: 02/03/2023  -     Microalbumin/Creatinine Ratio, Urine; Future; Expected date: 02/03/2023  -     Comprehensive Metabolic Panel; Future; Expected date: 02/03/2023  -     Urinalysis; Future; Expected date: 02/03/2023    Essential hypertension  -     Microalbumin/Creatinine Ratio, Urine; Future; Expected date: 02/03/2023  -     CBC Auto Differential; Future; Expected date: 02/03/2023  -     Urinalysis; Future; Expected date: 02/03/2023  .  Continue current medication    Lifestyle changes: Reduce the amount of salt in your diet; Lose weight; Avoid drinking too much alcohol; Exercise at least 30 minutes per day most days of the week.  Continue current medications and home BP monitoring.     Mixed hyperlipidemia  -     rosuvastatin (CRESTOR) 20 MG tablet; Take 1 tablet (20 mg total) by mouth once daily.  Dispense: 90 tablet; Refill: 0  -     Lipid Panel; Future; Expected date: 02/03/2023    Vitamin D deficiency  -     Vitamin D; Future; Expected date: 02/03/2023    Gastroesophageal  reflux disease without esophagitis  -     Comprehensive Metabolic Panel; Future; Expected date: 02/03/2023    Depressed mood  Currently controlled on current medication.  Continue    Class 1 obesity due to excess calories with serious comorbidity and body mass index (BMI) of 34.0 to 34.9 in adult  The patient is asked to make an attempt to improve diet and exercise patterns to aid in medical management of this problem.      Discussed labs and improvement of diet, weight loss.  Patient should continue on current medication regimen and follow-up in about 5 months with labs prior to office visit.    Follow up in about 5 months (around 1/3/2023) for hypertension, hyperlipidemia, diabetes, lab review.    Total time spent with patient: 32 minutes    Each patient to whom he or she provides medical services by telemedicine is:  (1) informed of the relationship between the physician and patient and the respective role of any other health care provider with respect to management of the patient; and (2) notified that he or she may decline to receive medical services by telemedicine and may withdraw from such care at any time.

## 2022-08-12 ENCOUNTER — CLINICAL SUPPORT (OUTPATIENT)
Dept: OTHER | Facility: CLINIC | Age: 57
End: 2022-08-12
Payer: COMMERCIAL

## 2022-08-12 DIAGNOSIS — Z00.8 ENCOUNTER FOR OTHER GENERAL EXAMINATION: ICD-10-CM

## 2022-08-13 VITALS
WEIGHT: 227 LBS | BODY MASS INDEX: 36.48 KG/M2 | HEIGHT: 66 IN | SYSTOLIC BLOOD PRESSURE: 123 MMHG | DIASTOLIC BLOOD PRESSURE: 75 MMHG

## 2022-08-13 LAB
GLUCOSE SERPL-MCNC: 104 MG/DL (ref 60–140)
HBA1C MFR BLD: 5.9 %
HDLC SERPL-MCNC: 63 MG/DL
POC CHOLESTEROL, LDL (DOCK): 58.39 MG/DL
POC CHOLESTEROL, TOTAL: 139 MG/DL
TRIGL SERPL-MCNC: 95 MG/DL

## 2022-11-03 ENCOUNTER — HOSPITAL ENCOUNTER (OUTPATIENT)
Dept: RADIOLOGY | Facility: CLINIC | Age: 57
Discharge: HOME | End: 2022-11-03
Attending: PODIATRIST
Payer: COMMERCIAL

## 2022-11-03 ENCOUNTER — OFFICE VISIT (OUTPATIENT)
Dept: PODIATRY | Facility: CLINIC | Age: 57
End: 2022-11-03
Payer: COMMERCIAL

## 2022-11-03 VITALS — HEIGHT: 66 IN | BODY MASS INDEX: 37.12 KG/M2 | WEIGHT: 231 LBS | HEART RATE: 88 BPM | OXYGEN SATURATION: 97 %

## 2022-11-03 DIAGNOSIS — E11.8 TYPE 2 DIABETES MELLITUS WITH COMPLICATION, WITHOUT LONG-TERM CURRENT USE OF INSULIN: ICD-10-CM

## 2022-11-03 DIAGNOSIS — B35.3 TINEA PEDIS OF LEFT FOOT: ICD-10-CM

## 2022-11-03 DIAGNOSIS — M79.671 RIGHT FOOT PAIN: ICD-10-CM

## 2022-11-03 DIAGNOSIS — M19.079 ARTHRITIS OF FOOT: Primary | ICD-10-CM

## 2022-11-03 PROCEDURE — 3044F PR MOST RECENT HEMOGLOBIN A1C LEVEL <7.0%: ICD-10-PCS | Mod: CPTII,S$GLB,, | Performed by: PODIATRIST

## 2022-11-03 PROCEDURE — 3044F HG A1C LEVEL LT 7.0%: CPT | Mod: CPTII,S$GLB,, | Performed by: PODIATRIST

## 2022-11-03 PROCEDURE — 3066F NEPHROPATHY DOC TX: CPT | Mod: CPTII,S$GLB,, | Performed by: PODIATRIST

## 2022-11-03 PROCEDURE — 99203 OFFICE O/P NEW LOW 30 MIN: CPT | Mod: S$GLB,,, | Performed by: PODIATRIST

## 2022-11-03 PROCEDURE — 73630 X-RAY EXAM OF FOOT: CPT | Mod: RT,S$GLB,, | Performed by: RADIOLOGY

## 2022-11-03 PROCEDURE — 3066F PR DOCUMENTATION OF TREATMENT FOR NEPHROPATHY: ICD-10-PCS | Mod: CPTII,S$GLB,, | Performed by: PODIATRIST

## 2022-11-03 PROCEDURE — 1160F PR REVIEW ALL MEDS BY PRESCRIBER/CLIN PHARMACIST DOCUMENTED: ICD-10-PCS | Mod: CPTII,S$GLB,, | Performed by: PODIATRIST

## 2022-11-03 PROCEDURE — 3061F NEG MICROALBUMINURIA REV: CPT | Mod: CPTII,S$GLB,, | Performed by: PODIATRIST

## 2022-11-03 PROCEDURE — 3061F PR NEG MICROALBUMINURIA RESULT DOCUMENTED/REVIEW: ICD-10-PCS | Mod: CPTII,S$GLB,, | Performed by: PODIATRIST

## 2022-11-03 PROCEDURE — 1160F RVW MEDS BY RX/DR IN RCRD: CPT | Mod: CPTII,S$GLB,, | Performed by: PODIATRIST

## 2022-11-03 PROCEDURE — 3008F PR BODY MASS INDEX (BMI) DOCUMENTED: ICD-10-PCS | Mod: CPTII,S$GLB,, | Performed by: PODIATRIST

## 2022-11-03 PROCEDURE — 99203 PR OFFICE/OUTPT VISIT, NEW, LEVL III, 30-44 MIN: ICD-10-PCS | Mod: S$GLB,,, | Performed by: PODIATRIST

## 2022-11-03 PROCEDURE — 1159F PR MEDICATION LIST DOCUMENTED IN MEDICAL RECORD: ICD-10-PCS | Mod: CPTII,S$GLB,, | Performed by: PODIATRIST

## 2022-11-03 PROCEDURE — 3008F BODY MASS INDEX DOCD: CPT | Mod: CPTII,S$GLB,, | Performed by: PODIATRIST

## 2022-11-03 PROCEDURE — 73630 XR FOOT COMPLETE 3 VIEW RIGHT: ICD-10-PCS | Mod: RT,S$GLB,, | Performed by: RADIOLOGY

## 2022-11-03 PROCEDURE — 1159F MED LIST DOCD IN RCRD: CPT | Mod: CPTII,S$GLB,, | Performed by: PODIATRIST

## 2022-11-03 RX ORDER — MUPIROCIN 20 MG/G
OINTMENT TOPICAL
COMMUNITY
Start: 2022-09-22

## 2022-11-03 RX ORDER — FLUCONAZOLE 200 MG/1
200 TABLET ORAL WEEKLY
Qty: 6 TABLET | Refills: 0 | Status: SHIPPED | OUTPATIENT
Start: 2022-11-03 | End: 2022-12-09

## 2022-11-03 NOTE — PROGRESS NOTES
"  [address] [place] [address]  Phone: [phone]   [phone]    Patient's PCP:PARAG Crenshaw  Referring Provider: No ref. provider found    Subjective:      Chief Complaint:: Foot Problem (Blisters on the bottom of the left foot )    ALONSO Dominguez is a 57 y.o. female who presents today with a complaint of blisters on the bottom of the left foot lasting for almost a year off and on. Onset of symptoms went to work and came home and felt funny on the bottom of the foot and went to lay down and when I woke up there was a blister and reports no trauma.  Current symptoms include blisters that pop and have an odor and sometimes a little blood.  Aggravating factors are none. Symptoms have progressed. Treatment to date have included popping the blister and using Bactroban.    Who also presents with right foot pain lasting for about a year and a half. Onset of symptoms was walking a lot and started feeling pain. Current symptoms include pain on the top of the right foot. Aggravating factors worse in the morning and after sitting for a short period of time. Symptoms have progressed. Treatment to date have included Ice.     Who also presents to the clinic for a diabetic foot exam.     Pt has seen PARAG Grimes   Seen on 8/3/2022 .    Blood sugar: doesn't take  Hemoglobin A1C: 5.9 (8/12/2022)         Vitals:    11/03/22 1141   Pulse: 88   SpO2: 97%   Weight: 104.8 kg (231 lb)   Height: 5' 6" (1.676 m)   PainSc:   1      Shoe Size: 10    Past Surgical History:   Procedure Laterality Date    ADENOIDECTOMY      BREAST SURGERY Right     breast biopsy    COLONOSCOPY N/A 9/10/2021    Procedure: COLONOSCOPY;  Surgeon: Holland Royal MD;  Location: [facility];  Service: Endoscopy;  Laterality: N/A;    TONSILLECTOMY      TRANSFORAMINAL EPIDURAL INJECTION OF STEROID Left 4/16/2019    Procedure: Injection,steroid,epidural,transforaminal approach;  Surgeon: Gilberto Michelle MD;  " Location: [facility] OR;  Service: Pain Management;  Laterality: Left;  L5-S1, S1    TRANSFORAMINAL EPIDURAL INJECTION OF STEROID Left 8/23/2019    Procedure: Injection,steroid,epidural,transforaminal approach;  Surgeon: Gilberto Michelle MD;  Location: [facility] OR;  Service: Pain Management;  Laterality: Left;  L5-S1, S1     Past Medical History:   Diagnosis Date    Class 3 severe obesity due to excess calories with serious comorbidity and body mass index (BMI) of 40.0 to 44.9 in adult 10/17/2018    COPD (chronic obstructive pulmonary disease)     Diabetes mellitus, type 2     GERD (gastroesophageal reflux disease)     Hyperlipidemia     Hypertension     Sleep apnea     Vitamin D deficiency      Family History   Problem Relation Age of Onset    Stroke Mother     Emphysema Mother     Diabetes Father     Coronary artery disease Father     COPD Father     Diabetes Sister         Social History:   Marital Status: Significant Other  Alcohol History:  reports current alcohol use.  Tobacco History:  reports that she quit smoking about 3 years ago. Her smoking use included cigarettes. She smoked an average of 2 packs per day. She has never used smokeless tobacco.  Drug History:  reports current drug use. Drug: Hydrocodone.    Review of patient's allergies indicates:  No Known Allergies    Current Outpatient Medications   Medication Sig Dispense Refill    albuterol-ipratropium (DUO-NEB) 2.5 mg-0.5 mg/3 mL nebulizer solution Take 3 mLs by nebulization every 6 (six) hours as needed for Wheezing. Rescue 1 each 2    amLODIPine (NORVASC) 10 MG tablet TAKE 1 TABLET BY MOUTH EVERY DAY 90 tablet 1    aspirin/salicylamide/caffeine (BC HEADACHE POWDER ORAL) Take by mouth as needed.      azelastine (ASTELIN) 137 mcg (0.1 %) nasal spray USE 1 SPRAY IN EACH NOSTRIL TWICE DAILY 30 mL 2    blood sugar diagnostic Strp 1 each by Misc.(Non-Drug; Combo Route) route once daily. 100 each 2    buPROPion (WELLBUTRIN XL) 300 MG 24 hr tablet TAKE 1 TABLET BY MOUTH  [FreeTextEntry1] : Referred by Dr. Isabel for pituitary lesion. Has been seen previously in [facility] by a neurologist, neurosurgeon and endocrinologist, none of whose records are as yet available. But Dr. Isabel has requested a follow up MRI with contrast that is available and was reviewed EVERY DAY 90 tablet 1    clotrimazole-betamethasone 1-0.05% (LOTRISONE) cream APPLY TOPICALLY 2 (TWO) TIMES DAILY AS NEEDED (RASH). 15 g 0    COMBIVENT RESPIMAT  mcg/actuation inhaler INHALE 2 PUFFS BY MOUTH EVERY 6 HOURS AS NEEDED FOR WHEEZING 4 g 5    fluticasone propionate (FLONASE) 50 mcg/actuation nasal spray 1 spray (50 mcg total) by Each Nostril route 2 (two) times daily. 48 mL 1    fluticasone-salmeterol 500-50 mcg/dose (WIXELA INHUB) 500-50 mcg/dose DsDv diskus inhaler INHALE 1 PUFF INTO THE LUNGS 2 (TWO) TIMES DAILY. CONTROLLER 60 each 5    ibuprofen (ADVIL,MOTRIN) 600 MG tablet Take 1 tablet (600 mg total) by mouth every 6 (six) hours as needed for Pain. 20 tablet 0    JANUMET -1,000 mg TM24 TAKE 1 TABLET BY MOUTH EVERY DAY 90 tablet 1    JARDIANCE 25 mg tablet TAKE 1 TABLET BY MOUTH EVERY DAY 90 tablet 1    lancets 33 gauge Misc 1 lancet by Misc.(Non-Drug; Combo Route) route once daily. 100 each 3    melatonin 5 mg Tab Take 10 mg by mouth nightly.       metFORMIN (GLUCOPHAGE-XR) 500 MG ER 24hr tablet TAKE TWO TABLETS BY MOUTH DAILY WITH DINNER OR EVENING MEAL 180 tablet 1    montelukast (SINGULAIR) 10 mg tablet TAKE 1 TABLET BY MOUTH EVERY DAY 90 tablet 1    pantoprazole (PROTONIX) 40 MG tablet TAKE 1 TABLET BY MOUTH EVERY DAY 90 tablet 1    rosuvastatin (CRESTOR) 20 MG tablet Take 1 tablet (20 mg total) by mouth once daily. 90 tablet 0    valsartan-hydrochlorothiazide (DIOVAN-HCT) 160-25 mg per tablet TAKE 1 TABLET BY MOUTH EVERY DAY 90 tablet 1    varicella-zoster gE-AS01B, PF, (SHINGRIX, PF,) 50 mcg/0.5 mL injection Shingrix (PF) 50 mcg/0.5 mL intramuscular suspension, kit   PHARMACY ADMINISTERED      blood-glucose meter Misc 1 each by Misc.(Non-Drug; Combo Route) route once daily. 1 each 0    fluconazole (DIFLUCAN) 200 MG Tab Take 1 tablet (200 mg total) by mouth once a week. for 6 doses 6 tablet 0    HYDROcodone-acetaminophen (NORCO) 5-325 mg per tablet Take 1 tablet by mouth every 4  (four) hours as needed for Pain. (Patient not taking: Reported on 11/3/2022) 10 tablet 0    mupirocin (BACTROBAN) 2 % ointment Apply topically.      tiZANidine 4 mg Cap Take 1 capsule by mouth 3 (three) times daily as needed.        No current facility-administered medications for this visit.       Review of Systems   Constitutional:  Negative for chills, fatigue, fever and unexpected weight change.   HENT:  Negative for hearing loss and trouble swallowing.    Eyes:  Negative for photophobia and visual disturbance.   Respiratory:  Negative for cough, shortness of breath and wheezing.    Cardiovascular:  Negative for chest pain, palpitations and leg swelling.   Gastrointestinal:  Negative for abdominal pain and nausea.   Genitourinary:  Negative for dysuria and frequency.   Musculoskeletal:  Negative for arthralgias, back pain, gait problem, joint swelling and myalgias.   Skin:  Negative for rash and wound.   Neurological:  Positive for numbness and headaches. Negative for seizures and weakness.   Hematological:  Does not bruise/bleed easily.       Objective:        Physical Exam:   Foot Exam    General  General Appearance: appears stated age and healthy   Orientation: alert and oriented to person, place, and time   Affect: appropriate   Gait: antalgic       Right Foot/Ankle     Inspection and Palpation  Ecchymosis: none  Tenderness: (Calcaneocuboid joint)  Swelling: (Mild swelling calcaneocuboid joint and extensor digitorum brevis muscle)  Arch: pes planus  Hammertoes: absent  Claw Toes: absent  Hallux valgus: no  Hallux limitus: no  Skin Exam: skin intact;   Neurovascular  Dorsalis pedis: 2+  Posterior tibial: 2+  Capillary Refill: 2+  Saphenous nerve sensation: normal  Tibial nerve sensation: normal  Superficial peroneal nerve sensation: normal  Deep peroneal nerve sensation: normal  Sural nerve sensation: normal    Muscle Strength  Ankle dorsiflexion: 5  Ankle plantar flexion: 5  Ankle inversion: 5  Ankle  eversion: 5  Great toe extension: 5  Great toe flexion: 5    Range of Motion    Normal right ankle ROM      Left Foot/Ankle      Inspection and Palpation  Ecchymosis: none  Tenderness: none   Swelling: none   Arch: pes planus  Hammertoes: absent  Claw toes: absent  Hallux valgus: no  Hallux limitus: no  Skin Exam: tinea; (Scaling in superficial dry blister type formation plantar forefoot similar to tinea pedis possible eczema.  No cellulitis.)  Neurovascular  Dorsalis pedis: 2+  Posterior tibial: 2+  Capillary refill: 2+  Saphenous nerve sensation: normal  Tibial nerve sensation: normal  Superficial peroneal nerve sensation: normal  Deep peroneal nerve sensation: normal  Sural nerve sensation: normal    Muscle Strength  Ankle dorsiflexion: 5  Ankle plantar flexion: 5  Ankle inversion: 5  Ankle eversion: 5  Great toe extension: 5  Great toe flexion: 5      Physical Exam  Cardiovascular:      Pulses:           Dorsalis pedis pulses are 2+ on the right side and 2+ on the left side.        Posterior tibial pulses are 2+ on the right side and 2+ on the left side.   Musculoskeletal:      Right foot: No bunion.      Left foot: No bunion.        Feet:              Right Ankle/Foot Exam     Range of Motion   The patient has normal right ankle ROM.    Left Ankle/Foot Exam     Muscle Strength   The patient has normal left ankle strength.      Muscle Strength   Right Lower Extremity   Ankle Dorsiflexion:  5   Plantar flexion:  5/5  Left Lower Extremity   Ankle Dorsiflexion:  5   Plantar flexion:  5/5     Vascular Exam     Right Pulses  Dorsalis Pedis:      2+  Posterior Tibial:      2+        Left Pulses  Dorsalis Pedis:      2+  Posterior Tibial:      2+         Imaging:   AP, lateral, lateral oblique weight-bearing x-rays right foot:  Pes planus foot structure inferior posterior calcaneal exostosis present.  No acute fractures, no bone tumors, no soft tissue masses seen.  Some degenerative changes calcaneocuboid joint which  may be related to congenital abnormality or previous injury or osteo arthritis.  Reviewed the x-rays of her left foot performed a year ago she has slight changes of the calcaneocuboid joint on that x-ray also.         Assessment:       1. Arthritis of foot    2. Right foot pain    3. Type 2 diabetes mellitus with complication, without long-term current use of insulin    4. Tinea pedis of left foot      Plan:   Arthritis of foot    Right foot pain  -     X-Ray Foot Complete Right    Type 2 diabetes mellitus with complication, without long-term current use of insulin    Tinea pedis of left foot    Other orders  -     fluconazole (DIFLUCAN) 200 MG Tab; Take 1 tablet (200 mg total) by mouth once a week. for 6 doses  Dispense: 6 tablet; Refill: 0     Evaluated patient far as diabetic concerns she is low risk of loss of limb due to good circulation normal neurological status although she does have some skin issues it could become infected if not treated properly.  She is once year evaluation for the diabetic status.    I evaluated lesion on the plantar aspect her left foot discuss with the possibilities of this being tinea pedis versus eczema questionable that would be psoriasis.  I am recommending we try the Lotrisone cream which she ready has she applies twice a day for 6 weeks I will place her on Diflucan 1 pill weekly for 6 weeks.  If the skin is doing a lot better than she will try to maintain with Lotrisone.  If it is not doing well she return to see me we can consider doing a skin scraping and sending it in the lab for histopathology and for any fungal cultures.    3.  I evaluated the pain and swelling she is having lateral-sided right foot described to the clinical radiological findings of some degenerative changes and abnormalities of the calcaneocuboid joint and the anterior superior aspect of the calcaneus.  Explained her this can be degenerative arthritis could be an old injury could be a congenital  abnormality of the foot with a possible partial tarsal coalition.  She has similar changes on the left foot but not as severe as the right foot.  I recommending treat the symptoms as needed protective shoe gear, over-the-counter anti-inflammatories as needed.  If the pain becomes progressive that I would order an MRI of the right foot to evaluate for any damage to the joints as well as tendons and ligaments in the area.  She will let me know gets bad enough to order the MRI.         Procedures          Counseling:     I provided patient education verbally regarding:   Patient diagnosis, treatment options, as well as alternatives, risks, and benefits.     I discussed the conservative treatent of arthritis consisiting of shoe modification, OTC NSAID, cortisone shots, a series of supartz injections, avoiding painful activities and common sense.  I explained that the arthritis may become more painful causng more disability and the possibility of further treatment.  Also discussed may need evaluation by Rheumatologist for possible inflammatory arthritis.     I discussed tarsal coalition, etiology, usual course ofdevelopment, sequela of the fusion and pain and limited ROM of affected joint by location of the coalition.  I discussed conservative care of shoe modification, CMO , NSAIDS, cortisone shots as symptomatic treatment.  I discussed that surgery is sometimes done if the coalition is found early enough and is small enough to allow for adequate resection and preservation of the joint.  I discussed that is some cases the resection will not work and a fusion of the rest of the joint ( subtalar in particular) may need to be done along with a realignment of the rigid flat foot if present.  I explained that some caolitions may never cause pain or deformity but soem may lead to further deformity of the foot, more stiffness, pain and arthritis of the surrounding joins to the coalition.     Athlete's foot counseling:  Counseled patient that it is important to keep the feet dry. Use absorbent cotton socks and change them if they become sweaty. Or wear an open-toe shoe or sandal. Wash the feet at least once a day with soap and water. Apply the antifungal cream as prescribed. Recommend spray antiperspirant to the feet. Some antifungal creams are available without a prescription. It may take a week before the rash starts to improve. It can take about 3 to 4 weeks to completely clear. Continue the medicine until the rash is all gone. Use over-the-counter antifungal powders or sprays on your feet after exposure to high-risk environments, such as public showers, gyms, and locker rooms. This can help prevent future infections. Wearing appropriate shoes in these situations can help.     Counseling/Education:  I provided patient education verbally regarding:   The aspects of diabetes and how it pertains to the feet. I explained the importance of proper diabetic foot care and how it is essential for the health of their feet.    I discussed the importance of knowing their Hemoglobin A1c and that the level needs to be as close to 6 as possible. I discussed the increase complications of high blood sugar including stroke, blindness, heart attack, kidney failure and loss of limb secondary to neuropathy and PVD.     With neuropathy, beware of any breaks in the skin or redness. These areas are not recognized early due to the numbness.    I discussed Diabetes, lower back issues, metabolic disorders, systemic causes, chemotherapy, vitamin deficiency, heavy metal exposure, as some of the causes. I also explained that as much as 40% of the time we can not find a cause. I discussed different treatments available to control the symptoms but which may not cure the problem.       This note was created using Dragon voice recognition software that occasionally misinterpreted phrases or words.

## 2023-03-14 ENCOUNTER — TELEPHONE (OUTPATIENT)
Dept: FAMILY MEDICINE | Facility: CLINIC | Age: 58
End: 2023-03-14

## 2023-03-14 DIAGNOSIS — E55.9 VITAMIN D DEFICIENCY: ICD-10-CM

## 2023-03-14 DIAGNOSIS — K21.9 GASTROESOPHAGEAL REFLUX DISEASE WITHOUT ESOPHAGITIS: ICD-10-CM

## 2023-03-14 DIAGNOSIS — I10 ESSENTIAL HYPERTENSION: ICD-10-CM

## 2023-03-14 DIAGNOSIS — E78.2 MIXED HYPERLIPIDEMIA: ICD-10-CM

## 2023-03-14 DIAGNOSIS — E11.8 TYPE 2 DIABETES MELLITUS WITH COMPLICATION, WITHOUT LONG-TERM CURRENT USE OF INSULIN: Primary | ICD-10-CM

## 2023-03-21 ENCOUNTER — OFFICE VISIT (OUTPATIENT)
Dept: FAMILY MEDICINE | Facility: CLINIC | Age: 58
End: 2023-03-21
Payer: COMMERCIAL

## 2023-03-21 VITALS
BODY MASS INDEX: 37.28 KG/M2 | HEART RATE: 84 BPM | WEIGHT: 232 LBS | TEMPERATURE: 99 F | HEIGHT: 66 IN | SYSTOLIC BLOOD PRESSURE: 120 MMHG | DIASTOLIC BLOOD PRESSURE: 60 MMHG | OXYGEN SATURATION: 98 %

## 2023-03-21 DIAGNOSIS — E11.8 TYPE 2 DIABETES MELLITUS WITH COMPLICATION, WITHOUT LONG-TERM CURRENT USE OF INSULIN: Primary | ICD-10-CM

## 2023-03-21 DIAGNOSIS — M54.50 ACUTE BILATERAL LOW BACK PAIN WITHOUT SCIATICA: ICD-10-CM

## 2023-03-21 DIAGNOSIS — M62.838 MUSCLE SPASM: ICD-10-CM

## 2023-03-21 DIAGNOSIS — E78.2 MIXED HYPERLIPIDEMIA: ICD-10-CM

## 2023-03-21 DIAGNOSIS — E55.9 VITAMIN D DEFICIENCY: ICD-10-CM

## 2023-03-21 DIAGNOSIS — I10 ESSENTIAL HYPERTENSION: ICD-10-CM

## 2023-03-21 DIAGNOSIS — K21.9 GASTROESOPHAGEAL REFLUX DISEASE WITHOUT ESOPHAGITIS: ICD-10-CM

## 2023-03-21 DIAGNOSIS — R45.89 DEPRESSED MOOD: ICD-10-CM

## 2023-03-21 DIAGNOSIS — J44.9 CHRONIC OBSTRUCTIVE PULMONARY DISEASE, UNSPECIFIED COPD TYPE: ICD-10-CM

## 2023-03-21 LAB
ALBUMIN SERPL-MCNC: 4.1 G/DL (ref 3.6–5.1)
ALBUMIN/CREAT UR: 13 MCG/MG CREAT
ALBUMIN/GLOB SERPL: 1.6 (CALC) (ref 1–2.5)
ALP SERPL-CCNC: 77 U/L (ref 37–153)
ALT SERPL-CCNC: 18 U/L (ref 6–29)
APPEARANCE UR: CLEAR
AST SERPL-CCNC: 20 U/L (ref 10–35)
BASOPHILS # BLD AUTO: 49 CELLS/UL (ref 0–200)
BASOPHILS NFR BLD AUTO: 0.8 %
BILIRUB SERPL-MCNC: 0.5 MG/DL (ref 0.2–1.2)
BILIRUB UR QL STRIP: NEGATIVE
BUN SERPL-MCNC: 15 MG/DL (ref 7–25)
BUN/CREAT SERPL: NORMAL (CALC) (ref 6–22)
CALCIUM SERPL-MCNC: 9.5 MG/DL (ref 8.6–10.4)
CHLORIDE SERPL-SCNC: 103 MMOL/L (ref 98–110)
CHOLEST SERPL-MCNC: 157 MG/DL
CHOLEST/HDLC SERPL: 2.2 (CALC)
CO2 SERPL-SCNC: 29 MMOL/L (ref 20–32)
COLOR UR: YELLOW
CREAT SERPL-MCNC: 0.65 MG/DL (ref 0.5–1.03)
CREAT UR-MCNC: 79 MG/DL (ref 20–275)
EGFR: 103 ML/MIN/1.73M2
EOSINOPHIL # BLD AUTO: 366 CELLS/UL (ref 15–500)
EOSINOPHIL NFR BLD AUTO: 6 %
ERYTHROCYTE [DISTWIDTH] IN BLOOD BY AUTOMATED COUNT: 13.7 % (ref 11–15)
GLOBULIN SER CALC-MCNC: 2.5 G/DL (CALC) (ref 1.9–3.7)
GLUCOSE SERPL-MCNC: 83 MG/DL (ref 65–99)
GLUCOSE UR QL STRIP: ABNORMAL
HBA1C MFR BLD: 6.6 % OF TOTAL HGB
HCT VFR BLD AUTO: 40.7 % (ref 35–45)
HDLC SERPL-MCNC: 70 MG/DL
HGB BLD-MCNC: 13.7 G/DL (ref 11.7–15.5)
HGB UR QL STRIP: NEGATIVE
KETONES UR QL STRIP: NEGATIVE
LDLC SERPL CALC-MCNC: 72 MG/DL (CALC)
LEUKOCYTE ESTERASE UR QL STRIP: NEGATIVE
LYMPHOCYTES # BLD AUTO: 1702 CELLS/UL (ref 850–3900)
LYMPHOCYTES NFR BLD AUTO: 27.9 %
MCH RBC QN AUTO: 28.2 PG (ref 27–33)
MCHC RBC AUTO-ENTMCNC: 33.7 G/DL (ref 32–36)
MCV RBC AUTO: 83.7 FL (ref 80–100)
MICROALBUMIN UR-MCNC: 1 MG/DL
MONOCYTES # BLD AUTO: 708 CELLS/UL (ref 200–950)
MONOCYTES NFR BLD AUTO: 11.6 %
NEUTROPHILS # BLD AUTO: 3276 CELLS/UL (ref 1500–7800)
NEUTROPHILS NFR BLD AUTO: 53.7 %
NITRITE UR QL STRIP: NEGATIVE
NONHDLC SERPL-MCNC: 87 MG/DL (CALC)
PH UR STRIP: ABNORMAL [PH] (ref 5–8)
PLATELET # BLD AUTO: 319 THOUSAND/UL (ref 140–400)
PMV BLD REES-ECKER: 9.5 FL (ref 7.5–12.5)
POTASSIUM SERPL-SCNC: 4.2 MMOL/L (ref 3.5–5.3)
PROT SERPL-MCNC: 6.6 G/DL (ref 6.1–8.1)
PROT UR QL STRIP: NEGATIVE
RBC # BLD AUTO: 4.86 MILLION/UL (ref 3.8–5.1)
SODIUM SERPL-SCNC: 141 MMOL/L (ref 135–146)
SP GR UR STRIP: 1.04 (ref 1–1.03)
TRIGL SERPL-MCNC: 72 MG/DL
WBC # BLD AUTO: 6.1 THOUSAND/UL (ref 3.8–10.8)

## 2023-03-21 PROCEDURE — 96372 THER/PROPH/DIAG INJ SC/IM: CPT | Mod: S$GLB,,, | Performed by: NURSE PRACTITIONER

## 2023-03-21 PROCEDURE — 1159F MED LIST DOCD IN RCRD: CPT | Mod: CPTII,S$GLB,, | Performed by: NURSE PRACTITIONER

## 2023-03-21 PROCEDURE — 1159F PR MEDICATION LIST DOCUMENTED IN MEDICAL RECORD: ICD-10-PCS | Mod: CPTII,S$GLB,, | Performed by: NURSE PRACTITIONER

## 2023-03-21 PROCEDURE — 1160F PR REVIEW ALL MEDS BY PRESCRIBER/CLIN PHARMACIST DOCUMENTED: ICD-10-PCS | Mod: CPTII,S$GLB,, | Performed by: NURSE PRACTITIONER

## 2023-03-21 PROCEDURE — 3044F PR MOST RECENT HEMOGLOBIN A1C LEVEL <7.0%: ICD-10-PCS | Mod: CPTII,S$GLB,, | Performed by: NURSE PRACTITIONER

## 2023-03-21 PROCEDURE — 1160F RVW MEDS BY RX/DR IN RCRD: CPT | Mod: CPTII,S$GLB,, | Performed by: NURSE PRACTITIONER

## 2023-03-21 PROCEDURE — 3061F NEG MICROALBUMINURIA REV: CPT | Mod: CPTII,S$GLB,, | Performed by: NURSE PRACTITIONER

## 2023-03-21 PROCEDURE — 3008F PR BODY MASS INDEX (BMI) DOCUMENTED: ICD-10-PCS | Mod: CPTII,S$GLB,, | Performed by: NURSE PRACTITIONER

## 2023-03-21 PROCEDURE — 3008F BODY MASS INDEX DOCD: CPT | Mod: CPTII,S$GLB,, | Performed by: NURSE PRACTITIONER

## 2023-03-21 PROCEDURE — 3074F SYST BP LT 130 MM HG: CPT | Mod: CPTII,S$GLB,, | Performed by: NURSE PRACTITIONER

## 2023-03-21 PROCEDURE — 3074F PR MOST RECENT SYSTOLIC BLOOD PRESSURE < 130 MM HG: ICD-10-PCS | Mod: CPTII,S$GLB,, | Performed by: NURSE PRACTITIONER

## 2023-03-21 PROCEDURE — 99214 OFFICE O/P EST MOD 30 MIN: CPT | Mod: 25,S$GLB,, | Performed by: NURSE PRACTITIONER

## 2023-03-21 PROCEDURE — 3044F HG A1C LEVEL LT 7.0%: CPT | Mod: CPTII,S$GLB,, | Performed by: NURSE PRACTITIONER

## 2023-03-21 PROCEDURE — 3061F PR NEG MICROALBUMINURIA RESULT DOCUMENTED/REVIEW: ICD-10-PCS | Mod: CPTII,S$GLB,, | Performed by: NURSE PRACTITIONER

## 2023-03-21 PROCEDURE — 99214 PR OFFICE/OUTPT VISIT, EST, LEVL IV, 30-39 MIN: ICD-10-PCS | Mod: 25,S$GLB,, | Performed by: NURSE PRACTITIONER

## 2023-03-21 PROCEDURE — 96372 PR INJECTION,THERAP/PROPH/DIAG2ST, IM OR SUBCUT: ICD-10-PCS | Mod: S$GLB,,, | Performed by: NURSE PRACTITIONER

## 2023-03-21 PROCEDURE — 3066F PR DOCUMENTATION OF TREATMENT FOR NEPHROPATHY: ICD-10-PCS | Mod: CPTII,S$GLB,, | Performed by: NURSE PRACTITIONER

## 2023-03-21 PROCEDURE — 3078F PR MOST RECENT DIASTOLIC BLOOD PRESSURE < 80 MM HG: ICD-10-PCS | Mod: CPTII,S$GLB,, | Performed by: NURSE PRACTITIONER

## 2023-03-21 PROCEDURE — 3078F DIAST BP <80 MM HG: CPT | Mod: CPTII,S$GLB,, | Performed by: NURSE PRACTITIONER

## 2023-03-21 PROCEDURE — 3066F NEPHROPATHY DOC TX: CPT | Mod: CPTII,S$GLB,, | Performed by: NURSE PRACTITIONER

## 2023-03-21 RX ORDER — DEXAMETHASONE SODIUM PHOSPHATE 4 MG/ML
4 INJECTION, SOLUTION INTRA-ARTICULAR; INTRALESIONAL; INTRAMUSCULAR; INTRAVENOUS; SOFT TISSUE
Status: COMPLETED | OUTPATIENT
Start: 2023-03-21 | End: 2023-03-21

## 2023-03-21 RX ORDER — TIZANIDINE HYDROCHLORIDE 4 MG/1
1 CAPSULE, GELATIN COATED ORAL 3 TIMES DAILY PRN
Qty: 30 CAPSULE | Refills: 1 | Status: SHIPPED | OUTPATIENT
Start: 2023-03-21 | End: 2023-08-29

## 2023-03-21 RX ORDER — CARIPRAZINE 1.5 MG/1
1.5 CAPSULE, GELATIN COATED ORAL DAILY
Qty: 30 CAPSULE | Refills: 2 | Status: SHIPPED | OUTPATIENT
Start: 2023-03-21 | End: 2023-05-23

## 2023-03-21 RX ADMIN — DEXAMETHASONE SODIUM PHOSPHATE 4 MG: 4 INJECTION, SOLUTION INTRA-ARTICULAR; INTRALESIONAL; INTRAMUSCULAR; INTRAVENOUS; SOFT TISSUE at 02:03

## 2023-03-21 NOTE — PROGRESS NOTES
Subjective:       Patient ID: Kemi Dominguez is a 58 y.o. female.    Chief Complaint: Diabetes, Hypertension, Hyperlipidemia, and Follow-up    Patient presents today following up for chronic conditions including hypertension, hyperlipidemia, diabetes, and depressed mood.   Patient has had increased depressed mood and lack of motivation lately. She has had some changes at work and this has affected her. She is still taking the wellbutruin and she has been on this for several years.  She slipped and injured her back a few days ago. She has been taking a friends gabapentin for relief and finds it mildly helpful. She is requesting some steroids to help with the inflammation.  Labs have been completed and will be reviewed at today's office visit.  Hemoglobin A1c has risen and patient does state that she has not been compliant with her diet.  Patient has been eating more sweets and nightly ice cream.    Patient is obese with a BMI of 37.45      Diabetes  She presents for her follow-up diabetic visit. She has type 2 diabetes mellitus. No MedicAlert identification noted. Her disease course has been stable. Hypoglycemia symptoms include headaches. Pertinent negatives for hypoglycemia include no confusion, dizziness, hunger, mood changes, nervousness/anxiousness, pallor, seizures, sleepiness, speech difficulty, sweats or tremors. Pertinent negatives for diabetes include no blurred vision, no foot paresthesias, no foot ulcerations, no polydipsia, no polyphagia, no polyuria and no weight loss. There are no hypoglycemic complications. Pertinent negatives for hypoglycemia complications include no blackouts, no hospitalization, no nocturnal hypoglycemia, no required assistance and no required glucagon injection. Symptoms are worsening. There are no diabetic complications. Pertinent negatives for diabetic complications include no autonomic neuropathy, CVA, heart disease, impotence, nephropathy, peripheral neuropathy, PVD or  retinopathy. Risk factors for coronary artery disease include hypertension, stress, sedentary lifestyle, obesity, dyslipidemia and diabetes mellitus. Current diabetic treatment includes oral agent (triple therapy) (metformin, janumet, jardiance). She is compliant with treatment most of the time. Her weight is stable. She is following a generally healthy diet. Meal planning includes avoidance of concentrated sweets. She has not had a previous visit with a dietitian. She participates in exercise intermittently. There is no compliance with monitoring of blood glucose. Her home blood glucose trend is decreasing steadily. An ACE inhibitor/angiotensin II receptor blocker is being taken. She does not see a podiatrist.Eye exam is not current.   Hypertension  This is a chronic problem. The current episode started more than 1 month ago. The problem has been waxing and waning since onset. The problem is controlled. Associated symptoms include anxiety, headaches and malaise/fatigue. Pertinent negatives include no blurred vision, palpitations or sweats. Agents associated with hypertension include estrogens. Risk factors for coronary artery disease include smoking/tobacco exposure, sedentary lifestyle, obesity, diabetes mellitus, dyslipidemia and stress. Past treatments include calcium channel blockers, angiotensin blockers and direct vasodilators. The current treatment provides significant improvement. Compliance problems include diet and exercise.  There is no history of angina, CVA, PVD or retinopathy. There is no history of chronic renal disease.   Hyperlipidemia  This is a chronic problem. The current episode started more than 1 month ago. The problem is controlled. Recent lipid tests were reviewed and are variable. Exacerbating diseases include diabetes and obesity. She has no history of chronic renal disease. Factors aggravating her hyperlipidemia include fatty foods. Associated symptoms include myalgias. Pertinent  negatives include no focal weakness or leg pain. Current antihyperlipidemic treatment includes statins. The current treatment provides significant improvement of lipids. Compliance problems include adherence to exercise and adherence to diet.  Risk factors for coronary artery disease include diabetes mellitus, dyslipidemia, obesity, a sedentary lifestyle and stress.   Depression  Visit Type: follow-up  Patient presents with the following symptoms: anhedonia and excessive worry.  Patient is not experiencing: confusion, impotence, nervousness/anxiety, palpitations, suicidal ideas, suicidal planning, thoughts of death and weight loss.  Frequency of symptoms: most days   Severity: moderate   Sleep quality: fair  Nighttime awakenings: occasional    Headache   This is a recurrent problem. The current episode started more than 1 month ago. The problem occurs intermittently. The problem has been waxing and waning. The pain is located in the Left unilateral region. The pain does not radiate. The pain quality is similar to prior headaches. The quality of the pain is described as pulsating. The pain is moderate. Associated symptoms include back pain (chronic), phonophobia and photophobia. Pertinent negatives include no blurred vision, dizziness, eye pain, hearing loss, rhinorrhea, seizures, sinus pressure or weight loss. The symptoms are aggravated by activity, bright light and caffeine withdrawal. She has tried NSAIDs (bc powders) for the symptoms. The treatment provided significant relief. Her past medical history is significant for obesity. There is no history of recent head traumas.   Review of Systems   Constitutional:  Positive for malaise/fatigue. Negative for activity change, appetite change, unexpected weight change and weight loss.        Obesity   HENT:  Negative for ear discharge, hearing loss, rhinorrhea, sinus pressure, trouble swallowing and voice change.    Eyes:  Positive for photophobia. Negative for blurred  vision, pain, discharge and visual disturbance.   Respiratory:  Negative for chest tightness and wheezing.         COPD, former smoker   Cardiovascular:  Negative for palpitations.        Hypertension, hyperlipidemia   Gastrointestinal:  Negative for blood in stool, constipation and diarrhea.        Gerd   Endocrine: Negative for cold intolerance, heat intolerance, polydipsia, polyphagia and polyuria.        Diabetes type 2   Genitourinary:  Negative for difficulty urinating, dysuria, hematuria, impotence and menstrual problem.   Musculoskeletal:  Positive for arthralgias, back pain (chronic) and myalgias. Negative for gait problem.   Skin:  Negative for color change and pallor.   Allergic/Immunologic: Negative for immunocompromised state.   Neurological:  Positive for headaches. Negative for dizziness, tremors, focal weakness, seizures and speech difficulty.   Psychiatric/Behavioral:  Positive for depression and dysphoric mood. Negative for confusion, self-injury and suicidal ideas. The patient is not nervous/anxious.      Past Medical History:   Diagnosis Date    Class 3 severe obesity due to excess calories with serious comorbidity and body mass index (BMI) of 40.0 to 44.9 in adult 10/17/2018    COPD (chronic obstructive pulmonary disease)     Diabetes mellitus, type 2     GERD (gastroesophageal reflux disease)     Hyperlipidemia     Hypertension     Sleep apnea     Vitamin D deficiency       Past Surgical History:   Procedure Laterality Date    ADENOIDECTOMY      BREAST SURGERY Right     breast biopsy    COLONOSCOPY N/A 9/10/2021    Procedure: COLONOSCOPY;  Surgeon: Holland Royal MD;  Location: [facility];  Service: Endoscopy;  Laterality: N/A;    TONSILLECTOMY      TRANSFORAMINAL EPIDURAL INJECTION OF STEROID Left 4/16/2019    Procedure: Injection,steroid,epidural,transforaminal approach;  Surgeon: Gilberto Michelle MD;  Location: [facility] OR;  Service: Pain Management;  Laterality: Left;  L5-S1, S1     TRANSFORAMINAL EPIDURAL INJECTION OF STEROID Left 2019    Procedure: Injection,steroid,epidural,transforaminal approach;  Surgeon: Gilberto Michelle MD;  Location[facility];  Service: Pain Management;  Laterality: Left;  L5-S1, S1       Family History   Problem Relation Age of Onset    Stroke Mother     Emphysema Mother     Diabetes Father     Coronary artery disease Father     COPD Father     Diabetes Sister        Social History     Socioeconomic History    Marital status: Significant Other   Tobacco Use    Smoking status: Former     Packs/day: 2.00     Types: Cigarettes     Quit date: 2019     Years since quittin.0    Smokeless tobacco: Never   Substance and Sexual Activity    Alcohol use: Yes     Comment: rarely    Drug use: Yes     Types: Hydrocodone    Sexual activity: Never     Social Determinants of Health     Financial Resource Strain: Low Risk     Difficulty of Paying Living Expenses: Not hard at all   Food Insecurity: No Food Insecurity    Worried About Running Out of Food in the Last Year: Never true    Ran Out of Food in the Last Year: Never true   Transportation Needs: No Transportation Needs    Lack of Transportation (Medical): No    Lack of Transportation (Non-Medical): No   Physical Activity: Inactive    Days of Exercise per Week: 0 days    Minutes of Exercise per Session: 0 min   Stress: Stress Concern Present    Feeling of Stress : To some extent   Social Connections: Unknown    Frequency of Communication with Friends and Family: Three times a week    Frequency of Social Gatherings with Friends and Family: Once a week    Active Member of Clubs or Organizations: No    Attends Club or Organization Meetings: Never    Marital Status: Living with partner   Housing Stability: Low Risk     Unable to Pay for Housing in the Last Year: No    Number of Places Lived in the Last Year: 1    Unstable Housing in the Last Year: No       Current Outpatient Medications   Medication Sig Dispense Refill     albuterol-ipratropium (DUO-NEB) 2.5 mg-0.5 mg/3 mL nebulizer solution Take 3 mLs by nebulization every 6 (six) hours as needed for Wheezing. Rescue 1 each 2    amLODIPine (NORVASC) 10 MG tablet TAKE 1 TABLET BY MOUTH EVERY DAY 90 tablet 1    aspirin/salicylamide/caffeine (BC HEADACHE POWDER ORAL) Take by mouth as needed.      azelastine (ASTELIN) 137 mcg (0.1 %) nasal spray USE 1 SPRAY IN EACH NOSTRIL TWICE DAILY 30 mL 2    blood sugar diagnostic Strp 1 each by Misc.(Non-Drug; Combo Route) route once daily. 100 each 2    buPROPion (WELLBUTRIN XL) 300 MG 24 hr tablet TAKE 1 TABLET BY MOUTH EVERY DAY 90 tablet 1    clotrimazole-betamethasone 1-0.05% (LOTRISONE) cream APPLY TOPICALLY 2 (TWO) TIMES DAILY AS NEEDED (RASH). 15 g 0    fluticasone propionate (FLONASE) 50 mcg/actuation nasal spray 1 spray (50 mcg total) by Each Nostril route 2 (two) times daily. 48 mL 1    fluticasone-salmeterol 500-50 mcg/dose (WIXELA INHUB) 500-50 mcg/dose DsDv diskus inhaler INHALE 1 PUFF INTO THE LUNGS 2 (TWO) TIMES DAILY. CONTROLLER 60 each 5    ibuprofen (ADVIL,MOTRIN) 600 MG tablet Take 1 tablet (600 mg total) by mouth every 6 (six) hours as needed for Pain. 20 tablet 0    ipratropium-albuteroL (COMBIVENT RESPIMAT)  mcg/actuation inhaler TAKE 2 PUFFS BY MOUTH EVERY 6 HOURS AS NEEDED FOR WHEEZE 4 g 5    JANUMET -1,000 mg TM24 TAKE 1 TABLET BY MOUTH EVERY DAY 90 tablet 1    JARDIANCE 25 mg tablet TAKE 1 TABLET BY MOUTH EVERY DAY 90 tablet 1    lancets 33 gauge Misc 1 lancet by Misc.(Non-Drug; Combo Route) route once daily. 100 each 3    melatonin 5 mg Tab Take 10 mg by mouth nightly.       metFORMIN (GLUCOPHAGE-XR) 500 MG ER 24hr tablet TAKE TWO TABLETS BY MOUTH DAILY WITH DINNER OR EVENING MEAL 180 tablet 1    montelukast (SINGULAIR) 10 mg tablet TAKE 1 TABLET BY MOUTH EVERY DAY 90 tablet 1    mupirocin (BACTROBAN) 2 % ointment Apply topically.      pantoprazole (PROTONIX) 40 MG tablet TAKE 1 TABLET BY MOUTH EVERY DAY 90  "tablet 1    rosuvastatin (CRESTOR) 20 MG tablet TAKE 1 TABLET BY MOUTH EVERY DAY 90 tablet 1    valsartan-hydrochlorothiazide (DIOVAN-HCT) 160-25 mg per tablet TAKE 1 TABLET BY MOUTH EVERY DAY 90 tablet 1    varicella-zoster gE-AS01B, PF, (SHINGRIX, PF,) 50 mcg/0.5 mL injection Shingrix (PF) 50 mcg/0.5 mL intramuscular suspension, kit   PHARMACY ADMINISTERED      blood-glucose meter Misc 1 each by Misc.(Non-Drug; Combo Route) route once daily. 1 each 0    cariprazine (VRAYLAR) 1.5 mg Cap Take 1 capsule (1.5 mg total) by mouth once daily. 30 capsule 2    HYDROcodone-acetaminophen (NORCO) 5-325 mg per tablet Take 1 tablet by mouth every 4 (four) hours as needed for Pain. (Patient not taking: Reported on 3/21/2023) 10 tablet 0    tiZANidine 4 mg Cap Take 1 capsule by mouth 3 (three) times daily as needed (muscle spasm). 30 capsule 1     No current facility-administered medications for this visit.       Review of patient's allergies indicates:  No Known Allergies  Objective:      Blood pressure 120/60, pulse 84, temperature 98.6 °F (37 °C), height 5' 6" (1.676 m), weight 105.2 kg (232 lb), SpO2 98 %. Body mass index is 37.45 kg/m².   Physical Exam  Vitals and nursing note reviewed.   Constitutional:       General: She is not in acute distress.     Appearance: Normal appearance. She is well-developed. She is obese. She is not ill-appearing.   HENT:      Head: Normocephalic and atraumatic.      Right Ear: Tympanic membrane and external ear normal.      Left Ear: Tympanic membrane and external ear normal.      Nose: Nose normal.      Mouth/Throat:      Mouth: Mucous membranes are moist.      Pharynx: Oropharynx is clear.   Eyes:      General: Lids are normal. Lids are everted, no foreign bodies appreciated.      Conjunctiva/sclera: Conjunctivae normal.      Pupils: Pupils are equal, round, and reactive to light.      Right eye: Pupil is round and reactive.      Left eye: Pupil is round and reactive.   Neck:      Trachea: " Trachea normal.   Cardiovascular:      Rate and Rhythm: Normal rate and regular rhythm.      Pulses: Normal pulses.      Heart sounds: Normal heart sounds, S1 normal and S2 normal. No murmur heard.  Pulmonary:      Effort: Pulmonary effort is normal. No respiratory distress.      Breath sounds: No stridor. Wheezing (light wheeze) present. No decreased breath sounds, rhonchi or rales.   Abdominal:      General: Bowel sounds are normal.      Palpations: Abdomen is soft. Abdomen is not rigid. There is no mass.      Tenderness: There is no guarding.   Musculoskeletal:         General: Normal range of motion.      Cervical back: Normal range of motion and neck supple.   Lymphadenopathy:      Cervical: No cervical adenopathy.   Skin:     General: Skin is warm and dry.      Capillary Refill: Capillary refill takes less than 2 seconds.   Neurological:      General: No focal deficit present.      Mental Status: She is alert and oriented to person, place, and time. Mental status is at baseline.      Cranial Nerves: No cranial nerve deficit.   Psychiatric:         Attention and Perception: Attention normal.         Mood and Affect: Mood is depressed. Mood is not anxious. Affect is tearful.         Speech: Speech normal.         Behavior: Behavior normal. Behavior is cooperative.         Thought Content: Thought content normal. Thought content does not include homicidal or suicidal ideation. Thought content does not include homicidal or suicidal plan.         Cognition and Memory: Cognition normal.         Judgment: Judgment normal.           Assessment:       1. Type 2 diabetes mellitus with complication, without long-term current use of insulin    2. Vitamin D deficiency    3. Mixed hyperlipidemia    4. Gastroesophageal reflux disease without esophagitis    5. Essential hypertension    6. Chronic obstructive pulmonary disease, unspecified COPD type    7. Depressed mood    8. Muscle spasm    9. Acute bilateral low back pain  without sciatica          Plan:       Kemi was seen today for diabetes, hypertension, hyperlipidemia and follow-up.    Diagnoses and all orders for this visit:    Type 2 diabetes mellitus with complication, without long-term current use of insulin  -     Hemoglobin A1C; Future  -     Comprehensive Metabolic Panel; Future  -     Urinalysis; Future  -     Hemoglobin A1C  -     Comprehensive Metabolic Panel  -     Urinalysis     Discussed the following complications of DM Type 2: CAD, CVD, Retinopathy, Nephropathy, Neuropathy.    Discussed Target A1C Goal <7.0% and Target fasting blood sugars () before each meal.    Discussed Lifestyle Modifications: Low glycemic index diet, Exercise (30-45 min) daily, Weight loss, and Smoking cessation      Patient is going on vacation and then plans to diet adjust when she returns.    Vitamin D deficiency  -     Vitamin D; Future  -     Vitamin D    Mixed hyperlipidemia  -     Lipid Panel; Future  -     Lipid Panel    Limit red meat, butter, fried foods, cheese, and other foods that have a lot of saturated fat. Consume more: lean meats, fish, fruits, vegetables, whole grains, beans, lentils, and nuts.  Weight loss, and 30-45 min of cardiovascular exercise daily.      Gastroesophageal reflux disease without esophagitis  Stable.  Continue current medication    Essential hypertension  -     Comprehensive Metabolic Panel; Future  -     Urinalysis; Future  -     Comprehensive Metabolic Panel  -     Urinalysis    Lifestyle changes: Reduce the amount of salt in your diet; Lose weight; Avoid drinking too much alcohol; Exercise at least 30 minutes per day most days of the week.  Continue current medications and home BP monitoring.      Chronic obstructive pulmonary disease, unspecified COPD type  Stable    Depressed mood  -     cariprazine (VRAYLAR) 1.5 mg Cap; Take 1 capsule (1.5 mg total) by mouth once daily.    Muscle spasm  -     tiZANidine 4 mg Cap; Take 1 capsule by mouth 3  (three) times daily as needed (muscle spasm).    Acute bilateral low back pain without sciatica  -     dexAMETHasone injection 4 mg       Encouraged diet changes and weight loss to improve chronic conditions.     Starting on Vraylar as add on for MDD. Patient may continue wellbutrin.    Follow up in 8 weeks for mood.  Labs in 4-6 months

## 2023-04-12 ENCOUNTER — PATIENT MESSAGE (OUTPATIENT)
Dept: PODIATRY | Facility: CLINIC | Age: 58
End: 2023-04-12
Payer: COMMERCIAL

## 2023-04-13 NOTE — PATIENT INSTRUCTIONS
Your Diabetes Foot Care Program    Every day you depend on your feet to keep you moving. But when you have diabetes, your feet need special care. Even a small foot problem can become very serious. So dont take your feet for granted. By working with your diabetes healthcare team, you can learn how to protect your feet and keep them healthy.  Evaluating your feet  An evaluation helps your healthcare provider check the condition of your feet. The evaluation includes a review of your diabetes history and overall health. It may also include a foot exam, X-rays, or other tests. These can help show problems beneath the skin that you cant see or feel.  Medical history  You will be asked about your overall health and any history of foot problems. Youll also discuss your diabetes history, such as whether your blood sugar level has changed over time. It also includes questions about sensations of pain, tingling, pins and needles, or numbness. Your healthcare provider will also want to know if you have high blood pressure and heart disease, or if you smoke. Be sure to mention any medicines (including over-the-counter), supplements, or herbal remedies you take.  Foot exam  A foot exam checks the condition of different parts of your foot. First, your skin and nails are examined for any signs of infection. Blood flow is checked by feeling for the pulses in each foot. You may also have tests to study the nerves in the foot. These include using a small filament (wire) to see how sensitive your feet are. In certain cases, you will be asked to walk a short distance to check for bone, joint, and muscle problems.  Diagnostic tests  If needed, your healthcare provider will suggest certain tests to learn more about your feet. These include:  Doppler tests to measure blood flow in the feet and lower leg.  X-rays, which can show bone or joint problems.  Other imaging tests, such as an MRI (magnetic resonance imaging), bone scan, and CT  (computed tomography) scan. These can help show bone infections.  Other tests, such as vascular tests, which study the blood flow in your feet and legs. You may also have nerve studies to learn how sensitive your feet are.  Creating a foot care program  Based on the evaluation, your healthcare provider will create a foot care program for you. Your program may be as simple as starting a daily self-care routine and changing the types of shoes your wear. It may also involve treating minor foot problems, such as a corn or blister. In some cases, surgery will be needed to treat an infection or mechanical problems, such as hammer toes.  Preventing problems  When you have diabetes, its easier to prevent problems than to treat them later on. So see your healthcare team for regular checkups and foot care. Your healthcare team can also help you learn more about caring for your feet at home. For example, you may be told to avoid walking barefoot. Or you may be told that special footwear is needed to protect your feet.  Have regular checkups  Foot problems can develop quickly. So be sure to follow your healthcare teams schedule for regular checkups. During office visits, take off your shoes and socks as soon as you get in the exam room. Ask your healthcare provider to examine your feet for problems. This will make it easier to find and treat small skin irritations before they get worse. Regular checkups can also help keep track of the blood flow and feeling in your feet. If you have neuropathy (lack of feeling in your feet), you will need to have checkups more often.  Learn about self-care  The more you know about diabetes and your feet, the easier it will be to prevent problems. Members of your healthcare team can teach you how to inspect your feet and teach you to look for warning signs. They can also give you other foot care tips. During office visits, be sure to ask any questions you have.  Date Last Reviewed: 7/1/2016  ©  6703-9117 [employer]. [address] [place] [address], [place] [address]. All rights reserved. This information is not intended as a substitute for professional medical care. Always follow your healthcare professional's instructions.

## 2023-04-17 ENCOUNTER — OFFICE VISIT (OUTPATIENT)
Dept: PODIATRY | Facility: CLINIC | Age: 58
End: 2023-04-17
Payer: COMMERCIAL

## 2023-04-17 VITALS — HEART RATE: 96 BPM | OXYGEN SATURATION: 96 % | HEIGHT: 66 IN | WEIGHT: 236 LBS | BODY MASS INDEX: 37.93 KG/M2

## 2023-04-17 DIAGNOSIS — L30.8 OTHER ECZEMA: Primary | ICD-10-CM

## 2023-04-17 DIAGNOSIS — M79.671 RIGHT FOOT PAIN: ICD-10-CM

## 2023-04-17 DIAGNOSIS — M79.672 LEFT FOOT PAIN: ICD-10-CM

## 2023-04-17 DIAGNOSIS — M25.571 SINUS TARSI SYNDROME OF RIGHT FOOT: ICD-10-CM

## 2023-04-17 PROCEDURE — 3008F BODY MASS INDEX DOCD: CPT | Mod: CPTII,S$GLB,, | Performed by: PODIATRIST

## 2023-04-17 PROCEDURE — 3066F NEPHROPATHY DOC TX: CPT | Mod: CPTII,S$GLB,, | Performed by: PODIATRIST

## 2023-04-17 PROCEDURE — 3008F PR BODY MASS INDEX (BMI) DOCUMENTED: ICD-10-PCS | Mod: CPTII,S$GLB,, | Performed by: PODIATRIST

## 2023-04-17 PROCEDURE — 3066F PR DOCUMENTATION OF TREATMENT FOR NEPHROPATHY: ICD-10-PCS | Mod: CPTII,S$GLB,, | Performed by: PODIATRIST

## 2023-04-17 PROCEDURE — 1159F MED LIST DOCD IN RCRD: CPT | Mod: CPTII,S$GLB,, | Performed by: PODIATRIST

## 2023-04-17 PROCEDURE — 99213 PR OFFICE/OUTPT VISIT, EST, LEVL III, 20-29 MIN: ICD-10-PCS | Mod: 25,S$GLB,, | Performed by: PODIATRIST

## 2023-04-17 PROCEDURE — 3044F HG A1C LEVEL LT 7.0%: CPT | Mod: CPTII,S$GLB,, | Performed by: PODIATRIST

## 2023-04-17 PROCEDURE — 99213 OFFICE O/P EST LOW 20 MIN: CPT | Mod: 25,S$GLB,, | Performed by: PODIATRIST

## 2023-04-17 PROCEDURE — 1160F RVW MEDS BY RX/DR IN RCRD: CPT | Mod: CPTII,S$GLB,, | Performed by: PODIATRIST

## 2023-04-17 PROCEDURE — 1160F PR REVIEW ALL MEDS BY PRESCRIBER/CLIN PHARMACIST DOCUMENTED: ICD-10-PCS | Mod: CPTII,S$GLB,, | Performed by: PODIATRIST

## 2023-04-17 PROCEDURE — 1159F PR MEDICATION LIST DOCUMENTED IN MEDICAL RECORD: ICD-10-PCS | Mod: CPTII,S$GLB,, | Performed by: PODIATRIST

## 2023-04-17 PROCEDURE — 3061F NEG MICROALBUMINURIA REV: CPT | Mod: CPTII,S$GLB,, | Performed by: PODIATRIST

## 2023-04-17 PROCEDURE — 20605 PR DRAIN/INJECT INTERMEDIATE JOINT/BURSA: ICD-10-PCS | Mod: RT,S$GLB,, | Performed by: PODIATRIST

## 2023-04-17 PROCEDURE — 3044F PR MOST RECENT HEMOGLOBIN A1C LEVEL <7.0%: ICD-10-PCS | Mod: CPTII,S$GLB,, | Performed by: PODIATRIST

## 2023-04-17 PROCEDURE — 3061F PR NEG MICROALBUMINURIA RESULT DOCUMENTED/REVIEW: ICD-10-PCS | Mod: CPTII,S$GLB,, | Performed by: PODIATRIST

## 2023-04-17 PROCEDURE — 20605 DRAIN/INJ JOINT/BURSA W/O US: CPT | Mod: RT,S$GLB,, | Performed by: PODIATRIST

## 2023-04-17 RX ORDER — METHYLPREDNISOLONE ACETATE 40 MG/ML
20 INJECTION, SUSPENSION INTRA-ARTICULAR; INTRALESIONAL; INTRAMUSCULAR; SOFT TISSUE
Status: COMPLETED | OUTPATIENT
Start: 2023-04-17 | End: 2023-04-17

## 2023-04-17 RX ORDER — DEXAMETHASONE SODIUM PHOSPHATE 4 MG/ML
4 INJECTION, SOLUTION INTRA-ARTICULAR; INTRALESIONAL; INTRAMUSCULAR; INTRAVENOUS; SOFT TISSUE
Status: COMPLETED | OUTPATIENT
Start: 2023-04-17 | End: 2023-04-17

## 2023-04-17 RX ORDER — BUPIVACAINE HYDROCHLORIDE 5 MG/ML
1.5 INJECTION, SOLUTION PERINEURAL
Status: COMPLETED | OUTPATIENT
Start: 2023-04-17 | End: 2023-04-17

## 2023-04-17 RX ORDER — HYDROCORTISONE 25 MG/G
CREAM TOPICAL 2 TIMES DAILY
Qty: 28 G | Refills: 3 | Status: SHIPPED | OUTPATIENT
Start: 2023-04-17

## 2023-04-17 RX ADMIN — METHYLPREDNISOLONE ACETATE 20 MG: 40 INJECTION, SUSPENSION INTRA-ARTICULAR; INTRALESIONAL; INTRAMUSCULAR; SOFT TISSUE at 12:04

## 2023-04-17 RX ADMIN — DEXAMETHASONE SODIUM PHOSPHATE 4 MG: 4 INJECTION, SOLUTION INTRA-ARTICULAR; INTRALESIONAL; INTRAMUSCULAR; INTRAVENOUS; SOFT TISSUE at 12:04

## 2023-04-17 RX ADMIN — BUPIVACAINE HYDROCHLORIDE 7.5 MG: 5 INJECTION, SOLUTION PERINEURAL at 12:04

## 2023-04-17 NOTE — PROGRESS NOTES
"  [address] [place] [address]  Phone: [phone]   [phone]    Patient's PCP:PARAG Crenshaw  Referring Provider: No ref. provider found    Subjective:      Chief Complaint:: Foot Pain (Left foot blisters and Right foot pain. Foot blisters on and off for 1 year . Right foot pain while relaxing . )    HPI  Kemi Dominguez is a 58 y.o. female who presents today with a complaint of Foot Pain (Left foot blisters and Right foot pain.) Foot blisters on and off for 1 year . Right foot pain while relaxing .  Current symptoms include pressure on left foot .  Aggravating factors are not sure . Symptoms have stayed the same . Treatment to date have included previous topical and oral medication.  Patient was previously prescribed oral Diflucan and states no change in the left foot at that time.  She states that she did see previous improvement while taking a Medrol Dosepak.    Systemic Doctor: PARAG Grimes   Seen on  03/21/2023   Blood sugar: doesn't take   Hemoglobin A1C: 6.6 (3/21/2022)            Vitals:    04/17/23 0838   Pulse: 96   SpO2: 96%   Weight: 107 kg (236 lb)   Height: 5' 6" (1.676 m)   PainSc:   3      Shoe Size: 10    Past Surgical History:   Procedure Laterality Date    ADENOIDECTOMY      BREAST SURGERY Right     breast biopsy    COLONOSCOPY N/A 9/10/2021    Procedure: COLONOSCOPY;  Surgeon: Holland Royal MD;  Location: [facility];  Service: Endoscopy;  Laterality: N/A;    TONSILLECTOMY      TRANSFORAMINAL EPIDURAL INJECTION OF STEROID Left 4/16/2019    Procedure: Injection,steroid,epidural,transforaminal approach;  Surgeon: Gilberto Michelle MD;  Location: [facility] OR;  Service: Pain Management;  Laterality: Left;  L5-S1, S1    TRANSFORAMINAL EPIDURAL INJECTION OF STEROID Left 8/23/2019    Procedure: Injection,steroid,epidural,transforaminal approach;  Surgeon: Gilberto Michelle MD;  Location: [facility] OR;  Service: Pain Management;  Laterality: Left;  L5-S1, S1     Past " Medical History:   Diagnosis Date    Class 3 severe obesity due to excess calories with serious comorbidity and body mass index (BMI) of 40.0 to 44.9 in adult 10/17/2018    COPD (chronic obstructive pulmonary disease)     Diabetes mellitus, type 2     GERD (gastroesophageal reflux disease)     Hyperlipidemia     Hypertension     Sleep apnea     Vitamin D deficiency      Family History   Problem Relation Age of Onset    Stroke Mother     Emphysema Mother     Diabetes Father     Coronary artery disease Father     COPD Father     Diabetes Sister         Social History:   Marital Status: Significant Other  Alcohol History:  reports current alcohol use.  Tobacco History:  reports that she quit smoking about 4 years ago. Her smoking use included cigarettes. She smoked an average of 2 packs per day. She has never used smokeless tobacco.  Drug History:  reports current drug use. Drug: Hydrocodone.    Review of patient's allergies indicates:  No Known Allergies    Current Outpatient Medications   Medication Sig Dispense Refill    albuterol-ipratropium (DUO-NEB) 2.5 mg-0.5 mg/3 mL nebulizer solution Take 3 mLs by nebulization every 6 (six) hours as needed for Wheezing. Rescue 1 each 2    amLODIPine (NORVASC) 10 MG tablet TAKE 1 TABLET BY MOUTH EVERY DAY 90 tablet 1    aspirin/salicylamide/caffeine (BC HEADACHE POWDER ORAL) Take by mouth as needed.      azelastine (ASTELIN) 137 mcg (0.1 %) nasal spray USE 1 SPRAY IN EACH NOSTRIL TWICE DAILY 30 mL 2    blood sugar diagnostic Strp 1 each by Misc.(Non-Drug; Combo Route) route once daily. 100 each 2    buPROPion (WELLBUTRIN XL) 300 MG 24 hr tablet TAKE 1 TABLET BY MOUTH EVERY DAY 90 tablet 1    cariprazine (VRAYLAR) 1.5 mg Cap Take 1 capsule (1.5 mg total) by mouth once daily. 30 capsule 2    clotrimazole-betamethasone 1-0.05% (LOTRISONE) cream APPLY TOPICALLY 2 (TWO) TIMES DAILY AS NEEDED (RASH). 15 g 0    fluticasone propionate (FLONASE) 50 mcg/actuation nasal spray 1 spray (50  mcg total) by Each Nostril route 2 (two) times daily. 48 mL 1    fluticasone-salmeterol 500-50 mcg/dose (WIXELA INHUB) 500-50 mcg/dose DsDv diskus inhaler INHALE 1 PUFF INTO THE LUNGS 2 (TWO) TIMES DAILY. CONTROLLER 60 each 5    HYDROcodone-acetaminophen (NORCO) 5-325 mg per tablet Take 1 tablet by mouth every 4 (four) hours as needed for Pain. 10 tablet 0    ibuprofen (ADVIL,MOTRIN) 600 MG tablet Take 1 tablet (600 mg total) by mouth every 6 (six) hours as needed for Pain. 20 tablet 0    ipratropium-albuteroL (COMBIVENT RESPIMAT)  mcg/actuation inhaler TAKE 2 PUFFS BY MOUTH EVERY 6 HOURS AS NEEDED FOR WHEEZE 4 g 5    JANUMET -1,000 mg TM24 TAKE 1 TABLET BY MOUTH EVERY DAY 90 tablet 1    JARDIANCE 25 mg tablet TAKE 1 TABLET BY MOUTH EVERY DAY 90 tablet 1    lancets 33 gauge Misc 1 lancet by Misc.(Non-Drug; Combo Route) route once daily. 100 each 3    melatonin 5 mg Tab Take 10 mg by mouth nightly.       metFORMIN (GLUCOPHAGE-XR) 500 MG ER 24hr tablet TAKE TWO TABLETS BY MOUTH DAILY WITH DINNER OR EVENING MEAL 180 tablet 1    montelukast (SINGULAIR) 10 mg tablet TAKE 1 TABLET BY MOUTH EVERY DAY 90 tablet 1    mupirocin (BACTROBAN) 2 % ointment Apply topically.      pantoprazole (PROTONIX) 40 MG tablet TAKE 1 TABLET BY MOUTH EVERY DAY 90 tablet 1    rosuvastatin (CRESTOR) 20 MG tablet TAKE 1 TABLET BY MOUTH EVERY DAY 90 tablet 1    tiZANidine 4 mg Cap Take 1 capsule by mouth 3 (three) times daily as needed (muscle spasm). 30 capsule 1    valsartan-hydrochlorothiazide (DIOVAN-HCT) 160-25 mg per tablet TAKE 1 TABLET BY MOUTH EVERY DAY 90 tablet 1    varicella-zoster gE-AS01B, PF, (SHINGRIX, PF,) 50 mcg/0.5 mL injection Shingrix (PF) 50 mcg/0.5 mL intramuscular suspension, kit   PHARMACY ADMINISTERED      blood-glucose meter Misc 1 each by Misc.(Non-Drug; Combo Route) route once daily. 1 each 0    hydrocortisone 2.5 % cream Apply topically 2 (two) times daily. 28 g 3     No current facility-administered  medications for this visit.       Review of Systems   Constitutional:  Negative for chills, fatigue, fever and unexpected weight change.   HENT:  Negative for hearing loss and trouble swallowing.    Eyes:  Negative for photophobia and visual disturbance.   Respiratory:  Negative for cough, shortness of breath and wheezing.    Cardiovascular:  Negative for chest pain and palpitations.   Gastrointestinal:  Negative for abdominal pain and nausea.   Genitourinary:  Negative for dysuria and frequency.   Musculoskeletal:  Positive for arthralgias and joint swelling. Negative for back pain, gait problem and myalgias.   Skin:  Positive for color change. Negative for rash and wound.   Neurological:  Negative for tremors, seizures, weakness, numbness and headaches.   Hematological:  Bruises/bleeds easily.       Objective:        Physical Exam:   Foot Exam    General  General Appearance: appears stated age and healthy   Orientation: alert and oriented to person, place, and time   Affect: appropriate   Gait: antalgic       Right Foot/Ankle     Inspection and Palpation  Ecchymosis: none  Tenderness: subtalar joint   Swelling: (Mild swelling calcaneocuboid joint and extensor digitorum brevis muscle)  Arch: pes planus  Hammertoes: absent  Claw Toes: absent  Hallux valgus: no  Hallux limitus: no  Skin Exam: skin intact;   Neurovascular  Dorsalis pedis: 2+  Posterior tibial: 2+  Capillary Refill: 2+  Saphenous nerve sensation: normal  Tibial nerve sensation: normal  Superficial peroneal nerve sensation: normal  Deep peroneal nerve sensation: normal  Sural nerve sensation: normal    Muscle Strength  Ankle dorsiflexion: 5  Ankle plantar flexion: 5  Ankle inversion: 5  Ankle eversion: 5  Great toe extension: 5  Great toe flexion: 5    Range of Motion    Normal right ankle ROM  Passive  Ankle eversion: pain  Ankle inversion: pain        Left Foot/Ankle      Inspection and Palpation  Ecchymosis: none  Tenderness: none   Swelling: none    Arch: pes planus  Hammertoes: absent  Claw toes: absent  Hallux valgus: no  Hallux limitus: no  Skin Exam: blister, skin changes and abnormal color; (Scaling in superficial dry blister type formation plantar forefoot similar to tinea pedis possible eczema.  No cellulitis.)  Neurovascular  Dorsalis pedis: 2+  Posterior tibial: 2+  Capillary refill: 2+  Saphenous nerve sensation: normal  Tibial nerve sensation: normal  Superficial peroneal nerve sensation: normal  Deep peroneal nerve sensation: normal  Sural nerve sensation: normal    Muscle Strength  Ankle dorsiflexion: 5  Ankle plantar flexion: 5  Ankle inversion: 5  Ankle eversion: 5  Great toe extension: 5  Great toe flexion: 5    Comments  Scattered scaling and blistering along the plantar foot.  No signs of infection  Physical Exam  Cardiovascular:      Pulses:           Dorsalis pedis pulses are 2+ on the right side and 2+ on the left side.        Posterior tibial pulses are 2+ on the right side and 2+ on the left side.   Musculoskeletal:      Right foot: No bunion.      Left foot: No bunion.   Feet:      Left foot:      Skin integrity: Blister present.             Right Ankle/Foot Exam     Tenderness   The patient is tender to palpation of the subtalar joint.    Range of Motion   The patient has normal right ankle ROM.    Left Ankle/Foot Exam     Comments:  Scattered scaling and blistering along the plantar foot.  No signs of infection      Muscle Strength   Right Lower Extremity   Ankle Dorsiflexion:  5   Plantar flexion:  5/5  Left Lower Extremity   Ankle Dorsiflexion:  5   Plantar flexion:  5/5     Vascular Exam     Right Pulses  Dorsalis Pedis:      2+  Posterior Tibial:      2+        Left Pulses  Dorsalis Pedis:      2+  Posterior Tibial:      2+         Imaging: none            Assessment:       1. Other eczema    2. Sinus tarsi syndrome of right foot    3. Right foot pain    4. Left foot pain      Plan:   Other eczema  -     hydrocortisone 2.5 %  cream; Apply topically 2 (two) times daily.  Dispense: 28 g; Refill: 3    Sinus tarsi syndrome of right foot    Right foot pain  -     methylPREDNISolone acetate injection 20 mg  -     BUPivacaine injection 7.5 mg  -     dexAMETHasone injection 4 mg    Left foot pain      Follow up if symptoms worsen or fail to improve.    Procedures Informed consent was obtained.  Time-out was called.  The area was prepped with alcohol, and a steroid injection was performed at right subtalar joint using 1.5 cc of 0.5% Marcaine w/out epi, 1 cc Dexamethasone, 0.5 cc Methylprednisolone. Patient tolerated the procedure well.           I discussed with the patient that given the clinical history of the left foot I do not suspect this is tinea pedis but rather some form of eczema.  I am going to send in a different topical steroid for her.  Instructed her to let me know if the symptoms do not improve over the next several weeks.  If there is no improvement then I will likely refer her to Dermatology for further evaluation    On the right foot I explained findings of inflammation of the subtalar joint.  We discussed different treatment options.  We are going to do a steroid injection today.  I did discussed with the patient that if this does not fully eliminate her symptoms she could do a follow-up injection proximally 4 weeks      Counseling:     I provided patient education verbally regarding:   Patient diagnosis, treatment options, as well as alternatives, risks, and benefits.     This note was created using Dragon voice recognition software that occasionally misinterpreted phrases or words.

## 2023-05-15 ENCOUNTER — PATIENT MESSAGE (OUTPATIENT)
Dept: FAMILY MEDICINE | Facility: CLINIC | Age: 58
End: 2023-05-15

## 2023-05-16 NOTE — PATIENT INSTRUCTIONS
Your Diabetes Foot Care Program    Every day you depend on your feet to keep you moving. But when you have diabetes, your feet need special care. Even a small foot problem can become very serious. So dont take your feet for granted. By working with your diabetes healthcare team, you can learn how to protect your feet and keep them healthy.  Evaluating your feet  An evaluation helps your healthcare provider check the condition of your feet. The evaluation includes a review of your diabetes history and overall health. It may also include a foot exam, X-rays, or other tests. These can help show problems beneath the skin that you cant see or feel.  Medical history  You will be asked about your overall health and any history of foot problems. Youll also discuss your diabetes history, such as whether your blood sugar level has changed over time. It also includes questions about sensations of pain, tingling, pins and needles, or numbness. Your healthcare provider will also want to know if you have high blood pressure and heart disease, or if you smoke. Be sure to mention any medicines (including over-the-counter), supplements, or herbal remedies you take.  Foot exam  A foot exam checks the condition of different parts of your foot. First, your skin and nails are examined for any signs of infection. Blood flow is checked by feeling for the pulses in each foot. You may also have tests to study the nerves in the foot. These include using a small filament (wire) to see how sensitive your feet are. In certain cases, you will be asked to walk a short distance to check for bone, joint, and muscle problems.  Diagnostic tests  If needed, your healthcare provider will suggest certain tests to learn more about your feet. These include:  Doppler tests to measure blood flow in the feet and lower leg.  X-rays, which can show bone or joint problems.  Other imaging tests, such as an MRI (magnetic resonance imaging), bone scan, and CT  (computed tomography) scan. These can help show bone infections.  Other tests, such as vascular tests, which study the blood flow in your feet and legs. You may also have nerve studies to learn how sensitive your feet are.  Creating a foot care program  Based on the evaluation, your healthcare provider will create a foot care program for you. Your program may be as simple as starting a daily self-care routine and changing the types of shoes your wear. It may also involve treating minor foot problems, such as a corn or blister. In some cases, surgery will be needed to treat an infection or mechanical problems, such as hammer toes.  Preventing problems  When you have diabetes, its easier to prevent problems than to treat them later on. So see your healthcare team for regular checkups and foot care. Your healthcare team can also help you learn more about caring for your feet at home. For example, you may be told to avoid walking barefoot. Or you may be told that special footwear is needed to protect your feet.  Have regular checkups  Foot problems can develop quickly. So be sure to follow your healthcare teams schedule for regular checkups. During office visits, take off your shoes and socks as soon as you get in the exam room. Ask your healthcare provider to examine your feet for problems. This will make it easier to find and treat small skin irritations before they get worse. Regular checkups can also help keep track of the blood flow and feeling in your feet. If you have neuropathy (lack of feeling in your feet), you will need to have checkups more often.  Learn about self-care  The more you know about diabetes and your feet, the easier it will be to prevent problems. Members of your healthcare team can teach you how to inspect your feet and teach you to look for warning signs. They can also give you other foot care tips. During office visits, be sure to ask any questions you have.  Date Last Reviewed: 7/1/2016  ©  9969-4308 [employer]. [address] [place] [address], [place] [address]. All rights reserved. This information is not intended as a substitute for professional medical care. Always follow your healthcare professional's instructions.

## 2023-05-18 ENCOUNTER — PATIENT MESSAGE (OUTPATIENT)
Dept: FAMILY MEDICINE | Facility: CLINIC | Age: 58
End: 2023-05-18

## 2023-05-23 ENCOUNTER — OFFICE VISIT (OUTPATIENT)
Dept: FAMILY MEDICINE | Facility: CLINIC | Age: 58
End: 2023-05-23
Payer: COMMERCIAL

## 2023-05-23 ENCOUNTER — OFFICE VISIT (OUTPATIENT)
Dept: PODIATRY | Facility: CLINIC | Age: 58
End: 2023-05-23
Payer: COMMERCIAL

## 2023-05-23 VITALS — HEIGHT: 66 IN | WEIGHT: 235 LBS | BODY MASS INDEX: 37.77 KG/M2

## 2023-05-23 VITALS
OXYGEN SATURATION: 96 % | WEIGHT: 235 LBS | SYSTOLIC BLOOD PRESSURE: 120 MMHG | HEART RATE: 80 BPM | BODY MASS INDEX: 37.77 KG/M2 | HEIGHT: 66 IN | DIASTOLIC BLOOD PRESSURE: 78 MMHG | TEMPERATURE: 99 F

## 2023-05-23 DIAGNOSIS — E66.01 CLASS 2 SEVERE OBESITY DUE TO EXCESS CALORIES WITH SERIOUS COMORBIDITY AND BODY MASS INDEX (BMI) OF 37.0 TO 37.9 IN ADULT: ICD-10-CM

## 2023-05-23 DIAGNOSIS — M25.571 SINUS TARSI SYNDROME OF RIGHT FOOT: Primary | ICD-10-CM

## 2023-05-23 DIAGNOSIS — I10 ESSENTIAL HYPERTENSION: ICD-10-CM

## 2023-05-23 DIAGNOSIS — E78.2 MIXED HYPERLIPIDEMIA: ICD-10-CM

## 2023-05-23 DIAGNOSIS — E55.9 VITAMIN D DEFICIENCY: ICD-10-CM

## 2023-05-23 DIAGNOSIS — L30.9 DERMATITIS: ICD-10-CM

## 2023-05-23 DIAGNOSIS — L30.8 OTHER ECZEMA: ICD-10-CM

## 2023-05-23 DIAGNOSIS — J44.9 CHRONIC OBSTRUCTIVE PULMONARY DISEASE, UNSPECIFIED COPD TYPE: ICD-10-CM

## 2023-05-23 DIAGNOSIS — E11.8 TYPE 2 DIABETES MELLITUS WITH COMPLICATION, WITHOUT LONG-TERM CURRENT USE OF INSULIN: Primary | ICD-10-CM

## 2023-05-23 DIAGNOSIS — R45.89 DEPRESSED MOOD: ICD-10-CM

## 2023-05-23 DIAGNOSIS — M79.671 RIGHT FOOT PAIN: ICD-10-CM

## 2023-05-23 DIAGNOSIS — K21.9 GASTROESOPHAGEAL REFLUX DISEASE WITHOUT ESOPHAGITIS: ICD-10-CM

## 2023-05-23 PROCEDURE — 1160F PR REVIEW ALL MEDS BY PRESCRIBER/CLIN PHARMACIST DOCUMENTED: ICD-10-PCS | Mod: CPTII,S$GLB,, | Performed by: PODIATRIST

## 2023-05-23 PROCEDURE — 99999 PR PBB SHADOW E&M-EST. PATIENT-LVL V: CPT | Mod: PBBFAC,,, | Performed by: PODIATRIST

## 2023-05-23 PROCEDURE — 3066F PR DOCUMENTATION OF TREATMENT FOR NEPHROPATHY: ICD-10-PCS | Mod: CPTII,S$GLB,, | Performed by: PODIATRIST

## 2023-05-23 PROCEDURE — 3008F PR BODY MASS INDEX (BMI) DOCUMENTED: ICD-10-PCS | Mod: CPTII,S$GLB,, | Performed by: PODIATRIST

## 2023-05-23 PROCEDURE — 3078F DIAST BP <80 MM HG: CPT | Mod: CPTII,S$GLB,, | Performed by: NURSE PRACTITIONER

## 2023-05-23 PROCEDURE — 3044F HG A1C LEVEL LT 7.0%: CPT | Mod: CPTII,S$GLB,, | Performed by: PODIATRIST

## 2023-05-23 PROCEDURE — 3066F NEPHROPATHY DOC TX: CPT | Mod: CPTII,S$GLB,, | Performed by: PODIATRIST

## 2023-05-23 PROCEDURE — 3044F PR MOST RECENT HEMOGLOBIN A1C LEVEL <7.0%: ICD-10-PCS | Mod: CPTII,S$GLB,, | Performed by: PODIATRIST

## 2023-05-23 PROCEDURE — 3008F PR BODY MASS INDEX (BMI) DOCUMENTED: ICD-10-PCS | Mod: CPTII,S$GLB,, | Performed by: NURSE PRACTITIONER

## 2023-05-23 PROCEDURE — 1159F MED LIST DOCD IN RCRD: CPT | Mod: CPTII,S$GLB,, | Performed by: PODIATRIST

## 2023-05-23 PROCEDURE — 3008F BODY MASS INDEX DOCD: CPT | Mod: CPTII,S$GLB,, | Performed by: PODIATRIST

## 2023-05-23 PROCEDURE — 99214 OFFICE O/P EST MOD 30 MIN: CPT | Mod: S$GLB,,, | Performed by: NURSE PRACTITIONER

## 2023-05-23 PROCEDURE — 1159F PR MEDICATION LIST DOCUMENTED IN MEDICAL RECORD: ICD-10-PCS | Mod: CPTII,S$GLB,, | Performed by: PODIATRIST

## 2023-05-23 PROCEDURE — 3066F NEPHROPATHY DOC TX: CPT | Mod: CPTII,S$GLB,, | Performed by: NURSE PRACTITIONER

## 2023-05-23 PROCEDURE — 3078F PR MOST RECENT DIASTOLIC BLOOD PRESSURE < 80 MM HG: ICD-10-PCS | Mod: CPTII,S$GLB,, | Performed by: NURSE PRACTITIONER

## 2023-05-23 PROCEDURE — 3074F PR MOST RECENT SYSTOLIC BLOOD PRESSURE < 130 MM HG: ICD-10-PCS | Mod: CPTII,S$GLB,, | Performed by: NURSE PRACTITIONER

## 2023-05-23 PROCEDURE — 3061F NEG MICROALBUMINURIA REV: CPT | Mod: CPTII,S$GLB,, | Performed by: NURSE PRACTITIONER

## 2023-05-23 PROCEDURE — 20605 DRAIN/INJ JOINT/BURSA W/O US: CPT | Mod: RT,S$GLB,, | Performed by: PODIATRIST

## 2023-05-23 PROCEDURE — 3061F PR NEG MICROALBUMINURIA RESULT DOCUMENTED/REVIEW: ICD-10-PCS | Mod: CPTII,S$GLB,, | Performed by: NURSE PRACTITIONER

## 2023-05-23 PROCEDURE — 1160F RVW MEDS BY RX/DR IN RCRD: CPT | Mod: CPTII,S$GLB,, | Performed by: PODIATRIST

## 2023-05-23 PROCEDURE — 3044F HG A1C LEVEL LT 7.0%: CPT | Mod: CPTII,S$GLB,, | Performed by: NURSE PRACTITIONER

## 2023-05-23 PROCEDURE — 3008F BODY MASS INDEX DOCD: CPT | Mod: CPTII,S$GLB,, | Performed by: NURSE PRACTITIONER

## 2023-05-23 PROCEDURE — 99999 PR PBB SHADOW E&M-EST. PATIENT-LVL V: ICD-10-PCS | Mod: PBBFAC,,, | Performed by: PODIATRIST

## 2023-05-23 PROCEDURE — 3074F SYST BP LT 130 MM HG: CPT | Mod: CPTII,S$GLB,, | Performed by: NURSE PRACTITIONER

## 2023-05-23 PROCEDURE — 99499 NO LOS: ICD-10-PCS | Mod: S$GLB,,, | Performed by: PODIATRIST

## 2023-05-23 PROCEDURE — 3061F PR NEG MICROALBUMINURIA RESULT DOCUMENTED/REVIEW: ICD-10-PCS | Mod: CPTII,S$GLB,, | Performed by: PODIATRIST

## 2023-05-23 PROCEDURE — 20605 PR DRAIN/INJECT INTERMEDIATE JOINT/BURSA: ICD-10-PCS | Mod: RT,S$GLB,, | Performed by: PODIATRIST

## 2023-05-23 PROCEDURE — 3066F PR DOCUMENTATION OF TREATMENT FOR NEPHROPATHY: ICD-10-PCS | Mod: CPTII,S$GLB,, | Performed by: NURSE PRACTITIONER

## 2023-05-23 PROCEDURE — 3061F NEG MICROALBUMINURIA REV: CPT | Mod: CPTII,S$GLB,, | Performed by: PODIATRIST

## 2023-05-23 PROCEDURE — 3044F PR MOST RECENT HEMOGLOBIN A1C LEVEL <7.0%: ICD-10-PCS | Mod: CPTII,S$GLB,, | Performed by: NURSE PRACTITIONER

## 2023-05-23 PROCEDURE — 99499 UNLISTED E&M SERVICE: CPT | Mod: S$GLB,,, | Performed by: PODIATRIST

## 2023-05-23 PROCEDURE — 99214 PR OFFICE/OUTPT VISIT, EST, LEVL IV, 30-39 MIN: ICD-10-PCS | Mod: S$GLB,,, | Performed by: NURSE PRACTITIONER

## 2023-05-23 RX ORDER — TIRZEPATIDE 2.5 MG/.5ML
2.5 INJECTION, SOLUTION SUBCUTANEOUS
Qty: 4 PEN | Refills: 0 | Status: SHIPPED | OUTPATIENT
Start: 2023-05-23 | End: 2023-08-29

## 2023-05-23 RX ORDER — DEXAMETHASONE SODIUM PHOSPHATE 4 MG/ML
4 INJECTION, SOLUTION INTRA-ARTICULAR; INTRALESIONAL; INTRAMUSCULAR; INTRAVENOUS; SOFT TISSUE
Status: COMPLETED | OUTPATIENT
Start: 2023-05-23 | End: 2023-05-23

## 2023-05-23 RX ORDER — METHYLPREDNISOLONE ACETATE 40 MG/ML
20 INJECTION, SUSPENSION INTRA-ARTICULAR; INTRALESIONAL; INTRAMUSCULAR; SOFT TISSUE
Status: COMPLETED | OUTPATIENT
Start: 2023-05-23 | End: 2023-05-23

## 2023-05-23 RX ORDER — METFORMIN HYDROCHLORIDE 500 MG/1
500 TABLET, EXTENDED RELEASE ORAL 2 TIMES DAILY WITH MEALS
Qty: 180 TABLET | Refills: 1 | Status: SHIPPED | OUTPATIENT
Start: 2023-05-23 | End: 2023-11-20

## 2023-05-23 RX ORDER — BUPIVACAINE HYDROCHLORIDE 5 MG/ML
1.5 INJECTION, SOLUTION PERINEURAL
Status: COMPLETED | OUTPATIENT
Start: 2023-05-23 | End: 2023-05-23

## 2023-05-23 RX ORDER — CARIPRAZINE 3 MG/1
3 CAPSULE, GELATIN COATED ORAL DAILY
Qty: 90 CAPSULE | Refills: 1 | Status: SHIPPED | OUTPATIENT
Start: 2023-05-23 | End: 2023-08-29

## 2023-05-23 RX ORDER — CLOTRIMAZOLE AND BETAMETHASONE DIPROPIONATE 10; .64 MG/G; MG/G
CREAM TOPICAL 2 TIMES DAILY PRN
Qty: 45 G | Refills: 1 | Status: SHIPPED | OUTPATIENT
Start: 2023-05-23

## 2023-05-23 RX ADMIN — DEXAMETHASONE SODIUM PHOSPHATE 4 MG: 4 INJECTION, SOLUTION INTRA-ARTICULAR; INTRALESIONAL; INTRAMUSCULAR; INTRAVENOUS; SOFT TISSUE at 05:05

## 2023-05-23 RX ADMIN — METHYLPREDNISOLONE ACETATE 20 MG: 40 INJECTION, SUSPENSION INTRA-ARTICULAR; INTRALESIONAL; INTRAMUSCULAR; SOFT TISSUE at 05:05

## 2023-05-23 RX ADMIN — BUPIVACAINE HYDROCHLORIDE 7.5 MG: 5 INJECTION, SOLUTION PERINEURAL at 05:05

## 2023-05-23 NOTE — PROGRESS NOTES
"  [address] [facility] [address] [place] [address]  Phone: [phone]   [phone]    Patient's PCP:PARAG Crenshaw  Referring Provider: No ref. provider found    Subjective:      Chief Complaint:: Foot Pain (Right foot - requesting steroid injection)    Foot Pain  Associated symptoms include arthralgias and joint swelling. Pertinent negatives include no abdominal pain, chest pain, chills, coughing, fatigue, fever, headaches, myalgias, nausea, numbness, rash or weakness.     Kemi Dominguez is a 58 y.o. female who presents today requesting another steroid injection in her right foot.  States the last injection did help.      Systemic Doctor: PARAG Grimes  Date Last Seen: 05/23/23  Blood Sugar: does not take  Hemoglobin A1c: 6.6 (03/20/23)    Vitals:    05/23/23 1431   Weight: 106.6 kg (235 lb)   Height: 5' 6" (1.676 m)   PainSc:   2      Shoe Size:     Past Surgical History:   Procedure Laterality Date    ADENOIDECTOMY      BREAST SURGERY Right     breast biopsy    COLONOSCOPY N/A 9/10/2021    Procedure: COLONOSCOPY;  Surgeon: Holland Royal MD;  Location: [facility];  Service: Endoscopy;  Laterality: N/A;    TONSILLECTOMY      TRANSFORAMINAL EPIDURAL INJECTION OF STEROID Left 4/16/2019    Procedure: Injection,steroid,epidural,transforaminal approach;  Surgeon: Gilberto Michelle MD;  Location: [facility] OR;  Service: Pain Management;  Laterality: Left;  L5-S1, S1    TRANSFORAMINAL EPIDURAL INJECTION OF STEROID Left 8/23/2019    Procedure: Injection,steroid,epidural,transforaminal approach;  Surgeon: Gilberto Michelle MD;  Location: [facility] OR;  Service: Pain Management;  Laterality: Left;  L5-S1, S1     Past Medical History:   Diagnosis Date    Class 3 severe obesity due to excess calories with serious comorbidity and body mass index (BMI) of 40.0 to 44.9 in adult 10/17/2018    COPD (chronic obstructive pulmonary disease)     Diabetes mellitus, type 2     GERD (gastroesophageal reflux disease)     " Hyperlipidemia     Hypertension     Sleep apnea     Vitamin D deficiency      Family History   Problem Relation Age of Onset    Stroke Mother     Emphysema Mother     Diabetes Father     Coronary artery disease Father     COPD Father     Diabetes Sister         Social History:   Marital Status: Significant Other  Alcohol History:  reports current alcohol use.  Tobacco History:  reports that she quit smoking about 4 years ago. Her smoking use included cigarettes. She smoked an average of 2 packs per day. She has never used smokeless tobacco.  Drug History:  reports current drug use. Drug: Hydrocodone.    Review of patient's allergies indicates:  No Known Allergies    Current Outpatient Medications   Medication Sig Dispense Refill    albuterol-ipratropium (DUO-NEB) 2.5 mg-0.5 mg/3 mL nebulizer solution Take 3 mLs by nebulization every 6 (six) hours as needed for Wheezing. Rescue 1 each 2    amLODIPine (NORVASC) 10 MG tablet TAKE 1 TABLET BY MOUTH EVERY DAY 90 tablet 1    aspirin/salicylamide/caffeine (BC HEADACHE POWDER ORAL) Take by mouth as needed.      azelastine (ASTELIN) 137 mcg (0.1 %) nasal spray USE 1 SPRAY IN EACH NOSTRIL TWICE DAILY 30 mL 2    blood sugar diagnostic Strp 1 each by Misc.(Non-Drug; Combo Route) route once daily. 100 each 2    blood-glucose meter Misc 1 each by Misc.(Non-Drug; Combo Route) route once daily. 1 each 0    buPROPion (WELLBUTRIN XL) 300 MG 24 hr tablet TAKE 1 TABLET BY MOUTH EVERY DAY 90 tablet 1    cariprazine (VRAYLAR) 3 mg Cap Take 1 capsule (3 mg total) by mouth once daily. 90 capsule 1    clotrimazole-betamethasone 1-0.05% (LOTRISONE) cream Apply topically 2 (two) times daily as needed (rash). 45 g 1    fluticasone propionate (FLONASE) 50 mcg/actuation nasal spray 1 spray (50 mcg total) by Each Nostril route 2 (two) times daily. 48 mL 1    fluticasone-salmeterol 500-50 mcg/dose (WIXELA INHUB) 500-50 mcg/dose DsDv diskus inhaler INHALE 1 PUFF INTO THE LUNGS 2 (TWO) TIMES DAILY.  CONTROLLER 60 each 5    HYDROcodone-acetaminophen (NORCO) 5-325 mg per tablet Take 1 tablet by mouth every 4 (four) hours as needed for Pain. 10 tablet 0    hydrocortisone 2.5 % cream Apply topically 2 (two) times daily. 28 g 3    ibuprofen (ADVIL,MOTRIN) 600 MG tablet Take 1 tablet (600 mg total) by mouth every 6 (six) hours as needed for Pain. 20 tablet 0    ipratropium-albuteroL (COMBIVENT RESPIMAT)  mcg/actuation inhaler TAKE 2 PUFFS BY MOUTH EVERY 6 HOURS AS NEEDED FOR WHEEZE 4 g 5    JARDIANCE 25 mg tablet TAKE 1 TABLET BY MOUTH EVERY DAY 90 tablet 1    lancets 33 gauge Misc 1 lancet by Misc.(Non-Drug; Combo Route) route once daily. 100 each 3    melatonin 5 mg Tab Take 10 mg by mouth nightly.       metFORMIN (GLUCOPHAGE-XR) 500 MG ER 24hr tablet Take 1 tablet (500 mg total) by mouth 2 (two) times daily with meals. TAKE TWO TABLETS BY MOUTH DAILY WITH DINNER OR EVENING MEAL 180 tablet 1    montelukast (SINGULAIR) 10 mg tablet TAKE 1 TABLET BY MOUTH EVERY DAY 90 tablet 1    mupirocin (BACTROBAN) 2 % ointment Apply topically.      pantoprazole (PROTONIX) 40 MG tablet TAKE 1 TABLET BY MOUTH EVERY DAY 90 tablet 1    rosuvastatin (CRESTOR) 20 MG tablet TAKE 1 TABLET BY MOUTH EVERY DAY 90 tablet 1    tirzepatide (MOUNJARO) 2.5 mg/0.5 mL PnIj Inject 2.5 mg into the skin every 7 days. 4 pen 0    tiZANidine 4 mg Cap Take 1 capsule by mouth 3 (three) times daily as needed (muscle spasm). 30 capsule 1    valsartan-hydrochlorothiazide (DIOVAN-HCT) 160-25 mg per tablet TAKE 1 TABLET BY MOUTH EVERY DAY 90 tablet 1    varicella-zoster [id number], PF, (SHINGRIX, PF,) 50 mcg/0.5 mL injection Shingrix (PF) 50 mcg/0.5 mL intramuscular suspension, kit   PHARMACY ADMINISTERED       No current facility-administered medications for this visit.       Review of Systems   Constitutional:  Negative for chills, fatigue, fever and unexpected weight change.   HENT:  Negative for hearing loss and trouble swallowing.    Eyes:  Negative  for photophobia and visual disturbance.   Respiratory:  Negative for cough, shortness of breath and wheezing.    Cardiovascular:  Negative for chest pain and palpitations.   Gastrointestinal:  Negative for abdominal pain and nausea.   Genitourinary:  Negative for dysuria and frequency.   Musculoskeletal:  Positive for arthralgias and joint swelling. Negative for back pain, gait problem and myalgias.   Skin:  Positive for color change. Negative for rash and wound.   Neurological:  Negative for tremors, seizures, weakness, numbness and headaches.   Hematological:  Bruises/bleeds easily.       Objective:        Physical Exam:   Foot Exam    General  General Appearance: appears stated age and healthy   Orientation: alert and oriented to person, place, and time   Affect: appropriate   Gait: antalgic       Right Foot/Ankle     Inspection and Palpation  Ecchymosis: none  Tenderness: subtalar joint   Swelling: none   Arch: pes planus  Hammertoes: absent  Claw Toes: absent  Hallux valgus: no  Hallux limitus: no  Skin Exam: skin intact;   Neurovascular  Dorsalis pedis: 2+  Posterior tibial: 2+  Capillary Refill: 2+  Saphenous nerve sensation: normal  Tibial nerve sensation: normal  Superficial peroneal nerve sensation: normal  Deep peroneal nerve sensation: normal  Sural nerve sensation: normal    Muscle Strength  Ankle dorsiflexion: 5  Ankle plantar flexion: 5  Ankle inversion: 5  Ankle eversion: 5  Great toe extension: 5  Great toe flexion: 5    Range of Motion    Normal right ankle ROM      Left Foot/Ankle      Inspection and Palpation  Ecchymosis: none  Tenderness: none   Swelling: none   Arch: pes planus  Hammertoes: absent  Claw toes: absent  Hallux valgus: no  Hallux limitus: no  Skin Exam: skin changes; no blister (Slight scaling along the plantar foot.  Improved from previous exam.)  Neurovascular  Dorsalis pedis: 2+  Posterior tibial: 2+  Capillary refill: 2+  Saphenous nerve sensation: normal  Tibial nerve  sensation: normal  Superficial peroneal nerve sensation: normal  Deep peroneal nerve sensation: normal  Sural nerve sensation: normal    Muscle Strength  Ankle dorsiflexion: 5  Ankle plantar flexion: 5  Ankle inversion: 5  Ankle eversion: 5  Great toe extension: 5  Great toe flexion: 5    Range of Motion    Normal left ankle ROM    Comments  Scattered scaling and blistering along the plantar foot which is improved from previous exam   Physical Exam  Cardiovascular:      Pulses:           Dorsalis pedis pulses are 2+ on the right side and 2+ on the left side.        Posterior tibial pulses are 2+ on the right side and 2+ on the left side.   Musculoskeletal:      Right foot: No bunion.      Left foot: No bunion.   Feet:      Left foot:      Skin integrity: No blister.             Right Ankle/Foot Exam     Tenderness   The patient is tender to palpation of the subtalar joint.    Range of Motion   The patient has normal right ankle ROM.    Left Ankle/Foot Exam     Range of Motion   The patient has normal left ankle ROM.     Comments:  Scattered scaling and blistering along the plantar foot which is improved from previous exam       Muscle Strength   Right Lower Extremity   Ankle Dorsiflexion:  5   Plantar flexion:  5/5  Left Lower Extremity   Ankle Dorsiflexion:  5   Plantar flexion:  5/5     Vascular Exam     Right Pulses  Dorsalis Pedis:      2+  Posterior Tibial:      2+        Left Pulses  Dorsalis Pedis:      2+  Posterior Tibial:      2+         Imaging: none            Assessment:       1. Sinus tarsi syndrome of right foot    2. Right foot pain    3. Other eczema      Plan:   Sinus tarsi syndrome of right foot  -     methylPREDNISolone acetate injection 20 mg  -     BUPivacaine injection 7.5 mg  -     dexAMETHasone injection 4 mg    Right foot pain  -     methylPREDNISolone acetate injection 20 mg  -     BUPivacaine injection 7.5 mg  -     dexAMETHasone injection 4 mg    Other eczema      Follow up if symptoms  worsen or fail to improve.    Procedures Informed consent was obtained.  Time-out was called.  The area was prepped with alcohol, and a steroid injection was performed at right subtalar joint using 1.5 cc of 0.5% Marcaine w/out epi, 1 cc Dexamethasone, 0.5 cc Methylprednisolone. Patient tolerated the procedure well.           I discussed with the patient that her skin on the left foot does appear slightly improved.  I recommend that she continue utilizing the topical cream daily to ensure that this fully goes away.    Recommend ice and anti-inflammatories for any residual pain at the subtalar joint.    Counseling:     I provided patient education verbally regarding:   Patient diagnosis, treatment options, as well as alternatives, risks, and benefits.     This note was created using Dragon voice recognition software that occasionally misinterpreted phrases or words.

## 2023-05-23 NOTE — PROGRESS NOTES
Subjective:       Patient ID: Kemi Dominguez is a 58 y.o. female.    Chief Complaint: Mood    Patient presents today following up for chronic conditions including hypertension, hyperlipidemia, diabetes, and depressed mood.  She was started on Vraylar at her last visit 6-8 weeks ago.  Her dose was prescribed as 1.5 mg to be taken daily.  She is tolerated this medication well but feels that it could improved.  She has no side effects with this medication.  She is been on multiple antidepressants in the past.  Labs were reviewed patient's last visit in stable.  Patient was advised to repeat labs in 4-6 months.  She does have concerns with control of her diabetes and management.  She is interested in trying a GLP 1 instead of her current medication regimen.  She feels that it would be helpful with her as she needs to lose weight and better control her hemoglobin A1c.  She is obese with a BMI 37.93        Diabetes  She presents for her follow-up diabetic visit. She has type 2 diabetes mellitus. No MedicAlert identification noted. Her disease course has been stable. Hypoglycemia symptoms include headaches. Pertinent negatives for hypoglycemia include no confusion, dizziness, hunger, mood changes, nervousness/anxiousness, pallor, seizures, sleepiness, speech difficulty, sweats or tremors. Pertinent negatives for diabetes include no blurred vision, no chest pain, no foot paresthesias, no foot ulcerations, no polydipsia, no polyphagia, no polyuria, no weakness and no weight loss. There are no hypoglycemic complications. Pertinent negatives for hypoglycemia complications include no blackouts, no hospitalization, no nocturnal hypoglycemia, no required assistance and no required glucagon injection. Symptoms are worsening. There are no diabetic complications. Pertinent negatives for diabetic complications include no autonomic neuropathy, CVA, heart disease, impotence, nephropathy, peripheral neuropathy, PVD or retinopathy. Risk  factors for coronary artery disease include hypertension, stress, sedentary lifestyle, obesity, dyslipidemia and diabetes mellitus. Current diabetic treatment includes oral agent (triple therapy) (metformin, janumet, jardiance). She is compliant with treatment most of the time. Her weight is stable. She is following a generally healthy diet. Meal planning includes avoidance of concentrated sweets. She has not had a previous visit with a dietitian. She participates in exercise intermittently. There is no compliance with monitoring of blood glucose. Her home blood glucose trend is decreasing steadily. An ACE inhibitor/angiotensin II receptor blocker is being taken. She does not see a podiatrist.Eye exam is not current.   Hypertension  This is a chronic problem. The current episode started more than 1 month ago. The problem has been waxing and waning since onset. The problem is controlled. Associated symptoms include anxiety, headaches and malaise/fatigue. Pertinent negatives include no blurred vision, chest pain, neck pain, palpitations, shortness of breath or sweats. Agents associated with hypertension include estrogens. Risk factors for coronary artery disease include smoking/tobacco exposure, sedentary lifestyle, obesity, diabetes mellitus, dyslipidemia and stress. Past treatments include calcium channel blockers, angiotensin blockers and direct vasodilators. The current treatment provides significant improvement. Compliance problems include diet and exercise.  There is no history of angina, CVA, PVD or retinopathy. There is no history of chronic renal disease.   Hyperlipidemia  This is a chronic problem. The current episode started more than 1 month ago. The problem is controlled. Recent lipid tests were reviewed and are variable. Exacerbating diseases include diabetes and obesity. She has no history of chronic renal disease. Factors aggravating her hyperlipidemia include fatty foods. Associated symptoms include  myalgias. Pertinent negatives include no chest pain, focal weakness, leg pain or shortness of breath. Current antihyperlipidemic treatment includes statins. The current treatment provides significant improvement of lipids. Compliance problems include adherence to exercise and adherence to diet.  Risk factors for coronary artery disease include diabetes mellitus, dyslipidemia, obesity, a sedentary lifestyle and stress.   Depression  Visit Type: follow-up  Patient presents with the following symptoms: anhedonia and excessive worry.  Patient is not experiencing: confusion, impotence, nervousness/anxiety, palpitations, shortness of breath, suicidal ideas, suicidal planning, thoughts of death and weight loss.  Frequency of symptoms: most days   Severity: moderate   Sleep quality: fair  Nighttime awakenings: occasional    Headache   This is a recurrent problem. The current episode started more than 1 month ago. The problem occurs intermittently. The problem has been waxing and waning. The pain is located in the Left unilateral region. The pain does not radiate. The pain quality is similar to prior headaches. The quality of the pain is described as pulsating. The pain is moderate. Associated symptoms include back pain (chronic), phonophobia and photophobia. Pertinent negatives include no abdominal pain, blurred vision, coughing, dizziness, ear pain, eye pain, fever, hearing loss, nausea, neck pain, rhinorrhea, seizures, sinus pressure, sore throat, vomiting, weakness or weight loss. The symptoms are aggravated by activity, bright light and caffeine withdrawal. She has tried NSAIDs (bc powders) for the symptoms. The treatment provided significant relief. Her past medical history is significant for obesity. There is no history of recent head traumas.   Review of Systems   Constitutional:  Positive for malaise/fatigue. Negative for activity change, appetite change, fever, unexpected weight change and weight loss.         Obesity   HENT:  Negative for congestion, ear discharge, ear pain, hearing loss, rhinorrhea, sinus pressure, sore throat, trouble swallowing and voice change.    Eyes:  Positive for photophobia. Negative for blurred vision, pain, discharge and visual disturbance.   Respiratory:  Negative for cough, chest tightness, shortness of breath and wheezing.         COPD, former smoker   Cardiovascular:  Negative for chest pain and palpitations.        Hypertension, hyperlipidemia   Gastrointestinal:  Negative for abdominal pain, blood in stool, constipation, diarrhea, nausea and vomiting.        Gerd   Endocrine: Negative for cold intolerance, heat intolerance, polydipsia, polyphagia and polyuria.        Diabetes type 2   Genitourinary:  Negative for difficulty urinating, dysuria, hematuria, impotence and menstrual problem.   Musculoskeletal:  Positive for arthralgias, back pain (chronic) and myalgias. Negative for gait problem, joint swelling and neck pain.   Skin:  Negative for color change, pallor and rash.   Allergic/Immunologic: Negative for immunocompromised state.   Neurological:  Positive for headaches. Negative for dizziness, tremors, focal weakness, seizures, speech difficulty and weakness.   Psychiatric/Behavioral:  Positive for depression and dysphoric mood. Negative for confusion, self-injury and suicidal ideas. The patient is not nervous/anxious.      Past Medical History:   Diagnosis Date    Class 3 severe obesity due to excess calories with serious comorbidity and body mass index (BMI) of 40.0 to 44.9 in adult 10/17/2018    COPD (chronic obstructive pulmonary disease)     Diabetes mellitus, type 2     GERD (gastroesophageal reflux disease)     Hyperlipidemia     Hypertension     Sleep apnea     Vitamin D deficiency       Past Surgical History:   Procedure Laterality Date    ADENOIDECTOMY      BREAST SURGERY Right     breast biopsy    COLONOSCOPY N/A 9/10/2021    Procedure: COLONOSCOPY;  Surgeon: Holland ODONNELL  MD Genny;  Location: [facility] ENDO;  Service: Endoscopy;  Laterality: N/A;    TONSILLECTOMY      TRANSFORAMINAL EPIDURAL INJECTION OF STEROID Left 2019    Procedure: Injection,steroid,epidural,transforaminal approach;  Surgeon: Gilberto Michelle MD;  Location: [facility] OR;  Service: Pain Management;  Laterality: Left;  L5-S1, S1    TRANSFORAMINAL EPIDURAL INJECTION OF STEROID Left 2019    Procedure: Injection,steroid,epidural,transforaminal approach;  Surgeon: Gilberto Michelle MD;  Location: [facility] OR;  Service: Pain Management;  Laterality: Left;  L5-S1, S1       Family History   Problem Relation Age of Onset    Stroke Mother     Emphysema Mother     Diabetes Father     Coronary artery disease Father     COPD Father     Diabetes Sister        Social History     Socioeconomic History    Marital status: Significant Other   Tobacco Use    Smoking status: Former     Packs/day: 2.00     Types: Cigarettes     Quit date: 2019     Years since quittin.2    Smokeless tobacco: Never   Substance and Sexual Activity    Alcohol use: Yes     Comment: rarely    Drug use: Yes     Types: Hydrocodone    Sexual activity: Never     Social Determinants of Health     Financial Resource Strain: Low Risk     Difficulty of Paying Living Expenses: Not hard at all   Food Insecurity: No Food Insecurity    Worried About Running Out of Food in the Last Year: Never true    Ran Out of Food in the Last Year: Never true   Transportation Needs: No Transportation Needs    Lack of Transportation (Medical): No    Lack of Transportation (Non-Medical): No   Physical Activity: Inactive    Days of Exercise per Week: 0 days    Minutes of Exercise per Session: 0 min   Stress: No Stress Concern Present    Feeling of Stress : Only a little   Social Connections: Unknown    Frequency of Communication with Friends and Family: More than three times a week    Frequency of Social Gatherings with Friends and Family: Twice a week    Active Member of Clubs or  Organizations: No    Attends Club or Organization Meetings: Never    Marital Status: Living with partner   Housing Stability: Low Risk     Unable to Pay for Housing in the Last Year: No    Number of Places Lived in the Last Year: 1    Unstable Housing in the Last Year: No       Current Outpatient Medications   Medication Sig Dispense Refill    albuterol-ipratropium (DUO-NEB) 2.5 mg-0.5 mg/3 mL nebulizer solution Take 3 mLs by nebulization every 6 (six) hours as needed for Wheezing. Rescue 1 each 2    amLODIPine (NORVASC) 10 MG tablet TAKE 1 TABLET BY MOUTH EVERY DAY 90 tablet 1    aspirin/salicylamide/caffeine (BC HEADACHE POWDER ORAL) Take by mouth as needed.      azelastine (ASTELIN) 137 mcg (0.1 %) nasal spray USE 1 SPRAY IN EACH NOSTRIL TWICE DAILY 30 mL 2    blood sugar diagnostic Strp 1 each by Misc.(Non-Drug; Combo Route) route once daily. 100 each 2    buPROPion (WELLBUTRIN XL) 300 MG 24 hr tablet TAKE 1 TABLET BY MOUTH EVERY DAY 90 tablet 1    fluticasone propionate (FLONASE) 50 mcg/actuation nasal spray 1 spray (50 mcg total) by Each Nostril route 2 (two) times daily. 48 mL 1    fluticasone-salmeterol 500-50 mcg/dose (WIXELA INHUB) 500-50 mcg/dose DsDv diskus inhaler INHALE 1 PUFF INTO THE LUNGS 2 (TWO) TIMES DAILY. CONTROLLER 60 each 5    HYDROcodone-acetaminophen (NORCO) 5-325 mg per tablet Take 1 tablet by mouth every 4 (four) hours as needed for Pain. 10 tablet 0    hydrocortisone 2.5 % cream Apply topically 2 (two) times daily. 28 g 3    ibuprofen (ADVIL,MOTRIN) 600 MG tablet Take 1 tablet (600 mg total) by mouth every 6 (six) hours as needed for Pain. 20 tablet 0    ipratropium-albuteroL (COMBIVENT RESPIMAT)  mcg/actuation inhaler TAKE 2 PUFFS BY MOUTH EVERY 6 HOURS AS NEEDED FOR WHEEZE 4 g 5    JARDIANCE 25 mg tablet TAKE 1 TABLET BY MOUTH EVERY DAY 90 tablet 1    lancets 33 gauge Misc 1 lancet by Misc.(Non-Drug; Combo Route) route once daily. 100 each 3    melatonin 5 mg Tab Take 10 mg by  "mouth nightly.       montelukast (SINGULAIR) 10 mg tablet TAKE 1 TABLET BY MOUTH EVERY DAY 90 tablet 1    mupirocin (BACTROBAN) 2 % ointment Apply topically.      pantoprazole (PROTONIX) 40 MG tablet TAKE 1 TABLET BY MOUTH EVERY DAY 90 tablet 1    rosuvastatin (CRESTOR) 20 MG tablet TAKE 1 TABLET BY MOUTH EVERY DAY 90 tablet 1    tiZANidine 4 mg Cap Take 1 capsule by mouth 3 (three) times daily as needed (muscle spasm). 30 capsule 1    valsartan-hydrochlorothiazide (DIOVAN-HCT) 160-25 mg per tablet TAKE 1 TABLET BY MOUTH EVERY DAY 90 tablet 1    varicella-zoster [id number], PF, (SHINGRIX, PF,) 50 mcg/0.5 mL injection Shingrix (PF) 50 mcg/0.5 mL intramuscular suspension, kit   PHARMACY ADMINISTERED      blood-glucose meter Misc 1 each by Misc.(Non-Drug; Combo Route) route once daily. 1 each 0    cariprazine (VRAYLAR) 3 mg Cap Take 1 capsule (3 mg total) by mouth once daily. 90 capsule 1    clotrimazole-betamethasone 1-0.05% (LOTRISONE) cream Apply topically 2 (two) times daily as needed (rash). 45 g 1    metFORMIN (GLUCOPHAGE-XR) 500 MG ER 24hr tablet Take 1 tablet (500 mg total) by mouth 2 (two) times daily with meals. TAKE TWO TABLETS BY MOUTH DAILY WITH DINNER OR EVENING MEAL 180 tablet 1    tirzepatide (MOUNJARO) 2.5 mg/0.5 mL PnIj Inject 2.5 mg into the skin every 7 days. 4 pen 0     No current facility-administered medications for this visit.       Review of patient's allergies indicates:  No Known Allergies  Objective:      Blood pressure 120/78, pulse 80, temperature 98.6 °F (37 °C), height 5' 6" (1.676 m), weight 106.6 kg (235 lb), SpO2 96 %. Body mass index is 37.93 kg/m².   Physical Exam  Vitals and nursing note reviewed.   Constitutional:       General: She is not in acute distress.     Appearance: Normal appearance. She is well-developed. She is obese. She is not ill-appearing.   HENT:      Head: Normocephalic and atraumatic.      Right Ear: Tympanic membrane and external ear normal.      Left Ear: " Tympanic membrane and external ear normal.      Nose: Nose normal.      Mouth/Throat:      Mouth: Mucous membranes are moist.      Pharynx: Oropharynx is clear.   Eyes:      General: Lids are normal. Lids are everted, no foreign bodies appreciated.      Conjunctiva/sclera: Conjunctivae normal.      Pupils: Pupils are equal, round, and reactive to light.      Right eye: Pupil is round and reactive.      Left eye: Pupil is round and reactive.   Neck:      Trachea: Trachea normal.   Cardiovascular:      Rate and Rhythm: Normal rate and regular rhythm.      Pulses: Normal pulses.      Heart sounds: Normal heart sounds, S1 normal and S2 normal. No murmur heard.  Pulmonary:      Effort: Pulmonary effort is normal. No respiratory distress.      Breath sounds: No stridor. No decreased breath sounds, rhonchi or rales.   Abdominal:      General: Bowel sounds are normal.      Palpations: Abdomen is soft. Abdomen is not rigid. There is no mass.      Tenderness: There is no guarding.   Musculoskeletal:         General: Normal range of motion.      Cervical back: Normal range of motion and neck supple.   Lymphadenopathy:      Cervical: No cervical adenopathy.   Skin:     General: Skin is warm and dry.      Capillary Refill: Capillary refill takes less than 2 seconds.   Neurological:      General: No focal deficit present.      Mental Status: She is alert and oriented to person, place, and time. Mental status is at baseline.      Cranial Nerves: No cranial nerve deficit.   Psychiatric:         Attention and Perception: Attention normal.         Mood and Affect: Mood is depressed. Mood is not anxious. Affect is not tearful.         Speech: Speech normal.         Behavior: Behavior normal. Behavior is cooperative.         Thought Content: Thought content normal. Thought content does not include homicidal or suicidal ideation. Thought content does not include homicidal or suicidal plan.         Cognition and Memory: Cognition normal.          Judgment: Judgment normal.      Comments: Improved mood, dosage adjustment requested           Assessment:       1. Type 2 diabetes mellitus with complication, without long-term current use of insulin    2. Essential hypertension    3. Mixed hyperlipidemia    4. Dermatitis    5. Depressed mood    6. Gastroesophageal reflux disease without esophagitis    7. Vitamin D deficiency    8. Chronic obstructive pulmonary disease, unspecified COPD type    9. Class 2 severe obesity due to excess calories with serious comorbidity and body mass index (BMI) of 37.0 to 37.9 in adult          Plan:       Kemi was seen today for mood.    Diagnoses and all orders for this visit:    Type 2 diabetes mellitus with complication, without long-term current use of insulin  -     tirzepatide (MOUNJARO) 2.5 mg/0.5 mL PnIj; Inject 2.5 mg into the skin every 7 days.  -     metFORMIN (GLUCOPHAGE-XR) 500 MG ER 24hr tablet; Take 1 tablet (500 mg total) by mouth 2 (two) times daily with meals. TAKE TWO TABLETS BY MOUTH DAILY WITH DINNER OR EVENING MEAL    Essential hypertension  Stable.  Continue current medication.    Lifestyle changes: Reduce the amount of salt in your diet; Lose weight; Avoid drinking too much alcohol; Exercise at least 30 minutes per day most days of the week.  Continue current medications and home BP monitoring.      Mixed hyperlipidemia  Stable.  Continue current medication.    Dermatitis  -     clotrimazole-betamethasone 1-0.05% (LOTRISONE) cream; Apply topically 2 (two) times daily as needed (rash).    Depressed mood  -     cariprazine (VRAYLAR) 3 mg Cap; Take 1 capsule (3 mg total) by mouth once daily.    Gastroesophageal reflux disease without esophagitis  Stable.  Continue current medication.    Vitamin D deficiency  Stable.  Continue current medication.    Chronic obstructive pulmonary disease, unspecified COPD type  Stable.  Continue current medication.    Class 2 severe obesity due to excess calories with  serious comorbidity and body mass index (BMI) of 37.0 to 37.9 in adult  The patient is asked to make an attempt to improve diet and exercise patterns to aid in medical management of this problem.      Increasing Vraylar from 1.5 mg daily to 3 mg daily dose.  Also starting on Mounjaro. Stop Janumet. Take metformin 500 mg bid.     Follow-up with me with labs prior to office visit in about 3 months.

## 2023-06-09 DIAGNOSIS — E78.2 MIXED HYPERLIPIDEMIA: ICD-10-CM

## 2023-06-12 RX ORDER — ROSUVASTATIN CALCIUM 20 MG/1
TABLET, COATED ORAL
Qty: 90 TABLET | Refills: 1 | Status: SHIPPED | OUTPATIENT
Start: 2023-06-12 | End: 2024-02-14

## 2023-07-09 DIAGNOSIS — J44.9 CHRONIC OBSTRUCTIVE PULMONARY DISEASE, UNSPECIFIED COPD TYPE: ICD-10-CM

## 2023-07-09 DIAGNOSIS — E11.8 TYPE 2 DIABETES MELLITUS WITH COMPLICATION, WITHOUT LONG-TERM CURRENT USE OF INSULIN: ICD-10-CM

## 2023-07-11 RX ORDER — TIRZEPATIDE 2.5 MG/.5ML
INJECTION, SOLUTION SUBCUTANEOUS
OUTPATIENT
Start: 2023-07-11

## 2023-07-11 RX ORDER — TIRZEPATIDE 5 MG/.5ML
5 INJECTION, SOLUTION SUBCUTANEOUS
Qty: 4 PEN | Refills: 3 | Status: SHIPPED | OUTPATIENT
Start: 2023-07-11 | End: 2023-08-29

## 2023-07-11 RX ORDER — IPRATROPIUM BROMIDE AND ALBUTEROL 20; 100 UG/1; UG/1
SPRAY, METERED RESPIRATORY (INHALATION)
Qty: 4 G | Refills: 5 | Status: SHIPPED | OUTPATIENT
Start: 2023-07-11 | End: 2023-12-21 | Stop reason: SDUPTHER

## 2023-07-20 DIAGNOSIS — I10 ESSENTIAL HYPERTENSION: ICD-10-CM

## 2023-07-20 DIAGNOSIS — K21.9 GASTROESOPHAGEAL REFLUX DISEASE WITHOUT ESOPHAGITIS: ICD-10-CM

## 2023-07-20 DIAGNOSIS — E11.8 TYPE 2 DIABETES MELLITUS WITH COMPLICATION, WITHOUT LONG-TERM CURRENT USE OF INSULIN: ICD-10-CM

## 2023-07-20 DIAGNOSIS — J44.9 CHRONIC OBSTRUCTIVE PULMONARY DISEASE, UNSPECIFIED COPD TYPE: ICD-10-CM

## 2023-07-20 RX ORDER — EMPAGLIFLOZIN 25 MG/1
TABLET, FILM COATED ORAL
Qty: 90 TABLET | Refills: 1 | Status: SHIPPED | OUTPATIENT
Start: 2023-07-20 | End: 2024-01-18

## 2023-07-20 RX ORDER — PANTOPRAZOLE SODIUM 40 MG/1
TABLET, DELAYED RELEASE ORAL
Qty: 90 TABLET | Refills: 1 | Status: SHIPPED | OUTPATIENT
Start: 2023-07-20 | End: 2024-01-18

## 2023-07-20 RX ORDER — MONTELUKAST SODIUM 10 MG/1
TABLET ORAL
Qty: 90 TABLET | Refills: 1 | Status: SHIPPED | OUTPATIENT
Start: 2023-07-20 | End: 2024-01-18

## 2023-07-20 RX ORDER — AMLODIPINE BESYLATE 10 MG/1
TABLET ORAL
Qty: 90 TABLET | Refills: 1 | Status: SHIPPED | OUTPATIENT
Start: 2023-07-20 | End: 2024-01-18

## 2023-07-20 RX ORDER — VALSARTAN AND HYDROCHLOROTHIAZIDE 160; 25 MG/1; MG/1
TABLET ORAL
Qty: 90 TABLET | Refills: 1 | Status: SHIPPED | OUTPATIENT
Start: 2023-07-20 | End: 2024-01-18

## 2023-08-22 ENCOUNTER — PATIENT MESSAGE (OUTPATIENT)
Dept: FAMILY MEDICINE | Facility: CLINIC | Age: 58
End: 2023-08-22

## 2023-08-23 ENCOUNTER — PATIENT MESSAGE (OUTPATIENT)
Dept: FAMILY MEDICINE | Facility: CLINIC | Age: 58
End: 2023-08-23

## 2023-08-24 LAB
25(OH)D3 SERPL-MCNC: 41 NG/ML (ref 30–100)
ALBUMIN SERPL-MCNC: 3.9 G/DL (ref 3.6–5.1)
ALBUMIN/GLOB SERPL: 1.6 (CALC) (ref 1–2.5)
ALP SERPL-CCNC: 74 U/L (ref 37–153)
ALT SERPL-CCNC: 19 U/L (ref 6–29)
APPEARANCE UR: CLEAR
AST SERPL-CCNC: 15 U/L (ref 10–35)
BILIRUB SERPL-MCNC: 0.3 MG/DL (ref 0.2–1.2)
BILIRUB UR QL STRIP: NEGATIVE
BUN SERPL-MCNC: 11 MG/DL (ref 7–25)
BUN/CREAT SERPL: ABNORMAL (CALC) (ref 6–22)
CALCIUM SERPL-MCNC: 8.8 MG/DL (ref 8.6–10.4)
CHLORIDE SERPL-SCNC: 108 MMOL/L (ref 98–110)
CHOLEST SERPL-MCNC: 141 MG/DL
CHOLEST/HDLC SERPL: 2.2 (CALC)
CO2 SERPL-SCNC: 29 MMOL/L (ref 20–32)
COLOR UR: YELLOW
CREAT SERPL-MCNC: 0.57 MG/DL (ref 0.5–1.03)
EGFR: 107 ML/MIN/1.73M2
GLOBULIN SER CALC-MCNC: 2.4 G/DL (CALC) (ref 1.9–3.7)
GLUCOSE SERPL-MCNC: 128 MG/DL (ref 65–99)
GLUCOSE UR QL STRIP: ABNORMAL
HBA1C MFR BLD: 6.3 % OF TOTAL HGB
HDLC SERPL-MCNC: 64 MG/DL
HGB UR QL STRIP: NEGATIVE
KETONES UR QL STRIP: NEGATIVE
LDLC SERPL CALC-MCNC: 58 MG/DL (CALC)
LEUKOCYTE ESTERASE UR QL STRIP: NEGATIVE
NITRITE UR QL STRIP: NEGATIVE
NONHDLC SERPL-MCNC: 77 MG/DL (CALC)
PH UR STRIP: 5.5 [PH] (ref 5–8)
POTASSIUM SERPL-SCNC: 4 MMOL/L (ref 3.5–5.3)
PROT SERPL-MCNC: 6.3 G/DL (ref 6.1–8.1)
PROT UR QL STRIP: ABNORMAL
SODIUM SERPL-SCNC: 144 MMOL/L (ref 135–146)
SP GR UR STRIP: 1.04 (ref 1–1.03)
TRIGL SERPL-MCNC: 105 MG/DL

## 2023-08-29 ENCOUNTER — OFFICE VISIT (OUTPATIENT)
Dept: FAMILY MEDICINE | Facility: CLINIC | Age: 58
End: 2023-08-29
Payer: COMMERCIAL

## 2023-08-29 VITALS
HEIGHT: 66 IN | OXYGEN SATURATION: 95 % | WEIGHT: 228.31 LBS | BODY MASS INDEX: 36.69 KG/M2 | SYSTOLIC BLOOD PRESSURE: 100 MMHG | HEART RATE: 89 BPM | DIASTOLIC BLOOD PRESSURE: 65 MMHG

## 2023-08-29 DIAGNOSIS — E66.01 CLASS 2 SEVERE OBESITY DUE TO EXCESS CALORIES WITH SERIOUS COMORBIDITY AND BODY MASS INDEX (BMI) OF 36.0 TO 36.9 IN ADULT: ICD-10-CM

## 2023-08-29 DIAGNOSIS — R45.89 DEPRESSED MOOD: ICD-10-CM

## 2023-08-29 DIAGNOSIS — J44.9 CHRONIC OBSTRUCTIVE PULMONARY DISEASE, UNSPECIFIED COPD TYPE: ICD-10-CM

## 2023-08-29 DIAGNOSIS — E11.8 TYPE 2 DIABETES MELLITUS WITH COMPLICATION, WITHOUT LONG-TERM CURRENT USE OF INSULIN: Primary | ICD-10-CM

## 2023-08-29 DIAGNOSIS — K21.9 GASTROESOPHAGEAL REFLUX DISEASE WITHOUT ESOPHAGITIS: ICD-10-CM

## 2023-08-29 DIAGNOSIS — E55.9 VITAMIN D DEFICIENCY: ICD-10-CM

## 2023-08-29 DIAGNOSIS — E78.2 MIXED HYPERLIPIDEMIA: ICD-10-CM

## 2023-08-29 DIAGNOSIS — I10 ESSENTIAL HYPERTENSION: ICD-10-CM

## 2023-08-29 PROCEDURE — 3044F HG A1C LEVEL LT 7.0%: CPT | Mod: CPTII,S$GLB,, | Performed by: NURSE PRACTITIONER

## 2023-08-29 PROCEDURE — 3074F PR MOST RECENT SYSTOLIC BLOOD PRESSURE < 130 MM HG: ICD-10-PCS | Mod: CPTII,S$GLB,, | Performed by: NURSE PRACTITIONER

## 2023-08-29 PROCEDURE — 1159F PR MEDICATION LIST DOCUMENTED IN MEDICAL RECORD: ICD-10-PCS | Mod: CPTII,S$GLB,, | Performed by: NURSE PRACTITIONER

## 2023-08-29 PROCEDURE — 3008F BODY MASS INDEX DOCD: CPT | Mod: CPTII,S$GLB,, | Performed by: NURSE PRACTITIONER

## 2023-08-29 PROCEDURE — 1160F PR REVIEW ALL MEDS BY PRESCRIBER/CLIN PHARMACIST DOCUMENTED: ICD-10-PCS | Mod: CPTII,S$GLB,, | Performed by: NURSE PRACTITIONER

## 2023-08-29 PROCEDURE — 3061F PR NEG MICROALBUMINURIA RESULT DOCUMENTED/REVIEW: ICD-10-PCS | Mod: CPTII,S$GLB,, | Performed by: NURSE PRACTITIONER

## 2023-08-29 PROCEDURE — 3074F SYST BP LT 130 MM HG: CPT | Mod: CPTII,S$GLB,, | Performed by: NURSE PRACTITIONER

## 2023-08-29 PROCEDURE — 3044F PR MOST RECENT HEMOGLOBIN A1C LEVEL <7.0%: ICD-10-PCS | Mod: CPTII,S$GLB,, | Performed by: NURSE PRACTITIONER

## 2023-08-29 PROCEDURE — 3066F PR DOCUMENTATION OF TREATMENT FOR NEPHROPATHY: ICD-10-PCS | Mod: CPTII,S$GLB,, | Performed by: NURSE PRACTITIONER

## 2023-08-29 PROCEDURE — 3078F DIAST BP <80 MM HG: CPT | Mod: CPTII,S$GLB,, | Performed by: NURSE PRACTITIONER

## 2023-08-29 PROCEDURE — 3061F NEG MICROALBUMINURIA REV: CPT | Mod: CPTII,S$GLB,, | Performed by: NURSE PRACTITIONER

## 2023-08-29 PROCEDURE — 99214 OFFICE O/P EST MOD 30 MIN: CPT | Mod: S$GLB,,, | Performed by: NURSE PRACTITIONER

## 2023-08-29 PROCEDURE — 3008F PR BODY MASS INDEX (BMI) DOCUMENTED: ICD-10-PCS | Mod: CPTII,S$GLB,, | Performed by: NURSE PRACTITIONER

## 2023-08-29 PROCEDURE — 99214 PR OFFICE/OUTPT VISIT, EST, LEVL IV, 30-39 MIN: ICD-10-PCS | Mod: S$GLB,,, | Performed by: NURSE PRACTITIONER

## 2023-08-29 PROCEDURE — 3078F PR MOST RECENT DIASTOLIC BLOOD PRESSURE < 80 MM HG: ICD-10-PCS | Mod: CPTII,S$GLB,, | Performed by: NURSE PRACTITIONER

## 2023-08-29 PROCEDURE — 1159F MED LIST DOCD IN RCRD: CPT | Mod: CPTII,S$GLB,, | Performed by: NURSE PRACTITIONER

## 2023-08-29 PROCEDURE — 1160F RVW MEDS BY RX/DR IN RCRD: CPT | Mod: CPTII,S$GLB,, | Performed by: NURSE PRACTITIONER

## 2023-08-29 PROCEDURE — 3066F NEPHROPATHY DOC TX: CPT | Mod: CPTII,S$GLB,, | Performed by: NURSE PRACTITIONER

## 2023-08-29 RX ORDER — CARIPRAZINE 1.5 MG/1
1.5 CAPSULE, GELATIN COATED ORAL DAILY
Qty: 90 CAPSULE | Refills: 1 | Status: SHIPPED | OUTPATIENT
Start: 2023-08-29

## 2023-08-29 RX ORDER — FLUOXETINE 10 MG/1
10 CAPSULE ORAL DAILY
Qty: 90 CAPSULE | Refills: 1 | Status: SHIPPED | OUTPATIENT
Start: 2023-08-29 | End: 2024-02-26

## 2023-08-29 NOTE — PROGRESS NOTES
Subjective:       Patient ID: Kemi Dominguez is a 58 y.o. female.    Chief Complaint: Mood    Patient presents today following up for chronic conditions including hypertension, hyperlipidemia, diabetes, and depressed mood.    Her vraylar was increased from 1.5 mg to a 3 mg dose at her last visit. She has not noticed much of a difference and is still having some anxiety. Depression is controlled and she is tolerating wellbutrin well.  She is obese with a BMI of 36.85        Diabetes  She presents for her follow-up diabetic visit. She has type 2 diabetes mellitus. No MedicAlert identification noted. Her disease course has been stable. Hypoglycemia symptoms include headaches. Pertinent negatives for hypoglycemia include no confusion, dizziness, hunger, mood changes, nervousness/anxiousness, pallor, seizures, sleepiness, speech difficulty, sweats or tremors. Pertinent negatives for diabetes include no blurred vision, no chest pain, no foot paresthesias, no foot ulcerations, no polydipsia, no polyphagia, no polyuria, no weakness and no weight loss. There are no hypoglycemic complications. Pertinent negatives for hypoglycemia complications include no blackouts, no hospitalization, no nocturnal hypoglycemia, no required assistance and no required glucagon injection. Symptoms are worsening. There are no diabetic complications. Pertinent negatives for diabetic complications include no autonomic neuropathy, CVA, heart disease, impotence, nephropathy, peripheral neuropathy, PVD or retinopathy. Risk factors for coronary artery disease include hypertension, stress, sedentary lifestyle, obesity, dyslipidemia and diabetes mellitus. Current diabetic treatment includes oral agent (triple therapy) (metformin, janumet, jardiance). She is compliant with treatment most of the time. Her weight is stable. She is following a generally healthy diet. Meal planning includes avoidance of concentrated sweets. She has not had a previous visit  with a dietitian. She participates in exercise intermittently. There is no compliance with monitoring of blood glucose. Her home blood glucose trend is decreasing steadily. An ACE inhibitor/angiotensin II receptor blocker is being taken. She does not see a podiatrist.Eye exam is not current.   Hypertension  This is a chronic problem. The current episode started more than 1 month ago. The problem has been waxing and waning since onset. The problem is controlled. Associated symptoms include anxiety, headaches and malaise/fatigue. Pertinent negatives include no blurred vision, chest pain, neck pain, palpitations, shortness of breath or sweats. Agents associated with hypertension include estrogens. Risk factors for coronary artery disease include smoking/tobacco exposure, sedentary lifestyle, obesity, diabetes mellitus, dyslipidemia and stress. Past treatments include calcium channel blockers, angiotensin blockers and direct vasodilators. The current treatment provides significant improvement. Compliance problems include diet and exercise.  There is no history of angina, CVA, PVD or retinopathy. There is no history of chronic renal disease.   Hyperlipidemia  This is a chronic problem. The current episode started more than 1 month ago. The problem is controlled. Recent lipid tests were reviewed and are variable. Exacerbating diseases include diabetes and obesity. She has no history of chronic renal disease. Factors aggravating her hyperlipidemia include fatty foods. Associated symptoms include myalgias. Pertinent negatives include no chest pain, focal weakness, leg pain or shortness of breath. Current antihyperlipidemic treatment includes statins. The current treatment provides significant improvement of lipids. Compliance problems include adherence to exercise and adherence to diet.  Risk factors for coronary artery disease include diabetes mellitus, dyslipidemia, obesity, a sedentary lifestyle and stress.    Depression  Visit Type: follow-up  Patient presents with the following symptoms: anhedonia and excessive worry.  Patient is not experiencing: confusion, impotence, nervousness/anxiety, palpitations, shortness of breath, suicidal ideas, suicidal planning, thoughts of death and weight loss.  Frequency of symptoms: most days   Severity: moderate   Sleep quality: fair  Nighttime awakenings: occasional    Headache   This is a recurrent problem. The current episode started more than 1 month ago. The problem occurs intermittently. The problem has been waxing and waning. The pain is located in the Left unilateral region. The pain does not radiate. The pain quality is similar to prior headaches. The quality of the pain is described as pulsating. The pain is moderate. Associated symptoms include back pain (chronic), phonophobia and photophobia. Pertinent negatives include no abdominal pain, blurred vision, coughing, dizziness, ear pain, eye pain, fever, hearing loss, nausea, neck pain, rhinorrhea, seizures, sinus pressure, sore throat, vomiting, weakness or weight loss. The symptoms are aggravated by activity, bright light and caffeine withdrawal. She has tried NSAIDs (bc powders) for the symptoms. The treatment provided significant relief. Her past medical history is significant for obesity. There is no history of recent head traumas.     Review of Systems   Constitutional:  Positive for malaise/fatigue. Negative for activity change, appetite change, fever, unexpected weight change and weight loss.        Obesity   HENT:  Negative for congestion, ear discharge, ear pain, hearing loss, rhinorrhea, sinus pressure, sore throat, trouble swallowing and voice change.    Eyes:  Positive for photophobia. Negative for blurred vision, pain, discharge and visual disturbance.   Respiratory:  Negative for cough, chest tightness, shortness of breath and wheezing.         COPD, former smoker   Cardiovascular:  Negative for chest pain and  palpitations.        Hypertension, hyperlipidemia   Gastrointestinal:  Negative for abdominal pain, blood in stool, constipation, diarrhea, nausea and vomiting.        Gerd   Endocrine: Negative for cold intolerance, heat intolerance, polydipsia, polyphagia and polyuria.        Diabetes type 2   Genitourinary:  Negative for difficulty urinating, dysuria, hematuria, impotence and menstrual problem.   Musculoskeletal:  Positive for arthralgias, back pain (chronic) and myalgias. Negative for gait problem, joint swelling and neck pain.   Skin:  Negative for color change, pallor and rash.   Allergic/Immunologic: Negative for immunocompromised state.   Neurological:  Positive for headaches. Negative for dizziness, tremors, focal weakness, seizures, speech difficulty and weakness.   Psychiatric/Behavioral:  Positive for depression and dysphoric mood. Negative for confusion, self-injury and suicidal ideas. The patient is not nervous/anxious.        Past Medical History:   Diagnosis Date    Class 3 severe obesity due to excess calories with serious comorbidity and body mass index (BMI) of 40.0 to 44.9 in adult 10/17/2018    COPD (chronic obstructive pulmonary disease)     Diabetes mellitus, type 2     GERD (gastroesophageal reflux disease)     Hyperlipidemia     Hypertension     Sleep apnea     Vitamin D deficiency       Past Surgical History:   Procedure Laterality Date    ADENOIDECTOMY      BREAST SURGERY Right     breast biopsy    COLONOSCOPY N/A 9/10/2021    Procedure: COLONOSCOPY;  Surgeon: Holland Royal MD;  Location: [facility];  Service: Endoscopy;  Laterality: N/A;    TONSILLECTOMY      TRANSFORAMINAL EPIDURAL INJECTION OF STEROID Left 4/16/2019    Procedure: Injection,steroid,epidural,transforaminal approach;  Surgeon: Gilberto Michelle MD;  Location: [facility];  Service: Pain Management;  Laterality: Left;  L5-S1, S1    TRANSFORAMINAL EPIDURAL INJECTION OF STEROID Left 8/23/2019    Procedure:  Injection,steroid,epidural,transforaminal approach;  Surgeon: Gilberto Michelle MD;  Location: [facility] OR;  Service: Pain Management;  Laterality: Left;  L5-S1, S1       Family History   Problem Relation Age of Onset    Stroke Mother     Emphysema Mother     Diabetes Father     Coronary artery disease Father     COPD Father     Diabetes Sister        Social History     Socioeconomic History    Marital status: Significant Other   Tobacco Use    Smoking status: Former     Current packs/day: 0.00     Types: Cigarettes     Quit date: 2019     Years since quittin.5    Smokeless tobacco: Never   Substance and Sexual Activity    Alcohol use: Yes     Comment: rarely    Drug use: Yes     Types: Hydrocodone    Sexual activity: Never     Social Determinants of Health     Financial Resource Strain: Low Risk  (2023)    Overall Financial Resource Strain (CARDIA)     Difficulty of Paying Living Expenses: Not hard at all   Food Insecurity: No Food Insecurity (2023)    Hunger Vital Sign     Worried About Running Out of Food in the Last Year: Never true     Ran Out of Food in the Last Year: Never true   Transportation Needs: No Transportation Needs (2023)    PRAPARE - Transportation     Lack of Transportation (Medical): No     Lack of Transportation (Non-Medical): No   Physical Activity: Inactive (2023)    Exercise Vital Sign     Days of Exercise per Week: 0 days     Minutes of Exercise per Session: 0 min   Stress: Stress Concern Present (2023)    [employer] - Occupational Stress Questionnaire     Feeling of Stress : Rather much   Social Connections: Unknown (2023)    Social Connection and Isolation Panel [NHANES]     Frequency of Communication with Friends and Family: Three times a week     Frequency of Social Gatherings with Friends and Family: Once a week     Active Member of Clubs or Organizations: No     Attends Club or Organization Meetings: Never     Marital Status:  Living with partner   Housing Stability: Low Risk  (8/29/2023)    Housing Stability Vital Sign     Unable to Pay for Housing in the Last Year: No     Number of Places Lived in the Last Year: 1     Unstable Housing in the Last Year: No       Current Outpatient Medications   Medication Sig Dispense Refill    albuterol-ipratropium (DUO-NEB) 2.5 mg-0.5 mg/3 mL nebulizer solution Take 3 mLs by nebulization every 6 (six) hours as needed for Wheezing. Rescue 1 each 2    amLODIPine (NORVASC) 10 MG tablet TAKE 1 TABLET BY MOUTH EVERY DAY 90 tablet 1    aspirin/salicylamide/caffeine (BC HEADACHE POWDER ORAL) Take by mouth as needed.      azelastine (ASTELIN) 137 mcg (0.1 %) nasal spray USE 1 SPRAY IN EACH NOSTRIL TWICE DAILY 30 mL 2    blood sugar diagnostic Strp 1 each by Misc.(Non-Drug; Combo Route) route once daily. 100 each 2    blood-glucose meter Misc 1 each by Misc.(Non-Drug; Combo Route) route once daily. 1 each 0    buPROPion (WELLBUTRIN XL) 300 MG 24 hr tablet TAKE 1 TABLET BY MOUTH EVERY DAY 90 tablet 1    clotrimazole-betamethasone 1-0.05% (LOTRISONE) cream Apply topically 2 (two) times daily as needed (rash). 45 g 1    fluticasone propionate (FLONASE) 50 mcg/actuation nasal spray 1 spray (50 mcg total) by Each Nostril route 2 (two) times daily. 48 mL 1    fluticasone-salmeterol 500-50 mcg/dose (WIXELA INHUB) 500-50 mcg/dose DsDv diskus inhaler INHALE 1 PUFF INTO THE LUNGS 2 (TWO) TIMES DAILY. CONTROLLER 60 each 5    hydrocortisone 2.5 % cream Apply topically 2 (two) times daily. 28 g 3    ipratropium-albuteroL (COMBIVENT RESPIMAT)  mcg/actuation inhaler INHALE 2 PUFFS BY MOUTH EVERY 6 HOURS AS NEEDED FOR WHEEZE 4 g 5    JARDIANCE 25 mg tablet TAKE 1 TABLET BY MOUTH EVERY DAY 90 tablet 1    lancets 33 gauge Misc 1 lancet by Misc.(Non-Drug; Combo Route) route once daily. 100 each 3    melatonin 5 mg Tab Take 10 mg by mouth nightly.       metFORMIN (GLUCOPHAGE-XR) 500 MG ER 24hr tablet Take 1 tablet (500 mg  "total) by mouth 2 (two) times daily with meals. TAKE TWO TABLETS BY MOUTH DAILY WITH DINNER OR EVENING MEAL 180 tablet 1    montelukast (SINGULAIR) 10 mg tablet TAKE 1 TABLET BY MOUTH EVERY DAY 90 tablet 1    pantoprazole (PROTONIX) 40 MG tablet TAKE 1 TABLET BY MOUTH EVERY DAY 90 tablet 1    rosuvastatin (CRESTOR) 20 MG tablet TAKE 1 TABLET BY MOUTH EVERY DAY 90 tablet 1    valsartan-hydrochlorothiazide (DIOVAN-HCT) 160-25 mg per tablet TAKE 1 TABLET BY MOUTH EVERY DAY 90 tablet 1    varicella-zoster [id number], PF, (SHINGRIX, PF,) 50 mcg/0.5 mL injection Shingrix (PF) 50 mcg/0.5 mL intramuscular suspension, kit   PHARMACY ADMINISTERED      cariprazine (VRAYLAR) 1.5 mg Cap Take 1 capsule (1.5 mg total) by mouth once daily. 90 capsule 1    FLUoxetine 10 MG capsule Take 1 capsule (10 mg total) by mouth once daily. 90 capsule 1    mupirocin (BACTROBAN) 2 % ointment Apply topically.      tirzepatide 7.5 mg/0.5 mL PnIj Inject 7.5 mg into the skin every 7 days. 4 pen 5     No current facility-administered medications for this visit.       Review of patient's allergies indicates:  No Known Allergies  Objective:      Blood pressure 100/65, pulse 89, height 5' 6" (1.676 m), weight 103.6 kg (228 lb 4.8 oz), SpO2 95 %. Body mass index is 36.85 kg/m².   Physical Exam  Vitals and nursing note reviewed.   Constitutional:       General: She is not in acute distress.     Appearance: Normal appearance. She is well-developed. She is obese. She is not ill-appearing.   HENT:      Head: Normocephalic and atraumatic.      Right Ear: Tympanic membrane and external ear normal.      Left Ear: Tympanic membrane and external ear normal.      Nose: Nose normal.      Mouth/Throat:      Mouth: Mucous membranes are moist.      Pharynx: Oropharynx is clear.   Eyes:      General: Lids are normal. Lids are everted, no foreign bodies appreciated.      Conjunctiva/sclera: Conjunctivae normal.      Pupils: Pupils are equal, round, and reactive to light. "      Right eye: Pupil is round and reactive.      Left eye: Pupil is round and reactive.   Neck:      Trachea: Trachea normal.   Cardiovascular:      Rate and Rhythm: Normal rate and regular rhythm.      Pulses: Normal pulses.      Heart sounds: Normal heart sounds, S1 normal and S2 normal. No murmur heard.  Pulmonary:      Effort: Pulmonary effort is normal. No respiratory distress.      Breath sounds: No stridor. No decreased breath sounds, rhonchi or rales.   Abdominal:      General: Bowel sounds are normal.      Palpations: Abdomen is soft. Abdomen is not rigid. There is no mass.      Tenderness: There is no guarding.   Musculoskeletal:         General: Normal range of motion.      Cervical back: Normal range of motion and neck supple.   Lymphadenopathy:      Cervical: No cervical adenopathy.   Skin:     General: Skin is warm and dry.      Capillary Refill: Capillary refill takes less than 2 seconds.   Neurological:      General: No focal deficit present.      Mental Status: She is alert and oriented to person, place, and time. Mental status is at baseline.      Cranial Nerves: No cranial nerve deficit.   Psychiatric:         Attention and Perception: Attention normal.         Mood and Affect: Mood is anxious. Mood is not depressed. Affect is not tearful.         Speech: Speech normal.         Behavior: Behavior normal. Behavior is cooperative.         Thought Content: Thought content normal. Thought content does not include homicidal or suicidal ideation. Thought content does not include homicidal or suicidal plan.         Cognition and Memory: Cognition normal.         Judgment: Judgment normal.      Comments: Improved mood, dosage adjustment requested             Assessment:       1. Type 2 diabetes mellitus with complication, without long-term current use of insulin    2. Essential hypertension    3. Mixed hyperlipidemia    4. Depressed mood    5. Gastroesophageal reflux disease without esophagitis    6.  Chronic obstructive pulmonary disease, unspecified COPD type    7. Vitamin D deficiency    8. Class 2 severe obesity due to excess calories with serious comorbidity and body mass index (BMI) of 36.0 to 36.9 in adult            Plan:       Kemi was seen today for mood.    Diagnoses and all orders for this visit:    Type 2 diabetes mellitus with complication, without long-term current use of insulin  -     tirzepatide 7.5 mg/0.5 mL PnIj; Inject 7.5 mg into the skin every 7 days.  -     Hemoglobin A1C; Future  -     Comprehensive Metabolic Panel; Future  -     Hemoglobin A1C  -     Comprehensive Metabolic Panel     Discussed the following complications of DM Type 2: CAD, CVD, Retinopathy, Nephropathy, Neuropathy.    Discussed Target A1C Goal <7.0% and Target fasting blood sugars () before each meal.    Discussed Lifestyle Modifications: Low glycemic index diet, Exercise (30-45 min) daily, Weight loss      Essential hypertension  -     Urinalysis; Future  -     Urinalysis    Lifestyle changes: Reduce the amount of salt in your diet; Lose weight; Avoid drinking too much alcohol; Exercise at least 30 minutes per day most days of the week.  Continue current medications and home BP monitoring.      Mixed hyperlipidemia  Limit red meat, butter, fried foods, cheese, and other foods that have a lot of saturated fat. Consume more: lean meats, fish, fruits, vegetables, whole grains, beans, lentils, and nuts.  Weight loss, and 30-45 min of cardiovascular exercise daily.      Depressed mood  -     cariprazine (VRAYLAR) 1.5 mg Cap; Take 1 capsule (1.5 mg total) by mouth once daily.  -     FLUoxetine 10 MG capsule; Take 1 capsule (10 mg total) by mouth once daily.    Gastroesophageal reflux disease without esophagitis  Stable  Continue current medications    Chronic obstructive pulmonary disease, unspecified COPD type  Stable  Continue current medications    Vitamin D deficiency  Stable  Continue current medications    Class  2 severe obesity due to excess calories with serious comorbidity and body mass index (BMI) of 36.0 to 36.9 in adult  The patient is asked to make an attempt to improve diet and exercise patterns to aid in medical management of this problem.     Reducing vraylar dose from 3 mg to 1.5 mg.  Adding prozac 10 mg  Continue wellbutrin  Increasing mounjaro from 5 mg weekly to 7.5 mg weekly dose.  Continue all other medications.

## 2023-10-03 ENCOUNTER — PATIENT MESSAGE (OUTPATIENT)
Dept: FAMILY MEDICINE | Facility: CLINIC | Age: 58
End: 2023-10-03

## 2023-10-05 DIAGNOSIS — R45.89 DEPRESSED MOOD: ICD-10-CM

## 2023-10-06 RX ORDER — BUPROPION HYDROCHLORIDE 300 MG/1
TABLET ORAL
Qty: 90 TABLET | Refills: 1 | Status: SHIPPED | OUTPATIENT
Start: 2023-10-06 | End: 2024-04-01

## 2023-11-19 DIAGNOSIS — E11.8 TYPE 2 DIABETES MELLITUS WITH COMPLICATION, WITHOUT LONG-TERM CURRENT USE OF INSULIN: ICD-10-CM

## 2023-11-20 RX ORDER — METFORMIN HYDROCHLORIDE 500 MG/1
TABLET, EXTENDED RELEASE ORAL
Qty: 180 TABLET | Refills: 3 | Status: SHIPPED | OUTPATIENT
Start: 2023-11-20

## 2023-12-02 LAB
ALBUMIN SERPL-MCNC: 4.2 G/DL (ref 3.6–5.1)
ALBUMIN/GLOB SERPL: 1.7 (CALC) (ref 1–2.5)
ALP SERPL-CCNC: 92 U/L (ref 37–153)
ALT SERPL-CCNC: 22 U/L (ref 6–29)
APPEARANCE UR: CLEAR
AST SERPL-CCNC: 16 U/L (ref 10–35)
BILIRUB SERPL-MCNC: 0.5 MG/DL (ref 0.2–1.2)
BILIRUB UR QL STRIP: NEGATIVE
BUN SERPL-MCNC: 12 MG/DL (ref 7–25)
BUN/CREAT SERPL: NORMAL (CALC) (ref 6–22)
CALCIUM SERPL-MCNC: 9.4 MG/DL (ref 8.6–10.4)
CHLORIDE SERPL-SCNC: 103 MMOL/L (ref 98–110)
CO2 SERPL-SCNC: 30 MMOL/L (ref 20–32)
COLOR UR: YELLOW
CREAT SERPL-MCNC: 0.66 MG/DL (ref 0.5–1.03)
EGFR: 103 ML/MIN/1.73M2
GLOBULIN SER CALC-MCNC: 2.5 G/DL (CALC) (ref 1.9–3.7)
GLUCOSE SERPL-MCNC: 84 MG/DL (ref 65–99)
GLUCOSE UR QL STRIP: ABNORMAL
HBA1C MFR BLD: 6 % OF TOTAL HGB
HGB UR QL STRIP: NEGATIVE
KETONES UR QL STRIP: NEGATIVE
LEUKOCYTE ESTERASE UR QL STRIP: NEGATIVE
NITRITE UR QL STRIP: NEGATIVE
PH UR STRIP: 5.5 [PH] (ref 5–8)
POTASSIUM SERPL-SCNC: 3.5 MMOL/L (ref 3.5–5.3)
PROT SERPL-MCNC: 6.7 G/DL (ref 6.1–8.1)
PROT UR QL STRIP: NEGATIVE
SODIUM SERPL-SCNC: 142 MMOL/L (ref 135–146)
SP GR UR STRIP: 1.04 (ref 1–1.03)

## 2023-12-05 ENCOUNTER — OFFICE VISIT (OUTPATIENT)
Dept: FAMILY MEDICINE | Facility: CLINIC | Age: 58
End: 2023-12-05
Payer: COMMERCIAL

## 2023-12-05 VITALS
BODY MASS INDEX: 35.52 KG/M2 | WEIGHT: 221 LBS | DIASTOLIC BLOOD PRESSURE: 60 MMHG | OXYGEN SATURATION: 96 % | TEMPERATURE: 99 F | SYSTOLIC BLOOD PRESSURE: 100 MMHG | HEIGHT: 66 IN | HEART RATE: 81 BPM

## 2023-12-05 DIAGNOSIS — R45.89 DEPRESSED MOOD: ICD-10-CM

## 2023-12-05 DIAGNOSIS — E66.01 CLASS 2 SEVERE OBESITY DUE TO EXCESS CALORIES WITH SERIOUS COMORBIDITY AND BODY MASS INDEX (BMI) OF 35.0 TO 35.9 IN ADULT: ICD-10-CM

## 2023-12-05 DIAGNOSIS — E55.9 VITAMIN D DEFICIENCY: ICD-10-CM

## 2023-12-05 DIAGNOSIS — E11.8 TYPE 2 DIABETES MELLITUS WITH COMPLICATION, WITHOUT LONG-TERM CURRENT USE OF INSULIN: Primary | ICD-10-CM

## 2023-12-05 DIAGNOSIS — I10 ESSENTIAL HYPERTENSION: ICD-10-CM

## 2023-12-05 DIAGNOSIS — E78.2 MIXED HYPERLIPIDEMIA: ICD-10-CM

## 2023-12-05 DIAGNOSIS — K21.9 GASTROESOPHAGEAL REFLUX DISEASE WITHOUT ESOPHAGITIS: ICD-10-CM

## 2023-12-05 DIAGNOSIS — J44.9 CHRONIC OBSTRUCTIVE PULMONARY DISEASE, UNSPECIFIED COPD TYPE: ICD-10-CM

## 2023-12-05 PROCEDURE — 1159F PR MEDICATION LIST DOCUMENTED IN MEDICAL RECORD: ICD-10-PCS | Mod: CPTII,S$GLB,, | Performed by: NURSE PRACTITIONER

## 2023-12-05 PROCEDURE — 3044F PR MOST RECENT HEMOGLOBIN A1C LEVEL <7.0%: ICD-10-PCS | Mod: CPTII,S$GLB,, | Performed by: NURSE PRACTITIONER

## 2023-12-05 PROCEDURE — 3074F SYST BP LT 130 MM HG: CPT | Mod: CPTII,S$GLB,, | Performed by: NURSE PRACTITIONER

## 2023-12-05 PROCEDURE — 3044F HG A1C LEVEL LT 7.0%: CPT | Mod: CPTII,S$GLB,, | Performed by: NURSE PRACTITIONER

## 2023-12-05 PROCEDURE — 99214 PR OFFICE/OUTPT VISIT, EST, LEVL IV, 30-39 MIN: ICD-10-PCS | Mod: S$GLB,,, | Performed by: NURSE PRACTITIONER

## 2023-12-05 PROCEDURE — 3008F PR BODY MASS INDEX (BMI) DOCUMENTED: ICD-10-PCS | Mod: CPTII,S$GLB,, | Performed by: NURSE PRACTITIONER

## 2023-12-05 PROCEDURE — 1160F PR REVIEW ALL MEDS BY PRESCRIBER/CLIN PHARMACIST DOCUMENTED: ICD-10-PCS | Mod: CPTII,S$GLB,, | Performed by: NURSE PRACTITIONER

## 2023-12-05 PROCEDURE — 3008F BODY MASS INDEX DOCD: CPT | Mod: CPTII,S$GLB,, | Performed by: NURSE PRACTITIONER

## 2023-12-05 PROCEDURE — 3078F DIAST BP <80 MM HG: CPT | Mod: CPTII,S$GLB,, | Performed by: NURSE PRACTITIONER

## 2023-12-05 PROCEDURE — 1160F RVW MEDS BY RX/DR IN RCRD: CPT | Mod: CPTII,S$GLB,, | Performed by: NURSE PRACTITIONER

## 2023-12-05 PROCEDURE — 3066F PR DOCUMENTATION OF TREATMENT FOR NEPHROPATHY: ICD-10-PCS | Mod: CPTII,S$GLB,, | Performed by: NURSE PRACTITIONER

## 2023-12-05 PROCEDURE — 99214 OFFICE O/P EST MOD 30 MIN: CPT | Mod: S$GLB,,, | Performed by: NURSE PRACTITIONER

## 2023-12-05 PROCEDURE — 3061F NEG MICROALBUMINURIA REV: CPT | Mod: CPTII,S$GLB,, | Performed by: NURSE PRACTITIONER

## 2023-12-05 PROCEDURE — 3066F NEPHROPATHY DOC TX: CPT | Mod: CPTII,S$GLB,, | Performed by: NURSE PRACTITIONER

## 2023-12-05 PROCEDURE — 3078F PR MOST RECENT DIASTOLIC BLOOD PRESSURE < 80 MM HG: ICD-10-PCS | Mod: CPTII,S$GLB,, | Performed by: NURSE PRACTITIONER

## 2023-12-05 PROCEDURE — 1159F MED LIST DOCD IN RCRD: CPT | Mod: CPTII,S$GLB,, | Performed by: NURSE PRACTITIONER

## 2023-12-05 PROCEDURE — 3061F PR NEG MICROALBUMINURIA RESULT DOCUMENTED/REVIEW: ICD-10-PCS | Mod: CPTII,S$GLB,, | Performed by: NURSE PRACTITIONER

## 2023-12-05 PROCEDURE — 3074F PR MOST RECENT SYSTOLIC BLOOD PRESSURE < 130 MM HG: ICD-10-PCS | Mod: CPTII,S$GLB,, | Performed by: NURSE PRACTITIONER

## 2023-12-05 RX ORDER — TIZANIDINE 4 MG/1
TABLET ORAL
COMMUNITY
Start: 2023-11-20

## 2023-12-05 RX ORDER — IPRATROPIUM BROMIDE AND ALBUTEROL SULFATE 2.5; .5 MG/3ML; MG/3ML
3 SOLUTION RESPIRATORY (INHALATION) EVERY 6 HOURS PRN
Qty: 1 EACH | Refills: 5 | Status: SHIPPED | OUTPATIENT
Start: 2023-12-05

## 2023-12-05 NOTE — PROGRESS NOTES
Subjective:       Patient ID: Kemi Dominguez is a 58 y.o. female.    Chief Complaint: Diabetes, Hypertension, and Follow-up    Patient presents today following up for chronic conditions including hypertension, hyperlipidemia, diabetes, and depressed mood.    Had multiple medication changes at her last visit with me.  Reduced vraylar dose from 3 mg to 1.5 mg.  Added prozac 10 mg  Continued wellbutrin  Patient has tolerated this combination of medication well.  She feels stable.  Her mounjaro dose was increased from 5 mg to 7.5 mg.  She has been tolerating that well but could increase the medication.  She is obese with a BMI of 35.67        Diabetes  She presents for her follow-up diabetic visit. She has type 2 diabetes mellitus. No MedicAlert identification noted. The initial diagnosis of diabetes was made 10 years ago. Her disease course has been stable. Hypoglycemia symptoms include headaches. Pertinent negatives for hypoglycemia include no confusion, dizziness, hunger, mood changes, nervousness/anxiousness, pallor, seizures, sleepiness, speech difficulty, sweats or tremors. Pertinent negatives for diabetes include no blurred vision, no chest pain, no fatigue, no foot paresthesias, no foot ulcerations, no polydipsia, no polyphagia, no polyuria, no visual change, no weakness and no weight loss. There are no hypoglycemic complications. Pertinent negatives for hypoglycemia complications include no blackouts, no hospitalization, no nocturnal hypoglycemia, no required assistance and no required glucagon injection. Symptoms are worsening. There are no diabetic complications. Pertinent negatives for diabetic complications include no autonomic neuropathy, CVA, heart disease, impotence, nephropathy, peripheral neuropathy, PVD or retinopathy. Risk factors for coronary artery disease include hypertension, stress, sedentary lifestyle, obesity, dyslipidemia and diabetes mellitus. Current diabetic treatment includes oral agent  (triple therapy) (metformin, janumet, jardiance). She is compliant with treatment most of the time. Her weight is stable. She is following a generally healthy diet. Meal planning includes avoidance of concentrated sweets. She has not had a previous visit with a dietitian. She participates in exercise intermittently. She monitors blood glucose at home 1-2 x per week. She monitors urine at home <1 x per month. There is no compliance with monitoring of blood glucose. Her home blood glucose trend is decreasing steadily. An ACE inhibitor/angiotensin II receptor blocker is being taken. She does not see a podiatrist.Eye exam is not current.   Hypertension  This is a chronic problem. The current episode started more than 1 month ago. The problem has been waxing and waning since onset. The problem is controlled. Associated symptoms include anxiety, headaches and malaise/fatigue. Pertinent negatives include no blurred vision, chest pain, neck pain, palpitations, shortness of breath or sweats. Agents associated with hypertension include estrogens. Risk factors for coronary artery disease include smoking/tobacco exposure, sedentary lifestyle, obesity, diabetes mellitus, dyslipidemia and stress. Past treatments include calcium channel blockers, angiotensin blockers and direct vasodilators. The current treatment provides significant improvement. Compliance problems include diet and exercise.  There is no history of angina, CVA, PVD or retinopathy. There is no history of chronic renal disease.   Hyperlipidemia  This is a chronic problem. The current episode started more than 1 month ago. The problem is controlled. Recent lipid tests were reviewed and are variable. Exacerbating diseases include diabetes and obesity. She has no history of chronic renal disease. Factors aggravating her hyperlipidemia include fatty foods. Associated symptoms include myalgias. Pertinent negatives include no chest pain, focal weakness, leg pain or  shortness of breath. Current antihyperlipidemic treatment includes statins. The current treatment provides significant improvement of lipids. Compliance problems include adherence to exercise and adherence to diet.  Risk factors for coronary artery disease include diabetes mellitus, dyslipidemia, obesity, a sedentary lifestyle and stress.     Review of Systems   Constitutional:  Positive for malaise/fatigue. Negative for activity change, appetite change, fatigue, fever, unexpected weight change and weight loss.        Obesity   HENT:  Negative for congestion, ear discharge, ear pain, hearing loss, rhinorrhea, sinus pressure, sore throat, trouble swallowing and voice change.    Eyes:  Negative for blurred vision, photophobia, pain, discharge and visual disturbance.   Respiratory:  Negative for cough, chest tightness, shortness of breath and wheezing.         COPD, former smoker   Cardiovascular:  Negative for chest pain and palpitations.        Hypertension, hyperlipidemia   Gastrointestinal:  Negative for abdominal pain, blood in stool, constipation, diarrhea, nausea and vomiting.        Gerd   Endocrine: Negative for cold intolerance, heat intolerance, polydipsia, polyphagia and polyuria.        Diabetes type 2   Genitourinary:  Negative for difficulty urinating, dysuria, hematuria, impotence and menstrual problem.   Musculoskeletal:  Positive for arthralgias, back pain (chronic) and myalgias. Negative for gait problem, joint swelling and neck pain.   Skin:  Negative for color change, pallor and rash.   Allergic/Immunologic: Negative for immunocompromised state.   Neurological:  Positive for headaches. Negative for dizziness, tremors, focal weakness, seizures, speech difficulty and weakness.   Psychiatric/Behavioral:  Positive for depression and dysphoric mood. Negative for confusion, self-injury and suicidal ideas. The patient is not nervous/anxious.        Past Medical History:   Diagnosis Date    Class 3 severe  obesity due to excess calories with serious comorbidity and body mass index (BMI) of 40.0 to 44.9 in adult 10/17/2018    COPD (chronic obstructive pulmonary disease)     Diabetes mellitus, type 2     GERD (gastroesophageal reflux disease)     Hyperlipidemia     Hypertension     Sleep apnea     Vitamin D deficiency       Past Surgical History:   Procedure Laterality Date    ADENOIDECTOMY      BREAST SURGERY Right     breast biopsy    COLONOSCOPY N/A 9/10/2021    Procedure: COLONOSCOPY;  Surgeon: Holland Royal MD;  Location: [facility];  Service: Endoscopy;  Laterality: N/A;    TONSILLECTOMY      TRANSFORAMINAL EPIDURAL INJECTION OF STEROID Left 2019    Procedure: Injection,steroid,epidural,transforaminal approach;  Surgeon: Gilberto Michelle MD;  Location: [facility] OR;  Service: Pain Management;  Laterality: Left;  L5-S1, S1    TRANSFORAMINAL EPIDURAL INJECTION OF STEROID Left 2019    Procedure: Injection,steroid,epidural,transforaminal approach;  Surgeon: Gilberto Michelle MD;  Location: [facility] OR;  Service: Pain Management;  Laterality: Left;  L5-S1, S1       Family History   Problem Relation Age of Onset    Stroke Mother     Emphysema Mother     Diabetes Father     Coronary artery disease Father     COPD Father     Diabetes Sister        Social History     Socioeconomic History    Marital status: Significant Other   Tobacco Use    Smoking status: Former     Current packs/day: 0.00     Types: Cigarettes     Quit date: 2019     Years since quittin.7    Smokeless tobacco: Never   Substance and Sexual Activity    Alcohol use: Yes     Comment: rarely    Drug use: Yes     Types: Hydrocodone    Sexual activity: Never     Social Determinants of Health     Financial Resource Strain: Low Risk  (2023)    Overall Financial Resource Strain (CARDIA)     Difficulty of Paying Living Expenses: Not hard at all   Food Insecurity: No Food Insecurity (2023)    Hunger Vital Sign     Worried About Running Out of Food in  the Last Year: Never true     Ran Out of Food in the Last Year: Never true   Transportation Needs: No Transportation Needs (8/29/2023)    PRAPARE - Transportation     Lack of Transportation (Medical): No     Lack of Transportation (Non-Medical): No   Physical Activity: Inactive (8/29/2023)    Exercise Vital Sign     Days of Exercise per Week: 0 days     Minutes of Exercise per Session: 0 min   Stress: Stress Concern Present (8/29/2023)    [employer] [place] [employer] - Occupational Stress Questionnaire     Feeling of Stress : Rather much   Social Connections: Unknown (8/29/2023)    Social Connection and Isolation Panel [NHANES]     Frequency of Communication with Friends and Family: Three times a week     Frequency of Social Gatherings with Friends and Family: Once a week     Active Member of Clubs or Organizations: No     Attends Club or Organization Meetings: Never     Marital Status: Living with partner   Housing Stability: Low Risk  (8/29/2023)    Housing Stability Vital Sign     Unable to Pay for Housing in the Last Year: No     Number of Places Lived in the Last Year: 1     Unstable Housing in the Last Year: No       Current Outpatient Medications   Medication Sig Dispense Refill    amLODIPine (NORVASC) 10 MG tablet TAKE 1 TABLET BY MOUTH EVERY DAY 90 tablet 1    aspirin/salicylamide/caffeine (BC HEADACHE POWDER ORAL) Take by mouth as needed.      azelastine (ASTELIN) 137 mcg (0.1 %) nasal spray USE 1 SPRAY IN EACH NOSTRIL TWICE DAILY 30 mL 2    buPROPion (WELLBUTRIN XL) 300 MG 24 hr tablet TAKE 1 TABLET BY MOUTH EVERY DAY 90 tablet 1    cariprazine (VRAYLAR) 1.5 mg Cap Take 1 capsule (1.5 mg total) by mouth once daily. 90 capsule 1    clotrimazole-betamethasone 1-0.05% (LOTRISONE) cream Apply topically 2 (two) times daily as needed (rash). 45 g 1    FLUoxetine 10 MG capsule Take 1 capsule (10 mg total) by mouth once daily. 90 capsule 1    fluticasone propionate (FLONASE) 50 mcg/actuation nasal  spray 1 spray (50 mcg total) by Each Nostril route 2 (two) times daily. 48 mL 1    fluticasone-salmeterol 500-50 mcg/dose (WIXELA INHUB) 500-50 mcg/dose DsDv diskus inhaler INHALE 1 PUFF INTO THE LUNGS 2 (TWO) TIMES DAILY. CONTROLLER 60 each 5    hydrocortisone 2.5 % cream Apply topically 2 (two) times daily. 28 g 3    ipratropium-albuteroL (COMBIVENT RESPIMAT)  mcg/actuation inhaler INHALE 2 PUFFS BY MOUTH EVERY 6 HOURS AS NEEDED FOR WHEEZE 4 g 5    JARDIANCE 25 mg tablet TAKE 1 TABLET BY MOUTH EVERY DAY 90 tablet 1    melatonin 5 mg Tab Take 10 mg by mouth nightly.       metFORMIN (GLUCOPHAGE-XR) 500 MG ER 24hr tablet TAKE 2 TABLETS BY MOUTH DAILY WITH DINNER OR EVENING MEAL 180 tablet 3    montelukast (SINGULAIR) 10 mg tablet TAKE 1 TABLET BY MOUTH EVERY DAY 90 tablet 1    mupirocin (BACTROBAN) 2 % ointment Apply topically.      pantoprazole (PROTONIX) 40 MG tablet TAKE 1 TABLET BY MOUTH EVERY DAY 90 tablet 1    rosuvastatin (CRESTOR) 20 MG tablet TAKE 1 TABLET BY MOUTH EVERY DAY 90 tablet 1    tiZANidine (ZANAFLEX) 4 MG tablet SMARTSI Tablet(s) By Mouth Every 8-12 Hours PRN      valsartan-hydrochlorothiazide (DIOVAN-HCT) 160-25 mg per tablet TAKE 1 TABLET BY MOUTH EVERY DAY 90 tablet 1    varicella-zoster [id number], PF, (SHINGRIX, PF,) 50 mcg/0.5 mL injection Shingrix (PF) 50 mcg/0.5 mL intramuscular suspension, kit   PHARMACY ADMINISTERED      albuterol-ipratropium (DUO-NEB) 2.5 mg-0.5 mg/3 mL nebulizer solution Take 3 mLs by nebulization every 6 (six) hours as needed for Wheezing. Rescue 1 each 5    blood sugar diagnostic Strp 1 each by Misc.(Non-Drug; Combo Route) route once daily. (Patient not taking: Reported on 2023) 100 each 2    blood-glucose meter Misc 1 each by Misc.(Non-Drug; Combo Route) route once daily. (Patient not taking: Reported on 2023) 1 each 0    lancets 33 gauge Misc 1 lancet by Misc.(Non-Drug; Combo Route) route once daily. (Patient not taking: Reported on 2023) 100  "each 3    tirzepatide 10 mg/0.5 mL PnIj Inject 10 mg into the skin every 7 days. 4 Pen 5     No current facility-administered medications for this visit.       Review of patient's allergies indicates:  No Known Allergies  Objective:      Blood pressure 100/60, pulse 81, temperature 99.1 °F (37.3 °C), height 5' 6" (1.676 m), weight 100.2 kg (221 lb), SpO2 96 %. Body mass index is 35.67 kg/m².   Physical Exam  Vitals and nursing note reviewed.   Constitutional:       General: She is not in acute distress.     Appearance: Normal appearance. She is well-developed. She is obese. She is not ill-appearing.   HENT:      Head: Normocephalic and atraumatic.      Right Ear: External ear normal.      Left Ear: External ear normal.      Nose: Nose normal.      Mouth/Throat:      Mouth: Mucous membranes are moist.      Pharynx: Oropharynx is clear.   Eyes:      General: Lids are normal. Lids are everted, no foreign bodies appreciated.      Conjunctiva/sclera: Conjunctivae normal.      Pupils: Pupils are equal, round, and reactive to light.      Right eye: Pupil is round and reactive.      Left eye: Pupil is round and reactive.   Neck:      Trachea: Trachea normal.   Cardiovascular:      Rate and Rhythm: Normal rate and regular rhythm.      Pulses: Normal pulses.      Heart sounds: Normal heart sounds, S1 normal and S2 normal. No murmur heard.  Pulmonary:      Effort: Pulmonary effort is normal. No respiratory distress.      Breath sounds: No stridor. No decreased breath sounds, rhonchi or rales.   Abdominal:      General: Bowel sounds are normal.      Palpations: Abdomen is soft. Abdomen is not rigid. There is no mass.      Tenderness: There is no guarding.   Musculoskeletal:         General: Normal range of motion.      Cervical back: Normal range of motion and neck supple.   Lymphadenopathy:      Cervical: No cervical adenopathy.   Skin:     General: Skin is warm and dry.      Capillary Refill: Capillary refill takes less than " 2 seconds.   Neurological:      General: No focal deficit present.      Mental Status: She is alert and oriented to person, place, and time. Mental status is at baseline.      Cranial Nerves: No cranial nerve deficit.   Psychiatric:         Attention and Perception: Attention normal.         Mood and Affect: Mood is anxious (stable) and depressed (Stable). Affect is not tearful.         Speech: Speech normal.         Behavior: Behavior normal. Behavior is cooperative.         Thought Content: Thought content normal. Thought content does not include homicidal or suicidal ideation. Thought content does not include homicidal or suicidal plan.         Cognition and Memory: Cognition normal.         Judgment: Judgment normal.             Assessment:       1. Type 2 diabetes mellitus with complication, without long-term current use of insulin    2. Chronic obstructive pulmonary disease, unspecified COPD type    3. Essential hypertension    4. Mixed hyperlipidemia    5. Depressed mood    6. Gastroesophageal reflux disease without esophagitis    7. Vitamin D deficiency    8. Class 2 severe obesity due to excess calories with serious comorbidity and body mass index (BMI) of 35.0 to 35.9 in adult            Plan:       Kemi was seen today for diabetes, hypertension and follow-up.    Diagnoses and all orders for this visit:    Type 2 diabetes mellitus with complication, without long-term current use of insulin  -     Hemoglobin A1C; Future  -     Microalbumin/Creatinine Ratio, Urine; Future  -     Urinalysis; Future  -     tirzepatide 10 mg/0.5 mL PnIj; Inject 10 mg into the skin every 7 days.  -     Hemoglobin A1C  -     Microalbumin/Creatinine Ratio, Urine  -     Urinalysis     Discussed the following complications of DM Type 2: CAD, CVD, Retinopathy, Nephropathy, Neuropathy.    Discussed Target A1C Goal <7.0% and Target fasting blood sugars () before each meal.    Discussed Lifestyle Modifications: Low glycemic  index diet, Exercise (30-45 min) daily, Weight loss, and Smoking cessation      Increasing Mounjaro dose from 7.5 mg weekly to a 10 mg weekly dose    Chronic obstructive pulmonary disease, unspecified COPD type  -     albuterol-ipratropium (DUO-NEB) 2.5 mg-0.5 mg/3 mL nebulizer solution; Take 3 mLs by nebulization every 6 (six) hours as needed for Wheezing. Rescue    Essential hypertension  -     CBC Auto Differential; Future  -     Comprehensive Metabolic Panel; Future  -     Urinalysis; Future  -     CBC Auto Differential  -     Comprehensive Metabolic Panel  -     Urinalysis  Stable.  Continue current medication    Limit red meat, butter, fried foods, cheese, and other foods that have a lot of saturated fat. Consume more: lean meats, fish, fruits, vegetables, whole grains, beans, lentils, and nuts.  Weight loss, and 30-45 min of cardiovascular exercise daily.      Mixed hyperlipidemia  -     Lipid Panel; Future  -     Lipid Panel  Limit red meat, butter, fried foods, cheese, and other foods that have a lot of saturated fat. Consume more: lean meats, fish, fruits, vegetables, whole grains, beans, lentils, and nuts.  Weight loss, and 30-45 min of cardiovascular exercise daily.      Depressed mood  Stable.  Continue current medication    Gastroesophageal reflux disease without esophagitis  Stable.  Continue current medication    Vitamin D deficiency  -     Vitamin D; Future  -     Vitamin D    Class 2 severe obesity due to excess calories with serious comorbidity and body mass index (BMI) of 35.0 to 35.9 in adult  The patient is asked to make an attempt to improve diet and exercise patterns to aid in medical management of this problem.       Follow-up with me in about 4 months with labs prior office visit for chronic condition management.

## 2023-12-21 DIAGNOSIS — J44.9 CHRONIC OBSTRUCTIVE PULMONARY DISEASE, UNSPECIFIED COPD TYPE: ICD-10-CM

## 2023-12-21 RX ORDER — IPRATROPIUM BROMIDE AND ALBUTEROL 20; 100 UG/1; UG/1
2 SPRAY, METERED RESPIRATORY (INHALATION) EVERY 6 HOURS PRN
Qty: 4 G | Refills: 5 | Status: SHIPPED | OUTPATIENT
Start: 2023-12-21

## 2023-12-21 NOTE — TELEPHONE ENCOUNTER
Please see the attached refill request. albuterol-ipratropium (DUO-NEB) sent on 12/05/23, is ipratropium-albuterol (COMBIVENT RESPIMAT) d/c'd?

## 2024-01-18 DIAGNOSIS — K21.9 GASTROESOPHAGEAL REFLUX DISEASE WITHOUT ESOPHAGITIS: ICD-10-CM

## 2024-01-18 DIAGNOSIS — J44.9 CHRONIC OBSTRUCTIVE PULMONARY DISEASE, UNSPECIFIED COPD TYPE: ICD-10-CM

## 2024-01-18 DIAGNOSIS — E11.8 TYPE 2 DIABETES MELLITUS WITH COMPLICATION, WITHOUT LONG-TERM CURRENT USE OF INSULIN: ICD-10-CM

## 2024-01-18 DIAGNOSIS — I10 ESSENTIAL HYPERTENSION: ICD-10-CM

## 2024-01-18 RX ORDER — MONTELUKAST SODIUM 10 MG/1
TABLET ORAL
Qty: 90 TABLET | Refills: 1 | Status: SHIPPED | OUTPATIENT
Start: 2024-01-18

## 2024-01-18 RX ORDER — PANTOPRAZOLE SODIUM 40 MG/1
TABLET, DELAYED RELEASE ORAL
Qty: 90 TABLET | Refills: 1 | Status: SHIPPED | OUTPATIENT
Start: 2024-01-18

## 2024-01-18 RX ORDER — AMLODIPINE BESYLATE 10 MG/1
TABLET ORAL
Qty: 90 TABLET | Refills: 1 | Status: SHIPPED | OUTPATIENT
Start: 2024-01-18

## 2024-01-18 RX ORDER — EMPAGLIFLOZIN 25 MG/1
TABLET, FILM COATED ORAL
Qty: 90 TABLET | Refills: 1 | Status: SHIPPED | OUTPATIENT
Start: 2024-01-18

## 2024-01-18 RX ORDER — VALSARTAN AND HYDROCHLOROTHIAZIDE 160; 25 MG/1; MG/1
TABLET ORAL
Qty: 90 TABLET | Refills: 1 | Status: SHIPPED | OUTPATIENT
Start: 2024-01-18

## 2024-02-09 DIAGNOSIS — E78.2 MIXED HYPERLIPIDEMIA: ICD-10-CM

## 2024-02-14 RX ORDER — ROSUVASTATIN CALCIUM 20 MG/1
TABLET, COATED ORAL
Qty: 90 TABLET | Refills: 1 | Status: SHIPPED | OUTPATIENT
Start: 2024-02-14

## 2024-02-25 DIAGNOSIS — R45.89 DEPRESSED MOOD: ICD-10-CM

## 2024-02-26 RX ORDER — FLUOXETINE 10 MG/1
10 CAPSULE ORAL
Qty: 90 CAPSULE | Refills: 1 | Status: SHIPPED | OUTPATIENT
Start: 2024-02-26

## 2024-03-05 DIAGNOSIS — J44.9 CHRONIC OBSTRUCTIVE PULMONARY DISEASE, UNSPECIFIED COPD TYPE: ICD-10-CM

## 2024-03-05 RX ORDER — FLUTICASONE PROPIONATE AND SALMETEROL 500; 50 UG/1; UG/1
1 POWDER RESPIRATORY (INHALATION) 2 TIMES DAILY
Qty: 60 EACH | Refills: 5 | Status: SHIPPED | OUTPATIENT
Start: 2024-03-05

## 2024-03-10 ENCOUNTER — HOSPITAL ENCOUNTER (EMERGENCY)
Facility: HOSPITAL | Age: 59
Discharge: HOME | End: 2024-03-10
Attending: EMERGENCY MEDICINE
Payer: COMMERCIAL

## 2024-03-10 VITALS
BODY MASS INDEX: 35.84 KG/M2 | HEART RATE: 85 BPM | OXYGEN SATURATION: 97 % | SYSTOLIC BLOOD PRESSURE: 96 MMHG | DIASTOLIC BLOOD PRESSURE: 51 MMHG | HEIGHT: 66 IN | TEMPERATURE: 98 F | WEIGHT: 223 LBS | RESPIRATION RATE: 20 BRPM

## 2024-03-10 DIAGNOSIS — S01.01XA SCALP LACERATION, INITIAL ENCOUNTER: Primary | ICD-10-CM

## 2024-03-10 DIAGNOSIS — S09.90XA CLOSED HEAD INJURY, INITIAL ENCOUNTER: ICD-10-CM

## 2024-03-10 PROCEDURE — 99284 EMERGENCY DEPT VISIT MOD MDM: CPT

## 2024-03-10 PROCEDURE — 12004 RPR S/N/AX/GEN/TRK7.6-12.5CM: CPT

## 2024-03-10 PROCEDURE — 25000003 PHARM REV CODE 250: Performed by: EMERGENCY MEDICINE

## 2024-03-10 RX ORDER — HYDROCODONE BITARTRATE AND ACETAMINOPHEN 5; 325 MG/1; MG/1
1 TABLET ORAL
Status: COMPLETED | OUTPATIENT
Start: 2024-03-10 | End: 2024-03-10

## 2024-03-10 RX ORDER — IBUPROFEN 600 MG/1
600 TABLET ORAL EVERY 6 HOURS PRN
Qty: 20 TABLET | Refills: 0 | Status: SHIPPED | OUTPATIENT
Start: 2024-03-10

## 2024-03-10 RX ORDER — HYDROCODONE BITARTRATE AND ACETAMINOPHEN 5; 325 MG/1; MG/1
1 TABLET ORAL EVERY 6 HOURS PRN
Qty: 12 TABLET | Refills: 0 | Status: SHIPPED | OUTPATIENT
Start: 2024-03-10

## 2024-03-10 RX ORDER — LIDOCAINE HYDROCHLORIDE 10 MG/ML
10 INJECTION, SOLUTION EPIDURAL; INFILTRATION; INTRACAUDAL; PERINEURAL ONCE
Status: COMPLETED | OUTPATIENT
Start: 2024-03-10 | End: 2024-03-10

## 2024-03-10 RX ORDER — IBUPROFEN 600 MG/1
600 TABLET ORAL
Status: COMPLETED | OUTPATIENT
Start: 2024-03-10 | End: 2024-03-10

## 2024-03-10 RX ADMIN — HYDROCODONE BITARTRATE AND ACETAMINOPHEN 1 TABLET: 5; 325 TABLET ORAL at 08:03

## 2024-03-10 RX ADMIN — BACITRACIN ZINC, NEOMYCIN, POLYMYXIN B 1 EACH: 400; 3.5; 5 OINTMENT TOPICAL at 10:03

## 2024-03-10 RX ADMIN — LIDOCAINE HYDROCHLORIDE 100 MG: 10 INJECTION, SOLUTION EPIDURAL; INFILTRATION; INTRACAUDAL at 08:03

## 2024-03-10 RX ADMIN — IBUPROFEN 600 MG: 600 TABLET ORAL at 08:03

## 2024-03-10 NOTE — DISCHARGE INSTRUCTIONS
Neosporin.  Please keep your wound clean and dry.  Return immediately if you get worse or if new problems develop.  Please follow-up with your primary care doctor for suture removal in 10 days.  Return especially for swelling redness or drainage from the wound.  Return for nausea vomiting or significant headache.  Medicines as directed.

## 2024-03-10 NOTE — ED PROVIDER NOTES
Encounter Date: 3/10/2024    SCRIBE #1 NOTE: I, Leslie Kitchen, am scribing for, and in the presence of,  Gilberto Springer MD. I have scribed the following portions of the note - Other sections scribed: HPI, ROS.       History     Chief Complaint   Patient presents with    Fall     Pt to ER with large laceration to head s/p trip and fall hitting head on corner of the wall. PT denies LOC. No blood thinners. + hematoma      Kemi Dominguez is a 59 y.o. female, with PMHx of HLD, HTN, and vitamin D deficiency, who presents to the ED complaining of a laceration to left side of her head/scalp s/p mechanical trip and fall PTA. Patient reports hitting the left side of her head on corner of the wall. Denies any LOC and not on blood thinners. Patient also reports associated pain/tenderness to area and left sided neck pain. Denies any other injuries. No medication PTA. Patient denies cough, shortness of breath, chest pain, fever, chills, abdominal pain, nausea, vomiting, diarrhea, dysuria, congestion, sore throat, arm or leg trouble, eye pain, ear pain, rash, or other associated symptoms.          The history is provided by the patient. No  was used.     Review of patient's allergies indicates:  No Known Allergies  Past Medical History:   Diagnosis Date    Class 3 severe obesity due to excess calories with serious comorbidity and body mass index (BMI) of 40.0 to 44.9 in adult 10/17/2018    COPD (chronic obstructive pulmonary disease)     Diabetes mellitus, type 2     GERD (gastroesophageal reflux disease)     Hyperlipidemia     Hypertension     Sleep apnea     Vitamin D deficiency      Past Surgical History:   Procedure Laterality Date    ADENOIDECTOMY      BREAST SURGERY Right     breast biopsy    COLONOSCOPY N/A 9/10/2021    Procedure: COLONOSCOPY;  Surgeon: Holland Royal MD;  Location: [facility];  Service: Endoscopy;  Laterality: N/A;    TONSILLECTOMY      TRANSFORAMINAL EPIDURAL INJECTION OF  STEROID Left 2019    Procedure: Injection,steroid,epidural,transforaminal approach;  Surgeon: Gilberto Michelle MD;  Location: [facility] OR;  Service: Pain Management;  Laterality: Left;  L5-S1, S1    TRANSFORAMINAL EPIDURAL INJECTION OF STEROID Left 2019    Procedure: Injection,steroid,epidural,transforaminal approach;  Surgeon: Gilberto Michelle MD;  Location: [facility] OR;  Service: Pain Management;  Laterality: Left;  L5-S1, S1     Family History   Problem Relation Age of Onset    Stroke Mother     Emphysema Mother     Diabetes Father     Coronary artery disease Father     COPD Father     Diabetes Sister      Social History     Tobacco Use    Smoking status: Former     Current packs/day: 0.00     Types: Cigarettes     Quit date: 2019     Years since quittin.0    Smokeless tobacco: Never   Substance Use Topics    Alcohol use: Yes     Comment: rarely    Drug use: Yes     Types: Hydrocodone     Review of Systems   Constitutional:  Negative for chills, diaphoresis and fever.   HENT:  Negative for ear pain and sore throat.         (+) Left sided head injury   Eyes:  Negative for pain.   Respiratory:  Negative for cough and shortness of breath.    Cardiovascular:  Negative for chest pain.   Gastrointestinal:  Negative for abdominal pain, diarrhea, nausea and vomiting.   Genitourinary:  Negative for dysuria.   Musculoskeletal:  Positive for neck pain (L). Negative for back pain.        (-) Arm or leg trouble.    Skin:  Positive for wound (Laceration to left side of head/scalp). Negative for rash.   Neurological:  Positive for headaches (L side head).        (-) LOC   Psychiatric/Behavioral:  Negative for confusion.        Physical Exam     Initial Vitals [03/10/24 0819]   BP Pulse Resp Temp SpO2   124/66 89 18 97.7 °F (36.5 °C) 98 %      MAP       --         Physical Exam  The patient was examined specifically for the following:   General:No significant distress, Good color, Warm and dry. Head and neck:Scalp atraumatic,  Neck supple. Neurological:Appropriate conversation, Gross motor deficits. Eyes:Conjugate gaze, Clear corneas. ENT: No epistaxis. Cardiac: Regular rate and rhythm, Grossly normal heart tones. Pulmonary: Wheezing, Rales. Gastrointestinal: Abdominal tenderness, Abdominal distention. Musculoskeletal: Extremity deformity, Apparent pain with range of motion of the joints. Skin: Rash.   The findings on examination were normal except for the following:  The patient has a 9 cm laceration of the left occipital scalp.  The bleeding is controlled.  There is no midline cervical or spinal tenderness.  Lungs are clear the heart tones are normal the abdomen is soft.  Mental status examination, cranial nerves, motor and sensory examination grossly unremarkable.  ED Course   Lac Repair    Date/Time: 3/10/2024 10:09 AM    Performed by: Gilberto Springer MD  Authorized by: Gilberto Springer MD    Consent:     Consent obtained:  Verbal  Universal protocol:     Procedure explained and questions answered to patient or proxy's satisfaction: yes      Patient identity confirmed:  Verbally with patient  Anesthesia:     Anesthesia method:  Local infiltration    Local anesthetic:  Lidocaine 1% w/o epi  Laceration details:     Location:  Scalp    Length (cm):  9    Depth (mm):  1.5  Pre-procedure details:     Preparation:  Patient was prepped and draped in usual sterile fashion  Exploration:     Limited defect created (wound extended): no      Hemostasis achieved with:  Direct pressure    Imaging outcome: foreign body noted      Wound exploration: entire depth of wound visualized      Wound extent: no foreign bodies/material noted and no underlying fracture noted      Contaminated: no    Treatment:     Area cleansed with:  Saline    Amount of cleaning:  Standard    Irrigation solution:  Sterile saline    Visualized foreign bodies/material removed: no      Debridement:  None  Skin repair:     Repair method:  Sutures    Suture size:  3-0    Suture  technique:  Running and vertical mattress  Approximation:     Approximation:  Close  Repair type:     Repair type:  Intermediate  Post-procedure details:     Dressing:  Antibiotic ointment    Procedure completion:  Tolerated well, no immediate complications  Comments:      Running vertical mattress closure was performed.  I tried to closed with staples but I could not achieve hemostasis.  I switched to sutures.    Labs Reviewed - No data to display       Imaging Results    None          Medications   LIDOcaine (PF) 10 mg/ml (1%) injection 100 mg (100 mg Other Given 3/10/24 0856)   ibuprofen tablet 600 mg (600 mg Oral Given 3/10/24 0856)   HYDROcodone-acetaminophen 5-325 mg per tablet 1 tablet (1 tablet Oral Given 3/10/24 0856)     Medical Decision Making  Risk  Prescription drug management.    Given the above, this patient fell against the corner of a wall.  She sustained a scalp laceration.  There was no loss of consciousness nausea vomiting headache.  She had some pain at the site of the wound.  There was a hematoma.  It was evacuated.  A running vertical mattress stitch was placed to provide hemostasis.  I considered CT scanning but there was no headache loss of consciousness neurologic deficit nausea vomiting.  The patient is not on anticoagulants.        Scribe Attestation:   Scribe #1: I performed the above scribed service and the documentation accurately describes the services I performed. I attest to the accuracy of the note.                               Clinical Impression:  Final diagnoses:  [S01.01XA] Scalp laceration, initial encounter (Primary)  [S09.90XA] Closed head injury, initial encounter          ED Disposition Condition    Discharge Stable        Please note that the documentation on this chart was provided by the scribe above on the date of service noted above, and that the documentation in the chart accurately reflects the work and decisions made by me alone.  Signed, Dr. Springer  ED  Prescriptions       Medication Sig Dispense Start Date End Date Auth. Provider    HYDROcodone-acetaminophen (NORCO) 5-325 mg per tablet Take 1 tablet by mouth every 6 (six) hours as needed for Pain. 12 tablet 3/10/2024 -- Gilberto Springer MD    ibuprofen (ADVIL,MOTRIN) 600 MG tablet Take 1 tablet (600 mg total) by mouth every 6 (six) hours as needed. 20 tablet 3/10/2024 -- Gilberto Springer MD          Follow-up Information       Follow up With Specialties Details Why Contact Info    Fiorella Acevedo, DILIP-C Family [facility] In 10 days  [address] [facility] [address]  [phone]               Gilberto Springer MD  03/10/24 1016

## 2024-03-10 NOTE — ED TRIAGE NOTES
Pt arrived to the ED due to laceration to back of scalp + hematoma after trip and fall today. Pt denies any other injury from fall. Takes aspirin daily.

## 2024-03-10 NOTE — Clinical Note
"Kemi Azul" Alberto was seen and treated in our emergency department on 3/10/2024.  She may return to work on 03/12/2024.       If you have any questions or concerns, please don't hesitate to call.       RN    "

## 2024-03-11 ENCOUNTER — PATIENT OUTREACH (OUTPATIENT)
Dept: ADMINISTRATIVE | Facility: HOSPITAL | Age: 59
End: 2024-03-11
Payer: COMMERCIAL

## 2024-03-11 ENCOUNTER — PATIENT MESSAGE (OUTPATIENT)
Dept: ADMINISTRATIVE | Facility: HOSPITAL | Age: 59
End: 2024-03-11
Payer: COMMERCIAL

## 2024-03-11 NOTE — PROGRESS NOTES
Population Health Chart Review & Patient Outreach Details      Additional [facility] Notes:    LEFT MESSAGE TO RETURN CALL ACTIVE PORTAL MESSAGE SENT  Routine Dilated Eye Exam  (Diabetic Retinopathy screening)     Non-compliant report chart audits for Eye Exam for Patients with Diabetes     Outreach to patient in reference to a routine Eye Exam                 Updates Requested / Reviewed:      Health Maintenance Topics Overdue:      [facility] Score: 4     Cervical Cancer Screening  Eye Exam  Mammogram  Foot Exam                       Health Maintenance Topic(s) Outreach Outcomes & Actions Taken:

## 2024-03-28 ENCOUNTER — PATIENT MESSAGE (OUTPATIENT)
Dept: ADMINISTRATIVE | Facility: HOSPITAL | Age: 59
End: 2024-03-28
Payer: COMMERCIAL

## 2024-03-28 DIAGNOSIS — R45.89 DEPRESSED MOOD: ICD-10-CM

## 2024-04-01 RX ORDER — BUPROPION HYDROCHLORIDE 300 MG/1
TABLET ORAL
Qty: 90 TABLET | Refills: 1 | Status: SHIPPED | OUTPATIENT
Start: 2024-04-01

## 2024-04-05 DIAGNOSIS — R45.89 DEPRESSED MOOD: ICD-10-CM

## 2024-04-08 RX ORDER — CARIPRAZINE 1.5 MG/1
1.5 CAPSULE, GELATIN COATED ORAL DAILY
Qty: 90 CAPSULE | Refills: 1 | Status: SHIPPED | OUTPATIENT
Start: 2024-04-08

## 2024-04-15 ENCOUNTER — OFFICE VISIT (OUTPATIENT)
Dept: FAMILY MEDICINE | Facility: CLINIC | Age: 59
End: 2024-04-15
Payer: COMMERCIAL

## 2024-04-15 VITALS
WEIGHT: 224 LBS | BODY MASS INDEX: 36 KG/M2 | HEIGHT: 66 IN | HEART RATE: 82 BPM | SYSTOLIC BLOOD PRESSURE: 110 MMHG | TEMPERATURE: 98 F | DIASTOLIC BLOOD PRESSURE: 60 MMHG | OXYGEN SATURATION: 97 %

## 2024-04-15 DIAGNOSIS — H93.13 TINNITUS OF BOTH EARS: ICD-10-CM

## 2024-04-15 DIAGNOSIS — E11.8 TYPE 2 DIABETES MELLITUS WITH COMPLICATION, WITHOUT LONG-TERM CURRENT USE OF INSULIN: Primary | ICD-10-CM

## 2024-04-15 DIAGNOSIS — J44.9 CHRONIC OBSTRUCTIVE PULMONARY DISEASE, UNSPECIFIED COPD TYPE: ICD-10-CM

## 2024-04-15 DIAGNOSIS — E66.01 CLASS 2 SEVERE OBESITY DUE TO EXCESS CALORIES WITH SERIOUS COMORBIDITY AND BODY MASS INDEX (BMI) OF 36.0 TO 36.9 IN ADULT: ICD-10-CM

## 2024-04-15 DIAGNOSIS — H91.93 DECREASED HEARING OF BOTH EARS: ICD-10-CM

## 2024-04-15 DIAGNOSIS — I10 ESSENTIAL HYPERTENSION: ICD-10-CM

## 2024-04-15 DIAGNOSIS — R45.89 DEPRESSED MOOD: ICD-10-CM

## 2024-04-15 DIAGNOSIS — K21.9 GASTROESOPHAGEAL REFLUX DISEASE WITHOUT ESOPHAGITIS: ICD-10-CM

## 2024-04-15 DIAGNOSIS — M54.2 NECK PAIN: ICD-10-CM

## 2024-04-15 DIAGNOSIS — E78.2 MIXED HYPERLIPIDEMIA: ICD-10-CM

## 2024-04-15 DIAGNOSIS — E55.9 VITAMIN D DEFICIENCY: ICD-10-CM

## 2024-04-15 PROCEDURE — 3078F DIAST BP <80 MM HG: CPT | Mod: CPTII,S$GLB,, | Performed by: NURSE PRACTITIONER

## 2024-04-15 PROCEDURE — 99214 OFFICE O/P EST MOD 30 MIN: CPT | Mod: S$GLB,,, | Performed by: NURSE PRACTITIONER

## 2024-04-15 PROCEDURE — 1160F RVW MEDS BY RX/DR IN RCRD: CPT | Mod: CPTII,S$GLB,, | Performed by: NURSE PRACTITIONER

## 2024-04-15 PROCEDURE — 99999 PR PBB SHADOW E&M-EST. PATIENT-LVL V: CPT | Mod: PBBFAC,,, | Performed by: NURSE PRACTITIONER

## 2024-04-15 PROCEDURE — 3074F SYST BP LT 130 MM HG: CPT | Mod: CPTII,S$GLB,, | Performed by: NURSE PRACTITIONER

## 2024-04-15 PROCEDURE — 3061F NEG MICROALBUMINURIA REV: CPT | Mod: CPTII,S$GLB,, | Performed by: NURSE PRACTITIONER

## 2024-04-15 PROCEDURE — 3008F BODY MASS INDEX DOCD: CPT | Mod: CPTII,S$GLB,, | Performed by: NURSE PRACTITIONER

## 2024-04-15 PROCEDURE — 3066F NEPHROPATHY DOC TX: CPT | Mod: CPTII,S$GLB,, | Performed by: NURSE PRACTITIONER

## 2024-04-15 PROCEDURE — 1159F MED LIST DOCD IN RCRD: CPT | Mod: CPTII,S$GLB,, | Performed by: NURSE PRACTITIONER

## 2024-04-15 PROCEDURE — 3044F HG A1C LEVEL LT 7.0%: CPT | Mod: CPTII,S$GLB,, | Performed by: NURSE PRACTITIONER

## 2024-04-15 RX ORDER — SULFAMETHOXAZOLE AND TRIMETHOPRIM 800; 160 MG/1; MG/1
1 TABLET ORAL 2 TIMES DAILY
COMMUNITY
Start: 2024-04-12

## 2024-04-15 RX ORDER — FLUCONAZOLE 150 MG/1
150 TABLET ORAL
COMMUNITY
Start: 2024-04-12

## 2024-04-15 RX ORDER — METFORMIN HYDROCHLORIDE 500 MG/1
1000 TABLET, EXTENDED RELEASE ORAL 2 TIMES DAILY WITH MEALS
Qty: 180 TABLET | Refills: 3 | Status: SHIPPED | OUTPATIENT
Start: 2024-04-15

## 2024-04-15 RX ORDER — ERYTHROMYCIN 5 MG/G
OINTMENT OPHTHALMIC
COMMUNITY
Start: 2024-04-12

## 2024-04-15 NOTE — PROGRESS NOTES
Subjective:       Patient ID: Kemi Dominguez is a 59 y.o. female.    Chief Complaint: Diabetes, Hypertension, Hyperlipidemia, and Follow-up    Patient presents today following up for chronic conditions including hypertension, hyperlipidemia, diabetes, and depressed mood.    Patient was without her Mounjaro for 1 month.  Her hemoglobin A1c and fasting glucose have risen.  She did obtain her medication recently restarted this week.  Last month, she fell and hit her head.  She had to have sutures to close the wound on her head.  Since this fall and head injury, she has had neck pain is new and daily.  She is taken tizanidine for muscle relaxation which is minimally helpful for her.  Heat helps while applied as well.  The pain is not improving as quickly as expected.  Patient will consider physical therapy for help with this neck pain.  Patient is obese with a BMI of 36.15          Diabetes  She presents for her follow-up diabetic visit. She has type 2 diabetes mellitus. No MedicAlert identification noted. The initial diagnosis of diabetes was made 10 years ago. Her disease course has been stable. Hypoglycemia symptoms include headaches. Pertinent negatives for hypoglycemia include no confusion, dizziness, hunger, mood changes, nervousness/anxiousness, pallor, seizures, sleepiness, speech difficulty, sweats or tremors. Pertinent negatives for diabetes include no blurred vision, no chest pain, no fatigue, no foot paresthesias, no foot ulcerations, no polydipsia, no polyphagia, no polyuria, no visual change, no weakness and no weight loss. There are no hypoglycemic complications. Pertinent negatives for hypoglycemia complications include no blackouts, no hospitalization, no nocturnal hypoglycemia, no required assistance and no required glucagon injection. Symptoms are worsening. There are no diabetic complications. Pertinent negatives for diabetic complications include no autonomic neuropathy, CVA, heart disease,  impotence, nephropathy, peripheral neuropathy, PVD or retinopathy. Risk factors for coronary artery disease include hypertension, stress, sedentary lifestyle, obesity, dyslipidemia and diabetes mellitus. Current diabetic treatment includes oral agent (triple therapy) (metformin, janumet, jardiance). She is compliant with treatment most of the time. Her weight is stable. She is following a generally healthy diet. Meal planning includes avoidance of concentrated sweets. She has not had a previous visit with a dietitian. She participates in exercise intermittently. She monitors blood glucose at home 1-2 x per week. She monitors urine at home <1 x per month. There is no compliance with monitoring of blood glucose. Her home blood glucose trend is decreasing steadily. An ACE inhibitor/angiotensin II receptor blocker is being taken. She does not see a podiatrist.Eye exam is not current.     Review of Systems   Constitutional:  Positive for malaise/fatigue. Negative for activity change, appetite change, fatigue, fever, unexpected weight change and weight loss.        Obesity   HENT:  Negative for congestion, ear discharge, ear pain, hearing loss, rhinorrhea, sinus pressure, sore throat, trouble swallowing and voice change.    Eyes:  Negative for blurred vision, photophobia, pain, discharge and visual disturbance.   Respiratory:  Negative for cough, chest tightness, shortness of breath and wheezing.         COPD, former smoker   Cardiovascular:  Negative for chest pain and palpitations.        Hypertension, hyperlipidemia   Gastrointestinal:  Negative for abdominal pain, blood in stool, constipation, diarrhea, nausea and vomiting.        Gerd   Endocrine: Negative for cold intolerance, heat intolerance, polydipsia, polyphagia and polyuria.        Diabetes type 2   Genitourinary:  Negative for difficulty urinating, dysuria, hematuria, impotence and menstrual problem.   Musculoskeletal:  Positive for arthralgias, back pain  (chronic), myalgias and neck pain. Negative for gait problem and joint swelling.   Skin:  Negative for color change, pallor and rash.   Allergic/Immunologic: Negative for immunocompromised state.   Neurological:  Positive for headaches. Negative for dizziness, tremors, focal weakness, seizures, speech difficulty and weakness.   Psychiatric/Behavioral:  Positive for dysphoric mood. Negative for confusion, self-injury and suicidal ideas. The patient is not nervous/anxious.        Past Medical History:   Diagnosis Date    Class 3 severe obesity due to excess calories with serious comorbidity and body mass index (BMI) of 40.0 to 44.9 in adult 10/17/2018    COPD (chronic obstructive pulmonary disease)     Diabetes mellitus, type 2     GERD (gastroesophageal reflux disease)     Hyperlipidemia     Hypertension     Sleep apnea     Vitamin D deficiency       Past Surgical History:   Procedure Laterality Date    ADENOIDECTOMY      BREAST SURGERY Right     breast biopsy    COLONOSCOPY N/A 9/10/2021    Procedure: COLONOSCOPY;  Surgeon: Holland Royal MD;  Location: [facility];  Service: Endoscopy;  Laterality: N/A;    TONSILLECTOMY      TRANSFORAMINAL EPIDURAL INJECTION OF STEROID Left 4/16/2019    Procedure: Injection,steroid,epidural,transforaminal approach;  Surgeon: Gilberto Michelle MD;  Location: [facility];  Service: Pain Management;  Laterality: Left;  L5-S1, S1    TRANSFORAMINAL EPIDURAL INJECTION OF STEROID Left 8/23/2019    Procedure: Injection,steroid,epidural,transforaminal approach;  Surgeon: Gilberto Michelle MD;  Location: [facility];  Service: Pain Management;  Laterality: Left;  L5-S1, S1       Family History   Problem Relation Name Age of Onset    Stroke Mother      Emphysema Mother      Diabetes Father      Coronary artery disease Father      COPD Father      Diabetes Sister         Social History     Socioeconomic History    Marital status: Significant Other   Tobacco Use    Smoking status: Former     Current packs/day:  0.00     Types: Cigarettes     Quit date: 2019     Years since quittin.1    Smokeless tobacco: Never   Substance and Sexual Activity    Alcohol use: Yes     Comment: rarely    Drug use: Yes     Types: Hydrocodone    Sexual activity: Never     Social Determinants of Health     Financial Resource Strain: Low Risk  (2023)    Overall Financial Resource Strain (CARDIA)     Difficulty of Paying Living Expenses: Not hard at all   Food Insecurity: No Food Insecurity (2023)    Hunger Vital Sign     Worried About Running Out of Food in the Last Year: Never true     Ran Out of Food in the Last Year: Never true   Transportation Needs: No Transportation Needs (2023)    PRAPARE - Transportation     Lack of Transportation (Medical): No     Lack of Transportation (Non-Medical): No   Physical Activity: Inactive (2023)    Exercise Vital Sign     Days of Exercise per Week: 0 days     Minutes of Exercise per Session: 0 min   Stress: Stress Concern Present (2023)    [employer] [employer] - Occupational Stress Questionnaire     Feeling of Stress : Rather much   Social Connections: Unknown (2023)    Social Connection and Isolation Panel [NHANES]     Frequency of Communication with Friends and Family: Three times a week     Frequency of Social Gatherings with Friends and Family: Once a week     Active Member of Clubs or Organizations: No     Attends Club or Organization Meetings: Never     Marital Status: Living with partner   Housing Stability: Low Risk  (2023)    Housing Stability Vital Sign     Unable to Pay for Housing in the Last Year: No     Number of Places Lived in the Last Year: 1     Unstable Housing in the Last Year: No       Current Outpatient Medications   Medication Sig Dispense Refill    albuterol-ipratropium (DUO-NEB) 2.5 mg-0.5 mg/3 mL nebulizer solution Take 3 mLs by nebulization every 6 (six) hours as needed for Wheezing. Rescue 1 each 5    amLODIPine  (NORVASC) 10 MG tablet TAKE 1 TABLET BY MOUTH EVERY DAY 90 tablet 1    aspirin/salicylamide/caffeine (BC HEADACHE POWDER ORAL) Take by mouth as needed.      azelastine (ASTELIN) 137 mcg (0.1 %) nasal spray USE 1 SPRAY IN EACH NOSTRIL TWICE DAILY 30 mL 2    buPROPion (WELLBUTRIN XL) 300 MG 24 hr tablet TAKE 1 TABLET BY MOUTH EVERY DAY 90 tablet 1    clotrimazole-betamethasone 1-0.05% (LOTRISONE) cream Apply topically 2 (two) times daily as needed (rash). 45 g 1    erythromycin (ROMYCIN) ophthalmic ointment SMARTSI.5 Inch(es) Right Eye Twice Daily      fluconazole (DIFLUCAN) 150 MG Tab Take 150 mg by mouth Every 3 (three) days.      FLUoxetine 10 MG capsule TAKE 1 CAPSULE BY MOUTH EVERY DAY 90 capsule 1    fluticasone propionate (FLONASE) 50 mcg/actuation nasal spray 1 spray (50 mcg total) by Each Nostril route 2 (two) times daily. 48 mL 1    fluticasone-salmeterol diskus inhaler 500-50 mcg Inhale 1 puff into the lungs 2 (two) times daily. Controller 60 each 5    HYDROcodone-acetaminophen (NORCO) 5-325 mg per tablet Take 1 tablet by mouth every 6 (six) hours as needed for Pain. 12 tablet 0    hydrocortisone 2.5 % cream Apply topically 2 (two) times daily. 28 g 3    ibuprofen (ADVIL,MOTRIN) 600 MG tablet Take 1 tablet (600 mg total) by mouth every 6 (six) hours as needed. 20 tablet 0    ipratropium-albuteroL (COMBIVENT RESPIMAT)  mcg/actuation inhaler Inhale 2 puffs into the lungs every 6 (six) hours as needed for Wheezing. Rescue 4 g 5    JARDIANCE 25 mg tablet TAKE 1 TABLET BY MOUTH EVERY DAY 90 tablet 1    melatonin 5 mg Tab Take 10 mg by mouth nightly.       montelukast (SINGULAIR) 10 mg tablet TAKE 1 TABLET BY MOUTH EVERY DAY 90 tablet 1    mupirocin (BACTROBAN) 2 % ointment Apply topically.      pantoprazole (PROTONIX) 40 MG tablet TAKE 1 TABLET BY MOUTH EVERY DAY 90 tablet 1    rosuvastatin (CRESTOR) 20 MG tablet TAKE 1 TABLET BY MOUTH EVERY DAY 90 tablet 1    sulfamethoxazole-trimethoprim 800-160mg  "(BACTRIM DS) 800-160 mg Tab Take 1 tablet by mouth 2 (two) times daily.      tirzepatide 10 mg/0.5 mL PnIj Inject 10 mg into the skin every 7 days. 4 Pen 5    tiZANidine (ZANAFLEX) 4 MG tablet SMARTSI Tablet(s) By Mouth Every 8-12 Hours PRN      valsartan-hydrochlorothiazide (DIOVAN-HCT) 160-25 mg per tablet TAKE 1 TABLET BY MOUTH EVERY DAY 90 tablet 1    varicella-zoster gE-AS01B, PF, (SHINGRIX, PF,) 50 mcg/0.5 mL injection Shingrix (PF) 50 mcg/0.5 mL intramuscular suspension, kit   PHARMACY ADMINISTERED      VRAYLAR 1.5 mg Cap TAKE 1 CAPSULE (1.5 MG TOTAL) BY MOUTH ONCE DAILY. 90 capsule 1    blood sugar diagnostic Strp 1 each by Misc.(Non-Drug; Combo Route) route once daily. (Patient not taking: Reported on 2023) 100 each 2    blood-glucose meter Misc 1 each by Misc.(Non-Drug; Combo Route) route once daily. (Patient not taking: Reported on 2023) 1 each 0    lancets 33 gauge Misc 1 lancet by Misc.(Non-Drug; Combo Route) route once daily. (Patient not taking: Reported on 4/15/2024) 100 each 3    metFORMIN (GLUCOPHAGE-XR) 500 MG ER 24hr tablet Take 2 tablets (1,000 mg total) by mouth 2 (two) times daily with meals. 180 tablet 3     No current facility-administered medications for this visit.       Review of patient's allergies indicates:  No Known Allergies  Objective:      Blood pressure 110/60, pulse 82, temperature 97.8 °F (36.6 °C), height 5' 6" (1.676 m), weight 101.6 kg (224 lb), SpO2 97%. Body mass index is 36.15 kg/m².   Physical Exam  Vitals and nursing note reviewed.   Constitutional:       General: She is not in acute distress.     Appearance: Normal appearance. She is well-developed. She is obese. She is not ill-appearing.   HENT:      Head: Normocephalic and atraumatic.      Right Ear: External ear normal.      Left Ear: External ear normal.      Nose: Nose normal.      Mouth/Throat:      Mouth: Mucous membranes are moist.      Pharynx: Oropharynx is clear.   Eyes:      General: Lids are " normal. Lids are everted, no foreign bodies appreciated.      Conjunctiva/sclera: Conjunctivae normal.      Pupils: Pupils are equal, round, and reactive to light.      Right eye: Pupil is round and reactive.      Left eye: Pupil is round and reactive.   Neck:      Trachea: Trachea normal.     Cardiovascular:      Rate and Rhythm: Normal rate and regular rhythm.      Pulses: Normal pulses.      Heart sounds: Normal heart sounds, S1 normal and S2 normal. No murmur heard.  Pulmonary:      Effort: Pulmonary effort is normal. No respiratory distress.      Breath sounds: No stridor. No decreased breath sounds, rhonchi or rales.   Abdominal:      General: Bowel sounds are normal.      Palpations: Abdomen is soft. Abdomen is not rigid. There is no mass.      Tenderness: There is no guarding.   Musculoskeletal:      Cervical back: Neck supple. Pain with movement present. No spinous process tenderness. Decreased range of motion.   Lymphadenopathy:      Cervical: No cervical adenopathy.   Skin:     General: Skin is warm and dry.      Capillary Refill: Capillary refill takes less than 2 seconds.   Neurological:      General: No focal deficit present.      Mental Status: She is alert and oriented to person, place, and time. Mental status is at baseline.      Cranial Nerves: No cranial nerve deficit.   Psychiatric:         Attention and Perception: Attention normal.         Mood and Affect: Mood is anxious (stable) and depressed (Stable). Affect is not tearful.         Speech: Speech normal.         Behavior: Behavior normal. Behavior is cooperative.         Thought Content: Thought content normal. Thought content does not include homicidal or suicidal ideation. Thought content does not include homicidal or suicidal plan.         Cognition and Memory: Cognition normal.         Judgment: Judgment normal.             Assessment:       1. Type 2 diabetes mellitus with complication, without long-term current use of insulin    2.  Chronic obstructive pulmonary disease, unspecified COPD type    3. Essential hypertension    4. Mixed hyperlipidemia    5. Depressed mood    6. Gastroesophageal reflux disease without esophagitis    7. Vitamin D deficiency    8. Neck pain    9. Decreased hearing of both ears    10. Tinnitus of both ears    11. Class 2 severe obesity due to excess calories with serious comorbidity and body mass index (BMI) of 36.0 to 36.9 in adult              Plan:       Kmei was seen today for diabetes, hypertension, hyperlipidemia and follow-up.    Diagnoses and all orders for this visit:    Type 2 diabetes mellitus with complication, without long-term current use of insulin  -     Hemoglobin A1C; Future  -     Microalbumin/Creatinine Ratio, Urine; Future  -     Comprehensive Metabolic Panel; Future  -     Urinalysis; Future  -     Hemoglobin A1C  -     Microalbumin/Creatinine Ratio, Urine  -     Comprehensive Metabolic Panel  -     Urinalysis  -     metFORMIN (GLUCOPHAGE-XR) 500 MG ER 24hr tablet; Take 2 tablets (1,000 mg total) by mouth 2 (two) times daily with meals.     Discussed the following complications of DM Type 2: CAD, CVD, Retinopathy, Nephropathy, Neuropathy.    Discussed Target A1C Goal <7.0% and Target fasting blood sugars () before each meal.    Discussed Lifestyle Modifications: Low glycemic index diet, Exercise (30-45 min) daily, Weight loss    Chronic obstructive pulmonary disease, unspecified COPD type  Stable    Essential hypertension  -     CBC Auto Differential; Future  -     Comprehensive Metabolic Panel; Future  -     Urinalysis; Future  -     CBC Auto Differential  -     Comprehensive Metabolic Panel  -     Urinalysis    Lifestyle changes: Reduce the amount of salt in your diet; Lose weight; Avoid drinking too much alcohol; Exercise at least 30 minutes per day most days of the week.  Continue current medications and home BP monitoring.      Mixed hyperlipidemia  -     Lipid Panel; Future  -      Lipid Panel    Limit red meat, butter, fried foods, cheese, and other foods that have a lot of saturated fat. Consume more: lean meats, fish, fruits, vegetables, whole grains, beans, lentils, and nuts.  Weight loss, and 30-45 min of cardiovascular exercise daily.      Depressed mood  Stable  Continue current medication    Gastroesophageal reflux disease without esophagitis  Stable.  Continue current medication    Vitamin D deficiency  -     Vitamin D; Future  -     Vitamin D    Neck pain  Patient will determine which physical therapy location she would like to obtain therapy and let me know for the referral.    Decreased hearing of both ears  -     Ambulatory referral/consult to ENT; Future    Tinnitus of both ears  -     Ambulatory referral/consult to ENT; Future    Class 2 severe obesity due to excess calories with serious comorbidity and body mass index (BMI) of 36.0 to 36.9 in adult  The patient is asked to make an attempt to improve diet and exercise patterns to aid in medical management of this problem.      Follow-up with me in 6 months with labs prior office visit for chronic condition management.  Follow-up sooner if needed

## 2024-04-16 ENCOUNTER — PATIENT MESSAGE (OUTPATIENT)
Dept: FAMILY MEDICINE | Facility: CLINIC | Age: 59
End: 2024-04-16
Payer: COMMERCIAL

## 2024-04-16 DIAGNOSIS — M54.2 NECK PAIN: Primary | ICD-10-CM

## 2024-04-16 DIAGNOSIS — Z12.31 SCREENING MAMMOGRAM FOR HIGH-RISK PATIENT: Primary | ICD-10-CM

## 2024-04-30 ENCOUNTER — HOSPITAL ENCOUNTER (OUTPATIENT)
Dept: RADIOLOGY | Facility: HOSPITAL | Age: 59
Discharge: HOME | End: 2024-04-30
Attending: NURSE PRACTITIONER
Payer: COMMERCIAL

## 2024-04-30 DIAGNOSIS — Z12.31 SCREENING MAMMOGRAM FOR HIGH-RISK PATIENT: ICD-10-CM

## 2024-04-30 PROCEDURE — 77063 BREAST TOMOSYNTHESIS BI: CPT | Mod: 26,,, | Performed by: RADIOLOGY

## 2024-04-30 PROCEDURE — 77067 SCR MAMMO BI INCL CAD: CPT | Mod: TC,PO

## 2024-04-30 PROCEDURE — 77067 SCR MAMMO BI INCL CAD: CPT | Mod: 26,,, | Performed by: RADIOLOGY

## 2024-05-17 DIAGNOSIS — J44.9 CHRONIC OBSTRUCTIVE PULMONARY DISEASE, UNSPECIFIED COPD TYPE: ICD-10-CM

## 2024-05-20 RX ORDER — IPRATROPIUM BROMIDE AND ALBUTEROL 20; 100 UG/1; UG/1
2 SPRAY, METERED RESPIRATORY (INHALATION) EVERY 6 HOURS PRN
Qty: 4 G | Refills: 5 | Status: SHIPPED | OUTPATIENT
Start: 2024-05-20

## 2024-05-31 ENCOUNTER — PATIENT MESSAGE (OUTPATIENT)
Dept: FAMILY MEDICINE | Facility: CLINIC | Age: 59
End: 2024-05-31
Payer: COMMERCIAL

## 2024-06-06 DIAGNOSIS — L30.9 DERMATITIS: ICD-10-CM

## 2024-06-06 RX ORDER — CLOTRIMAZOLE AND BETAMETHASONE DIPROPIONATE 10; .64 MG/G; MG/G
CREAM TOPICAL 2 TIMES DAILY PRN
Qty: 45 G | Refills: 1 | Status: SHIPPED | OUTPATIENT
Start: 2024-06-06

## 2024-07-18 DIAGNOSIS — K21.9 GASTROESOPHAGEAL REFLUX DISEASE WITHOUT ESOPHAGITIS: ICD-10-CM

## 2024-07-18 DIAGNOSIS — I10 ESSENTIAL HYPERTENSION: ICD-10-CM

## 2024-07-18 DIAGNOSIS — J44.9 CHRONIC OBSTRUCTIVE PULMONARY DISEASE, UNSPECIFIED COPD TYPE: ICD-10-CM

## 2024-07-18 DIAGNOSIS — E11.8 TYPE 2 DIABETES MELLITUS WITH COMPLICATION, WITHOUT LONG-TERM CURRENT USE OF INSULIN: ICD-10-CM

## 2024-07-18 DIAGNOSIS — R45.89 DEPRESSED MOOD: ICD-10-CM

## 2024-07-19 RX ORDER — AMLODIPINE BESYLATE 10 MG/1
TABLET ORAL
Qty: 90 TABLET | Refills: 1 | Status: SHIPPED | OUTPATIENT
Start: 2024-07-19

## 2024-07-19 RX ORDER — VALSARTAN AND HYDROCHLOROTHIAZIDE 160; 25 MG/1; MG/1
TABLET ORAL
Qty: 90 TABLET | Refills: 1 | Status: SHIPPED | OUTPATIENT
Start: 2024-07-19

## 2024-07-19 RX ORDER — MONTELUKAST SODIUM 10 MG/1
TABLET ORAL
Qty: 90 TABLET | Refills: 1 | Status: SHIPPED | OUTPATIENT
Start: 2024-07-19

## 2024-07-19 RX ORDER — FLUOXETINE 10 MG/1
10 CAPSULE ORAL
Qty: 90 CAPSULE | Refills: 1 | Status: SHIPPED | OUTPATIENT
Start: 2024-07-19

## 2024-07-19 RX ORDER — EMPAGLIFLOZIN 25 MG/1
TABLET, FILM COATED ORAL
Qty: 90 TABLET | Refills: 1 | Status: SHIPPED | OUTPATIENT
Start: 2024-07-19

## 2024-07-19 RX ORDER — PANTOPRAZOLE SODIUM 40 MG/1
TABLET, DELAYED RELEASE ORAL
Qty: 90 TABLET | Refills: 1 | Status: SHIPPED | OUTPATIENT
Start: 2024-07-19

## 2024-08-07 ENCOUNTER — CLINICAL SUPPORT (OUTPATIENT)
Dept: OTHER | Facility: CLINIC | Age: 59
End: 2024-08-07

## 2024-08-07 DIAGNOSIS — Z00.8 ENCOUNTER FOR OTHER GENERAL EXAMINATION: ICD-10-CM

## 2024-08-08 VITALS
SYSTOLIC BLOOD PRESSURE: 114 MMHG | HEIGHT: 65 IN | BODY MASS INDEX: 35.65 KG/M2 | WEIGHT: 214 LBS | DIASTOLIC BLOOD PRESSURE: 69 MMHG

## 2024-08-08 LAB
GLUCOSE SERPL-MCNC: 86 MG/DL (ref 60–140)
HDLC SERPL-MCNC: 67 MG/DL
POC CHOLESTEROL, LDL (DOCK): 52 MG/DL
POC CHOLESTEROL, TOTAL: 142 MG/DL
TRIGL SERPL-MCNC: 134 MG/DL

## 2024-08-21 DIAGNOSIS — E11.8 TYPE 2 DIABETES MELLITUS WITH COMPLICATION, WITHOUT LONG-TERM CURRENT USE OF INSULIN: ICD-10-CM

## 2024-08-22 RX ORDER — TIRZEPATIDE 10 MG/.5ML
10 INJECTION, SOLUTION SUBCUTANEOUS
Qty: 4 PEN | Refills: 2 | Status: SHIPPED | OUTPATIENT
Start: 2024-08-22

## 2024-10-11 DIAGNOSIS — E78.2 MIXED HYPERLIPIDEMIA: ICD-10-CM

## 2024-10-14 ENCOUNTER — TELEPHONE (OUTPATIENT)
Dept: FAMILY MEDICINE | Facility: CLINIC | Age: 59
End: 2024-10-14
Payer: COMMERCIAL

## 2024-10-14 DIAGNOSIS — E55.9 VITAMIN D DEFICIENCY: ICD-10-CM

## 2024-10-14 DIAGNOSIS — E11.8 TYPE 2 DIABETES MELLITUS WITH COMPLICATION, WITHOUT LONG-TERM CURRENT USE OF INSULIN: ICD-10-CM

## 2024-10-14 DIAGNOSIS — E78.2 MIXED HYPERLIPIDEMIA: ICD-10-CM

## 2024-10-14 DIAGNOSIS — I10 ESSENTIAL HYPERTENSION: Primary | ICD-10-CM

## 2024-10-16 DIAGNOSIS — J44.9 CHRONIC OBSTRUCTIVE PULMONARY DISEASE, UNSPECIFIED COPD TYPE: ICD-10-CM

## 2024-10-16 RX ORDER — IPRATROPIUM BROMIDE AND ALBUTEROL 20; 100 UG/1; UG/1
2 SPRAY, METERED RESPIRATORY (INHALATION) EVERY 6 HOURS PRN
Qty: 4 EACH | Refills: 5 | Status: SHIPPED | OUTPATIENT
Start: 2024-10-16

## 2024-10-16 RX ORDER — ROSUVASTATIN CALCIUM 20 MG/1
TABLET, COATED ORAL
Qty: 90 TABLET | Refills: 1 | Status: SHIPPED | OUTPATIENT
Start: 2024-10-16

## 2024-10-17 ENCOUNTER — PATIENT MESSAGE (OUTPATIENT)
Dept: FAMILY MEDICINE | Facility: CLINIC | Age: 59
End: 2024-10-17
Payer: COMMERCIAL

## 2024-10-24 ENCOUNTER — OFFICE VISIT (OUTPATIENT)
Dept: FAMILY MEDICINE | Facility: CLINIC | Age: 59
End: 2024-10-24
Payer: COMMERCIAL

## 2024-10-24 VITALS
SYSTOLIC BLOOD PRESSURE: 130 MMHG | TEMPERATURE: 98 F | OXYGEN SATURATION: 98 % | HEART RATE: 75 BPM | HEIGHT: 65 IN | BODY MASS INDEX: 38.65 KG/M2 | WEIGHT: 232 LBS | DIASTOLIC BLOOD PRESSURE: 70 MMHG

## 2024-10-24 DIAGNOSIS — G89.29 CHRONIC LEFT SHOULDER PAIN: ICD-10-CM

## 2024-10-24 DIAGNOSIS — R45.89 DEPRESSED MOOD: ICD-10-CM

## 2024-10-24 DIAGNOSIS — I10 ESSENTIAL HYPERTENSION: ICD-10-CM

## 2024-10-24 DIAGNOSIS — E55.9 VITAMIN D DEFICIENCY: ICD-10-CM

## 2024-10-24 DIAGNOSIS — M25.512 CHRONIC LEFT SHOULDER PAIN: ICD-10-CM

## 2024-10-24 DIAGNOSIS — E66.812 CLASS 2 SEVERE OBESITY DUE TO EXCESS CALORIES WITH SERIOUS COMORBIDITY AND BODY MASS INDEX (BMI) OF 38.0 TO 38.9 IN ADULT: ICD-10-CM

## 2024-10-24 DIAGNOSIS — E78.2 MIXED HYPERLIPIDEMIA: ICD-10-CM

## 2024-10-24 DIAGNOSIS — E11.8 TYPE 2 DIABETES MELLITUS WITH COMPLICATION, WITHOUT LONG-TERM CURRENT USE OF INSULIN: ICD-10-CM

## 2024-10-24 DIAGNOSIS — K21.9 GASTROESOPHAGEAL REFLUX DISEASE WITHOUT ESOPHAGITIS: ICD-10-CM

## 2024-10-24 DIAGNOSIS — M25.552 BILATERAL HIP PAIN: ICD-10-CM

## 2024-10-24 DIAGNOSIS — M25.551 BILATERAL HIP PAIN: ICD-10-CM

## 2024-10-24 DIAGNOSIS — E66.01 CLASS 2 SEVERE OBESITY DUE TO EXCESS CALORIES WITH SERIOUS COMORBIDITY AND BODY MASS INDEX (BMI) OF 38.0 TO 38.9 IN ADULT: ICD-10-CM

## 2024-10-24 DIAGNOSIS — J44.9 CHRONIC OBSTRUCTIVE PULMONARY DISEASE, UNSPECIFIED COPD TYPE: Primary | ICD-10-CM

## 2024-10-24 PROCEDURE — 1159F MED LIST DOCD IN RCRD: CPT | Mod: CPTII,S$GLB,, | Performed by: NURSE PRACTITIONER

## 2024-10-24 PROCEDURE — 99214 OFFICE O/P EST MOD 30 MIN: CPT | Mod: S$GLB,,, | Performed by: NURSE PRACTITIONER

## 2024-10-24 PROCEDURE — 99999 PR PBB SHADOW E&M-EST. PATIENT-LVL III: CPT | Mod: PBBFAC,,, | Performed by: NURSE PRACTITIONER

## 2024-10-24 PROCEDURE — 3044F HG A1C LEVEL LT 7.0%: CPT | Mod: CPTII,S$GLB,, | Performed by: NURSE PRACTITIONER

## 2024-10-24 PROCEDURE — 3078F DIAST BP <80 MM HG: CPT | Mod: CPTII,S$GLB,, | Performed by: NURSE PRACTITIONER

## 2024-10-24 PROCEDURE — 3066F NEPHROPATHY DOC TX: CPT | Mod: CPTII,S$GLB,, | Performed by: NURSE PRACTITIONER

## 2024-10-24 PROCEDURE — 3061F NEG MICROALBUMINURIA REV: CPT | Mod: CPTII,S$GLB,, | Performed by: NURSE PRACTITIONER

## 2024-10-24 PROCEDURE — 1160F RVW MEDS BY RX/DR IN RCRD: CPT | Mod: CPTII,S$GLB,, | Performed by: NURSE PRACTITIONER

## 2024-10-24 PROCEDURE — G2211 COMPLEX E/M VISIT ADD ON: HCPCS | Mod: S$GLB,,, | Performed by: NURSE PRACTITIONER

## 2024-10-24 PROCEDURE — 3075F SYST BP GE 130 - 139MM HG: CPT | Mod: CPTII,S$GLB,, | Performed by: NURSE PRACTITIONER

## 2024-10-24 PROCEDURE — 3008F BODY MASS INDEX DOCD: CPT | Mod: CPTII,S$GLB,, | Performed by: NURSE PRACTITIONER

## 2024-10-24 RX ORDER — SEMAGLUTIDE 1.34 MG/ML
1 INJECTION, SOLUTION SUBCUTANEOUS
Qty: 9 ML | Refills: 1 | Status: SHIPPED | OUTPATIENT
Start: 2024-10-24

## 2024-10-24 RX ORDER — CLINDAMYCIN HYDROCHLORIDE 300 MG/1
300 CAPSULE ORAL EVERY 8 HOURS
COMMUNITY
Start: 2024-10-16

## 2024-10-24 NOTE — PROGRESS NOTES
Subjective:       Patient ID: Kemi Dominguez is a 59 y.o. female.    Chief Complaint: Diabetes, Hypertension, Hyperlipidemia, and Follow-up    History of Present Illness    CHIEF COMPLAINT:  Patient presents for follow-up on diabetes management and to discuss recent skin abscesses and joint pain.    HPI:  Patient reports developing a topical rash from Mounjaro injections, which became raised and pruritic, leading to discontinuation of the medication 4-6 weeks ago. A rash on the forehead also occurred, though its relation to Mounjaro is uncertain.    Patient has had two episodes of abscesses in the last 4 months. The first occurred in the axillary region, with 3 abscesses present, but only one drained after starting antibiotics. The second and most recent abscess appeared on the face. Another abscess developed in the contralateral axilla, remaining stable but persistent. Patient is currently on antibiotics, with 2 days remaining in the course, and reports improvement of the facial abscess.    Left shoulder pain has been present for approximately 6 months, progressively worsening. Patient has difficulty with shoulder extension and removing clothing. No specific injury is recalled, but a previous fall on that side during a head injury incident is noted, without immediate shoulder pain afterward.    Patient reports hip pain, most noticeable upon initial ambulation. The pain radiates to the knee during sleep, distinct from previous sciatica. It is described as articular and achy during sleep, improving with movement, but causing stiffness upon rising. Patient expresses concern about these symptoms, hoping for arthritis but worried about more serious conditions.    An 8-pound weight gain since the last visit is reported, noticeable in clothing fit.    Patient reports a recurrent nasal sore.    Heartburn with overeating was noted while on Mounjaro, but nausea was not present.    MEDICATIONS:  Patient was on Mounjaro 10 mg  but discontinued it 4-6 weeks ago due to a topical rash. She is currently taking Calcium with Vitamin D daily.    MEDICAL HISTORY:  Patient has a history of diabetes and Vitamin D deficiency.    TEST RESULTS:  Patient's CBC results are normal. Her fasting blood sugar is 126 mg/dL, which is down from 140 mg/dL previously. Liver function tests have normalized, and electrolytes are within normal range. The cholesterol panel shows high HDL at 81 and low LDL at 62. Vitamin D level is 38 ng/mL, which is normal but on the low end. Microalbumin, reflective of kidney function, is controlled. Her Hemoglobin A1c is 6.3%. Urinalysis shows presence of glucose, which is expected due to medication.      ROS:  General: -fever, -chills, -fatigue, +weight gain, -weight loss  Eyes: -vision changes, -redness, -discharge  ENT: -ear pain, -nasal congestion, -sore throat  Cardiovascular: -chest pain, -palpitations, -lower extremity edema  Respiratory: -cough, -shortness of breath  Gastrointestinal: -abdominal pain, -nausea, -vomiting, -diarrhea, -constipation, -blood in stool, +heartburn  Genitourinary: -dysuria, -hematuria, -frequency  Musculoskeletal: +joint pain, +muscle pain, +joint stiffness  Skin: +rash, +lesion  Neurological: -headache, -dizziness, -numbness, -tingling  Psychiatric: -anxiety, -depression, -sleep difficulty         Past Medical History:   Diagnosis Date    Class 3 severe obesity due to excess calories with serious comorbidity and body mass index (BMI) of 40.0 to 44.9 in adult 10/17/2018    COPD (chronic obstructive pulmonary disease)     Diabetes mellitus, type 2     GERD (gastroesophageal reflux disease)     Hyperlipidemia     Hypertension     Sleep apnea     Vitamin D deficiency         Past Surgical History:   Procedure Laterality Date    ADENOIDECTOMY      BREAST BIOPSY      BREAST SURGERY Right     breast biopsy    COLONOSCOPY N/A 09/10/2021    Procedure: COLONOSCOPY;  Surgeon: Holland Royal MD;   Location: [facility] ENDO;  Service: Endoscopy;  Laterality: N/A;    TONSILLECTOMY      TRANSFORAMINAL EPIDURAL INJECTION OF STEROID Left 2019    Procedure: Injection,steroid,epidural,transforaminal approach;  Surgeon: Gilberto Michelle MD;  Location: [facility] OR;  Service: Pain Management;  Laterality: Left;  L5-S1, S1    TRANSFORAMINAL EPIDURAL INJECTION OF STEROID Left 2019    Procedure: Injection,steroid,epidural,transforaminal approach;  Surgeon: Gilberto Michelle MD;  Location: [facility] OR;  Service: Pain Management;  Laterality: Left;  L5-S1, S1       Family History   Problem Relation Name Age of Onset    Stroke Mother      Emphysema Mother      Diabetes Father      Coronary artery disease Father      COPD Father      Diabetes Sister         Social History     Socioeconomic History    Marital status: Significant Other   Tobacco Use    Smoking status: Former     Current packs/day: 0.00     Types: Cigarettes     Quit date: 2019     Years since quittin.6    Smokeless tobacco: Never   Substance and Sexual Activity    Alcohol use: Yes     Comment: rarely    Drug use: Yes     Types: Hydrocodone    Sexual activity: Never     Social Drivers of Health     Financial Resource Strain: Low Risk  (2023)    Overall Financial Resource Strain (CARDIA)     Difficulty of Paying Living Expenses: Not hard at all   Food Insecurity: No Food Insecurity (2023)    Hunger Vital Sign     Worried About Running Out of Food in the Last Year: Never true     Ran Out of Food in the Last Year: Never true   Transportation Needs: No Transportation Needs (2023)    PRAPARE - Transportation     Lack of Transportation (Medical): No     Lack of Transportation (Non-Medical): No   Physical Activity: Inactive (2023)    Exercise Vital Sign     Days of Exercise per Week: 0 days     Minutes of Exercise per Session: 0 min   Stress: Stress Concern Present (2023)    [employer] [place] [employer] - Occupational Stress  Questionnaire     Feeling of Stress : Rather much   Housing Stability: Low Risk  (2023)    Housing Stability Vital Sign     Unable to Pay for Housing in the Last Year: No     Number of Places Lived in the Last Year: 1     Unstable Housing in the Last Year: No       Current Outpatient Medications   Medication Sig Dispense Refill    albuterol-ipratropium (DUO-NEB) 2.5 mg-0.5 mg/3 mL nebulizer solution Take 3 mLs by nebulization every 6 (six) hours as needed for Wheezing. Rescue 1 each 5    amLODIPine (NORVASC) 10 MG tablet TAKE 1 TABLET BY MOUTH EVERY DAY 90 tablet 1    aspirin/salicylamide/caffeine (BC HEADACHE POWDER ORAL) Take by mouth as needed.      azelastine (ASTELIN) 137 mcg (0.1 %) nasal spray USE 1 SPRAY IN EACH NOSTRIL TWICE DAILY 30 mL 2    blood sugar diagnostic Strp 1 each by Misc.(Non-Drug; Combo Route) route once daily. 100 each 2    buPROPion (WELLBUTRIN XL) 300 MG 24 hr tablet TAKE 1 TABLET BY MOUTH EVERY DAY 90 tablet 1    clindamycin (CLEOCIN) 300 MG capsule Take 300 mg by mouth every 8 (eight) hours.      clotrimazole-betamethasone 1-0.05% (LOTRISONE) cream APPLY TOPICALLY 2 (TWO) TIMES DAILY AS NEEDED (RASH). 45 g 1    erythromycin (ROMYCIN) ophthalmic ointment SMARTSI.5 Inch(es) Right Eye Twice Daily      fluconazole (DIFLUCAN) 150 MG Tab Take 150 mg by mouth Every 3 (three) days.      FLUoxetine 10 MG capsule TAKE 1 CAPSULE BY MOUTH EVERY DAY 90 capsule 1    fluticasone propionate (FLONASE) 50 mcg/actuation nasal spray 1 spray (50 mcg total) by Each Nostril route 2 (two) times daily. 48 mL 1    fluticasone-salmeterol diskus inhaler 500-50 mcg Inhale 1 puff into the lungs 2 (two) times daily. Controller 60 each 5    HYDROcodone-acetaminophen (NORCO) 5-325 mg per tablet Take 1 tablet by mouth every 6 (six) hours as needed for Pain. 12 tablet 0    hydrocortisone 2.5 % cream Apply topically 2 (two) times daily. 28 g 3    ibuprofen (ADVIL,MOTRIN) 600 MG tablet Take 1 tablet (600 mg total) by  "mouth every 6 (six) hours as needed. 20 tablet 0    ipratropium-albuteroL (COMBIVENT RESPIMAT)  mcg/actuation inhaler INHALE 2 PUFFS INTO THE LUNGS EVERY 6 (SIX) HOURS AS NEEDED FOR WHEEZING. RESCUE 4 each 5    JARDIANCE 25 mg tablet TAKE 1 TABLET BY MOUTH EVERY DAY 90 tablet 1    lancets 33 gauge Misc 1 lancet by Misc.(Non-Drug; Combo Route) route once daily. 100 each 3    melatonin 5 mg Tab Take 10 mg by mouth nightly.       metFORMIN (GLUCOPHAGE-XR) 500 MG ER 24hr tablet Take 2 tablets (1,000 mg total) by mouth 2 (two) times daily with meals. 180 tablet 3    montelukast (SINGULAIR) 10 mg tablet TAKE 1 TABLET BY MOUTH EVERY DAY 90 tablet 1    mupirocin (BACTROBAN) 2 % ointment Apply topically.      pantoprazole (PROTONIX) 40 MG tablet TAKE 1 TABLET BY MOUTH EVERY DAY 90 tablet 1    rosuvastatin (CRESTOR) 20 MG tablet TAKE 1 TABLET BY MOUTH EVERY DAY 90 tablet 1    tiZANidine (ZANAFLEX) 4 MG tablet SMARTSI Tablet(s) By Mouth Every 8-12 Hours PRN      valsartan-hydrochlorothiazide (DIOVAN-HCT) 160-25 mg per tablet TAKE 1 TABLET BY MOUTH EVERY DAY 90 tablet 1    varicella-zoster [id number], PF, (SHINGRIX, PF,) 50 mcg/0.5 mL injection Shingrix (PF) 50 mcg/0.5 mL intramuscular suspension, kit   PHARMACY ADMINISTERED      VRAYLAR 1.5 mg Cap TAKE 1 CAPSULE (1.5 MG TOTAL) BY MOUTH ONCE DAILY. 90 capsule 1    blood-glucose meter Misc 1 each by Misc.(Non-Drug; Combo Route) route once daily. (Patient not taking: Reported on 2023) 1 each 0    semaglutide (OZEMPIC) 1 mg/dose (4 mg/3 mL) Inject 1 mg into the skin every 7 days. 9 mL 1     No current facility-administered medications for this visit.       Review of patient's allergies indicates:  No Known Allergies  Objective:      Blood pressure 130/70, pulse 75, temperature 97.5 °F (36.4 °C), height 5' 5" (1.651 m), weight 105.2 kg (232 lb), SpO2 98%. Body mass index is 38.61 kg/m².   Physical Exam    Vitals: Weight: 232 lbs.  General: No acute distress. " Well-developed. Well-nourished.  Eyes: EOMI. Sclerae anicteric.  HENT: Normocephalic. Atraumatic. Nares patent. Moist oral mucosa.  Ears: Bilateral TMs clear. Bilateral EACs clear.  Cardiovascular: Regular rate. Regular rhythm. No murmurs. No rubs. No gallops. Normal S1, S2.  Respiratory: Normal respiratory effort. Clear to auscultation bilaterally. No rales. No rhonchi. No wheezing.  Abdomen: Soft. Non-tender. Non-distended. Normoactive bowel sounds.  Musculoskeletal: Pain in the anterior hips bilaterally. Limited rom of the left shoulder with inability to reach overhead and behind the back.  Extremities: No lower extremity edema.  Neurological: Alert & oriented x3. No slurred speech. Normal gait.  Psychiatric: Normal mood. Normal affect. Good insight. Good judgment.  Skin: Warm. Dry. No rash.         Assessment:       Assessment & Plan    - Switched patient from Mounjaro to Ozempic due to topical rash reaction  - Considered potential for systemic reaction to Mounjaro, though forehead rash may be unrelated  - Reviewed recent lab results:  - - CBC normal  - - Fasting blood sugar improved (140 to 126)  - - Liver function normalized  - - Cholesterol levels excellent (HDL 81, LDL 62)  - - Vitamin D level at 38, within normal range but on lower end  - - Hemoglobin A1c at 6.3%, deemed satisfactory  - Assessed recurrent abscesses; recommended nasal application of Bactroban to prevent recurrence  - Evaluated left shoulder and bilateral hip pain; decided to refer to orthopedics before considering physical therapy    DIABETES:  - Started Ozempic 1mg pen for diabetes management.  - Titrate dose over 8 weeks: Weeks 1-4: 18 clicks (starter dose), Week 5: 36 clicks (0.5mg), Gradually increase to 1mg over the following weeks.  - Do not exceed 2-week increments when increasing dose.  - Explained Ozempic administration process: use of insulin pen-like device, need for needle attachment and priming.  - Discussed potential for  allergic responses to repeated medication exposure.    JOINT PAIN:  - Explained the benefits of physical therapy in joint pain management, including proper alignment and muscle strengthening.  - Referred to Dr. Palomo at [facility] in [place] for orthopedic evaluation of left shoulder and bilateral hip pain.    SKIN INFECTION:  - Discussed the natural presence of staph in the nose and its potential role in skin infections.  - Patient to apply tea tree oil to abscess spots 2 times daily until resolved.  - Patient to use Q-tip to apply thin layer of Bactroban inside nose 2 times daily for 2 weeks.  - Refilled Bactroban (mupirocin).    MEDICATIONS/SUPPLEMENTS:  - Started additional Vitamin D3 2000 units daily.    LABS:  - Ordered CBC, fasting blood sugar, liver function tests, lipid panel, Vitamin D level, hemoglobin A1c, and urinalysis.    FOLLOW UP:  - Follow up in 4 months (end of February).  - Complete lab work before the follow-up visit.       Plan:       Kemi was seen today for diabetes, hypertension, hyperlipidemia and follow-up.    Diagnoses and all orders for this visit:    Chronic obstructive pulmonary disease, unspecified COPD type  Stable  Continue current inhalers    Essential hypertension  -     Urinalysis; Future  -     Comprehensive Metabolic Panel; Future  -     CBC Auto Differential; Future  -     Urinalysis  -     Comprehensive Metabolic Panel  -     CBC Auto Differential    Vitamin D deficiency  -     Vitamin D; Future  -     Vitamin D    Mixed hyperlipidemia  -     Lipid Panel; Future  -     Lipid Panel    Type 2 diabetes mellitus with complication, without long-term current use of insulin  -     Urinalysis; Future  -     Comprehensive Metabolic Panel; Future  -     Microalbumin/Creatinine Ratio, Urine; Future  -     Hemoglobin A1C; Future  -     semaglutide (OZEMPIC) 1 mg/dose (4 mg/3 mL); Inject 1 mg into the skin every 7 days.  -     Urinalysis  -     Comprehensive Metabolic Panel  -      Microalbumin/Creatinine Ratio, Urine  -     Hemoglobin A1C    Depressed mood  Stable  Continue current medication    Gastroesophageal reflux disease without esophagitis  Stable  Continue current medication    Bilateral hip pain  -     Ambulatory referral/consult to Orthopedics; Future    Chronic left shoulder pain  -     Ambulatory referral/consult to Orthopedics; Future    Class 2 severe obesity due to excess calories with serious comorbidity and body mass index (BMI) of 38.0 to 38.9 in adult  The patient is asked to make an attempt to improve diet and exercise patterns to aid in medical management of this problem.           This note was generated with the assistance of ambient listening technology. Verbal consent was obtained by the patient and accompanying visitor(s) for the recording of patient appointment to facilitate this note. I attest to having reviewed and edited the generated note for accuracy, though some syntax or spelling errors may persist. Please contact the author of this note for any clarification.      Visit today included increased complexity associated with the care of the episodic problem shoulder and hip pain along with recurrent infections addressed and managing the longitudinal care of the patient due to the serious and/or complex managed problem(s) Diabetes type 2, depression, hypertension, hyperlipidemia, GERD.

## 2024-10-25 DIAGNOSIS — M25.512 LEFT SHOULDER PAIN, UNSPECIFIED CHRONICITY: Primary | ICD-10-CM

## 2024-10-25 RX ORDER — CARIPRAZINE 1.5 MG/1
1.5 CAPSULE, GELATIN COATED ORAL
Qty: 90 CAPSULE | Refills: 1 | Status: SHIPPED | OUTPATIENT
Start: 2024-10-25

## 2024-10-31 ENCOUNTER — OFFICE VISIT (OUTPATIENT)
Dept: ORTHOPEDICS | Facility: CLINIC | Age: 59
End: 2024-10-31
Payer: COMMERCIAL

## 2024-10-31 ENCOUNTER — HOSPITAL ENCOUNTER (OUTPATIENT)
Dept: RADIOLOGY | Facility: HOSPITAL | Age: 59
Discharge: HOME | End: 2024-10-31
Attending: ORTHOPAEDIC SURGERY
Payer: COMMERCIAL

## 2024-10-31 DIAGNOSIS — M25.512 CHRONIC LEFT SHOULDER PAIN: ICD-10-CM

## 2024-10-31 DIAGNOSIS — M25.512 LEFT SHOULDER PAIN, UNSPECIFIED CHRONICITY: ICD-10-CM

## 2024-10-31 DIAGNOSIS — M25.552 BILATERAL HIP PAIN: ICD-10-CM

## 2024-10-31 DIAGNOSIS — M25.512 LEFT SHOULDER PAIN, UNSPECIFIED CHRONICITY: Primary | ICD-10-CM

## 2024-10-31 DIAGNOSIS — M25.551 BILATERAL HIP PAIN: ICD-10-CM

## 2024-10-31 DIAGNOSIS — G89.29 CHRONIC LEFT SHOULDER PAIN: ICD-10-CM

## 2024-10-31 DIAGNOSIS — M75.102 TEAR OF LEFT ROTATOR CUFF, UNSPECIFIED TEAR EXTENT, UNSPECIFIED WHETHER TRAUMATIC: ICD-10-CM

## 2024-10-31 PROCEDURE — 1160F RVW MEDS BY RX/DR IN RCRD: CPT | Mod: CPTII,S$GLB,, | Performed by: ORTHOPAEDIC SURGERY

## 2024-10-31 PROCEDURE — 73030 X-RAY EXAM OF SHOULDER: CPT | Mod: 26,LT,, | Performed by: RADIOLOGY

## 2024-10-31 PROCEDURE — 1159F MED LIST DOCD IN RCRD: CPT | Mod: CPTII,S$GLB,, | Performed by: ORTHOPAEDIC SURGERY

## 2024-10-31 PROCEDURE — 3066F NEPHROPATHY DOC TX: CPT | Mod: CPTII,S$GLB,, | Performed by: ORTHOPAEDIC SURGERY

## 2024-10-31 PROCEDURE — 73030 X-RAY EXAM OF SHOULDER: CPT | Mod: TC,PO,LT

## 2024-10-31 PROCEDURE — 3061F NEG MICROALBUMINURIA REV: CPT | Mod: CPTII,S$GLB,, | Performed by: ORTHOPAEDIC SURGERY

## 2024-10-31 PROCEDURE — 99999 PR PBB SHADOW E&M-EST. PATIENT-LVL II: CPT | Mod: PBBFAC,,, | Performed by: ORTHOPAEDIC SURGERY

## 2024-10-31 PROCEDURE — 99204 OFFICE O/P NEW MOD 45 MIN: CPT | Mod: S$GLB,,, | Performed by: ORTHOPAEDIC SURGERY

## 2024-10-31 PROCEDURE — 3044F HG A1C LEVEL LT 7.0%: CPT | Mod: CPTII,S$GLB,, | Performed by: ORTHOPAEDIC SURGERY

## 2024-11-04 DIAGNOSIS — M25.551 BILATERAL HIP PAIN: Primary | ICD-10-CM

## 2024-11-04 DIAGNOSIS — M25.552 BILATERAL HIP PAIN: Primary | ICD-10-CM

## 2024-11-08 ENCOUNTER — HOSPITAL ENCOUNTER (OUTPATIENT)
Dept: RADIOLOGY | Facility: HOSPITAL | Age: 59
Discharge: HOME | End: 2024-11-08
Attending: ORTHOPAEDIC SURGERY
Payer: COMMERCIAL

## 2024-11-08 ENCOUNTER — OFFICE VISIT (OUTPATIENT)
Dept: ORTHOPEDICS | Facility: CLINIC | Age: 59
End: 2024-11-08
Payer: COMMERCIAL

## 2024-11-08 VITALS — WEIGHT: 231.94 LBS | BODY MASS INDEX: 38.64 KG/M2 | HEIGHT: 65 IN

## 2024-11-08 DIAGNOSIS — M25.552 BILATERAL HIP PAIN: Primary | ICD-10-CM

## 2024-11-08 DIAGNOSIS — M25.552 BILATERAL HIP PAIN: ICD-10-CM

## 2024-11-08 DIAGNOSIS — M25.551 BILATERAL HIP PAIN: Primary | ICD-10-CM

## 2024-11-08 DIAGNOSIS — M25.551 BILATERAL HIP PAIN: ICD-10-CM

## 2024-11-08 DIAGNOSIS — M70.61 TROCHANTERIC BURSITIS OF RIGHT HIP: ICD-10-CM

## 2024-11-08 PROCEDURE — 99999 PR PBB SHADOW E&M-EST. PATIENT-LVL IV: CPT | Mod: PBBFAC,,, | Performed by: ORTHOPAEDIC SURGERY

## 2024-11-08 PROCEDURE — 73521 X-RAY EXAM HIPS BI 2 VIEWS: CPT | Mod: TC

## 2024-11-08 PROCEDURE — 73521 X-RAY EXAM HIPS BI 2 VIEWS: CPT | Mod: 26,,, | Performed by: RADIOLOGY

## 2024-11-08 RX ORDER — TRIAMCINOLONE ACETONIDE 40 MG/ML
40 INJECTION, SUSPENSION INTRA-ARTICULAR; INTRAMUSCULAR
Status: DISCONTINUED | OUTPATIENT
Start: 2024-11-08 | End: 2024-11-08 | Stop reason: HOSPADM

## 2024-11-08 RX ADMIN — TRIAMCINOLONE ACETONIDE 40 MG: 40 INJECTION, SUSPENSION INTRA-ARTICULAR; INTRAMUSCULAR at 09:11

## 2024-11-08 NOTE — PROGRESS NOTES
Patient ID: Kemi Dominguez is a 59 y.o. female    Chief Complaint:   Chief Complaint   Patient presents with    Right Hip - Pain       History of Present Illness:    History of Present Illness    CHIEF COMPLAINT:  - Kemi presents today for initial evaluation of bilateral hip pain, worse on the right side, that has been present for approximately 6-7 months.    HPI:  Kemi presents with a 6-month history of bilateral hip pain, more pronounced in the right hip. The pain initially manifested when getting up or after sitting in a car for extended periods. She reports that it initially started when first getting up or after being in the car for a while, causing significant discomfort in the hips. Over time, the right hip pain has become constant during walking, while the left hip pain is mainly present upon initial movement. She experiences pain when stepping up on a curb or similar elevation. The pain is described as feeling like it's in the joint, but not in the groin area.    Sleep has been affected for about seven months, with the patient reporting difficulty sleeping on either side. When sleeping on the side, there is more noticeable aching down into the knee, requiring frequent position changes.    Kemi has attempted self-treatment with BC powder, which she regularly takes for headaches, and has recently started incorporating ibuprofen. However, these measures have not provided significant relief. She reports feeling stiff in the hips, particularly when crossing legs, describing it as very tight.    Kemi expresses concern about the progression of symptoms, feeling that it is causing premature aging.    She denies pain in the groin area, significant pain with hip rotation while sitting, or pain when pressure is applied to a specific area of the hip. She denies current sciatica symptoms.    IMAGING:  - X-rays hips: Age-appropriate changes, good joint space, no big bone spurs, no tumors, considered  normal    MEDICATIONS:  - BC powder: for headaches  - Ibuprofen: for about a week, mixed with BC powder, not helpful for hip pain           PAST MEDICAL HISTORY:   Past Medical History:   Diagnosis Date    Class 3 severe obesity due to excess calories with serious comorbidity and body mass index (BMI) of 40.0 to 44.9 in adult 10/17/2018    COPD (chronic obstructive pulmonary disease)     Diabetes mellitus, type 2     GERD (gastroesophageal reflux disease)     Hyperlipidemia     Hypertension     Sleep apnea     Vitamin D deficiency      PAST SURGICAL HISTORY:   Past Surgical History:   Procedure Laterality Date    ADENOIDECTOMY      BREAST BIOPSY      BREAST SURGERY Right     breast biopsy    COLONOSCOPY N/A 09/10/2021    Procedure: COLONOSCOPY;  Surgeon: Holland Royal MD;  Location: [facility];  Service: Endoscopy;  Laterality: N/A;    TONSILLECTOMY      TRANSFORAMINAL EPIDURAL INJECTION OF STEROID Left 2019    Procedure: Injection,steroid,epidural,transforaminal approach;  Surgeon: Gilberto Michelle MD;  Location: [facility] OR;  Service: Pain Management;  Laterality: Left;  L5-S1, S1    TRANSFORAMINAL EPIDURAL INJECTION OF STEROID Left 2019    Procedure: Injection,steroid,epidural,transforaminal approach;  Surgeon: Gilberto Michelle MD;  Location: [facility] OR;  Service: Pain Management;  Laterality: Left;  L5-S1, S1     FAMILY HISTORY:   Family History   Problem Relation Name Age of Onset    Stroke Mother      Emphysema Mother      Diabetes Father      Coronary artery disease Father      COPD Father      Diabetes Sister       SOCIAL HISTORY:   Social History     Occupational History    Not on file   Tobacco Use    Smoking status: Former     Current packs/day: 0.00     Types: Cigarettes     Quit date: 2019     Years since quittin.7    Smokeless tobacco: Never   Substance and Sexual Activity    Alcohol use: Yes     Comment: rarely    Drug use: Yes     Types: Hydrocodone    Sexual activity: Never         MEDICATIONS:   Current Outpatient Medications:     albuterol-ipratropium (DUO-NEB) 2.5 mg-0.5 mg/3 mL nebulizer solution, Take 3 mLs by nebulization every 6 (six) hours as needed for Wheezing. Rescue, Disp: 1 each, Rfl: 5    amLODIPine (NORVASC) 10 MG tablet, TAKE 1 TABLET BY MOUTH EVERY DAY, Disp: 90 tablet, Rfl: 1    aspirin/salicylamide/caffeine (BC HEADACHE POWDER ORAL), Take by mouth as needed., Disp: , Rfl:     azelastine (ASTELIN) 137 mcg (0.1 %) nasal spray, USE 1 SPRAY IN EACH NOSTRIL TWICE DAILY, Disp: 30 mL, Rfl: 2    blood sugar diagnostic Strp, 1 each by Misc.(Non-Drug; Combo Route) route once daily., Disp: 100 each, Rfl: 2    buPROPion (WELLBUTRIN XL) 300 MG 24 hr tablet, TAKE 1 TABLET BY MOUTH EVERY DAY, Disp: 90 tablet, Rfl: 1    clindamycin (CLEOCIN) 300 MG capsule, Take 300 mg by mouth every 8 (eight) hours., Disp: , Rfl:     clotrimazole-betamethasone 1-0.05% (LOTRISONE) cream, APPLY TOPICALLY 2 (TWO) TIMES DAILY AS NEEDED (RASH)., Disp: 45 g, Rfl: 1    erythromycin (ROMYCIN) ophthalmic ointment, SMARTSI.5 Inch(es) Right Eye Twice Daily, Disp: , Rfl:     fluconazole (DIFLUCAN) 150 MG Tab, Take 150 mg by mouth Every 3 (three) days., Disp: , Rfl:     FLUoxetine 10 MG capsule, TAKE 1 CAPSULE BY MOUTH EVERY DAY, Disp: 90 capsule, Rfl: 1    fluticasone propionate (FLONASE) 50 mcg/actuation nasal spray, 1 spray (50 mcg total) by Each Nostril route 2 (two) times daily., Disp: 48 mL, Rfl: 1    fluticasone-salmeterol diskus inhaler 500-50 mcg, Inhale 1 puff into the lungs 2 (two) times daily. Controller, Disp: 60 each, Rfl: 5    HYDROcodone-acetaminophen (NORCO) 5-325 mg per tablet, Take 1 tablet by mouth every 6 (six) hours as needed for Pain., Disp: 12 tablet, Rfl: 0    hydrocortisone 2.5 % cream, Apply topically 2 (two) times daily., Disp: 28 g, Rfl: 3    ibuprofen (ADVIL,MOTRIN) 600 MG tablet, Take 1 tablet (600 mg total) by mouth every 6 (six) hours as needed., Disp: 20 tablet, Rfl: 0     ipratropium-albuteroL (COMBIVENT RESPIMAT)  mcg/actuation inhaler, INHALE 2 PUFFS INTO THE LUNGS EVERY 6 (SIX) HOURS AS NEEDED FOR WHEEZING. RESCUE, Disp: 4 each, Rfl: 5    JARDIANCE 25 mg tablet, TAKE 1 TABLET BY MOUTH EVERY DAY, Disp: 90 tablet, Rfl: 1    lancets 33 gauge Misc, 1 lancet by Misc.(Non-Drug; Combo Route) route once daily., Disp: 100 each, Rfl: 3    melatonin 5 mg Tab, Take 10 mg by mouth nightly. , Disp: , Rfl:     metFORMIN (GLUCOPHAGE-XR) 500 MG ER 24hr tablet, Take 2 tablets (1,000 mg total) by mouth 2 (two) times daily with meals., Disp: 180 tablet, Rfl: 3    montelukast (SINGULAIR) 10 mg tablet, TAKE 1 TABLET BY MOUTH EVERY DAY, Disp: 90 tablet, Rfl: 1    mupirocin (BACTROBAN) 2 % ointment, Apply topically., Disp: , Rfl:     pantoprazole (PROTONIX) 40 MG tablet, TAKE 1 TABLET BY MOUTH EVERY DAY, Disp: 90 tablet, Rfl: 1    rosuvastatin (CRESTOR) 20 MG tablet, TAKE 1 TABLET BY MOUTH EVERY DAY, Disp: 90 tablet, Rfl: 1    semaglutide (OZEMPIC) 1 mg/dose (4 mg/3 mL), Inject 1 mg into the skin every 7 days., Disp: 9 mL, Rfl: 1    tiZANidine (ZANAFLEX) 4 MG tablet, SMARTSI Tablet(s) By Mouth Every 8-12 Hours PRN, Disp: , Rfl:     valsartan-hydrochlorothiazide (DIOVAN-HCT) 160-25 mg per tablet, TAKE 1 TABLET BY MOUTH EVERY DAY, Disp: 90 tablet, Rfl: 1    varicella-zoster gE-AS01B, PF, (SHINGRIX, PF,) 50 mcg/0.5 mL injection, Shingrix (PF) 50 mcg/0.5 mL intramuscular suspension, kit  PHARMACY ADMINISTERED, Disp: , Rfl:     VRAYLAR 1.5 mg Cap, TAKE 1 CAPSULE BY MOUTH EVERY DAY, Disp: 90 capsule, Rfl: 1  ALLERGIES: Review of patient's allergies indicates:  No Known Allergies      Physical Exam     There were no vitals filed for this visit.  Alert and oriented to person, place and time. No acute distress. Well-groomed, not ill appearing. Pupils round and reactive, normal respiratory effort, no audible wheezing.     Gait: She  walks with a normal gait.                   EXTREMITIES:  Examination of  lower extremities reveals there is no visible mass or deformity.            Right hip:  ROM(IR/ER) 25/50    .Negative FADIR test    .Negative Stinchfield test     Negative trochanteric pain.    Negative straight leg raise.    No warmth    No erythema        The skin over both lower extremities is normal and unremarkable.  She has a  painless range of motion of the knees and ankles bilaterally.   Sensation is intact in both lower extremities.    There are no motor deficits in the lower extremities bilaterally.   Pedal pulses are palpable distally bilaterally.    She has no calf tenderness to palpation nor edema.       Imaging:       X-Ray: I have reviewed all pertinent results/findings and my personal findings are:  Negative for fracture.  No evidence of AVN or DJD      Assessment & Plan    Bilateral hip pain  -     Ambulatory referral/consult to Physical/Occupational Therapy; Future; Expected date: 11/15/2024    Trochanteric bursitis of right hip         Treatment options discussed with her in detail.  We went over her x-rays which are overall unremarkable.  Her symptoms are most consistent with trochanteric bursitis.  We discussed nonoperative and operative treatment.  Non operatively we discussed injections as well as formal physical therapy.  We discussed the timeline for recovery and rehabilitation.  She is in agreement to proceed with right hip injection as well as right hip and core physical therapy.          This note was generated with the assistance of ambient listening technology. Verbal consent was obtained by the patient and accompanying visitor(s) for the recording of patient appointment to facilitate this note. I attest to having reviewed and edited the generated note for accuracy, though some syntax or spelling errors may persist. Please contact the author of this note for any clarification.

## 2024-11-08 NOTE — PROCEDURES
Large Joint Aspiration/Injection: R greater trochanteric bursa    Date/Time: 11/8/2024 9:30 AM    Performed by: Marcelo Sanz MD  Authorized by: Marcelo Sanz MD    Consent Done?:  Yes (Verbal)  Indications:  Pain  Timeout: prior to procedure the correct patient, procedure, and site was verified      Local anesthesia used?: Yes    Local anesthetic:  Topical anesthetic and lidocaine 1% without epinephrine  Anesthetic total (ml):  3      Details:  Needle Size:  22 G  Approach:  Lateral  Location:  Hip  Site:  R greater trochanteric bursa  Medications:  40 mg triamcinolone acetonide 40 mg/mL  Patient tolerance:  Patient tolerated the procedure well with no immediate complications

## 2024-11-09 ENCOUNTER — HOSPITAL ENCOUNTER (OUTPATIENT)
Dept: RADIOLOGY | Facility: HOSPITAL | Age: 59
Discharge: HOME | End: 2024-11-09
Attending: ORTHOPAEDIC SURGERY
Payer: COMMERCIAL

## 2024-11-09 DIAGNOSIS — M25.512 LEFT SHOULDER PAIN, UNSPECIFIED CHRONICITY: ICD-10-CM

## 2024-11-09 DIAGNOSIS — M75.102 TEAR OF LEFT ROTATOR CUFF, UNSPECIFIED TEAR EXTENT, UNSPECIFIED WHETHER TRAUMATIC: ICD-10-CM

## 2024-11-09 PROCEDURE — 73221 MRI JOINT UPR EXTREM W/O DYE: CPT | Mod: 26,LT,, | Performed by: RADIOLOGY

## 2024-11-09 PROCEDURE — 73221 MRI JOINT UPR EXTREM W/O DYE: CPT | Mod: TC,LT

## 2024-11-14 ENCOUNTER — OFFICE VISIT (OUTPATIENT)
Dept: ORTHOPEDICS | Facility: CLINIC | Age: 59
End: 2024-11-14
Payer: COMMERCIAL

## 2024-11-14 VITALS — WEIGHT: 231.94 LBS | HEIGHT: 65 IN | BODY MASS INDEX: 38.64 KG/M2

## 2024-11-14 DIAGNOSIS — M75.102 TEAR OF LEFT ROTATOR CUFF, UNSPECIFIED TEAR EXTENT, UNSPECIFIED WHETHER TRAUMATIC: ICD-10-CM

## 2024-11-14 DIAGNOSIS — M25.512 LEFT SHOULDER PAIN, UNSPECIFIED CHRONICITY: Primary | ICD-10-CM

## 2024-11-14 PROCEDURE — 3061F NEG MICROALBUMINURIA REV: CPT | Mod: CPTII,S$GLB,, | Performed by: ORTHOPAEDIC SURGERY

## 2024-11-14 PROCEDURE — 1160F RVW MEDS BY RX/DR IN RCRD: CPT | Mod: CPTII,S$GLB,, | Performed by: ORTHOPAEDIC SURGERY

## 2024-11-14 PROCEDURE — 3008F BODY MASS INDEX DOCD: CPT | Mod: CPTII,S$GLB,, | Performed by: ORTHOPAEDIC SURGERY

## 2024-11-14 PROCEDURE — 1159F MED LIST DOCD IN RCRD: CPT | Mod: CPTII,S$GLB,, | Performed by: ORTHOPAEDIC SURGERY

## 2024-11-14 PROCEDURE — 3066F NEPHROPATHY DOC TX: CPT | Mod: CPTII,S$GLB,, | Performed by: ORTHOPAEDIC SURGERY

## 2024-11-14 PROCEDURE — 3044F HG A1C LEVEL LT 7.0%: CPT | Mod: CPTII,S$GLB,, | Performed by: ORTHOPAEDIC SURGERY

## 2024-11-14 PROCEDURE — 99214 OFFICE O/P EST MOD 30 MIN: CPT | Mod: S$GLB,,, | Performed by: ORTHOPAEDIC SURGERY

## 2024-11-14 PROCEDURE — 99999 PR PBB SHADOW E&M-EST. PATIENT-LVL III: CPT | Mod: PBBFAC,,, | Performed by: ORTHOPAEDIC SURGERY

## 2024-11-14 NOTE — PROGRESS NOTES
Past Medical History:   Diagnosis Date    Class 3 severe obesity due to excess calories with serious comorbidity and body mass index (BMI) of 40.0 to 44.9 in adult 10/17/2018    COPD (chronic obstructive pulmonary disease)     Diabetes mellitus, type 2     GERD (gastroesophageal reflux disease)     Hyperlipidemia     Hypertension     Sleep apnea     Vitamin D deficiency        Past Surgical History:   Procedure Laterality Date    ADENOIDECTOMY      BREAST BIOPSY      BREAST SURGERY Right     breast biopsy    COLONOSCOPY N/A 09/10/2021    Procedure: COLONOSCOPY;  Surgeon: Holland Royal MD;  Location: [facility] ENDO;  Service: Endoscopy;  Laterality: N/A;    TONSILLECTOMY      TRANSFORAMINAL EPIDURAL INJECTION OF STEROID Left 04/16/2019    Procedure: Injection,steroid,epidural,transforaminal approach;  Surgeon: Gilberto Michelle MD;  Location: [facility] OR;  Service: Pain Management;  Laterality: Left;  L5-S1, S1    TRANSFORAMINAL EPIDURAL INJECTION OF STEROID Left 08/23/2019    Procedure: Injection,steroid,epidural,transforaminal approach;  Surgeon: Gilberto Michelle MD;  Location: [facility] OR;  Service: Pain Management;  Laterality: Left;  L5-S1, S1       Current Outpatient Medications   Medication Sig    albuterol-ipratropium (DUO-NEB) 2.5 mg-0.5 mg/3 mL nebulizer solution Take 3 mLs by nebulization every 6 (six) hours as needed for Wheezing. Rescue    amLODIPine (NORVASC) 10 MG tablet TAKE 1 TABLET BY MOUTH EVERY DAY    aspirin/salicylamide/caffeine (BC HEADACHE POWDER ORAL) Take by mouth as needed.    azelastine (ASTELIN) 137 mcg (0.1 %) nasal spray USE 1 SPRAY IN EACH NOSTRIL TWICE DAILY    blood sugar diagnostic Strp 1 each by Misc.(Non-Drug; Combo Route) route once daily.    buPROPion (WELLBUTRIN XL) 300 MG 24 hr tablet TAKE 1 TABLET BY MOUTH EVERY DAY    clindamycin (CLEOCIN) 300 MG capsule Take 300 mg by mouth every 8 (eight) hours.    clotrimazole-betamethasone 1-0.05% (LOTRISONE) cream APPLY TOPICALLY 2 (TWO) TIMES DAILY AS  NEEDED (RASH).    erythromycin (ROMYCIN) ophthalmic ointment SMARTSI.5 Inch(es) Right Eye Twice Daily    fluconazole (DIFLUCAN) 150 MG Tab Take 150 mg by mouth Every 3 (three) days.    FLUoxetine 10 MG capsule TAKE 1 CAPSULE BY MOUTH EVERY DAY    fluticasone propionate (FLONASE) 50 mcg/actuation nasal spray 1 spray (50 mcg total) by Each Nostril route 2 (two) times daily.    fluticasone-salmeterol diskus inhaler 500-50 mcg Inhale 1 puff into the lungs 2 (two) times daily. Controller    HYDROcodone-acetaminophen (NORCO) 5-325 mg per tablet Take 1 tablet by mouth every 6 (six) hours as needed for Pain.    hydrocortisone 2.5 % cream Apply topically 2 (two) times daily.    ibuprofen (ADVIL,MOTRIN) 600 MG tablet Take 1 tablet (600 mg total) by mouth every 6 (six) hours as needed.    ipratropium-albuteroL (COMBIVENT RESPIMAT)  mcg/actuation inhaler INHALE 2 PUFFS INTO THE LUNGS EVERY 6 (SIX) HOURS AS NEEDED FOR WHEEZING. RESCUE    JARDIANCE 25 mg tablet TAKE 1 TABLET BY MOUTH EVERY DAY    lancets 33 gauge Misc 1 lancet by Misc.(Non-Drug; Combo Route) route once daily.    melatonin 5 mg Tab Take 10 mg by mouth nightly.     metFORMIN (GLUCOPHAGE-XR) 500 MG ER 24hr tablet Take 2 tablets (1,000 mg total) by mouth 2 (two) times daily with meals.    montelukast (SINGULAIR) 10 mg tablet TAKE 1 TABLET BY MOUTH EVERY DAY    mupirocin (BACTROBAN) 2 % ointment Apply topically.    pantoprazole (PROTONIX) 40 MG tablet TAKE 1 TABLET BY MOUTH EVERY DAY    rosuvastatin (CRESTOR) 20 MG tablet TAKE 1 TABLET BY MOUTH EVERY DAY    semaglutide (OZEMPIC) 1 mg/dose (4 mg/3 mL) Inject 1 mg into the skin every 7 days.    tiZANidine (ZANAFLEX) 4 MG tablet SMARTSI Tablet(s) By Mouth Every 8-12 Hours PRN    valsartan-hydrochlorothiazide (DIOVAN-HCT) 160-25 mg per tablet TAKE 1 TABLET BY MOUTH EVERY DAY    varicella-zoster gE-AS01B, PF, (SHINGRIX, PF,) 50 mcg/0.5 mL injection Shingrix (PF) 50 mcg/0.5 mL intramuscular suspension, kit    PHARMACY ADMINISTERED    VRAYLAR 1.5 mg Cap TAKE 1 CAPSULE BY MOUTH EVERY DAY     No current facility-administered medications for this visit.       Review of patient's allergies indicates:  No Known Allergies    Family History   Problem Relation Name Age of Onset    Stroke Mother      Emphysema Mother      Diabetes Father      Coronary artery disease Father      COPD Father      Diabetes Sister         Social History     Socioeconomic History    Marital status: Significant Other   Tobacco Use    Smoking status: Former     Current packs/day: 0.00     Types: Cigarettes     Quit date: 2019     Years since quittin.7    Smokeless tobacco: Never   Substance and Sexual Activity    Alcohol use: Yes     Comment: rarely    Drug use: Yes     Types: Hydrocodone    Sexual activity: Never     Social Drivers of Health     Financial Resource Strain: Low Risk  (2023)    Overall Financial Resource Strain (CARDIA)     Difficulty of Paying Living Expenses: Not hard at all   Food Insecurity: No Food Insecurity (2023)    Hunger Vital Sign     Worried About Running Out of Food in the Last Year: Never true     Ran Out of Food in the Last Year: Never true   Transportation Needs: No Transportation Needs (2023)    PRAPARE - Transportation     Lack of Transportation (Medical): No     Lack of Transportation (Non-Medical): No   Physical Activity: Inactive (2023)    Exercise Vital Sign     Days of Exercise per Week: 0 days     Minutes of Exercise per Session: 0 min   Stress: Stress Concern Present (2023)    [employer] [place] [employer] - Occupational Stress Questionnaire     Feeling of Stress : Rather much   Housing Stability: Low Risk  (2023)    Housing Stability Vital Sign     Unable to Pay for Housing in the Last Year: No     Number of Places Lived in the Last Year: 1     Unstable Housing in the Last Year: No       Chief Complaint: No chief complaint on file.      History of present illness:   59-year-old right-hand-dominant female seen for left shoulder pain.  Pain started 3-4 months ago.  Patient denies a trauma or injury.  Patient has limited and painful range of motion particularly reaching overhead or behind her back.  Pain radiates down to the elbow.  Patient did physical therapy on her shoulder previously for a month.  It helped with the range of motion but did not help with the pain.  Describes pain is sharp and achy.  Patient takes aspirin and ibuprofen with minimal relief.  MRI shows partial-thickness rotator cuff tear.        Review of systems:  Musculoskeletal:  See HPI          Physical Examination:    Vital Signs:  There were no vitals filed for this visit.    There is no height or weight on file to calculate BMI.    This a well-developed, well nourished patient in no acute distress.  They are alert and oriented and cooperative to examination.  Pt. walks without an antalgic gait.      Examination of the left shoulder shows no rashes or erythema. There are no masses, ecchymosis, or atrophy. The patient has minimal loss of active range of motion in forward flexion, external rotation, and internal rotation to the mid T-spine. The patient has moderately positive Hartmann and Neer test. - Speeds test. Nontender to palpation over a.c. joint. Passive range of motion: Forward flexion of 180°, external rotation at 90° of 90°, internal rotation of 50°, and external rotation at 0° of 50°. 2+ radial pulse. Intact axillary, radial, median and ulnar sensation. 5 out of 5 resisted forward flexion, external rotation, and negative lift off test.      X-rays:  X-rays left shoulder reviewed which show some mild AC arthritis    MRI of the left shoulder is reviewed and interpreted:Small partial-thickness bursal surface tear of the supraspinatus/infraspinatus tendons measuring 1.5 x 1.2 centimeters involving less than 50% of the tendon thickness.        Assessment:  Left rotator cuff syndrome    Plan:  I reviewed  the findings with her today.   Patient has already tried formal physical therapy with some improvement.  We discussed continued non operative care versus left shoulder arthroscopy with subacromial decompression and rotator cuff debridement.  Patient would like to try some more therapy again.  We also talked briefly about PRP.  Follow up in 2 months.            All previous pertinent notes including ER visits, physical therapy visits, other orthopedic visits as well as other care for the same musculoskeletal problem were reviewed.  All pertinent lab values and previous imaging was reviewed pertinent to the current visit.    This note was created using M [employer] voice recognition software that occasionally misinterpreted phrases or words.    Consult note is delivered via Epic messaging service.

## 2024-12-27 DIAGNOSIS — R45.89 DEPRESSED MOOD: ICD-10-CM

## 2024-12-30 RX ORDER — BUPROPION HYDROCHLORIDE 300 MG/1
300 TABLET ORAL DAILY
Qty: 90 TABLET | Refills: 1 | Status: SHIPPED | OUTPATIENT
Start: 2024-12-30

## 2025-01-07 ENCOUNTER — TELEPHONE (OUTPATIENT)
Dept: ORTHOPEDICS | Facility: CLINIC | Age: 60
End: 2025-01-07
Payer: COMMERCIAL

## 2025-01-15 DIAGNOSIS — K21.9 GASTROESOPHAGEAL REFLUX DISEASE WITHOUT ESOPHAGITIS: ICD-10-CM

## 2025-01-15 DIAGNOSIS — I10 ESSENTIAL HYPERTENSION: ICD-10-CM

## 2025-01-15 DIAGNOSIS — R45.89 DEPRESSED MOOD: ICD-10-CM

## 2025-01-15 DIAGNOSIS — E11.8 TYPE 2 DIABETES MELLITUS WITH COMPLICATION, WITHOUT LONG-TERM CURRENT USE OF INSULIN: ICD-10-CM

## 2025-01-15 DIAGNOSIS — J44.9 CHRONIC OBSTRUCTIVE PULMONARY DISEASE, UNSPECIFIED COPD TYPE: ICD-10-CM

## 2025-01-16 RX ORDER — VALSARTAN AND HYDROCHLOROTHIAZIDE 160; 25 MG/1; MG/1
TABLET ORAL
Qty: 90 TABLET | Refills: 1 | Status: SHIPPED | OUTPATIENT
Start: 2025-01-16

## 2025-01-16 RX ORDER — PANTOPRAZOLE SODIUM 40 MG/1
TABLET, DELAYED RELEASE ORAL
Qty: 90 TABLET | Refills: 1 | Status: SHIPPED | OUTPATIENT
Start: 2025-01-16

## 2025-01-16 RX ORDER — EMPAGLIFLOZIN 25 MG/1
TABLET, FILM COATED ORAL
Qty: 90 TABLET | Refills: 1 | Status: SHIPPED | OUTPATIENT
Start: 2025-01-16

## 2025-01-16 RX ORDER — FLUOXETINE 10 MG/1
10 CAPSULE ORAL
Qty: 90 CAPSULE | Refills: 1 | Status: SHIPPED | OUTPATIENT
Start: 2025-01-16

## 2025-01-16 RX ORDER — MONTELUKAST SODIUM 10 MG/1
TABLET ORAL
Qty: 90 TABLET | Refills: 1 | Status: SHIPPED | OUTPATIENT
Start: 2025-01-16

## 2025-02-19 ENCOUNTER — OFFICE VISIT (OUTPATIENT)
Dept: FAMILY MEDICINE | Facility: CLINIC | Age: 60
End: 2025-02-19
Payer: COMMERCIAL

## 2025-02-19 VITALS
BODY MASS INDEX: 39.32 KG/M2 | SYSTOLIC BLOOD PRESSURE: 110 MMHG | TEMPERATURE: 98 F | DIASTOLIC BLOOD PRESSURE: 70 MMHG | WEIGHT: 236 LBS | OXYGEN SATURATION: 96 % | HEART RATE: 73 BPM | HEIGHT: 65 IN

## 2025-02-19 DIAGNOSIS — R45.89 DEPRESSED MOOD: ICD-10-CM

## 2025-02-19 DIAGNOSIS — J01.00 ACUTE MAXILLARY SINUSITIS, RECURRENCE NOT SPECIFIED: ICD-10-CM

## 2025-02-19 DIAGNOSIS — R05.1 ACUTE COUGH: Primary | ICD-10-CM

## 2025-02-19 DIAGNOSIS — R06.2 WHEEZING: ICD-10-CM

## 2025-02-19 DIAGNOSIS — E55.9 VITAMIN D DEFICIENCY: ICD-10-CM

## 2025-02-19 DIAGNOSIS — J44.9 CHRONIC OBSTRUCTIVE PULMONARY DISEASE, UNSPECIFIED COPD TYPE: ICD-10-CM

## 2025-02-19 DIAGNOSIS — K59.09 CHRONIC CONSTIPATION: ICD-10-CM

## 2025-02-19 DIAGNOSIS — I10 ESSENTIAL HYPERTENSION: ICD-10-CM

## 2025-02-19 DIAGNOSIS — E78.2 MIXED HYPERLIPIDEMIA: ICD-10-CM

## 2025-02-19 DIAGNOSIS — E11.8 TYPE 2 DIABETES MELLITUS WITH COMPLICATION, WITHOUT LONG-TERM CURRENT USE OF INSULIN: ICD-10-CM

## 2025-02-19 DIAGNOSIS — K21.9 GASTROESOPHAGEAL REFLUX DISEASE WITHOUT ESOPHAGITIS: ICD-10-CM

## 2025-02-19 DIAGNOSIS — H66.003 ACUTE SUPPURATIVE OTITIS MEDIA OF BOTH EARS WITHOUT SPONTANEOUS RUPTURE OF TYMPANIC MEMBRANES, RECURRENCE NOT SPECIFIED: ICD-10-CM

## 2025-02-19 RX ORDER — AMOXICILLIN AND CLAVULANATE POTASSIUM 875; 125 MG/1; MG/1
1 TABLET, FILM COATED ORAL 2 TIMES DAILY
Qty: 20 TABLET | Refills: 0 | Status: SHIPPED | OUTPATIENT
Start: 2025-02-19

## 2025-02-19 RX ORDER — PROMETHAZINE HYDROCHLORIDE AND DEXTROMETHORPHAN HYDROBROMIDE 6.25; 15 MG/5ML; MG/5ML
5 SYRUP ORAL NIGHTLY PRN
Qty: 118 ML | Refills: 0 | Status: SHIPPED | OUTPATIENT
Start: 2025-02-19

## 2025-02-19 RX ORDER — FLUTICASONE FUROATE, UMECLIDINIUM BROMIDE AND VILANTEROL TRIFENATATE 100; 62.5; 25 UG/1; UG/1; UG/1
1 POWDER RESPIRATORY (INHALATION) DAILY
Qty: 60 EACH | Refills: 5 | Status: SHIPPED | OUTPATIENT
Start: 2025-02-19

## 2025-02-19 RX ORDER — FLUTICASONE PROPIONATE 50 MCG
1 SPRAY, SUSPENSION (ML) NASAL DAILY
Qty: 16 G | Refills: 0 | Status: SHIPPED | OUTPATIENT
Start: 2025-02-19

## 2025-02-19 NOTE — PROGRESS NOTES
Subjective:       Patient ID: Kemi Dominguez is a 59 y.o. female.    Chief Complaint: Diabetes, Hyperlipidemia, Follow-up, Nasal Congestion, and Cough    History of Present Illness    CHIEF COMPLAINT:  Patient presents for a follow-up visit with labs and to address current illness symptoms.    HPI:  Patient reports feeling sick since Monday, with the primary symptom being sinus congestion. 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 Although she received a flu vaccine and COVID booster, she developed these symptoms. She denies cough, ear pain, or sore throat.    Patient reports issues with bowel movements, describing a feeling of fullness in the rectal area and difficulty fully emptying the rectum. This has been affecting her urinary stream. She notes having to strain significantly during bowel movements, despite having daily or every other day bowel movements. She denies feeling constipated but acknowledges the straining and incomplete emptying sensation.    Patient mentions worsening stress incontinence, reporting minor leakage. She expresses concern about these symptoms, particularly given that all female organs are still present.    Patient reports inconsistent use of her maintenance inhaler and frequent use of her rescue inhaler (CombiVent), stating a feeling of dependence on it.    MEDICATIONS:  Patient is on Ozempic but has not been using it consistently. She is prescribed Advair as a maintenance inhaler but does not use it as prescribed. She frequently uses CombiVent as a rescue inhaler. Patient is also taking a Vitamin D supplement.    MEDICAL HISTORY:  Patient has a history of diabetes, a respiratory condition, and stress incontinence. She has received both a flu vaccine and a COVID booster.    TEST RESULTS:  Patient recently underwent several labs. Her flu and COVID swabs were negative. Her fasting glucose was 107 mg/dL, showing improvement from a previous reading of 126 mg/dL. A  recent CBC was mostly normal, with slightly low MCH and slightly elevated monocytes (6 points high). Her A1C was 7%, and cholesterol levels were normal. Vitamin D levels were 59 ng/mL. A urinalysis showed the presence of glucose, which was noted as expected due to her current medications. Patient had a colonoscopy a few years ago. Her next colonoscopy is due in 10 years from the last one.    SOCIAL HISTORY:  Occupation: Works at the hospital      ROS:  General: -fever, -chills, -fatigue, -weight gain, -weight loss  Eyes: -vision changes, -redness, -discharge  ENT: -ear pain, +nasal congestion, -sore throat  Cardiovascular: -chest pain, -palpitations, -lower extremity edema  Respiratory: -cough, -shortness of breath  Gastrointestinal: -abdominal pain, -nausea, -vomiting, -diarrhea, -constipation, -blood in stool  Genitourinary: -dysuria, -hematuria, -frequency  Musculoskeletal: -joint pain, -muscle pain  Skin: -rash, -lesion  Neurological: -headache, -dizziness, -numbness, -tingling  Psychiatric: +anxiety, +depression, -sleep difficulty         Past Medical History:   Diagnosis Date    Class 3 severe obesity due to excess calories with serious comorbidity and body mass index (BMI) of 40.0 to 44.9 in adult 10/17/2018    COPD (chronic obstructive pulmonary disease)     Diabetes mellitus, type 2     GERD (gastroesophageal reflux disease)     Hyperlipidemia     Hypertension     Sleep apnea     Vitamin D deficiency         Past Surgical History:   Procedure Laterality Date    ADENOIDECTOMY      BREAST BIOPSY      BREAST SURGERY Right     breast biopsy    COLONOSCOPY N/A 09/10/2021    Procedure: COLONOSCOPY;  Surgeon: Holland Royal MD;  Location: [facility];  Service: Endoscopy;  Laterality: N/A;    TONSILLECTOMY      TRANSFORAMINAL EPIDURAL INJECTION OF STEROID Left 04/16/2019    Procedure: Injection,steroid,epidural,transforaminal approach;  Surgeon: Gilberto Michelle MD;  Location: [facility] OR;  Service: Pain Management;   Laterality: Left;  L5-S1, S1    TRANSFORAMINAL EPIDURAL INJECTION OF STEROID Left 2019    Procedure: Injection,steroid,epidural,transforaminal approach;  Surgeon: Gilberto Michelle MD;  Location: [facility] OR;  Service: Pain Management;  Laterality: Left;  L5-S1, S1       Family History   Problem Relation Name Age of Onset    Stroke Mother      Emphysema Mother      Diabetes Father      Coronary artery disease Father      COPD Father      Diabetes Sister         Social History     Socioeconomic History    Marital status: Significant Other   Tobacco Use    Smoking status: Former     Current packs/day: 0.00     Types: Cigarettes     Quit date: 2019     Years since quittin.9    Smokeless tobacco: Never   Substance and Sexual Activity    Alcohol use: Yes     Comment: rarely    Drug use: Yes     Types: Hydrocodone    Sexual activity: Never     Social Drivers of Health     Financial Resource Strain: Low Risk  (2023)    Overall Financial Resource Strain (CARDIA)     Difficulty of Paying Living Expenses: Not hard at all   Food Insecurity: No Food Insecurity (2023)    Hunger Vital Sign     Worried About Running Out of Food in the Last Year: Never true     Ran Out of Food in the Last Year: Never true   Transportation Needs: No Transportation Needs (2023)    PRAPARE - Transportation     Lack of Transportation (Medical): No     Lack of Transportation (Non-Medical): No   Physical Activity: Inactive (2023)    Exercise Vital Sign     Days of Exercise per Week: 0 days     Minutes of Exercise per Session: 0 min   Stress: Stress Concern Present (2023)    [employer] [place] [employer] - Occupational Stress Questionnaire     Feeling of Stress : Rather much   Housing Stability: Low Risk  (2023)    Housing Stability Vital Sign     Unable to Pay for Housing in the Last Year: No     Number of Places Lived in the Last Year: 1     Unstable Housing in the Last Year: No       Current  "Medications[1]    Review of patient's allergies indicates:  No Known Allergies  Objective:      Blood pressure 110/70, pulse 73, temperature 97.9 °F (36.6 °C), height 5' 5" (1.651 m), weight 107 kg (236 lb), SpO2 96%. Body mass index is 39.27 kg/m².   Physical Exam    General: No acute distress. Well-developed. Well-nourished.  Eyes: EOMI. Sclerae anicteric.  HENT: Normocephalic. Atraumatic. Nares patent. Moist oral mucosa.  Ears: Left ear erythema. Mild erythema on the right  Cardiovascular: Regular rate. Regular rhythm. No murmurs. No rubs. No gallops. Normal S1, S2.  Respiratory: Normal respiratory effort. Clear to auscultation bilaterally. No rales. No rhonchi. No wheezing.  Abdomen: Soft. Non-tender. Non-distended. Normoactive bowel sounds.  Musculoskeletal: No  obvious deformity.  Extremities: No lower extremity edema.  Neurological: Alert & oriented x3. No slurred speech. Normal gait.  Psychiatric: Normal mood. Normal affect. Good insight. Good judgment.  Skin: Warm. Dry. No rash.         Assessment:       Assessment & Plan    - Diagnosed upper respiratory infection with sinusitis and early ear infection based on symptoms and physical exam  - Recommend antibiotic treatment with Augmentin due to its effectiveness for both ear and sinus infections  - Suggested switching from Advair to Trelegy for maintenance inhaler and CombiVent to AirSupra for rescue inhaler to improve respiratory management  - Assessed chronic constipation as likely cause of rectal fullness and urinary symptoms  - Will initiate treatment for chronic constipation to address GI and urinary concerns    TYPE 2 DIABETES MELLITUS WITH COMPLICATION, WITHOUT LONG-TERM CURRENT USE OF INSULIN:  - Ordered A1C test for 3-4 months at [facility].  - Noted improvement in fasting glucose to 107 from previous 126, while A1c increased to 7%.  - Observed glucose in urine test, which is expected with current medications.  - A1c fluctuation attributed to " inconsistent medication use and travel diet.  - Advised patient to resume consistent use of Ozempic and adhere to prescribed diet.  - Scheduled repeat labs in 3-4 months, including A1C, CMP, and urine test.    OBESITY, CLASS 2:  - Advised patient to resume adherence to prescribed diet and consistent use of Ozempic.    ASTHMA:  - Initiated Trelegy as maintenance inhaler, replacing Advair and CombiVent.  - Instructed patient to rinse mouth after use and sign up for Trelegy coupon at [email or web address] for cost savings.  - Prescribed AirSupra as rescue inhaler, replacing CombiVent.  - Noted patient's non-adherence to prescribed maintenance inhaler regimen and overuse of CombiVent inhaler.  - Explained that Trelegy is a triple medication inhaler containing steroids, similar to Advair.    BILATERAL EAR INFECTION:  - Examined patient's ears and found both infected, with left ear red and right ear showing early signs of infection.  - Prescribed Augmentin for bilateral ear infection.    SINUS INFECTION:  - Determined need for antibiotics due to concurrent ear and sinus infection.  - Prescribed Augmentin for sinus infection.  - Initiated Flonase nasal spray and recommended Mucinex throughout the day for congestion.  - Prescribed promethazine DM, 1 teaspoon at bedtime for nighttime cough.  - Noted sinus congestion as patient's main symptom.    CHRONIC CONSTIPATION:  - Diagnosed chronic constipation based on patient's report of rectal fullness, difficulty emptying bowels, and straining during bowel movements.  - Explained the connection between chronic constipation and urinary symptoms due to anatomical proximity, including possible rectal dilation and altered bowel sensation.  - Prescribed Linzess, lowest dose, 90-day supply, to be taken 30 minutes before eating in the morning.  - Informed about possible initial effects, including temporary diarrhea.  - Recommend increasing fiber intake through diet or supplements if not consuming  enough fruits and vegetables.    STRESS INCONTINENCE:  - Noted patient's report of worsening stress incontinence with some leakage.  - Acknowledged the risk of urinary tract infections due to the condition.  - Explained the connection between chronic constipation and urinary symptoms due to anatomical proximity.    MEDICATIONS/SUPPLEMENTS:  - Advised taking vitamin C to boost immune system.  - Recommend avoiding dairy products while sick to reduce mucus production.    LABS:  - CMP and urinalysis ordered for 3-4 months at [facility].    FOLLOW UP:  - Follow up in 3-4 months for lab review.       Plan:       Kemi was seen today for diabetes, hyperlipidemia, follow-up, nasal congestion and cough.    Diagnoses and all orders for this visit:    Acute cough  -     POCT COVID-19 Rapid Screening  -     POCT Influenza A/B Molecular  -     promethazine-dextromethorphan (PROMETHAZINE-DM) 6.25-15 mg/5 mL Syrp; Take 5 mLs by mouth nightly as needed (cough).    Wheezing  -     albuterol-budesonide (AIRSUPRA) 90-80 mcg/actuation; Inhale 2 puffs into the lungs every 6 (six) hours as needed (sob, wheezing).    Chronic obstructive pulmonary disease, unspecified COPD type  -     fluticasone-umeclidin-vilanter (TRELEGY ELLIPTA) 100-62.5-25 mcg DsDv; Inhale 1 puff into the lungs once daily.    Chronic constipation  -     linaCLOtide (LINZESS) 72 mcg Cap capsule; Take 1 capsule (72 mcg total) by mouth before breakfast.    Acute maxillary sinusitis, recurrence not specified  -     amoxicillin-clavulanate 875-125mg (AUGMENTIN) 875-125 mg per tablet; Take 1 tablet by mouth 2 (two) times daily.  -     fluticasone propionate (FLONASE) 50 mcg/actuation nasal spray; 1 spray (50 mcg total) by Each Nostril route once daily.    Acute suppurative otitis media of both ears without spontaneous rupture of tympanic membranes, recurrence not specified  -     amoxicillin-clavulanate 875-125mg (AUGMENTIN) 875-125 mg per tablet; Take 1 tablet by  mouth 2 (two) times daily.  -     fluticasone propionate (FLONASE) 50 mcg/actuation nasal spray; 1 spray (50 mcg total) by Each Nostril route once daily.    Gastroesophageal reflux disease without esophagitis  Stable    Type 2 diabetes mellitus with complication, without long-term current use of insulin  -     Hemoglobin A1C; Future  -     Urinalysis; Future  -     Hemoglobin A1C  -     Urinalysis    Essential hypertension  -     Comprehensive Metabolic Panel; Future  -     Urinalysis; Future  -     Comprehensive Metabolic Panel  -     Urinalysis    Depressed mood  Stable  Continue current medications    Vitamin D deficiency  Stable  Continue supplement    Mixed hyperlipidemia  Limit red meat, butter, fried foods, cheese, and other foods that have a lot of saturated fat. Consume more: lean meats, fish, fruits, vegetables, whole grains, beans, lentils, and nuts.  Weight loss, and 30-45 min of cardiovascular exercise daily.           This note was generated with the assistance of ambient listening technology. Verbal consent was obtained by the patient and accompanying visitor(s) for the recording of patient appointment to facilitate this note. I attest to having reviewed and edited the generated note for accuracy, though some syntax or spelling errors may persist. Please contact the author of this note for any clarification.    Visit today included increased complexity associated with the care of the episodic problem sinus infection, ear infection, constipation addressed and managing the longitudinal care of the patient due to the serious and/or complex managed problem(s) type 2 diabetes, depression, anxiety, hyperlipidemia, hypertension, vitamin deficiency.     I spent a total of 40 minutes on the day of the visit.  This includes face to face time and non-face to face time preparing to see the patient (eg, review of tests), obtaining and/or reviewing separately obtained history, documenting clinical information in  the electronic or other health record, independently interpreting results and communicating results to the patient/family/caregiver, or care coordinator.        [1]   Current Outpatient Medications   Medication Sig Dispense Refill    amLODIPine (NORVASC) 10 MG tablet TAKE 1 TABLET BY MOUTH EVERY DAY 90 tablet 1    aspirin/salicylamide/caffeine (BC HEADACHE POWDER ORAL) Take by mouth as needed.      azelastine (ASTELIN) 137 mcg (0.1 %) nasal spray USE 1 SPRAY IN EACH NOSTRIL TWICE DAILY 30 mL 2    blood sugar diagnostic Strp 1 each by Misc.(Non-Drug; Combo Route) route once daily. 100 each 2    buPROPion (WELLBUTRIN XL) 300 MG 24 hr tablet Take 1 tablet (300 mg total) by mouth once daily. 90 tablet 1    clotrimazole-betamethasone 1-0.05% (LOTRISONE) cream APPLY TOPICALLY 2 (TWO) TIMES DAILY AS NEEDED (RASH). 45 g 1    FLUoxetine 10 MG capsule TAKE 1 CAPSULE BY MOUTH EVERY DAY 90 capsule 1    fluticasone propionate (FLONASE) 50 mcg/actuation nasal spray 1 spray (50 mcg total) by Each Nostril route 2 (two) times daily. 48 mL 1    hydrocortisone 2.5 % cream Apply topically 2 (two) times daily. 28 g 3    ibuprofen (ADVIL,MOTRIN) 600 MG tablet Take 1 tablet (600 mg total) by mouth every 6 (six) hours as needed. 20 tablet 0    JARDIANCE 25 mg tablet TAKE 1 TABLET BY MOUTH EVERY DAY 90 tablet 1    lancets 33 gauge Misc 1 lancet by Misc.(Non-Drug; Combo Route) route once daily. 100 each 3    melatonin 5 mg Tab Take 10 mg by mouth nightly.       metFORMIN (GLUCOPHAGE-XR) 500 MG ER 24hr tablet Take 2 tablets (1,000 mg total) by mouth 2 (two) times daily with meals. 180 tablet 3    montelukast (SINGULAIR) 10 mg tablet TAKE 1 TABLET BY MOUTH EVERY DAY 90 tablet 1    mupirocin (BACTROBAN) 2 % ointment Apply topically.      pantoprazole (PROTONIX) 40 MG tablet TAKE 1 TABLET BY MOUTH EVERY DAY 90 tablet 1    rosuvastatin (CRESTOR) 20 MG tablet TAKE 1 TABLET BY MOUTH EVERY DAY 90 tablet 1    semaglutide (OZEMPIC) 1 mg/dose (4 mg/3  mL) Inject 1 mg into the skin every 7 days. 9 mL 1    tiZANidine (ZANAFLEX) 4 MG tablet SMARTSI Tablet(s) By Mouth Every 8-12 Hours PRN      valsartan-hydrochlorothiazide (DIOVAN-HCT) 160-25 mg per tablet TAKE 1 TABLET BY MOUTH EVERY DAY 90 tablet 1    varicella-zoster [id number], PF, (SHINGRIX, PF,) 50 mcg/0.5 mL injection Shingrix (PF) 50 mcg/0.5 mL intramuscular suspension, kit   PHARMACY ADMINISTERED      VRAYLAR 1.5 mg Cap TAKE 1 CAPSULE BY MOUTH EVERY DAY 90 capsule 1    albuterol-budesonide (AIRSUPRA) 90-80 mcg/actuation Inhale 2 puffs into the lungs every 6 (six) hours as needed (sob, wheezing). 10.7 g 5    amoxicillin-clavulanate 875-125mg (AUGMENTIN) 875-125 mg per tablet Take 1 tablet by mouth 2 (two) times daily. 20 tablet 0    fluticasone propionate (FLONASE) 50 mcg/actuation nasal spray 1 spray (50 mcg total) by Each Nostril route once daily. 16 g 0    fluticasone-umeclidin-vilanter (TRELEGY ELLIPTA) 100-62.5-25 mcg DsDv Inhale 1 puff into the lungs once daily. 60 each 5    linaCLOtide (LINZESS) 72 mcg Cap capsule Take 1 capsule (72 mcg total) by mouth before breakfast. 90 each 1    promethazine-dextromethorphan (PROMETHAZINE-DM) 6.25-15 mg/5 mL Syrp Take 5 mLs by mouth nightly as needed (cough). 118 mL 0     No current facility-administered medications for this visit.

## 2025-02-20 ENCOUNTER — OFFICE VISIT (OUTPATIENT)
Dept: ORTHOPEDICS | Facility: CLINIC | Age: 60
End: 2025-02-20
Payer: COMMERCIAL

## 2025-02-20 DIAGNOSIS — M25.512 LEFT SHOULDER PAIN, UNSPECIFIED CHRONICITY: Primary | ICD-10-CM

## 2025-02-20 DIAGNOSIS — M75.100 ROTATOR CUFF SYNDROME, UNSPECIFIED LATERALITY: ICD-10-CM

## 2025-02-20 DIAGNOSIS — M75.102 TEAR OF LEFT ROTATOR CUFF, UNSPECIFIED TEAR EXTENT, UNSPECIFIED WHETHER TRAUMATIC: ICD-10-CM

## 2025-02-20 NOTE — PROGRESS NOTES
Past Medical History:   Diagnosis Date    Class 3 severe obesity due to excess calories with serious comorbidity and body mass index (BMI) of 40.0 to 44.9 in adult 10/17/2018    COPD (chronic obstructive pulmonary disease)     Diabetes mellitus, type 2     GERD (gastroesophageal reflux disease)     Hyperlipidemia     Hypertension     Sleep apnea     Vitamin D deficiency        Past Surgical History:   Procedure Laterality Date    ADENOIDECTOMY      BREAST BIOPSY      BREAST SURGERY Right     breast biopsy    COLONOSCOPY N/A 09/10/2021    Procedure: COLONOSCOPY;  Surgeon: Holland oRyal MD;  Location: [facility] ENDO;  Service: Endoscopy;  Laterality: N/A;    TONSILLECTOMY      TRANSFORAMINAL EPIDURAL INJECTION OF STEROID Left 04/16/2019    Procedure: Injection,steroid,epidural,transforaminal approach;  Surgeon: Gilberto Michelle MD;  Location: [facility] OR;  Service: Pain Management;  Laterality: Left;  L5-S1, S1    TRANSFORAMINAL EPIDURAL INJECTION OF STEROID Left 08/23/2019    Procedure: Injection,steroid,epidural,transforaminal approach;  Surgeon: Gilberto Michelle MD;  Location: [facility] OR;  Service: Pain Management;  Laterality: Left;  L5-S1, S1       Current Outpatient Medications   Medication Sig    albuterol-budesonide (AIRSUPRA) 90-80 mcg/actuation Inhale 2 puffs into the lungs every 6 (six) hours as needed (sob, wheezing).    amLODIPine (NORVASC) 10 MG tablet TAKE 1 TABLET BY MOUTH EVERY DAY    amoxicillin-clavulanate 875-125mg (AUGMENTIN) 875-125 mg per tablet Take 1 tablet by mouth 2 (two) times daily.    aspirin/salicylamide/caffeine (BC HEADACHE POWDER ORAL) Take by mouth as needed.    azelastine (ASTELIN) 137 mcg (0.1 %) nasal spray USE 1 SPRAY IN EACH NOSTRIL TWICE DAILY    blood sugar diagnostic Strp 1 each by Misc.(Non-Drug; Combo Route) route once daily.    buPROPion (WELLBUTRIN XL) 300 MG 24 hr tablet Take 1 tablet (300 mg total) by mouth once daily.    clotrimazole-betamethasone 1-0.05% (LOTRISONE) cream APPLY  TOPICALLY 2 (TWO) TIMES DAILY AS NEEDED (RASH).    FLUoxetine 10 MG capsule TAKE 1 CAPSULE BY MOUTH EVERY DAY    fluticasone propionate (FLONASE) 50 mcg/actuation nasal spray 1 spray (50 mcg total) by Each Nostril route 2 (two) times daily.    fluticasone propionate (FLONASE) 50 mcg/actuation nasal spray 1 spray (50 mcg total) by Each Nostril route once daily.    fluticasone-umeclidin-vilanter (TRELEGY ELLIPTA) 100-62.5-25 mcg DsDv Inhale 1 puff into the lungs once daily.    hydrocortisone 2.5 % cream Apply topically 2 (two) times daily.    ibuprofen (ADVIL,MOTRIN) 600 MG tablet Take 1 tablet (600 mg total) by mouth every 6 (six) hours as needed.    JARDIANCE 25 mg tablet TAKE 1 TABLET BY MOUTH EVERY DAY    lancets 33 gauge Misc 1 lancet by Misc.(Non-Drug; Combo Route) route once daily.    linaCLOtide (LINZESS) 72 mcg Cap capsule Take 1 capsule (72 mcg total) by mouth before breakfast.    melatonin 5 mg Tab Take 10 mg by mouth nightly.     metFORMIN (GLUCOPHAGE-XR) 500 MG ER 24hr tablet Take 2 tablets (1,000 mg total) by mouth 2 (two) times daily with meals.    montelukast (SINGULAIR) 10 mg tablet TAKE 1 TABLET BY MOUTH EVERY DAY    mupirocin (BACTROBAN) 2 % ointment Apply topically.    pantoprazole (PROTONIX) 40 MG tablet TAKE 1 TABLET BY MOUTH EVERY DAY    promethazine-dextromethorphan (PROMETHAZINE-DM) 6.25-15 mg/5 mL Syrp Take 5 mLs by mouth nightly as needed (cough).    rosuvastatin (CRESTOR) 20 MG tablet TAKE 1 TABLET BY MOUTH EVERY DAY    semaglutide (OZEMPIC) 1 mg/dose (4 mg/3 mL) Inject 1 mg into the skin every 7 days.    tiZANidine (ZANAFLEX) 4 MG tablet SMARTSI Tablet(s) By Mouth Every 8-12 Hours PRN    valsartan-hydrochlorothiazide (DIOVAN-HCT) 160-25 mg per tablet TAKE 1 TABLET BY MOUTH EVERY DAY    varicella-zoster [id number], PF, (SHINGRIX, PF,) 50 mcg/0.5 mL injection Shingrix (PF) 50 mcg/0.5 mL intramuscular suspension, kit   PHARMACY ADMINISTERED    VRAYLAR 1.5 mg Cap TAKE 1 CAPSULE BY MOUTH EVERY  DAY     No current facility-administered medications for this visit.       Review of patient's allergies indicates:  No Known Allergies    Family History   Problem Relation Name Age of Onset    Stroke Mother      Emphysema Mother      Diabetes Father      Coronary artery disease Father      COPD Father      Diabetes Sister         Social History     Socioeconomic History    Marital status: Significant Other   Tobacco Use    Smoking status: Former     Current packs/day: 0.00     Types: Cigarettes     Quit date: 2019     Years since quittin.9    Smokeless tobacco: Never   Substance and Sexual Activity    Alcohol use: Yes     Comment: rarely    Drug use: Yes     Types: Hydrocodone    Sexual activity: Never     Social Drivers of Health     Financial Resource Strain: Low Risk  (2023)    Overall Financial Resource Strain (CARDIA)     Difficulty of Paying Living Expenses: Not hard at all   Food Insecurity: No Food Insecurity (2023)    Hunger Vital Sign     Worried About Running Out of Food in the Last Year: Never true     Ran Out of Food in the Last Year: Never true   Transportation Needs: No Transportation Needs (2023)    PRAPARE - Transportation     Lack of Transportation (Medical): No     Lack of Transportation (Non-Medical): No   Physical Activity: Inactive (2023)    Exercise Vital Sign     Days of Exercise per Week: 0 days     Minutes of Exercise per Session: 0 min   Stress: Stress Concern Present (2023)    [employer] [place] [employer] - Occupational Stress Questionnaire     Feeling of Stress : Rather much   Housing Stability: Low Risk  (2023)    Housing Stability Vital Sign     Unable to Pay for Housing in the Last Year: No     Number of Places Lived in the Last Year: 1     Unstable Housing in the Last Year: No       Chief Complaint: No chief complaint on file.      History of present illness:  59-year-old right-hand-dominant female seen for left shoulder pain.  Pain  started 3-4 months ago.  Patient denies a trauma or injury.  Patient has limited and painful range of motion particularly reaching overhead or behind her back.  Pain radiates down to the elbow.  PT previously.  It helped with the range of motion but did not help with the pain.  Describes pain is sharp and achy.  Patient takes aspirin and ibuprofen with minimal relief.  MRI shows partial-thickness rotator cuff tear.  She has done physical therapy a couple different times and does not really feel any better.  Pain at this time is a 4/10        Review of systems:  Musculoskeletal:  See HPI          Physical Examination:    Vital Signs:  There were no vitals filed for this visit.    There is no height or weight on file to calculate BMI.    This a well-developed, well nourished patient in no acute distress.  They are alert and oriented and cooperative to examination.  Pt. walks without an antalgic gait.      Examination of the left shoulder shows no rashes or erythema. There are no masses, ecchymosis, or atrophy. The patient has minimal loss of active range of motion in forward flexion, external rotation, and internal rotation to the mid T-spine. The patient has moderately positive Hartmann and Neer test. - Speeds test. Nontender to palpation over a.c. joint. Passive range of motion: Forward flexion of 180°, external rotation at 90° of 90°, internal rotation of 50°, and external rotation at 0° of 50°. 2+ radial pulse. Intact axillary, radial, median and ulnar sensation. 5 out of 5 resisted forward flexion, external rotation, and negative lift off test.      X-rays:  X-rays left shoulder reviewed which show some mild AC arthritis    MRI of the left shoulder is reviewed and interpreted:Small partial-thickness bursal surface tear of the supraspinatus/infraspinatus tendons measuring 1.5 x 1.2 centimeters involving less than 50% of the tendon thickness.        Assessment:  Left rotator cuff syndrome    Plan:  I reviewed the  findings with her today.   Patient has already tried formal physical therapy with some improvement.  We discussed continued non operative care versus left shoulder arthroscopy with subacromial decompression and rotator cuff debridement.  Patient would like to continue to monitor it.  Patient will follow up at her convenience for surgical treatment            All previous pertinent notes including ER visits, physical therapy visits, other orthopedic visits as well as other care for the same musculoskeletal problem were reviewed.  All pertinent lab values and previous imaging was reviewed pertinent to the current visit.    This note was created using Pruffi voice recognition software that occasionally misinterpreted phrases or words.    Consult note is delivered via Epic messaging service.

## 2025-04-03 DIAGNOSIS — I10 ESSENTIAL HYPERTENSION: ICD-10-CM

## 2025-04-03 RX ORDER — AMLODIPINE BESYLATE 10 MG/1
10 TABLET ORAL
Qty: 90 TABLET | Refills: 1 | Status: SHIPPED | OUTPATIENT
Start: 2025-04-03

## 2025-04-10 DIAGNOSIS — R45.89 DEPRESSED MOOD: ICD-10-CM

## 2025-04-10 RX ORDER — BUPROPION HYDROCHLORIDE 300 MG/1
300 TABLET ORAL
Qty: 90 TABLET | Refills: 1 | Status: SHIPPED | OUTPATIENT
Start: 2025-04-10

## 2025-04-12 DIAGNOSIS — E78.2 MIXED HYPERLIPIDEMIA: ICD-10-CM

## 2025-04-13 DIAGNOSIS — J01.00 ACUTE MAXILLARY SINUSITIS, RECURRENCE NOT SPECIFIED: ICD-10-CM

## 2025-04-13 DIAGNOSIS — H66.003 ACUTE SUPPURATIVE OTITIS MEDIA OF BOTH EARS WITHOUT SPONTANEOUS RUPTURE OF TYMPANIC MEMBRANES, RECURRENCE NOT SPECIFIED: ICD-10-CM

## 2025-04-13 DIAGNOSIS — E11.8 TYPE 2 DIABETES MELLITUS WITH COMPLICATION, WITHOUT LONG-TERM CURRENT USE OF INSULIN: ICD-10-CM

## 2025-04-15 RX ORDER — ROSUVASTATIN CALCIUM 20 MG/1
20 TABLET, COATED ORAL
Qty: 90 TABLET | Refills: 1 | Status: SHIPPED | OUTPATIENT
Start: 2025-04-15

## 2025-04-15 RX ORDER — SEMAGLUTIDE 1.34 MG/ML
1 INJECTION, SOLUTION SUBCUTANEOUS
Qty: 9 EACH | Refills: 1 | Status: SHIPPED | OUTPATIENT
Start: 2025-04-15

## 2025-04-15 RX ORDER — FLUTICASONE PROPIONATE 50 MCG
SPRAY, SUSPENSION (ML) NASAL
Qty: 48 ML | Refills: 1 | Status: SHIPPED | OUTPATIENT
Start: 2025-04-15

## 2025-04-18 DIAGNOSIS — E11.8 TYPE 2 DIABETES MELLITUS WITH COMPLICATION, WITHOUT LONG-TERM CURRENT USE OF INSULIN: ICD-10-CM

## 2025-04-22 RX ORDER — METFORMIN HYDROCHLORIDE 500 MG/1
1000 TABLET, EXTENDED RELEASE ORAL 2 TIMES DAILY WITH MEALS
Qty: 360 TABLET | Refills: 1 | Status: SHIPPED | OUTPATIENT
Start: 2025-04-22

## 2025-05-21 ENCOUNTER — OFFICE VISIT (OUTPATIENT)
Dept: FAMILY MEDICINE | Facility: CLINIC | Age: 60
End: 2025-05-21
Payer: COMMERCIAL

## 2025-05-21 VITALS
BODY MASS INDEX: 40.14 KG/M2 | DIASTOLIC BLOOD PRESSURE: 70 MMHG | TEMPERATURE: 99 F | SYSTOLIC BLOOD PRESSURE: 130 MMHG | WEIGHT: 240.94 LBS | HEART RATE: 79 BPM | OXYGEN SATURATION: 97 % | HEIGHT: 65 IN

## 2025-05-21 DIAGNOSIS — E66.813 CLASS 3 SEVERE OBESITY DUE TO EXCESS CALORIES WITH SERIOUS COMORBIDITY AND BODY MASS INDEX (BMI) OF 40.0 TO 44.9 IN ADULT: ICD-10-CM

## 2025-05-21 DIAGNOSIS — L30.9 DERMATITIS: ICD-10-CM

## 2025-05-21 DIAGNOSIS — E78.2 MIXED HYPERLIPIDEMIA: ICD-10-CM

## 2025-05-21 DIAGNOSIS — J44.9 CHRONIC OBSTRUCTIVE PULMONARY DISEASE, UNSPECIFIED COPD TYPE: ICD-10-CM

## 2025-05-21 DIAGNOSIS — E55.9 VITAMIN D DEFICIENCY: ICD-10-CM

## 2025-05-21 DIAGNOSIS — E66.01 CLASS 3 SEVERE OBESITY DUE TO EXCESS CALORIES WITH SERIOUS COMORBIDITY AND BODY MASS INDEX (BMI) OF 40.0 TO 44.9 IN ADULT: ICD-10-CM

## 2025-05-21 DIAGNOSIS — E11.8 TYPE 2 DIABETES MELLITUS WITH COMPLICATION, WITHOUT LONG-TERM CURRENT USE OF INSULIN: Primary | ICD-10-CM

## 2025-05-21 DIAGNOSIS — R40.0 DAYTIME SOMNOLENCE: ICD-10-CM

## 2025-05-21 DIAGNOSIS — Z01.419 ENCOUNTER FOR WELL WOMAN EXAM WITH ROUTINE GYNECOLOGICAL EXAM: ICD-10-CM

## 2025-05-21 DIAGNOSIS — I10 ESSENTIAL HYPERTENSION: ICD-10-CM

## 2025-05-21 DIAGNOSIS — Z12.31 ENCOUNTER FOR SCREENING MAMMOGRAM FOR MALIGNANT NEOPLASM OF BREAST: ICD-10-CM

## 2025-05-21 PROCEDURE — 3008F BODY MASS INDEX DOCD: CPT | Mod: CPTII,S$GLB,, | Performed by: NURSE PRACTITIONER

## 2025-05-21 PROCEDURE — 3066F NEPHROPATHY DOC TX: CPT | Mod: CPTII,S$GLB,, | Performed by: NURSE PRACTITIONER

## 2025-05-21 PROCEDURE — 99214 OFFICE O/P EST MOD 30 MIN: CPT | Mod: S$GLB,,, | Performed by: NURSE PRACTITIONER

## 2025-05-21 PROCEDURE — G2211 COMPLEX E/M VISIT ADD ON: HCPCS | Mod: S$GLB,,, | Performed by: NURSE PRACTITIONER

## 2025-05-21 PROCEDURE — 3051F HG A1C>EQUAL 7.0%<8.0%: CPT | Mod: CPTII,S$GLB,, | Performed by: NURSE PRACTITIONER

## 2025-05-21 PROCEDURE — 3078F DIAST BP <80 MM HG: CPT | Mod: CPTII,S$GLB,, | Performed by: NURSE PRACTITIONER

## 2025-05-21 PROCEDURE — 3061F NEG MICROALBUMINURIA REV: CPT | Mod: CPTII,S$GLB,, | Performed by: NURSE PRACTITIONER

## 2025-05-21 PROCEDURE — 99999 PR PBB SHADOW E&M-EST. PATIENT-LVL V: CPT | Mod: PBBFAC,,, | Performed by: NURSE PRACTITIONER

## 2025-05-21 PROCEDURE — 3075F SYST BP GE 130 - 139MM HG: CPT | Mod: CPTII,S$GLB,, | Performed by: NURSE PRACTITIONER

## 2025-05-21 PROCEDURE — 1159F MED LIST DOCD IN RCRD: CPT | Mod: CPTII,S$GLB,, | Performed by: NURSE PRACTITIONER

## 2025-05-21 PROCEDURE — 1160F RVW MEDS BY RX/DR IN RCRD: CPT | Mod: CPTII,S$GLB,, | Performed by: NURSE PRACTITIONER

## 2025-05-21 RX ORDER — CLOTRIMAZOLE AND BETAMETHASONE DIPROPIONATE 10; .64 MG/G; MG/G
CREAM TOPICAL 2 TIMES DAILY PRN
Qty: 45 G | Refills: 1 | Status: SHIPPED | OUTPATIENT
Start: 2025-05-21

## 2025-05-21 RX ORDER — TIRZEPATIDE 5 MG/.5ML
5 INJECTION, SOLUTION SUBCUTANEOUS
Qty: 12 PEN | Refills: 0 | Status: SHIPPED | OUTPATIENT
Start: 2025-06-21

## 2025-05-21 RX ORDER — TIRZEPATIDE 2.5 MG/.5ML
2.5 INJECTION, SOLUTION SUBCUTANEOUS
Qty: 4 PEN | Refills: 0 | Status: SHIPPED | OUTPATIENT
Start: 2025-05-21

## 2025-05-21 NOTE — PROGRESS NOTES
Subjective:       Patient ID: Kemi Dominguez is a 60 y.o. female.    Chief Complaint: Diabetes, Hyperlipidemia, and Follow-up    History of Present Illness    CHIEF COMPLAINT:  Patient presents to discuss recent lab results and to request a change in medication from Ozempic back to Mounjaro for diabetes management.    HPI:  Patient reports non-adherence to Ozempic as prescribed, with approximately 4 weeks since her last dose. She expresses a preference for Mounjaro, citing improved ease of use and efficacy. Patient mentions a topical rash at the injection site with Mounjaro but considers this preferable to using Ozempic. She reports no adverse reactions to Ozempic.    Patient's A1C is currently at 7.3, indicating suboptimal diabetes control. She mentions being dehydrated during a recent urinalysis, which showed trace amounts of protein, ketones, and 3+ glucose. Patient's fasting glucose was 99.    Patient reports frequent nighttime urination, with multiple instances of waking to urinate. She expresses interest in obtaining a sleep study, mentioning a history of sleep apnea for which she previously used a CPAP machine, though she has not used it in years. Patient reports feeling sleepy throughout the day, which has worsened recently, noting difficulty staying awake during TV programs and increasing difficulty staying alert during night shifts at work.    Patient reports recent weight gain, with her weight increasing from 231 lbs in November to 236 lbs in February, and currently at 240 lbs. She attributes this partly to increased food intake during a recent 17-day transatlantic cruise from [place] to [facility].    MEDICATIONS:  Patient is on Trelegy for respiratory issues and uses Lotrisone cream during summer months. She discontinued Ozempic approximately 4 weeks ago and has also stopped taking Mounjaro.    MEDICAL HISTORY:  Patient has a history of sleep apnea and diabetes. Patient is due for a mammogram. Her  last Pap smear date is not specified.    TEST RESULTS:  Patient's recent A1C result was 7.3%. A recent urinalysis showed very concentrated urine with a trace amount of protein, ketones, and 3+ glucose. A recent metabolic panel revealed a fasting glucose of 99 mg/dL, with normal electrolytes, liver function, and kidney function. Patient underwent a sleep study more than 5 years ago, which revealed sleep apnea. She used CPAP as a result of this diagnosis.    SOCIAL HISTORY:  Occupation: Works nights      ROS:  General: -fever, -chills, -fatigue, -weight gain, -weight loss  Eyes: -vision changes, -redness, -discharge  ENT: -ear pain, -nasal congestion, -sore throat  Cardiovascular: -chest pain, -palpitations, -lower extremity edema  Respiratory: -cough, -shortness of breath  Gastrointestinal: -abdominal pain, -nausea, -vomiting, -diarrhea, -constipation, -blood in stool  Genitourinary: -dysuria, -hematuria, -frequency, +nocturia  Musculoskeletal: -joint pain, -muscle pain  Skin: +rash, -lesion  Neurological: -headache, -dizziness, -numbness, -tingling, +excessive drowsiness  Psychiatric: -anxiety, -depression, -sleep difficulty         Past Medical History:   Diagnosis Date    Class 3 severe obesity due to excess calories with serious comorbidity and body mass index (BMI) of 40.0 to 44.9 in adult 10/17/2018    COPD (chronic obstructive pulmonary disease)     Diabetes mellitus, type 2     GERD (gastroesophageal reflux disease)     Hyperlipidemia     Hypertension     Sleep apnea     Vitamin D deficiency         Past Surgical History:   Procedure Laterality Date    ADENOIDECTOMY      BREAST BIOPSY      BREAST SURGERY Right     breast biopsy    COLONOSCOPY N/A 09/10/2021    Procedure: COLONOSCOPY;  Surgeon: Holland Royal MD;  Location: [facility];  Service: Endoscopy;  Laterality: N/A;    TONSILLECTOMY      TRANSFORAMINAL EPIDURAL INJECTION OF STEROID Left 04/16/2019    Procedure:  Injection,steroid,epidural,transforaminal approach;  Surgeon: Gilberto Michelle MD;  [facility]: [facility] OR;  Service: Pain Management;  Laterality: Left;  L5-S1, S1    TRANSFORAMINAL EPIDURAL INJECTION OF STEROID Left 2019    Procedure: Injection,steroid,epidural,transforaminal approach;  Surgeon: Gilberto Michelle MD;  Location: [facility] OR;  Service: Pain Management;  Laterality: Left;  L5-S1, S1       Family History   Problem Relation Name Age of Onset    Stroke Mother      Emphysema Mother      Diabetes Father      Coronary artery disease Father      COPD Father      Diabetes Sister         Social History     Socioeconomic History    Marital status: Significant Other   Tobacco Use    Smoking status: Former     Current packs/day: 0.00     Types: Cigarettes     Quit date: 2019     Years since quittin.2    Smokeless tobacco: Never   Substance and Sexual Activity    Alcohol use: Yes     Comment: rarely    Drug use: Yes     Types: Hydrocodone    Sexual activity: Never     Social Drivers of Health     Financial Resource Strain: Low Risk  (2025)    Overall Financial Resource Strain (CARDIA)     Difficulty of Paying Living Expenses: Not hard at all   Food Insecurity: No Food Insecurity (2025)    Hunger Vital Sign     Worried About Running Out of Food in the Last Year: Never true     Ran Out of Food in the Last Year: Never true   Transportation Needs: No Transportation Needs (2025)    PRAPARE - Transportation     Lack of Transportation (Medical): No     Lack of Transportation (Non-Medical): No   Physical Activity: Unknown (2025)    Exercise Vital Sign     Days of Exercise per Week: 0 days   Stress: No Stress Concern Present (2025)    [employer] [place] [employer] - Occupational Stress Questionnaire     Feeling of Stress : Not at all   Housing Stability: Low Risk  (2025)    Housing Stability Vital Sign     Unable to Pay for Housing in the Last Year: No     Number of Times Moved in the  "Last Year: 0     Homeless in the Last Year: No       Current Medications[1]    Review of patient's allergies indicates:  No Known Allergies  Objective:      Blood pressure 130/70, pulse 79, temperature 98.7 °F (37.1 °C), height 5' 5" (1.651 m), weight 109.3 kg (240 lb 15.4 oz), SpO2 97%. Body mass index is 40.1 kg/m².   Physical Exam    General: No acute distress. Well-developed. Well-nourished. Obese  Eyes: EOMI. Sclerae anicteric.  HENT: Normocephalic. Atraumatic. Nares patent. Moist oral mucosa.  Cardiovascular: Regular rate. Regular rhythm. No murmurs. No rubs. No gallops. Normal S1, S2.  Respiratory: Normal respiratory effort. Clear to auscultation bilaterally. No rales. No rhonchi. No wheezing.  Abdomen: Soft. Non-distended.   Musculoskeletal: No  obvious deformity.  Extremities: No lower extremity edema.  Neurological: Alert & oriented x3. No slurred speech. Normal gait.  Psychiatric: Normal mood. Normal affect. Good insight. Good judgment.  Skin: Warm. Dry. No rash.         Assessment:       Assessment & Plan    - Switched from Ozempic to Mounjaro for better A1C control.  - A1C currently at 7.3%, indicating need for improved glycemic management.  - Urinalysis showed dehydration with trace protein, ketones, and 3+ glucose; glucose likely due to Mounjaro.  - Metabolic panel revealed fasting glucose at 99, with normal electrolytes, liver, and renal function.  - Considered frequent nighttime urination and daytime sleepiness as potential indicators for sleep apnea.  - Noted weight gain since last visit (240 lbs today, up from 236 lbs in February).    OBESITY, CLASS 2:  - Monitored the patient's weight, which increased from 231 lbs in November to 240 lbs today.  - Discussed this significant weight gain and emphasized the need to get back on track with weight management.  - Initiated Mounjaro 2.5 mg weekly for 1 month, then to increase to 5 mg weekly for better control.  - Educated the patient about the importance " of balancing diet and exercise for effective weight management.    TYPE 2 DIABETES MELLITUS:  - A1C currently at 7.3 with fasting glucose at 99, indicating suboptimal control.  - Urinalysis shows trace protein, ketones, and 3+ glucose.  - Discussed switching back to Mounjaro for better glycemic control.  - Recommend scheduling an ophthalmology exam.    OBSTRUCTIVE SLEEP APNEA:  - Patient reports waking multiple times during the night to urinate and experiences daytime somnolence with difficulty maintaining wakefulness while watching television.  - Ordered sleep study evaluation with potential home sleep study option.  - Will check TSH prior to sleep study.    HYPERSOMNIA:  - Patient reports increased daytime sleepiness, which appears related to possible sleep apnea.  - Will address through the ordered sleep study.    NOCTURIA:  - Patient reports frequent nocturnal micturition, likely related to sleep apnea.  - Will reassess after sleep study results.    ABNORMAL WEIGHT GAIN:  - Weight increased from 231 lbs in November to 240 lbs today, possibly due to dietary changes during recent cruise.  - Discussed importance of getting back on track with weight management plan.    PROTEINURIA:  - Urinalysis shows trace amount of protein.  - Will monitor with follow-up labs.    LOCALIZED SKIN ERUPTION DUE TO MEDICATIONS:  - Patient reports topical rash at Mounjaro injection site, though reaction was minimal compared to no reaction with Ozempic.  - Recommend managing rash with daily Zyrtec and rotating injection sites between abdomen, upper arm, and thigh.  - Instructed patient to contact office if injection site reactions worsen or if issues arise with Mounjaro prescription.  - Refilled Lotrisone cream.    FOLLOW-UP AND ADDITIONAL CARE:  - Referred to Melody Desir, NP with [facility], for GYN care.  - Ordered mammogram.  - Scheduled follow up in 3 months with labs, including A1C and full metabolic panel.       Plan:        Kemi was seen today for diabetes, hyperlipidemia and follow-up.    Diagnoses and all orders for this visit:    Type 2 diabetes mellitus with complication, without long-term current use of insulin  -     tirzepatide (MOUNJARO) 2.5 mg/0.5 mL PnIj; Inject 2.5 mg into the skin every 7 days.  -     tirzepatide (MOUNJARO) 5 mg/0.5 mL PnIj; Inject 5 mg into the skin every 7 days.  -     Comprehensive Metabolic Panel; Future  -     Urinalysis; Future  -     Microalbumin/Creatinine Ratio, Urine; Future  -     Hemoglobin A1C; Future  -     Comprehensive Metabolic Panel  -     Urinalysis  -     Microalbumin/Creatinine Ratio, Urine  -     Hemoglobin A1C    Dermatitis  -     clotrimazole-betamethasone 1-0.05% (LOTRISONE) cream; Apply topically 2 (two) times daily as needed (rash).    Essential hypertension  -     Comprehensive Metabolic Panel; Future  -     Urinalysis; Future  -     CBC Auto Differential; Future  -     Comprehensive Metabolic Panel  -     Urinalysis  -     CBC Auto Differential    Mixed hyperlipidemia  -     Lipid Panel; Future  -     Lipid Panel    Vitamin D deficiency  -     Vitamin D; Future  -     Vitamin D    Encounter for well woman exam with routine gynecological exam  -     Ambulatory referral/consult to Obstetrics / Gynecology; Future    Daytime somnolence  -     Ambulatory referral/consult to Sleep Disorders; Future  -     TSH; Future  -     TSH    Chronic obstructive pulmonary disease, unspecified COPD type  Stable  Well controlled with Trelegy    Encounter for screening mammogram for malignant neoplasm of breast   Mammogram ordered    Class 3 severe obesity due to excess calories with serious comorbidity and body mass index (BMI) of 40.0 to 44.9 in adult  The patient is asked to make an attempt to improve diet and exercise patterns to aid in medical management of this problem.           This note was generated with the assistance of ambient listening technology. Verbal consent was obtained by the  patient and accompanying visitor(s) for the recording of patient appointment to facilitate this note. I attest to having reviewed and edited the generated note for accuracy, though some syntax or spelling errors may persist. Please contact the author of this note for any clarification.             [1]   Current Outpatient Medications   Medication Sig Dispense Refill    albuterol-budesonide (AIRSUPRA) 90-80 mcg/actuation Inhale 2 puffs into the lungs every 6 (six) hours as needed (sob, wheezing). 10.7 g 5    amLODIPine (NORVASC) 10 MG tablet TAKE 1 TABLET BY MOUTH EVERY DAY 90 tablet 1    amoxicillin-clavulanate 875-125mg (AUGMENTIN) 875-125 mg per tablet Take 1 tablet by mouth 2 (two) times daily. 20 tablet 0    aspirin/salicylamide/caffeine (BC HEADACHE POWDER ORAL) Take by mouth as needed.      azelastine (ASTELIN) 137 mcg (0.1 %) nasal spray USE 1 SPRAY IN EACH NOSTRIL TWICE DAILY 30 mL 2    blood sugar diagnostic Strp 1 each by Misc.(Non-Drug; Combo Route) route once daily. 100 each 2    buPROPion (WELLBUTRIN XL) 300 MG 24 hr tablet TAKE 1 TABLET BY MOUTH EVERY DAY 90 tablet 1    FLUoxetine 10 MG capsule TAKE 1 CAPSULE BY MOUTH EVERY DAY 90 capsule 1    fluticasone propionate (FLONASE) 50 mcg/actuation nasal spray 1 spray (50 mcg total) by Each Nostril route 2 (two) times daily. 48 mL 1    fluticasone propionate (FLONASE) 50 mcg/actuation nasal spray USE 1 SPRAY (50 MCG TOTAL) IN EACH NOSTRIL ONCE DAILY 48 mL 1    fluticasone-umeclidin-vilanter (TRELEGY ELLIPTA) 100-62.5-25 mcg DsDv Inhale 1 puff into the lungs once daily. 60 each 5    hydrocortisone 2.5 % cream Apply topically 2 (two) times daily. 28 g 3    ibuprofen (ADVIL,MOTRIN) 600 MG tablet Take 1 tablet (600 mg total) by mouth every 6 (six) hours as needed. 20 tablet 0    JARDIANCE 25 mg tablet TAKE 1 TABLET BY MOUTH EVERY DAY 90 tablet 1    lancets 33 gauge Misc 1 lancet by Misc.(Non-Drug; Combo Route) route once daily. 100 each 3    linaCLOtide  (LINZESS) 72 mcg Cap capsule Take 1 capsule (72 mcg total) by mouth before breakfast. 90 each 1    melatonin 5 mg Tab Take 10 mg by mouth nightly.       metFORMIN (GLUCOPHAGE-XR) 500 MG ER 24hr tablet TAKE 2 TABLETS BY MOUTH TWICE A DAY WITH MEALS 360 tablet 1    montelukast (SINGULAIR) 10 mg tablet TAKE 1 TABLET BY MOUTH EVERY DAY 90 tablet 1    mupirocin (BACTROBAN) 2 % ointment Apply topically.      pantoprazole (PROTONIX) 40 MG tablet TAKE 1 TABLET BY MOUTH EVERY DAY 90 tablet 1    promethazine-dextromethorphan (PROMETHAZINE-DM) 6.25-15 mg/5 mL Syrp Take 5 mLs by mouth nightly as needed (cough). 118 mL 0    rosuvastatin (CRESTOR) 20 MG tablet TAKE 1 TABLET BY MOUTH EVERY DAY 90 tablet 1    tiZANidine (ZANAFLEX) 4 MG tablet SMARTSI Tablet(s) By Mouth Every 8-12 Hours PRN      valsartan-hydrochlorothiazide (DIOVAN-HCT) 160-25 mg per tablet TAKE 1 TABLET BY MOUTH EVERY DAY 90 tablet 1    varicella-zoster gE-AS01B, PF, (SHINGRIX, PF,) 50 mcg/0.5 mL injection Shingrix (PF) 50 mcg/0.5 mL intramuscular suspension, kit   PHARMACY ADMINISTERED      VRAYLAR 1.5 mg Cap TAKE 1 CAPSULE BY MOUTH EVERY DAY 90 capsule 1    clotrimazole-betamethasone 1-0.05% (LOTRISONE) cream Apply topically 2 (two) times daily as needed (rash). 45 g 1    tirzepatide (MOUNJARO) 2.5 mg/0.5 mL PnIj Inject 2.5 mg into the skin every 7 days. 4 Pen 0    [START ON 2025] tirzepatide (MOUNJARO) 5 mg/0.5 mL PnIj Inject 5 mg into the skin every 7 days. 12 Pen 0     No current facility-administered medications for this visit.

## 2025-05-23 ENCOUNTER — TELEPHONE (OUTPATIENT)
Dept: FAMILY MEDICINE | Facility: CLINIC | Age: 60
End: 2025-05-23
Payer: COMMERCIAL

## 2025-05-23 NOTE — TELEPHONE ENCOUNTER
Mounjaro PA Approved    Approved  Prior Authorization Portal   Prior authorization approved  Payer: [employer] Patient's Payer Case ID: [id number]    [phone]  Note from payer: The request has been approved. The authorization is effective from 05/23/2025 to 05/22/2026, as long as the member is enrolled in their current health plan. The request was approved as submitted. This request has been approved for all strengths with a quantity limit of 2mL per 28 days. A written notification letter will follow with additional details.  Approval Details    Authorized from May 23, 2025 to May 22, 2026  Electronic appeal: Not supported  View History    Pharmacy notified $45 co-pay

## 2025-06-07 ENCOUNTER — HOSPITAL ENCOUNTER (INPATIENT)
Facility: HOSPITAL | Age: 60
LOS: 3 days | Discharge: HOME | DRG: 190 | End: 2025-06-10
Attending: EMERGENCY MEDICINE | Admitting: EMERGENCY MEDICINE
Payer: COMMERCIAL

## 2025-06-07 DIAGNOSIS — R05.1 ACUTE COUGH: ICD-10-CM

## 2025-06-07 DIAGNOSIS — R07.9 CHEST PAIN: ICD-10-CM

## 2025-06-07 DIAGNOSIS — R09.02 HYPOXIA: ICD-10-CM

## 2025-06-07 DIAGNOSIS — J01.00 ACUTE MAXILLARY SINUSITIS, RECURRENCE NOT SPECIFIED: ICD-10-CM

## 2025-06-07 DIAGNOSIS — L30.8 OTHER ECZEMA: ICD-10-CM

## 2025-06-07 DIAGNOSIS — J44.9 CHRONIC OBSTRUCTIVE PULMONARY DISEASE, UNSPECIFIED COPD TYPE: Primary | ICD-10-CM

## 2025-06-07 DIAGNOSIS — H66.003 ACUTE SUPPURATIVE OTITIS MEDIA OF BOTH EARS WITHOUT SPONTANEOUS RUPTURE OF TYMPANIC MEMBRANES, RECURRENCE NOT SPECIFIED: ICD-10-CM

## 2025-06-07 DIAGNOSIS — L30.9 DERMATITIS: ICD-10-CM

## 2025-06-07 DIAGNOSIS — R06.02 SHORTNESS OF BREATH: ICD-10-CM

## 2025-06-07 PROBLEM — G47.33 OSA (OBSTRUCTIVE SLEEP APNEA): Status: ACTIVE | Noted: 2025-06-07

## 2025-06-07 PROBLEM — J44.1 COPD EXACERBATION: Status: ACTIVE | Noted: 2018-10-17

## 2025-06-07 LAB
ABSOLUTE EOSINOPHIL (OHS): 0.04 K/UL
ABSOLUTE MONOCYTE (OHS): 1.08 K/UL (ref 0.3–1)
ABSOLUTE NEUTROPHIL COUNT (OHS): 9.85 K/UL (ref 1.8–7.7)
ALBUMIN SERPL BCP-MCNC: 3.4 G/DL (ref 3.5–5.2)
ALP SERPL-CCNC: 102 UNIT/L (ref 40–150)
ALT SERPL W/O P-5'-P-CCNC: 21 UNIT/L (ref 10–44)
ANION GAP (OHS): 13 MMOL/L (ref 8–16)
AST SERPL-CCNC: 20 UNIT/L (ref 11–45)
BASOPHILS # BLD AUTO: 0.03 K/UL
BASOPHILS NFR BLD AUTO: 0.2 %
BILIRUB SERPL-MCNC: 0.4 MG/DL (ref 0.1–1)
BNP SERPL-MCNC: <10 PG/ML (ref 0–99)
BUN SERPL-MCNC: 10 MG/DL (ref 6–20)
CALCIUM SERPL-MCNC: 9.2 MG/DL (ref 8.7–10.5)
CHLORIDE SERPL-SCNC: 100 MMOL/L (ref 95–110)
CO2 SERPL-SCNC: 24 MMOL/L (ref 23–29)
CREAT SERPL-MCNC: 0.7 MG/DL (ref 0.5–1.4)
CTP QC/QA: YES
D DIMER PPP IA.FEU-MCNC: 0.29 MG/L FEU
ERYTHROCYTE [DISTWIDTH] IN BLOOD BY AUTOMATED COUNT: 16.6 % (ref 11.5–14.5)
GFR SERPLBLD CREATININE-BSD FMLA CKD-EPI: >60 ML/MIN/1.73/M2
GLUCOSE SERPL-MCNC: 156 MG/DL (ref 70–110)
HCT VFR BLD AUTO: 44 % (ref 37–48.5)
HGB BLD-MCNC: 14 GM/DL (ref 12–16)
IMM GRANULOCYTES # BLD AUTO: 0.05 K/UL (ref 0–0.04)
IMM GRANULOCYTES NFR BLD AUTO: 0.4 % (ref 0–0.5)
LACTATE SERPL-SCNC: 1.1 MMOL/L (ref 0.5–2.2)
LYMPHOCYTES # BLD AUTO: 1.17 K/UL (ref 1–4.8)
MCH RBC QN AUTO: 27.4 PG (ref 27–31)
MCHC RBC AUTO-ENTMCNC: 31.8 G/DL (ref 32–36)
MCV RBC AUTO: 86 FL (ref 82–98)
NUCLEATED RBC (/100WBC) (OHS): 0 /100 WBC
PLATELET # BLD AUTO: 280 K/UL (ref 150–450)
PMV BLD AUTO: 8.6 FL (ref 9.2–12.9)
POCT GLUCOSE: 141 MG/DL (ref 70–110)
POCT GLUCOSE: 152 MG/DL (ref 70–110)
POCT GLUCOSE: 155 MG/DL (ref 70–110)
POTASSIUM SERPL-SCNC: 3.4 MMOL/L (ref 3.5–5.1)
PROCALCITONIN SERPL-MCNC: 0.1 NG/ML
PROT SERPL-MCNC: 7.3 GM/DL (ref 6–8.4)
RBC # BLD AUTO: 5.11 M/UL (ref 4–5.4)
RELATIVE EOSINOPHIL (OHS): 0.3 %
RELATIVE LYMPHOCYTE (OHS): 9.6 % (ref 18–48)
RELATIVE MONOCYTE (OHS): 8.8 % (ref 4–15)
RELATIVE NEUTROPHIL (OHS): 80.7 % (ref 38–73)
SARS-COV-2 RDRP RESP QL NAA+PROBE: NEGATIVE
SODIUM SERPL-SCNC: 137 MMOL/L (ref 136–145)
TROPONIN I SERPL DL<=0.01 NG/ML-MCNC: 0.01 NG/ML
WBC # BLD AUTO: 12.22 K/UL (ref 3.9–12.7)

## 2025-06-07 PROCEDURE — 84484 ASSAY OF TROPONIN QUANT: CPT | Performed by: EMERGENCY MEDICINE

## 2025-06-07 PROCEDURE — 94644 CONT INHLJ TX 1ST HOUR: CPT

## 2025-06-07 PROCEDURE — 25000242 PHARM REV CODE 250 ALT 637 W/ HCPCS: Performed by: EMERGENCY MEDICINE

## 2025-06-07 PROCEDURE — 25000242 PHARM REV CODE 250 ALT 637 W/ HCPCS

## 2025-06-07 PROCEDURE — 84145 PROCALCITONIN (PCT): CPT

## 2025-06-07 PROCEDURE — 11000001 HC ACUTE MED/SURG PRIVATE ROOM

## 2025-06-07 PROCEDURE — 25000003 PHARM REV CODE 250

## 2025-06-07 PROCEDURE — 63600175 PHARM REV CODE 636 W HCPCS: Mod: JZ,TB | Performed by: EMERGENCY MEDICINE

## 2025-06-07 PROCEDURE — 27100107 HC POCKET PEAK FLOW METER

## 2025-06-07 PROCEDURE — 27000221 HC OXYGEN, UP TO 24 HOURS

## 2025-06-07 PROCEDURE — 83605 ASSAY OF LACTIC ACID: CPT | Performed by: EMERGENCY MEDICINE

## 2025-06-07 PROCEDURE — 87635 SARS-COV-2 COVID-19 AMP PRB: CPT | Performed by: EMERGENCY MEDICINE

## 2025-06-07 PROCEDURE — 63600175 PHARM REV CODE 636 W HCPCS

## 2025-06-07 PROCEDURE — 94761 N-INVAS EAR/PLS OXIMETRY MLT: CPT

## 2025-06-07 PROCEDURE — 94799 UNLISTED PULMONARY SVC/PX: CPT

## 2025-06-07 PROCEDURE — 94760 N-INVAS EAR/PLS OXIMETRY 1: CPT

## 2025-06-07 PROCEDURE — 83880 ASSAY OF NATRIURETIC PEPTIDE: CPT | Performed by: EMERGENCY MEDICINE

## 2025-06-07 PROCEDURE — 94640 AIRWAY INHALATION TREATMENT: CPT

## 2025-06-07 PROCEDURE — 85025 COMPLETE CBC W/AUTO DIFF WBC: CPT | Performed by: EMERGENCY MEDICINE

## 2025-06-07 PROCEDURE — 93010 ELECTROCARDIOGRAM REPORT: CPT | Mod: ,,, | Performed by: INTERNAL MEDICINE

## 2025-06-07 PROCEDURE — 93005 ELECTROCARDIOGRAM TRACING: CPT

## 2025-06-07 PROCEDURE — 85379 FIBRIN DEGRADATION QUANT: CPT | Performed by: EMERGENCY MEDICINE

## 2025-06-07 PROCEDURE — 96374 THER/PROPH/DIAG INJ IV PUSH: CPT

## 2025-06-07 PROCEDURE — 80053 COMPREHEN METABOLIC PANEL: CPT | Performed by: EMERGENCY MEDICINE

## 2025-06-07 PROCEDURE — 36415 COLL VENOUS BLD VENIPUNCTURE: CPT

## 2025-06-07 PROCEDURE — 99285 EMERGENCY DEPT VISIT HI MDM: CPT | Mod: 25

## 2025-06-07 PROCEDURE — 99900035 HC TECH TIME PER 15 MIN (STAT)

## 2025-06-07 RX ORDER — IBUPROFEN 200 MG
16 TABLET ORAL
Status: DISCONTINUED | OUTPATIENT
Start: 2025-06-07 | End: 2025-06-10 | Stop reason: HOSPADM

## 2025-06-07 RX ORDER — BUPROPION HYDROCHLORIDE 150 MG/1
300 TABLET ORAL DAILY
Status: DISCONTINUED | OUTPATIENT
Start: 2025-06-08 | End: 2025-06-10 | Stop reason: HOSPADM

## 2025-06-07 RX ORDER — NALOXONE HCL 0.4 MG/ML
0.02 VIAL (ML) INJECTION
Status: DISCONTINUED | OUTPATIENT
Start: 2025-06-07 | End: 2025-06-10 | Stop reason: HOSPADM

## 2025-06-07 RX ORDER — IPRATROPIUM BROMIDE 0.5 MG/2.5ML
1.5 SOLUTION RESPIRATORY (INHALATION) CONTINUOUS
Status: DISCONTINUED | OUTPATIENT
Start: 2025-06-07 | End: 2025-06-07

## 2025-06-07 RX ORDER — INSULIN ASPART 100 [IU]/ML
0-10 INJECTION, SOLUTION INTRAVENOUS; SUBCUTANEOUS
Status: DISCONTINUED | OUTPATIENT
Start: 2025-06-07 | End: 2025-06-10 | Stop reason: HOSPADM

## 2025-06-07 RX ORDER — GLUCAGON 1 MG
1 KIT INJECTION
Status: DISCONTINUED | OUTPATIENT
Start: 2025-06-07 | End: 2025-06-10 | Stop reason: HOSPADM

## 2025-06-07 RX ORDER — POLYETHYLENE GLYCOL 3350 17 G/17G
17 POWDER, FOR SOLUTION ORAL DAILY
Status: DISCONTINUED | OUTPATIENT
Start: 2025-06-07 | End: 2025-06-10 | Stop reason: HOSPADM

## 2025-06-07 RX ORDER — IPRATROPIUM BROMIDE AND ALBUTEROL SULFATE 2.5; .5 MG/3ML; MG/3ML
3 SOLUTION RESPIRATORY (INHALATION) EVERY 6 HOURS PRN
Status: DISCONTINUED | OUTPATIENT
Start: 2025-06-07 | End: 2025-06-07

## 2025-06-07 RX ORDER — IPRATROPIUM BROMIDE AND ALBUTEROL SULFATE 2.5; .5 MG/3ML; MG/3ML
3 SOLUTION RESPIRATORY (INHALATION) EVERY 6 HOURS PRN
Status: DISCONTINUED | OUTPATIENT
Start: 2025-06-07 | End: 2025-06-10 | Stop reason: HOSPADM

## 2025-06-07 RX ORDER — ONDANSETRON HYDROCHLORIDE 2 MG/ML
4 INJECTION, SOLUTION INTRAVENOUS EVERY 12 HOURS PRN
Status: DISCONTINUED | OUTPATIENT
Start: 2025-06-07 | End: 2025-06-10 | Stop reason: HOSPADM

## 2025-06-07 RX ORDER — GUAIFENESIN 100 MG/5ML
200 LIQUID ORAL EVERY 4 HOURS PRN
Status: DISCONTINUED | OUTPATIENT
Start: 2025-06-07 | End: 2025-06-10 | Stop reason: HOSPADM

## 2025-06-07 RX ORDER — AMLODIPINE BESYLATE 5 MG/1
10 TABLET ORAL DAILY
Status: DISCONTINUED | OUTPATIENT
Start: 2025-06-08 | End: 2025-06-10 | Stop reason: HOSPADM

## 2025-06-07 RX ORDER — LOSARTAN POTASSIUM AND HYDROCHLOROTHIAZIDE 12.5; 5 MG/1; MG/1
1 TABLET ORAL DAILY
Status: DISCONTINUED | OUTPATIENT
Start: 2025-06-08 | End: 2025-06-10 | Stop reason: HOSPADM

## 2025-06-07 RX ORDER — BUDESONIDE 0.5 MG/2ML
0.5 INHALANT ORAL EVERY 12 HOURS
Status: DISCONTINUED | OUTPATIENT
Start: 2025-06-07 | End: 2025-06-10 | Stop reason: HOSPADM

## 2025-06-07 RX ORDER — METHYLPREDNISOLONE SOD SUCC 125 MG
125 VIAL (EA) INJECTION
Status: COMPLETED | OUTPATIENT
Start: 2025-06-07 | End: 2025-06-07

## 2025-06-07 RX ORDER — SODIUM CHLORIDE 0.9 % (FLUSH) 0.9 %
3 SYRINGE (ML) INJECTION
Status: DISCONTINUED | OUTPATIENT
Start: 2025-06-07 | End: 2025-06-10 | Stop reason: HOSPADM

## 2025-06-07 RX ORDER — ATORVASTATIN CALCIUM 40 MG/1
40 TABLET, FILM COATED ORAL DAILY
Status: DISCONTINUED | OUTPATIENT
Start: 2025-06-07 | End: 2025-06-10 | Stop reason: HOSPADM

## 2025-06-07 RX ORDER — TALC
6 POWDER (GRAM) TOPICAL NIGHTLY PRN
Status: DISCONTINUED | OUTPATIENT
Start: 2025-06-07 | End: 2025-06-10 | Stop reason: HOSPADM

## 2025-06-07 RX ORDER — ENOXAPARIN SODIUM 100 MG/ML
40 INJECTION SUBCUTANEOUS EVERY 24 HOURS
Status: DISCONTINUED | OUTPATIENT
Start: 2025-06-07 | End: 2025-06-10 | Stop reason: HOSPADM

## 2025-06-07 RX ORDER — ALBUTEROL SULFATE 2.5 MG/.5ML
2.5 SOLUTION RESPIRATORY (INHALATION)
Status: COMPLETED | OUTPATIENT
Start: 2025-06-07 | End: 2025-06-07

## 2025-06-07 RX ORDER — IPRATROPIUM BROMIDE AND ALBUTEROL SULFATE 2.5; .5 MG/3ML; MG/3ML
3 SOLUTION RESPIRATORY (INHALATION)
Status: DISCONTINUED | OUTPATIENT
Start: 2025-06-07 | End: 2025-06-07

## 2025-06-07 RX ORDER — ARFORMOTEROL TARTRATE 15 UG/2ML
15 SOLUTION RESPIRATORY (INHALATION) 2 TIMES DAILY
Status: DISCONTINUED | OUTPATIENT
Start: 2025-06-07 | End: 2025-06-10 | Stop reason: HOSPADM

## 2025-06-07 RX ORDER — ALBUTEROL SULFATE 2.5 MG/.5ML
10 SOLUTION RESPIRATORY (INHALATION) CONTINUOUS
Status: DISCONTINUED | OUTPATIENT
Start: 2025-06-07 | End: 2025-06-07

## 2025-06-07 RX ORDER — PREDNISONE 20 MG/1
40 TABLET ORAL DAILY
Status: DISCONTINUED | OUTPATIENT
Start: 2025-06-08 | End: 2025-06-07

## 2025-06-07 RX ORDER — METHYLPREDNISOLONE SOD SUCC 125 MG
60 VIAL (EA) INJECTION DAILY
Status: DISCONTINUED | OUTPATIENT
Start: 2025-06-08 | End: 2025-06-08

## 2025-06-07 RX ORDER — FLUOXETINE 10 MG/1
10 CAPSULE ORAL DAILY
Status: DISCONTINUED | OUTPATIENT
Start: 2025-06-08 | End: 2025-06-10 | Stop reason: HOSPADM

## 2025-06-07 RX ORDER — METHYLPREDNISOLONE SOD SUCC 125 MG
60 VIAL (EA) INJECTION DAILY
Status: DISCONTINUED | OUTPATIENT
Start: 2025-06-08 | End: 2025-06-07

## 2025-06-07 RX ORDER — AZELASTINE 1 MG/ML
1 SPRAY, METERED NASAL 2 TIMES DAILY
Status: DISCONTINUED | OUTPATIENT
Start: 2025-06-07 | End: 2025-06-10 | Stop reason: HOSPADM

## 2025-06-07 RX ORDER — IBUPROFEN 200 MG
24 TABLET ORAL
Status: DISCONTINUED | OUTPATIENT
Start: 2025-06-07 | End: 2025-06-10 | Stop reason: HOSPADM

## 2025-06-07 RX ORDER — IPRATROPIUM BROMIDE AND ALBUTEROL SULFATE 2.5; .5 MG/3ML; MG/3ML
3 SOLUTION RESPIRATORY (INHALATION)
Status: DISCONTINUED | OUTPATIENT
Start: 2025-06-07 | End: 2025-06-09

## 2025-06-07 RX ORDER — DOXYCYCLINE HYCLATE 100 MG
100 TABLET ORAL EVERY 12 HOURS
Status: DISCONTINUED | OUTPATIENT
Start: 2025-06-07 | End: 2025-06-08

## 2025-06-07 RX ORDER — PANTOPRAZOLE SODIUM 40 MG/1
40 TABLET, DELAYED RELEASE ORAL DAILY
Status: DISCONTINUED | OUTPATIENT
Start: 2025-06-08 | End: 2025-06-10 | Stop reason: HOSPADM

## 2025-06-07 RX ADMIN — IPRATROPIUM BROMIDE 1.5 MG: 0.5 SOLUTION RESPIRATORY (INHALATION) at 09:06

## 2025-06-07 RX ADMIN — DOXYCYCLINE HYCLATE 100 MG: 100 TABLET, COATED ORAL at 08:06

## 2025-06-07 RX ADMIN — ARFORMOTEROL TARTRATE 15 MCG: 15 SOLUTION RESPIRATORY (INHALATION) at 07:06

## 2025-06-07 RX ADMIN — ENOXAPARIN SODIUM 40 MG: 40 INJECTION SUBCUTANEOUS at 04:06

## 2025-06-07 RX ADMIN — IPRATROPIUM BROMIDE AND ALBUTEROL SULFATE 3 ML: 2.5; .5 SOLUTION RESPIRATORY (INHALATION) at 05:06

## 2025-06-07 RX ADMIN — ALBUTEROL SULFATE 10 MG: 2.5 SOLUTION RESPIRATORY (INHALATION) at 09:06

## 2025-06-07 RX ADMIN — BUDESONIDE 0.5 MG: 0.5 INHALANT RESPIRATORY (INHALATION) at 07:06

## 2025-06-07 RX ADMIN — ALBUTEROL SULFATE 2.5 MG: 2.5 SOLUTION RESPIRATORY (INHALATION) at 12:06

## 2025-06-07 RX ADMIN — AZELASTINE HYDROCHLORIDE 137 MCG: 137 SPRAY, METERED NASAL at 08:06

## 2025-06-07 RX ADMIN — METHYLPREDNISOLONE SODIUM SUCCINATE 125 MG: 125 INJECTION, POWDER, FOR SOLUTION INTRAMUSCULAR; INTRAVENOUS at 10:06

## 2025-06-07 RX ADMIN — IPRATROPIUM BROMIDE AND ALBUTEROL SULFATE 3 ML: 2.5; .5 SOLUTION RESPIRATORY (INHALATION) at 07:06

## 2025-06-07 NOTE — NURSING
[facility]  Nurses Note -- 4 Eyes      6/7/2025       Skin assessed on: Admit      [x] No Pressure Injuries Present    [x]Prevention Measures Documented    [] Yes LDA  for Pressure Injury Previously documented     [] Yes New Pressure Injury Discovered   [] LDA for New Pressure Injury Added      Attending RN:  Taniya Gallardo RN     Second RN:  RENATO Lopez

## 2025-06-07 NOTE — ASSESSMENT & PLAN NOTE
Patient presents with a history of COPD presents to the hospital with complaints of worsening shortness of breath, cough, congestion, adn wheezing for the past 4-5 days. Was seen in Urgent Care 4 days ago and prescribed Z-pack for URI. Denies any relief with home breathing treatments or inhalers.  Afebrile without leukocytosis, pulse oximetry 92% on 2L O2 via NC. Patient given DuoNebs and IV Methylprednisolone 125mg in the ED without much relief in symptoms. Patient has a history of smoking cigarettes 1.5 packs per day for 30 years (quit 6 years ago).   Patient in COPD exacerbation secondary to recent URI.     Plan:  Procalcitonin ordered   Scheduled DuoNebs q 4 hrs and supplemental oxygen as needed (wean as tolerated)  Started on Doxycycline 100 mg bid for 5 days   IV Methylprednisolone 60 mg daily (may switch to po steroids at discharge)  Peak flows and pulse ox q 4 hrs   Monitor respiratory status closely

## 2025-06-07 NOTE — H&P
[facility] Medicine  History & Physical    Patient Name: Kemi Dominguez  MRN: [id number]  Patient Class: IP- Inpatient  Admission Date: 6/7/2025  Attending Physician: Paxton Ferraro MD   Primary Care Provider: Fiorella Acevedo FNP-C         Patient information was obtained from patient, past medical records, and ER records.     Subjective:     Principal Problem:COPD exacerbation    Chief Complaint:   Chief Complaint   Patient presents with    Cough    Nasal Congestion     Pt to ED from home with c/o shortness of breath, cough and congestion x 4 days. Pt states she went to  and was prescribed abx and cough medication for an upper respiratory infection with no relief. Pt reports hx of COPD with normal O2 saturation at 94% on RA         HPI: Kemi Dominguez is a 60 y.o. female with a pmh of HTN, HLD, DM2, COPD not on home O2, PETEY, GERD presents to the hospital with worsening shortness of breath, cough, and congestion for the past several days.     Patient was recently seen at a Urgent Care 3 days ago for sob, cough, congestion, rhinorrhea and was prescribed Z-pack for an URI. Patient states her symptoms have not improved and endorses worsening shortness of breath with a productive cough (thick yellow sputum). Patient has been using DuoNebs at home without much relief. Patient also states she took Medrol Dosepak which she had at home.  Patient denies any other alleviating or exacerbating factors.  Patient denies headache, blurry vision, fever/chills, chest pain, nausea, vomiting, diarrhea, abdominal pain, hematemesis, hematochezia, hematuria, or any other associated symptoms.     In the ED: Patient afebrile without leukocytosis, slightly hypertensive on presentation 141/65, 89% O2 sat on RA which improved to 92-94% on 3 L oxygen via NC, CBC unremarkable, potassium 3.4, normal BNP, normal troponin, lactic acid normal, COVID/influenza negative, chest x-ray shows coarsed interstitial lung  markings with suspected left basilar atelectasis and possible trace pleural effusion however no consolidation or pneumothorax.  Patient given duo neb treatment and IV methylprednisolone 125 mg in the ED. Case discussed with ED provider and patient admitted to hospital medicine for further medical management.    Past Medical History:   Diagnosis Date    Class 3 severe obesity due to excess calories with serious comorbidity and body mass index (BMI) of 40.0 to 44.9 in adult 10/17/2018    COPD (chronic obstructive pulmonary disease)     Diabetes mellitus, type 2     GERD (gastroesophageal reflux disease)     Hyperlipidemia     Hypertension     Sleep apnea     Vitamin D deficiency        Past Surgical History:   Procedure Laterality Date    ADENOIDECTOMY      BREAST BIOPSY      BREAST SURGERY Right     breast biopsy    COLONOSCOPY N/A 09/10/2021    Procedure: COLONOSCOPY;  Surgeon: Holland Royal MD;  Location: [facility];  Service: Endoscopy;  Laterality: N/A;    TONSILLECTOMY      TRANSFORAMINAL EPIDURAL INJECTION OF STEROID Left 04/16/2019    Procedure: Injection,steroid,epidural,transforaminal approach;  Surgeon: Gilberto Michelle MD;  Location: [facility] OR;  Service: Pain Management;  Laterality: Left;  L5-S1, S1    TRANSFORAMINAL EPIDURAL INJECTION OF STEROID Left 08/23/2019    Procedure: Injection,steroid,epidural,transforaminal approach;  Surgeon: Gilberto Michelle MD;  Location: [facility] OR;  Service: Pain Management;  Laterality: Left;  L5-S1, S1       Review of patient's allergies indicates:  No Known Allergies    No current facility-administered medications on file prior to encounter.     Current Outpatient Medications on File Prior to Encounter   Medication Sig    albuterol-budesonide (AIRSUPRA) 90-80 mcg/actuation Inhale 2 puffs into the lungs every 6 (six) hours as needed (sob, wheezing).    amLODIPine (NORVASC) 10 MG tablet TAKE 1 TABLET BY MOUTH EVERY DAY    amoxicillin-clavulanate 875-125mg (AUGMENTIN) 875-125 mg per  tablet Take 1 tablet by mouth 2 (two) times daily.    aspirin/salicylamide/caffeine (BC HEADACHE POWDER ORAL) Take by mouth as needed.    azelastine (ASTELIN) 137 mcg (0.1 %) nasal spray USE 1 SPRAY IN EACH NOSTRIL TWICE DAILY    blood sugar diagnostic Strp 1 each by Misc.(Non-Drug; Combo Route) route once daily.    buPROPion (WELLBUTRIN XL) 300 MG 24 hr tablet TAKE 1 TABLET BY MOUTH EVERY DAY    clotrimazole-betamethasone 1-0.05% (LOTRISONE) cream Apply topically 2 (two) times daily as needed (rash).    FLUoxetine 10 MG capsule TAKE 1 CAPSULE BY MOUTH EVERY DAY    fluticasone propionate (FLONASE) 50 mcg/actuation nasal spray 1 spray (50 mcg total) by Each Nostril route 2 (two) times daily.    fluticasone propionate (FLONASE) 50 mcg/actuation nasal spray USE 1 SPRAY (50 MCG TOTAL) IN EACH NOSTRIL ONCE DAILY    fluticasone-umeclidin-vilanter (TRELEGY ELLIPTA) 100-62.5-25 mcg DsDv Inhale 1 puff into the lungs once daily.    hydrocortisone 2.5 % cream Apply topically 2 (two) times daily.    ibuprofen (ADVIL,MOTRIN) 600 MG tablet Take 1 tablet (600 mg total) by mouth every 6 (six) hours as needed.    JARDIANCE 25 mg tablet TAKE 1 TABLET BY MOUTH EVERY DAY    lancets 33 gauge Misc 1 lancet by Misc.(Non-Drug; Combo Route) route once daily.    linaCLOtide (LINZESS) 72 mcg Cap capsule Take 1 capsule (72 mcg total) by mouth before breakfast.    melatonin 5 mg Tab Take 10 mg by mouth nightly.     metFORMIN (GLUCOPHAGE-XR) 500 MG ER 24hr tablet TAKE 2 TABLETS BY MOUTH TWICE A DAY WITH MEALS    montelukast (SINGULAIR) 10 mg tablet TAKE 1 TABLET BY MOUTH EVERY DAY    mupirocin (BACTROBAN) 2 % ointment Apply topically.    pantoprazole (PROTONIX) 40 MG tablet TAKE 1 TABLET BY MOUTH EVERY DAY    promethazine-dextromethorphan (PROMETHAZINE-DM) 6.25-15 mg/5 mL Syrp Take 5 mLs by mouth nightly as needed (cough).    rosuvastatin (CRESTOR) 20 MG tablet TAKE 1 TABLET BY MOUTH EVERY DAY    tirzepatide (MOUNJARO) 2.5 mg/0.5 mL PnIj Inject  2.5 mg into the skin every 7 days.    [START ON 2025] tirzepatide (MOUNJARO) 5 mg/0.5 mL PnIj Inject 5 mg into the skin every 7 days.    tiZANidine (ZANAFLEX) 4 MG tablet SMARTSI Tablet(s) By Mouth Every 8-12 Hours PRN    valsartan-hydrochlorothiazide (DIOVAN-HCT) 160-25 mg per tablet TAKE 1 TABLET BY MOUTH EVERY DAY    varicella-zoster [id number], PF, (SHINGRIX, PF,) 50 mcg/0.5 mL injection Shingrix (PF) 50 mcg/0.5 mL intramuscular suspension, kit   PHARMACY ADMINISTERED    VRAYLAR 1.5 mg Cap TAKE 1 CAPSULE BY MOUTH EVERY DAY     Family History       Problem Relation (Age of Onset)    COPD Father    Coronary artery disease Father    Diabetes Father, Sister    Emphysema Mother    Stroke Mother          Tobacco Use    Smoking status: Former     Current packs/day: 0.00     Types: Cigarettes     Quit date: 2019     Years since quittin.2    Smokeless tobacco: Never   Substance and Sexual Activity    Alcohol use: Yes     Comment: rarely    Drug use: Yes     Types: Hydrocodone    Sexual activity: Never     Review of Systems   Constitutional:  Positive for chills.   HENT:  Positive for congestion and rhinorrhea.    Respiratory:  Positive for cough and wheezing.    Cardiovascular: Negative.    Gastrointestinal: Negative.    Genitourinary: Negative.    Musculoskeletal: Negative.    Skin: Negative.    Neurological:  Positive for headaches.   Psychiatric/Behavioral: Negative.       Objective:     Vital Signs (Most Recent):  Temp: 98.2 °F (36.8 °C) (25 1438)  Pulse: 96 (25 1438)  Resp: 18 (25 1438)  BP: 138/83 (25 1438)  SpO2: (!) 92 % (25 1438) Vital Signs (24h Range):  Temp:  [98.2 °F (36.8 °C)] 98.2 °F (36.8 °C)  Pulse:  [] 96  Resp:  [17-21] 18  SpO2:  [89 %-94 %] 92 %  BP: (129-160)/(55-84) 138/83     Weight: 104.3 kg (230 lb)  Body mass index is 37.12 kg/m².     Physical Exam  Constitutional:       General: She is not in acute distress.     Appearance: She is obese. She  is not ill-appearing or diaphoretic.   HENT:      Head: Normocephalic and atraumatic.      Mouth/Throat:      Mouth: Mucous membranes are moist.   Eyes:      Extraocular Movements: Extraocular movements intact.   Cardiovascular:      Rate and Rhythm: Normal rate and regular rhythm.      Pulses: Normal pulses.   Pulmonary:      Effort: Pulmonary effort is normal. No respiratory distress.      Breath sounds: Wheezing present.      Comments: On 3 L oxygen via NC  Bilateral expiratory wheezing noted   Abdominal:      General: Abdomen is flat.      Palpations: Abdomen is soft.   Musculoskeletal:      Right lower leg: No edema.      Left lower leg: No edema.   Skin:     General: Skin is warm.   Neurological:      General: No focal deficit present.      Mental Status: She is alert and oriented to person, place, and time. Mental status is at baseline.   Psychiatric:         Mood and Affect: Mood normal.         Behavior: Behavior normal.         Thought Content: Thought content normal.                Significant Labs: All pertinent labs within the past 24 hours have been reviewed.  A1C:   Recent Labs   Lab 02/18/25  0848 05/16/25  1429   HGBA1C 7.0* 7.3*     Bilirubin:   Recent Labs   Lab 05/16/25  1429 06/07/25  1005   BILITOT 0.3 0.4     BMP:   Recent Labs   Lab 06/07/25  1005   *      K 3.4*      CO2 24   BUN 10   CREATININE 0.7   CALCIUM 9.2     CBC:   Recent Labs   Lab 06/07/25  1005   WBC 12.22   HGB 14.0   HCT 44.0        CMP:   Recent Labs   Lab 06/07/25  1005      K 3.4*      CO2 24   *   BUN 10   CREATININE 0.7   CALCIUM 9.2   PROT 7.3   ALBUMIN 3.4*   BILITOT 0.4   ALKPHOS 102   AST 20   ALT 21   ANIONGAP 13     Cardiac Markers:   Recent Labs   Lab 06/07/25  1005   BNP <10     Lactic Acid:   Recent Labs   Lab 06/07/25  1005   LACTATE 1.1     POCT Glucose:   Recent Labs   Lab 06/07/25  1049   POCTGLUCOSE 141*     Troponin:   Recent Labs   Lab 06/07/25  1005   TROPONINI  0.012     Significant Imaging: I have reviewed all pertinent imaging results/findings within the past 24 hours.  Imaging Results              X-Ray Chest AP Portable (Final result)  Result time 06/07/25 10:40:13      Final result by Bryce Cueva MD (06/07/25 10:40:13)                   Impression:      Please see above.      Electronically signed by: Bryce Cueva  Date:    06/07/2025  Time:    10:40               Narrative:    EXAMINATION:  XR CHEST AP PORTABLE    CLINICAL HISTORY:  Shortness of breath    TECHNIQUE:  Single frontal view of the chest was performed.    COMPARISON:  Chest radiograph 06/29/2021    FINDINGS:  Coarsened interstitial lung markings, noting hypoventilatory exam.  Suspected left basilar atelectasis.  Probable trace pleural effusions.  No large region consolidation or pneumothorax.    Cardiomediastinal silhouette is unchanged in size.  Mediastinal structures are midline.    Osseous structures demonstrate no evidence for acute fracture or osseous destructive lesion.  Degenerative change.    No free intra-abdominal air.  Soft tissues are unremarkable.                                      Assessment/Plan:     Assessment & Plan  COPD exacerbation  Patient presents with a history of COPD presents to the hospital with complaints of worsening shortness of breath, cough, congestion, adn wheezing for the past 4-5 days. Was seen in Urgent Care 4 days ago and prescribed Z-pack for URI. Denies any relief with home breathing treatments or inhalers.  Afebrile without leukocytosis, pulse oximetry 92% on 2L O2 via NC. Patient given DuoNebs and IV Methylprednisolone 125mg in the ED without much relief in symptoms. Patient has a history of smoking cigarettes 1.5 packs per day for 30 years (quit 6 years ago).   Patient in COPD exacerbation secondary to recent URI.     Plan:  Procalcitonin ordered   Scheduled DuoNebs q 4 hrs and supplemental oxygen as needed (wean as tolerated)  Started on Doxycycline 100 mg bid  for 5 days   IV Methylprednisolone 60 mg daily (may switch to po steroids at discharge)  Peak flows and pulse ox q 4 hrs   Monitor respiratory status closely  Essential hypertension  Patient's blood pressure range in the last 24 hours was: BP  Min: 119/69  Max: 160/55.The patient's inpatient anti-hypertensive regimen is listed below:  Current Antihypertensives  amLODIPine tablet 10 mg, Daily, Oral  losartan-hydrochlorothiazide 50-12.5 mg per tablet 1 tablet, Daily, Oral    Plan  - BP is controlled, no changes needed to their regimen  Mixed hyperlipidemia  Continue statin   Type 2 diabetes mellitus with complication, without long-term current use of insulin  Patient's FSGs are controlled on current medication regimen.  Last A1c reviewed-   Lab Results   Component Value Date    HGBA1C 7.3 (H) 05/16/2025     Most recent fingerstick glucose reviewed-   Recent Labs   Lab 06/07/25  1049   POCTGLUCOSE 141*     Current correctional scale  Medium  Maintain anti-hyperglycemic dose as follows-   Antihyperglycemics (From admission, onward)      Start     Stop Route Frequency Ordered    06/07/25 1514  insulin aspart U-100 pen 0-10 Units         -- SubQ Before meals & nightly PRN 06/07/25 1415          MDSSI prn   Poct glucose ac/hs   ADA diet, BG goal 140-180  Hold Oral hypoglycemics while patient is in the hospital  Class 2 severe obesity due to excess calories with serious comorbidity and body mass index (BMI) of 37.0 to 37.9 in adult  Body mass index is 37.01 kg/m². Morbid obesity complicates all aspects of disease management from diagnostic modalities to treatment. Weight loss encouraged and health benefits explained to patient.   Shortness of breath  -See plan above for COPD exacerbation   PETEY (obstructive sleep apnea)  History noted, patient does not use CPAP  VTE Risk Mitigation (From admission, onward)           Ordered     enoxaparin injection 40 mg  Daily         06/07/25 1414     IP VTE HIGH RISK PATIENT  Once          06/07/25 1414     Place sequential compression device  Until discontinued         06/07/25 1414                     As clarification, on 6/7/2025, patient should be admitted to inpatient services under my care in collaboration with Paxton Ferraro MD. RUPESH Aguiar PA-C  Department of Hospital Medicine  [facility]

## 2025-06-07 NOTE — SUBJECTIVE & OBJECTIVE
Past Medical History:   Diagnosis Date    Class 3 severe obesity due to excess calories with serious comorbidity and body mass index (BMI) of 40.0 to 44.9 in adult 10/17/2018    COPD (chronic obstructive pulmonary disease)     Diabetes mellitus, type 2     GERD (gastroesophageal reflux disease)     Hyperlipidemia     Hypertension     Sleep apnea     Vitamin D deficiency        Past Surgical History:   Procedure Laterality Date    ADENOIDECTOMY      BREAST BIOPSY      BREAST SURGERY Right     breast biopsy    COLONOSCOPY N/A 09/10/2021    Procedure: COLONOSCOPY;  Surgeon: Holland Royal MD;  Location: [facility];  Service: Endoscopy;  Laterality: N/A;    TONSILLECTOMY      TRANSFORAMINAL EPIDURAL INJECTION OF STEROID Left 04/16/2019    Procedure: Injection,steroid,epidural,transforaminal approach;  Surgeon: Gilberto Michelle MD;  Location: [facility] OR;  Service: Pain Management;  Laterality: Left;  L5-S1, S1    TRANSFORAMINAL EPIDURAL INJECTION OF STEROID Left 08/23/2019    Procedure: Injection,steroid,epidural,transforaminal approach;  Surgeon: Gilberto Michelle MD;  Location: [facility] OR;  Service: Pain Management;  Laterality: Left;  L5-S1, S1       Review of patient's allergies indicates:  No Known Allergies    No current facility-administered medications on file prior to encounter.     Current Outpatient Medications on File Prior to Encounter   Medication Sig    albuterol-budesonide (AIRSUPRA) 90-80 mcg/actuation Inhale 2 puffs into the lungs every 6 (six) hours as needed (sob, wheezing).    amLODIPine (NORVASC) 10 MG tablet TAKE 1 TABLET BY MOUTH EVERY DAY    amoxicillin-clavulanate 875-125mg (AUGMENTIN) 875-125 mg per tablet Take 1 tablet by mouth 2 (two) times daily.    aspirin/salicylamide/caffeine (BC HEADACHE POWDER ORAL) Take by mouth as needed.    azelastine (ASTELIN) 137 mcg (0.1 %) nasal spray USE 1 SPRAY IN EACH NOSTRIL TWICE DAILY    blood sugar diagnostic Strp 1 each by Misc.(Non-Drug; Combo Route) route once  daily.    buPROPion (WELLBUTRIN XL) 300 MG 24 hr tablet TAKE 1 TABLET BY MOUTH EVERY DAY    clotrimazole-betamethasone 1-0.05% (LOTRISONE) cream Apply topically 2 (two) times daily as needed (rash).    FLUoxetine 10 MG capsule TAKE 1 CAPSULE BY MOUTH EVERY DAY    fluticasone propionate (FLONASE) 50 mcg/actuation nasal spray 1 spray (50 mcg total) by Each Nostril route 2 (two) times daily.    fluticasone propionate (FLONASE) 50 mcg/actuation nasal spray USE 1 SPRAY (50 MCG TOTAL) IN EACH NOSTRIL ONCE DAILY    fluticasone-umeclidin-vilanter (TRELEGY ELLIPTA) 100-62.5-25 mcg DsDv Inhale 1 puff into the lungs once daily.    hydrocortisone 2.5 % cream Apply topically 2 (two) times daily.    ibuprofen (ADVIL,MOTRIN) 600 MG tablet Take 1 tablet (600 mg total) by mouth every 6 (six) hours as needed.    JARDIANCE 25 mg tablet TAKE 1 TABLET BY MOUTH EVERY DAY    lancets 33 gauge Misc 1 lancet by Misc.(Non-Drug; Combo Route) route once daily.    linaCLOtide (LINZESS) 72 mcg Cap capsule Take 1 capsule (72 mcg total) by mouth before breakfast.    melatonin 5 mg Tab Take 10 mg by mouth nightly.     metFORMIN (GLUCOPHAGE-XR) 500 MG ER 24hr tablet TAKE 2 TABLETS BY MOUTH TWICE A DAY WITH MEALS    montelukast (SINGULAIR) 10 mg tablet TAKE 1 TABLET BY MOUTH EVERY DAY    mupirocin (BACTROBAN) 2 % ointment Apply topically.    pantoprazole (PROTONIX) 40 MG tablet TAKE 1 TABLET BY MOUTH EVERY DAY    promethazine-dextromethorphan (PROMETHAZINE-DM) 6.25-15 mg/5 mL Syrp Take 5 mLs by mouth nightly as needed (cough).    rosuvastatin (CRESTOR) 20 MG tablet TAKE 1 TABLET BY MOUTH EVERY DAY    tirzepatide (MOUNJARO) 2.5 mg/0.5 mL PnIj Inject 2.5 mg into the skin every 7 days.    [START ON 2025] tirzepatide (MOUNJARO) 5 mg/0.5 mL PnIj Inject 5 mg into the skin every 7 days.    tiZANidine (ZANAFLEX) 4 MG tablet SMARTSI Tablet(s) By Mouth Every 8-12 Hours PRN    valsartan-hydrochlorothiazide (DIOVAN-HCT) 160-25 mg per tablet TAKE 1 TABLET  BY MOUTH EVERY DAY    varicella-zoster gE-AS01B, PF, (SHINGRIX, PF,) 50 mcg/0.5 mL injection Shingrix (PF) 50 mcg/0.5 mL intramuscular suspension, kit   PHARMACY ADMINISTERED    VRAYLAR 1.5 mg Cap TAKE 1 CAPSULE BY MOUTH EVERY DAY     Family History       Problem Relation (Age of Onset)    COPD Father    Coronary artery disease Father    Diabetes Father, Sister    Emphysema Mother    Stroke Mother          Tobacco Use    Smoking status: Former     Current packs/day: 0.00     Types: Cigarettes     Quit date: 2019     Years since quittin.2    Smokeless tobacco: Never   Substance and Sexual Activity    Alcohol use: Yes     Comment: rarely    Drug use: Yes     Types: Hydrocodone    Sexual activity: Never     Review of Systems   Constitutional:  Positive for chills.   HENT:  Positive for congestion and rhinorrhea.    Respiratory:  Positive for cough and wheezing.    Cardiovascular: Negative.    Gastrointestinal: Negative.    Genitourinary: Negative.    Musculoskeletal: Negative.    Skin: Negative.    Neurological:  Positive for headaches.   Psychiatric/Behavioral: Negative.       Objective:     Vital Signs (Most Recent):  Temp: 98.2 °F (36.8 °C) (25 1438)  Pulse: 96 (25 1438)  Resp: 18 (25 1438)  BP: 138/83 (25 1438)  SpO2: (!) 92 % (25 1438) Vital Signs (24h Range):  Temp:  [98.2 °F (36.8 °C)] 98.2 °F (36.8 °C)  Pulse:  [] 96  Resp:  [17-21] 18  SpO2:  [89 %-94 %] 92 %  BP: (129-160)/(55-84) 138/83     Weight: 104.3 kg (230 lb)  Body mass index is 37.12 kg/m².     Physical Exam  Constitutional:       General: She is not in acute distress.     Appearance: She is obese. She is not ill-appearing or diaphoretic.   HENT:      Head: Normocephalic and atraumatic.      Mouth/Throat:      Mouth: Mucous membranes are moist.   Eyes:      Extraocular Movements: Extraocular movements intact.   Cardiovascular:      Rate and Rhythm: Normal rate and regular rhythm.      Pulses: Normal  pulses.   Pulmonary:      Effort: Pulmonary effort is normal. No respiratory distress.      Breath sounds: Wheezing present.      Comments: On 3 L oxygen via NC  Bilateral expiratory wheezing noted   Abdominal:      General: Abdomen is flat.      Palpations: Abdomen is soft.   Musculoskeletal:      Right lower leg: No edema.      Left lower leg: No edema.   Skin:     General: Skin is warm.   Neurological:      General: No focal deficit present.      Mental Status: She is alert and oriented to person, place, and time. Mental status is at baseline.   Psychiatric:         Mood and Affect: Mood normal.         Behavior: Behavior normal.         Thought Content: Thought content normal.                Significant Labs: All pertinent labs within the past 24 hours have been reviewed.  A1C:   Recent Labs   Lab 02/18/25  0848 05/16/25  1429   HGBA1C 7.0* 7.3*     Bilirubin:   Recent Labs   Lab 05/16/25  1429 06/07/25  1005   BILITOT 0.3 0.4     BMP:   Recent Labs   Lab 06/07/25  1005   *      K 3.4*      CO2 24   BUN 10   CREATININE 0.7   CALCIUM 9.2     CBC:   Recent Labs   Lab 06/07/25  1005   WBC 12.22   HGB 14.0   HCT 44.0        CMP:   Recent Labs   Lab 06/07/25  1005      K 3.4*      CO2 24   *   BUN 10   CREATININE 0.7   CALCIUM 9.2   PROT 7.3   ALBUMIN 3.4*   BILITOT 0.4   ALKPHOS 102   AST 20   ALT 21   ANIONGAP 13     Cardiac Markers:   Recent Labs   Lab 06/07/25  1005   BNP <10     Lactic Acid:   Recent Labs   Lab 06/07/25  1005   LACTATE 1.1     POCT Glucose:   Recent Labs   Lab 06/07/25  1049   POCTGLUCOSE 141*     Troponin:   Recent Labs   Lab 06/07/25  1005   TROPONINI 0.012     Significant Imaging: I have reviewed all pertinent imaging results/findings within the past 24 hours.  Imaging Results              X-Ray Chest AP Portable (Final result)  Result time 06/07/25 10:40:13      Final result by Bryce Cueva MD (06/07/25 10:40:13)                   Impression:       Please see above.      Electronically signed by: Bryce Cueva  Date:    06/07/2025  Time:    10:40               Narrative:    EXAMINATION:  XR CHEST AP PORTABLE    CLINICAL HISTORY:  Shortness of breath    TECHNIQUE:  Single frontal view of the chest was performed.    COMPARISON:  Chest radiograph 06/29/2021    FINDINGS:  Coarsened interstitial lung markings, noting hypoventilatory exam.  Suspected left basilar atelectasis.  Probable trace pleural effusions.  No large region consolidation or pneumothorax.    Cardiomediastinal silhouette is unchanged in size.  Mediastinal structures are midline.    Osseous structures demonstrate no evidence for acute fracture or osseous destructive lesion.  Degenerative change.    No free intra-abdominal air.  Soft tissues are unremarkable.

## 2025-06-07 NOTE — NURSING
Pt arrive to the unit by stretcher accompanied by transport Assisted pt to bed. Tele monitoring initiated.  Admit assessment initiated.  Pt is AAOx4.  NO distress noted.

## 2025-06-07 NOTE — ASSESSMENT & PLAN NOTE
Body mass index is 37.01 kg/m². Morbid obesity complicates all aspects of disease management from diagnostic modalities to treatment. Weight loss encouraged and health benefits explained to patient.

## 2025-06-07 NOTE — ASSESSMENT & PLAN NOTE
Patient's blood pressure range in the last 24 hours was: BP  Min: 119/69  Max: 160/55.The patient's inpatient anti-hypertensive regimen is listed below:  Current Antihypertensives  amLODIPine tablet 10 mg, Daily, Oral  losartan-hydrochlorothiazide 50-12.5 mg per tablet 1 tablet, Daily, Oral    Plan  - BP is controlled, no changes needed to their regimen

## 2025-06-07 NOTE — ED NOTES
Pt presents to ED due to SOB congestion and cough, pt states she has had symptoms for about a week now, currently O2 86% on room air, pt states she is currently taking abts and albuterol tx with no relief, placed on 2L per nc, O2 sats 93%.

## 2025-06-07 NOTE — PLAN OF CARE
Problem: Adult Inpatient Plan of Care  Goal: Plan of Care Review  Outcome: Ongoing  Goal: Patient-Specific Goal (Individualized)  Outcome: Ongoing  Goal: Absence of Hospital-Acquired Illness or Injury  Outcome: Ongoing  Goal: Optimal Comfort and Wellbeing  Outcome: Ongoing  Goal: Readiness for Transition of Care  Outcome: Ongoing     Problem: COPD (Chronic Obstructive Pulmonary Disease)  Goal: Optimal Chronic Illness Coping  Outcome: Ongoing  Goal: Optimal Level of Functional [place]  Outcome: Ongoing  Goal: Absence of Infection Signs and Symptoms  Outcome: Ongoing  Goal: Improved Oral Intake  Outcome: Ongoing  Goal: Effective Oxygenation and Ventilation  Outcome: Ongoing     Problem: Diabetes Comorbidity  Goal: Blood Glucose Level Within Targeted Range  Outcome: Ongoing

## 2025-06-07 NOTE — ASSESSMENT & PLAN NOTE
Patient's FSGs are controlled on current medication regimen.  Last A1c reviewed-   Lab Results   Component Value Date    HGBA1C 7.3 (H) 05/16/2025     Most recent fingerstick glucose reviewed-   Recent Labs   Lab 06/07/25  1049   POCTGLUCOSE 141*     Current correctional scale  Medium  Maintain anti-hyperglycemic dose as follows-   Antihyperglycemics (From admission, onward)      Start     Stop Route Frequency Ordered    06/07/25 1514  insulin aspart U-100 pen 0-10 Units         -- SubQ Before meals & nightly PRN 06/07/25 1415          MDSSI prn   Poct glucose ac/hs   ADA diet, BG goal 140-180  Hold Oral hypoglycemics while patient is in the hospital

## 2025-06-07 NOTE — ED PROVIDER NOTES
Encounter Date: 6/7/2025       History     Chief Complaint   Patient presents with    Cough    Nasal Congestion     Pt to ED from home with c/o shortness of breath, cough and congestion x 4 days. Pt states she went to  and was prescribed abx and cough medication for an upper respiratory infection with no relief. Pt reports hx of COPD with normal O2 saturation at 94% on RA      Chief complaint:  Shortness of breath     History of present illness:  60 old female with a past medical history of COPD, not on home oxygen, diabetes mellitus type 2, hyperlipidemia, hypertension, sleep apnea, GERD, presents to the emergency department with 4 days of shortness of breath and cough productive of sputum.  Reports no chest pain.  Reports no orthopnea.  Reports no new lower extremity edema.  States she has been using her DuoNeb nebulizers at home frequently without relief.  She states that she went to urgent care on Wednesday, 3 days ago, and was prescribed Augmentin.  They did not prescribe prednisone at that time but she states that she started taking a Medrol Dosepak on the same day that she had at home already.  She does report travel to [facility] but states that was over 1 month ago.  No history of DVT/PE.  No GI symptoms.  Reports nasal congestion.        Review of patient's allergies indicates:  No Known Allergies  Past Medical History:   Diagnosis Date    Class 3 severe obesity due to excess calories with serious comorbidity and body mass index (BMI) of 40.0 to 44.9 in adult 10/17/2018    COPD (chronic obstructive pulmonary disease)     Diabetes mellitus, type 2     GERD (gastroesophageal reflux disease)     Hyperlipidemia     Hypertension     Sleep apnea     Vitamin D deficiency      Past Surgical History:   Procedure Laterality Date    ADENOIDECTOMY      BREAST BIOPSY      BREAST SURGERY Right     breast biopsy    COLONOSCOPY N/A 09/10/2021    Procedure: COLONOSCOPY;  Surgeon: Holland Royal MD;  Location: [facility];   Service: Endoscopy;  Laterality: N/A;    TONSILLECTOMY      TRANSFORAMINAL EPIDURAL INJECTION OF STEROID Left 04/16/2019    Procedure: Injection,steroid,epidural,transforaminal approach;  Surgeon: Gilberto Michelle MD;  Location: [facility] OR;  Service: Pain Management;  Laterality: Left;  L5-S1, S1    TRANSFORAMINAL EPIDURAL INJECTION OF STEROID Left 08/23/2019    Procedure: Injection,steroid,epidural,transforaminal approach;  Surgeon: Gilberto Michelle MD;  Location: [facility] OR;  Service: Pain Management;  Laterality: Left;  L5-S1, S1     Family History   Problem Relation Name Age of Onset    Stroke Mother      Emphysema Mother      Diabetes Father      Coronary artery disease Father      COPD Father      Diabetes Sister       Social History[1]  Review of Systems   Constitutional:  Negative for fever.   HENT:  Negative for sore throat.    Respiratory:  Positive for cough and shortness of breath.    Cardiovascular:  Negative for chest pain and leg swelling.   Gastrointestinal:  Negative for abdominal pain, nausea and vomiting.   Genitourinary:  Negative for dysuria.   Musculoskeletal:  Negative for back pain.   Skin:  Negative for rash.   Neurological:  Negative for weakness.   Hematological:  Does not bruise/bleed easily.   Psychiatric/Behavioral:  Negative for agitation and confusion.        Physical Exam     Initial Vitals [06/07/25 0928]   BP Pulse Resp Temp SpO2   (!) 141/65 105 (!) 21 98.2 °F (36.8 °C) (!) 89 %      MAP       --         Physical Exam    Nursing note and vitals reviewed.  Constitutional: She appears well-developed and well-nourished. She is not diaphoretic. No distress.   HENT:   Head: Normocephalic and atraumatic.   Right Ear: External ear normal.   Left Ear: External ear normal.   Nose: Nose normal. Mouth/Throat: Oropharynx is clear and moist.   Eyes: Conjunctivae and EOM are normal. Pupils are equal, round, and reactive to light. Right eye exhibits no discharge. Left eye exhibits no discharge. No scleral  icterus.   Neck: Neck supple.   Normal range of motion.  Cardiovascular:  Normal rate, regular rhythm, normal heart sounds and intact distal pulses.           No murmur heard.  Pulmonary/Chest: No respiratory distress. She has wheezes. She has no rhonchi. She has no rales.   Work of breathing mildly increased.  There are decreased breath sounds and symmetric expiratory wheezes auscultated in all lung fields, more prominently in the lower lung fields posteriorly, bilaterally.   Abdominal: Abdomen is soft. Bowel sounds are normal. She exhibits no distension and no mass. There is no abdominal tenderness. There is no rebound and no guarding.   Musculoskeletal:         General: No tenderness or edema. Normal range of motion.      Cervical back: Normal range of motion and neck supple.     Neurological: She is alert and oriented to person, place, and time. She has normal strength. No cranial nerve deficit or sensory deficit.   Skin: Skin is warm and dry. No rash noted. No erythema. No pallor.   Psychiatric: She has a normal mood and affect. Her behavior is normal. Judgment and thought content normal.         ED Course   Critical Care    Date/Time: 6/7/2025 1:17 PM    Performed by: Mikey Murcia Jr., MD  Authorized by: Mikey Murcia Jr., MD  Direct patient critical care time: 15 minutes  Additional history critical care time: 10 minutes  Ordering / reviewing critical care time: 5 minutes  Documentation critical care time: 5 minutes  Consulting other physicians critical care time: 5 minutes  Total critical care time (exclusive of procedural time) : 40 minutes  Critical care was necessary to treat or prevent imminent or life-threatening deterioration of the following conditions: respiratory failure.  Critical care was time spent personally by me on the following activities: development of treatment plan with patient or surrogate, discussions with consultants, interpretation of cardiac output measurements,  evaluation of patient's response to treatment, examination of patient, obtaining history from patient or surrogate, ordering and performing treatments and interventions, ordering and review of laboratory studies, ordering and review of radiographic studies, pulse oximetry, re-evaluation of patient's condition and review of old charts.        Labs Reviewed   COMPREHENSIVE METABOLIC PANEL - Abnormal       Result Value    Sodium 137      Potassium 3.4 (*)     Chloride 100      CO2 24      Glucose 156 (*)     BUN 10      Creatinine 0.7      Calcium 9.2      Protein Total 7.3      Albumin 3.4 (*)     Bilirubin Total 0.4            AST 20      ALT 21      Anion Gap 13      eGFR >60     CBC WITH DIFFERENTIAL - Abnormal    WBC 12.22      RBC 5.11      HGB 14.0      HCT 44.0      MCV 86      MCH 27.4      MCHC 31.8 (*)     RDW 16.6 (*)     Platelet Count 280      MPV 8.6 (*)     Nucleated RBC 0      Neut % 80.7 (*)     Lymph % 9.6 (*)     Mono % 8.8      Eos % 0.3      Basophil % 0.2      Imm Grans % 0.4      Neut # 9.85 (*)     Lymph # 1.17      Mono # 1.08 (*)     Eos # 0.04      Baso # 0.03      Imm Grans # 0.05 (*)    TROPONIN I - Normal    Troponin-I 0.012     B-TYPE NATRIURETIC PEPTIDE - Normal    BNP <10     LACTIC ACID, PLASMA - Normal    Lactic Acid Level 1.1      Narrative:     Falsely low lactic acid results can be found in samples containing >=13.0 mg/dL total bilirubin and/or >=3.5 mg/dL direct bilirubin. Specimen slightly hemolyzed.   D DIMER, QUANTITATIVE - Normal    D-Dimer 0.29     CBC W/ AUTO DIFFERENTIAL    Narrative:     The following orders were created for panel order CBC Auto Differential.  Procedure                               Abnormality         Status                     ---------                               -----------         ------                     CBC with Differential[[id number]]       Abnormal            Final result                 Please view results for these tests on the  individual orders.   SARS-COV-2 RDRP GENE    POC Rapid COVID Negative       Acceptable Yes       EKG Readings: (Independently Interpreted)   Initial Reading: No STEMI. Ectopy: No Ectopy.   EKG reviewed and interpreted by me shows sinus tachycardia at a rate of 101 beats per minute.  The NC, QRS, QTC intervals are grossly within normal limits.  There is normal axis.  There is normal R-wave progression across the precordium.  There are nonspecific ST abnormalities in the inferior and lateral leads, similar to previous EKG on file.         Imaging Results              X-Ray Chest AP Portable (Final result)  Result time 06/07/25 10:40:13      Final result by Bryce Cueva MD (06/07/25 10:40:13)                   Impression:      Please see above.      Electronically signed by: Bryce Cueva  Date:    06/07/2025  Time:    10:40               Narrative:    EXAMINATION:  XR CHEST AP PORTABLE    CLINICAL HISTORY:  Shortness of breath    TECHNIQUE:  Single frontal view of the chest was performed.    COMPARISON:  Chest radiograph 06/29/2021    FINDINGS:  Coarsened interstitial lung markings, noting hypoventilatory exam.  Suspected left basilar atelectasis.  Probable trace pleural effusions.  No large region consolidation or pneumothorax.    Cardiomediastinal silhouette is unchanged in size.  Mediastinal structures are midline.    Osseous structures demonstrate no evidence for acute fracture or osseous destructive lesion.  Degenerative change.    No free intra-abdominal air.  Soft tissues are unremarkable.                                    X-Rays:   Independently Interpreted Readings:   Other Readings:  Chest x-ray reveals coarse interstitial markings.  No isolated consolidation or pneumothorax.    Medications   albuterol sulfate nebulizer solution 10 mg (10 mg Nebulization New Bag 6/7/25 0957)   ipratropium 0.02 % nebulizer solution 1.5 mg (1.5 mg Nebulization New Bag 6/7/25 0957)   methylPREDNISolone sodium  succinate injection 125 mg (125 mg Intravenous Given 6/7/25 1049)   albuterol sulfate nebulizer solution 2.5 mg (2.5 mg Nebulization Given 6/7/25 1223)     Medical Decision Making  This is the emergent evaluation of a 60-year-old female presents emergency department for evaluation of cough, nasal congestion, and shortness of breath for the past 4 days.  Differential diagnosis at the time of initial evaluation included, but was not limited to:  COPD exacerbation, pneumonia, pneumothorax, pleural effusion, pulmonary edema.   I think that pulmonary embolus is less likely.  However, patient does report trip to [facility] approximately 1 month ago and has mild tachycardia.    This patient had a negative D-dimer and BNP was within normal limits.  Chest x-ray with coarse interstitial markings.  Patient has a wheezing on examination.  I suspect this is all related to COPD exacerbation which has been refractory to several days of antibiotics.  Patient has also been taking Medrol Dosepak at home without improvement.  After continuous DuoNeb in the emergency department as well as Solu-Medrol IV, patient is still has oxygen saturation of 88-89% at rest with 2 LPM of oxygen via nasal cannula.  Oxygen improved by increasing this to 3 LPM via nasal cannula.  Patient is not on home oxygen.    Rest of the patient's workup and labs were reassuring.  This is most likely COPD exacerbation with persistent hypoxemia despite treatment in the emergency department.  We will plan to discuss the case with hospital medicine via secure chat.  Patient is in agreement with hospitalization.    Amount and/or Complexity of Data Reviewed  External Data Reviewed: labs, ECG and notes.  Labs: ordered. Decision-making details documented in ED Course.     Details: No leukocytosis or anemia.  Normal platelet count.  COVID negative.  Lactic acid within normal limits.  BNP and D-dimer within normal limits.  No evidence of acute metabolic derangement.  No  transaminitis, hyperbilirubinemia, or SALLY.  Troponin within normal limits.  Radiology: ordered and independent interpretation performed. Decision-making details documented in ED Course.    Risk  Prescription drug management.                                      Clinical Impression:  Final diagnoses:  [R06.02] Shortness of breath  [J44.9] Chronic obstructive pulmonary disease, unspecified COPD type (Primary)  [R09.02] Hypoxia                       [1]   Social History  Tobacco Use    Smoking status: Former     Current packs/day: 0.00     Types: Cigarettes     Quit date: 2019     Years since quittin.2    Smokeless tobacco: Never   Substance Use Topics    Alcohol use: Yes     Comment: rarely    Drug use: Yes     Types: Hydrocodone        Mikey Murcia Jr., MD  25 0101

## 2025-06-08 PROBLEM — J96.01 ACUTE HYPOXIC RESPIRATORY FAILURE: Status: ACTIVE | Noted: 2025-06-07

## 2025-06-08 LAB
ABSOLUTE EOSINOPHIL (OHS): 0 K/UL
ABSOLUTE MONOCYTE (OHS): 0.75 K/UL (ref 0.3–1)
ABSOLUTE NEUTROPHIL COUNT (OHS): 7.6 K/UL (ref 1.8–7.7)
ANION GAP (OHS): 15 MMOL/L (ref 8–16)
BASOPHILS # BLD AUTO: 0.02 K/UL
BASOPHILS NFR BLD AUTO: 0.2 %
BUN SERPL-MCNC: 14 MG/DL (ref 6–20)
CALCIUM SERPL-MCNC: 9.6 MG/DL (ref 8.7–10.5)
CHLORIDE SERPL-SCNC: 100 MMOL/L (ref 95–110)
CO2 SERPL-SCNC: 27 MMOL/L (ref 23–29)
CREAT SERPL-MCNC: 0.7 MG/DL (ref 0.5–1.4)
ERYTHROCYTE [DISTWIDTH] IN BLOOD BY AUTOMATED COUNT: 16.3 % (ref 11.5–14.5)
GFR SERPLBLD CREATININE-BSD FMLA CKD-EPI: >60 ML/MIN/1.73/M2
GLUCOSE SERPL-MCNC: 135 MG/DL (ref 70–110)
HCT VFR BLD AUTO: 46.2 % (ref 37–48.5)
HGB BLD-MCNC: 14.3 GM/DL (ref 12–16)
IMM GRANULOCYTES # BLD AUTO: 0.06 K/UL (ref 0–0.04)
IMM GRANULOCYTES NFR BLD AUTO: 0.7 % (ref 0–0.5)
LYMPHOCYTES # BLD AUTO: 0.72 K/UL (ref 1–4.8)
MAGNESIUM SERPL-MCNC: 2.2 MG/DL (ref 1.6–2.6)
MCH RBC QN AUTO: 27 PG (ref 27–31)
MCHC RBC AUTO-ENTMCNC: 31 G/DL (ref 32–36)
MCV RBC AUTO: 87 FL (ref 82–98)
NUCLEATED RBC (/100WBC) (OHS): 0 /100 WBC
PHOSPHATE SERPL-MCNC: 4 MG/DL (ref 2.7–4.5)
PLATELET # BLD AUTO: 337 K/UL (ref 150–450)
PMV BLD AUTO: 8.7 FL (ref 9.2–12.9)
POCT GLUCOSE: 111 MG/DL (ref 70–110)
POCT GLUCOSE: 151 MG/DL (ref 70–110)
POCT GLUCOSE: 167 MG/DL (ref 70–110)
POTASSIUM SERPL-SCNC: 3.9 MMOL/L (ref 3.5–5.1)
RBC # BLD AUTO: 5.3 M/UL (ref 4–5.4)
RELATIVE EOSINOPHIL (OHS): 0 %
RELATIVE LYMPHOCYTE (OHS): 7.9 % (ref 18–48)
RELATIVE MONOCYTE (OHS): 8.2 % (ref 4–15)
RELATIVE NEUTROPHIL (OHS): 83 % (ref 38–73)
SODIUM SERPL-SCNC: 142 MMOL/L (ref 136–145)
WBC # BLD AUTO: 9.15 K/UL (ref 3.9–12.7)

## 2025-06-08 PROCEDURE — 99900035 HC TECH TIME PER 15 MIN (STAT)

## 2025-06-08 PROCEDURE — 0202U NFCT DS 22 TRGT SARS-COV-2: CPT

## 2025-06-08 PROCEDURE — 94640 AIRWAY INHALATION TREATMENT: CPT

## 2025-06-08 PROCEDURE — 87899 AGENT NOS ASSAY W/OPTIC: CPT

## 2025-06-08 PROCEDURE — 36415 COLL VENOUS BLD VENIPUNCTURE: CPT

## 2025-06-08 PROCEDURE — 63600175 PHARM REV CODE 636 W HCPCS: Mod: JZ,TB

## 2025-06-08 PROCEDURE — 25000242 PHARM REV CODE 250 ALT 637 W/ HCPCS

## 2025-06-08 PROCEDURE — 94761 N-INVAS EAR/PLS OXIMETRY MLT: CPT

## 2025-06-08 PROCEDURE — 80048 BASIC METABOLIC PNL TOTAL CA: CPT

## 2025-06-08 PROCEDURE — 85025 COMPLETE CBC W/AUTO DIFF WBC: CPT

## 2025-06-08 PROCEDURE — 63700000 PHARM REV CODE 250 ALT 637 W/O HCPCS

## 2025-06-08 PROCEDURE — 83735 ASSAY OF MAGNESIUM: CPT

## 2025-06-08 PROCEDURE — 11000001 HC ACUTE MED/SURG PRIVATE ROOM

## 2025-06-08 PROCEDURE — 63600175 PHARM REV CODE 636 W HCPCS

## 2025-06-08 PROCEDURE — 84100 ASSAY OF PHOSPHORUS: CPT

## 2025-06-08 PROCEDURE — 27000221 HC OXYGEN, UP TO 24 HOURS

## 2025-06-08 PROCEDURE — 25000003 PHARM REV CODE 250

## 2025-06-08 RX ORDER — CEFTRIAXONE 2 G/1
2 INJECTION, POWDER, FOR SOLUTION INTRAMUSCULAR; INTRAVENOUS
Status: DISCONTINUED | OUTPATIENT
Start: 2025-06-08 | End: 2025-06-10 | Stop reason: HOSPADM

## 2025-06-08 RX ORDER — PREDNISONE 20 MG/1
40 TABLET ORAL DAILY
Status: DISCONTINUED | OUTPATIENT
Start: 2025-06-08 | End: 2025-06-09

## 2025-06-08 RX ORDER — AZITHROMYCIN 250 MG/1
500 TABLET, FILM COATED ORAL DAILY
Status: DISCONTINUED | OUTPATIENT
Start: 2025-06-08 | End: 2025-06-10 | Stop reason: HOSPADM

## 2025-06-08 RX ADMIN — GUAIFENESIN 200 MG: 200 SOLUTION ORAL at 08:06

## 2025-06-08 RX ADMIN — GUAIFENESIN 200 MG: 200 SOLUTION ORAL at 01:06

## 2025-06-08 RX ADMIN — INSULIN ASPART 2 UNITS: 100 INJECTION, SOLUTION INTRAVENOUS; SUBCUTANEOUS at 04:06

## 2025-06-08 RX ADMIN — AZELASTINE HYDROCHLORIDE 137 MCG: 137 SPRAY, METERED NASAL at 08:06

## 2025-06-08 RX ADMIN — ARFORMOTEROL TARTRATE 15 MCG: 15 SOLUTION RESPIRATORY (INHALATION) at 07:06

## 2025-06-08 RX ADMIN — ENOXAPARIN SODIUM 40 MG: 40 INJECTION SUBCUTANEOUS at 04:06

## 2025-06-08 RX ADMIN — DOXYCYCLINE HYCLATE 100 MG: 100 TABLET, COATED ORAL at 09:06

## 2025-06-08 RX ADMIN — METHYLPREDNISOLONE SODIUM SUCCINATE 60 MG: 125 INJECTION, POWDER, FOR SOLUTION INTRAMUSCULAR; INTRAVENOUS at 09:06

## 2025-06-08 RX ADMIN — BUPROPION HYDROCHLORIDE 300 MG: 150 TABLET, EXTENDED RELEASE ORAL at 09:06

## 2025-06-08 RX ADMIN — BUDESONIDE 0.5 MG: 0.5 INHALANT RESPIRATORY (INHALATION) at 07:06

## 2025-06-08 RX ADMIN — INSULIN ASPART 1 UNITS: 100 INJECTION, SOLUTION INTRAVENOUS; SUBCUTANEOUS at 09:06

## 2025-06-08 RX ADMIN — IPRATROPIUM BROMIDE AND ALBUTEROL SULFATE 3 ML: 2.5; .5 SOLUTION RESPIRATORY (INHALATION) at 07:06

## 2025-06-08 RX ADMIN — IPRATROPIUM BROMIDE AND ALBUTEROL SULFATE 3 ML: 2.5; .5 SOLUTION RESPIRATORY (INHALATION) at 12:06

## 2025-06-08 RX ADMIN — IPRATROPIUM BROMIDE AND ALBUTEROL SULFATE 3 ML: 2.5; .5 SOLUTION RESPIRATORY (INHALATION) at 03:06

## 2025-06-08 RX ADMIN — GUAIFENESIN 200 MG: 200 SOLUTION ORAL at 09:06

## 2025-06-08 RX ADMIN — AZELASTINE HYDROCHLORIDE 137 MCG: 137 SPRAY, METERED NASAL at 09:06

## 2025-06-08 RX ADMIN — PANTOPRAZOLE SODIUM 40 MG: 40 TABLET, DELAYED RELEASE ORAL at 09:06

## 2025-06-08 RX ADMIN — AZITHROMYCIN DIHYDRATE 500 MG: 250 TABLET ORAL at 10:06

## 2025-06-08 RX ADMIN — FLUOXETINE HYDROCHLORIDE 10 MG: 10 CAPSULE ORAL at 09:06

## 2025-06-08 RX ADMIN — AMLODIPINE BESYLATE 10 MG: 5 TABLET ORAL at 09:06

## 2025-06-08 RX ADMIN — LOSARTAN POTASSIUM AND HYDROCHLOROTHIAZIDE 1 TABLET: 12.5; 5 TABLET ORAL at 09:06

## 2025-06-08 RX ADMIN — ATORVASTATIN CALCIUM 40 MG: 40 TABLET, FILM COATED ORAL at 08:06

## 2025-06-08 RX ADMIN — CEFTRIAXONE 2 G: 2 INJECTION, POWDER, FOR SOLUTION INTRAMUSCULAR; INTRAVENOUS at 10:06

## 2025-06-08 RX ADMIN — GUAIFENESIN 200 MG: 200 SOLUTION ORAL at 05:06

## 2025-06-08 NOTE — ASSESSMENT & PLAN NOTE
Patient's FSGs are controlled on current medication regimen.  Last A1c reviewed-   Lab Results   Component Value Date    HGBA1C 7.3 (H) 05/16/2025     Most recent fingerstick glucose reviewed-   Recent Labs   Lab 06/07/25  1604 06/07/25  2147 06/08/25  0722 06/08/25  1120   POCTGLUCOSE 155* 152* 111* 151*     Current correctional scale  Medium  Maintain anti-hyperglycemic dose as follows-   Antihyperglycemics (From admission, onward)      Start     Stop Route Frequency Ordered    06/07/25 1514  insulin aspart U-100 pen 0-10 Units         -- SubQ Before meals & nightly PRN 06/07/25 1415          MDSSI prn   Poct glucose ac/hs   ADA diet, BG goal 140-180  Hold Oral hypoglycemics while patient is in the hospital

## 2025-06-08 NOTE — NURSING
[facility]  Nurses Note -- 4 Eyes      6/8/2025       Skin assessed on: Q Shift      [x] No Pressure Injuries Present    []Prevention Measures Documented    [] Yes LDA  for Pressure Injury Previously documented     [] Yes New Pressure Injury Discovered   [] LDA for New Pressure Injury Added      Attending RN:  Estela Livingston RN     Second RN:  Marilou

## 2025-06-08 NOTE — PLAN OF CARE
Case Management Assessment - [facility] [place]    PCP: Fiorella Acevedo FNP-C    Pharmacy:   [facility]/pharmacy #[id number] - [place] - [address]  [address] [address]  Phone: [phone] Fax: [phone]        Patient Arrived From: Home  Existing Help at Home: Significant other Fran [phone]     Barriers to Discharge: None    Discharge Plan:    A. Home with Family   B. Home      CM spoke with patient via phone to complete discharge planning. Patient is independent and lives with significant other Fran. Patient uses a nebulizer at home and does not have any CM/HH services. Fran will provide needed support and transportation home upon discharge.      06/08/25 1437   Discharge Assessment   Assessment Type Discharge Planning Assessment   Confirmed/corrected address, phone number and insurance Yes   Confirmed Demographics Correct on Facesheet   Source of Information patient   People in Home significant other   Name(s) of People in Home Significant other Fran [phone]   Do you expect to return to your current living situation? Yes   Do you have help at home or someone to help you manage your care at home? Yes   Who are your caregiver(s) and their phone number(s)? Significant other Fran [phone]   Prior to hospitilization cognitive status: Alert/Oriented   Current cognitive status: Alert/Oriented   Walking or Climbing Stairs Difficulty no   Dressing/Bathing Difficulty no   Equipment Currently Used at Home none   Readmission within 30 days? No   Patient currently being followed by outpatient case management? No   Do you currently have service(s) that help you manage your care at home? No   Do you take prescription medications? Yes   Do you have prescription coverage? Yes   Do you have any problems affording any of your prescribed medications? No   Is the patient taking medications as prescribed? yes   Who is going to help you get home at discharge? Significant other Fran [phone]   How do you get  to doctors appointments? car, drives self   Are you on dialysis? No   Do you take coumadin? No   Discharge Plan A Home with family   Discharge Plan B Home with family   DME Needed Upon Discharge    (TBD)   Discharge Plan discussed with: Patient   Transition of Care Barriers None   Physical Activity   On average, how many minutes do you engage in exercise at this level? 0 min   Financial Resource Strain   How hard is it for you to pay for the very basics like food, housing, medical care, and heating? Not very   Housing Stability   In the last 12 months, was there a time when you were not able to pay the mortgage or rent on time? N   At any time in the past 12 months, were you homeless or living in a shelter (including now)? N   Transportation Needs   In the past 12 months, has lack of transportation kept you from medical appointments or from getting medications? no   In the past 12 months, has lack of transportation kept you from meetings, work, or from getting things needed for daily living? No   Food Insecurity   Within the past 12 months, you worried that your food would run out before you got the money to buy more. Never true   Within the past 12 months, the food you bought just didn't last and you didn't have money to get more. Never true   Alcohol Use   Q2: How many drinks containing alcohol do you have on a typical day when you are drinking?   (Occasionally)

## 2025-06-08 NOTE — NURSING
Testing O2    O2 Sats on RA at rest = 91%    O2 sats on RA with exercise  = 90%    O2 sats on 2 L O2 with exercise = 92%

## 2025-06-08 NOTE — PLAN OF CARE
Problem: Adult Inpatient Plan of Care  Goal: Plan of Care Review  Outcome: Progressing  Flowsheets (Taken 6/8/2025 0815)  Plan of Care Reviewed With: patient  Goal: Patient-Specific Goal (Individualized)  Outcome: Progressing     Problem: COPD (Chronic Obstructive Pulmonary Disease)  Goal: Optimal Chronic Illness Coping  Outcome: Progressing  Intervention: Support and Optimize Psychosocial Response  Flowsheets (Taken 6/8/2025 0815)  Supportive Measures:   active listening utilized   self-care encouraged   verbalization of feelings encouraged     Problem: Adult Inpatient Plan of Care  Goal: Plan of Care Review  Outcome: Progressing  Flowsheets (Taken 6/8/2025 0815)  Plan of Care Reviewed With: patient     Problem: Adult Inpatient Plan of Care  Goal: Plan of Care Review  Outcome: Progressing  Flowsheets (Taken 6/8/2025 0815)  Plan of Care Reviewed With: patient     Problem: Adult Inpatient Plan of Care  Goal: Patient-Specific Goal (Individualized)  Outcome: Progressing     Problem: Adult Inpatient Plan of Care  Goal: Patient-Specific Goal (Individualized)  Outcome: Progressing     Problem: COPD (Chronic Obstructive Pulmonary Disease)  Goal: Optimal Chronic Illness Coping  Outcome: Progressing  Intervention: Support and Optimize Psychosocial Response  Flowsheets (Taken 6/8/2025 0815)  Supportive Measures:   active listening utilized   self-care encouraged   verbalization of feelings encouraged     Problem: COPD (Chronic Obstructive Pulmonary Disease)  Goal: Optimal Chronic Illness Coping  Outcome: Progressing     Problem: COPD (Chronic Obstructive Pulmonary Disease)  Goal: Optimal Chronic Illness Coping  Intervention: Support and Optimize Psychosocial Response  Flowsheets (Taken 6/8/2025 0815)  Supportive Measures:   active listening utilized   self-care encouraged   verbalization of feelings encouraged     Problem: COPD (Chronic Obstructive Pulmonary Disease)  Goal: Optimal Chronic Illness Coping  Intervention:  Support and Optimize Psychosocial Response  Flowsheets (Taken 6/8/2025 4362)  Supportive Measures:   active listening utilized   self-care encouraged   verbalization of feelings encouraged

## 2025-06-08 NOTE — PLAN OF CARE
Problem: Adult Inpatient Plan of Care  Goal: Plan of Care Review  Outcome: Progressing  Goal: Patient-Specific Goal (Individualized)  Outcome: Progressing  Goal: Absence of Hospital-Acquired Illness or Injury  Outcome: Progressing  Goal: Optimal Comfort and Wellbeing  Outcome: Progressing  Goal: Readiness for Transition of Care  Outcome: Progressing     Problem: COPD (Chronic Obstructive Pulmonary Disease)  Goal: Optimal Chronic Illness Coping  Outcome: Progressing  Goal: Optimal Level of Functional [place]  Outcome: Progressing  Goal: Absence of Infection Signs and Symptoms  Outcome: Progressing  Goal: Improved Oral Intake  Outcome: Progressing  Goal: Effective Oxygenation and Ventilation  Outcome: Progressing     Problem: Diabetes Comorbidity  Goal: Blood Glucose Level Within Targeted Range  Outcome: Progressing

## 2025-06-08 NOTE — ASSESSMENT & PLAN NOTE
Patient's blood pressure range in the last 24 hours was: BP  Min: 114/62  Max: 169/81.The patient's inpatient anti-hypertensive regimen is listed below:  Current Antihypertensives  amLODIPine tablet 10 mg, Daily, Oral  losartan-hydrochlorothiazide 50-12.5 mg per tablet 1 tablet, Daily, Oral    Plan  - BP is controlled, no changes needed to their regimen

## 2025-06-08 NOTE — ASSESSMENT & PLAN NOTE
Continue supplemental oxygen to maintain saturation greater than 92%  Encourage incentive spirometry  Treat underlying cause

## 2025-06-08 NOTE — PROGRESS NOTES
[facility]  Progress Note    Patient Name: Kemi Dominguez  MRN: [id number]  Patient Class: IP- Inpatient   Admission Date: 6/7/2025  Length of Stay: 1 days  Attending Physician: Paxton Ferraro MD  Primary Care Provider: Fiorella Acevedo FNP-C        Subjective     Principal Problem:COPD exacerbation        HPI:  Kemi Dominguez is a 60 y.o. female with a pmh of HTN, HLD, DM2, COPD not on home O2, PETEY, GERD presents to the hospital with worsening shortness of breath, cough, and congestion for the past several days.     Patient was recently seen at a Urgent Care 3 days ago for sob, cough, congestion, rhinorrhea and was prescribed Z-pack for an URI. Patient states her symptoms have not improved and endorses worsening shortness of breath with a productive cough (thick yellow sputum). Patient has been using DuoNebs at home without much relief. Patient also states she took Medrol Dosepak which she had at home.  Patient denies any other alleviating or exacerbating factors.  Patient denies headache, blurry vision, fever/chills, chest pain, nausea, vomiting, diarrhea, abdominal pain, hematemesis, hematochezia, hematuria, or any other associated symptoms.     In the ED: Patient afebrile without leukocytosis, slightly hypertensive on presentation 141/65, 89% O2 sat on RA which improved to 92-94% on 3 L oxygen via NC, CBC unremarkable, potassium 3.4, normal BNP, normal troponin, lactic acid normal, COVID/influenza negative, chest x-ray shows coarsed interstitial lung markings with suspected left basilar atelectasis and possible trace pleural effusion however no consolidation or pneumothorax.  Patient given duo neb treatment and IV methylprednisolone 125 mg in the ED. Case discussed with ED provider and patient admitted to hospital medicine for further medical management.    Overview/Hospital Course:  No notes on file    Interval History:  Patient stated that she had signs of shortness of breaths  last night; endorsed 45 pack year smoking history.  We will obtain low-dose CT scan to further delineate pulmonary anatomy.    Review of Systems  Objective:     Vital Signs (Most Recent):  Temp: 98.5 °F (36.9 °C) (06/08/25 1118)  Pulse: 97 (06/08/25 1218)  Resp: 17 (06/08/25 1218)  BP: (!) 169/81 (06/08/25 1118)  SpO2: (!) 92 % (06/08/25 1218) Vital Signs (24h Range):  Temp:  [97.8 °F (36.6 °C)-98.5 °F (36.9 °C)] 98.5 °F (36.9 °C)  Pulse:  [] 97  Resp:  [17-20] 17  SpO2:  [90 %-95 %] 92 %  BP: (114-169)/(62-83) 169/81     Weight: 104 kg (229 lb 4.5 oz)  Body mass index is 37.01 kg/m².  No intake or output data in the 24 hours ending 06/08/25 1430      Physical Exam  Constitutional:       General: She is not in acute distress.     Appearance: She is not ill-appearing, toxic-appearing or diaphoretic.   Cardiovascular:      Rate and Rhythm: Normal rate and regular rhythm.      Pulses: Normal pulses.      Heart sounds: Normal heart sounds.   Pulmonary:      Breath sounds: Wheezing present.      Comments: Reduced air entry bilaterally; slightly increased work of breathing  Abdominal:      General: Abdomen is flat. Bowel sounds are normal. There is no distension.      Palpations: Abdomen is soft.   Musculoskeletal:      Right lower leg: No edema.      Left lower leg: No edema.   Neurological:      General: No focal deficit present.               Significant Labs: CBC:   Recent Labs   Lab 06/07/25  1005 06/08/25  0433   WBC 12.22 9.15   HGB 14.0 14.3   HCT 44.0 46.2    337     CMP:   Recent Labs   Lab 06/07/25  1005 06/08/25  0433    142   K 3.4* 3.9    100   CO2 24 27   * 135*   BUN 10 14   CREATININE 0.7 0.7   CALCIUM 9.2 9.6   PROT 7.3  --    ALBUMIN 3.4*  --    BILITOT 0.4  --    ALKPHOS 102  --    AST 20  --    ALT 21  --    ANIONGAP 13 15       Significant Imaging: I have reviewed all pertinent imaging results/findings within the past 24 hours.      Assessment & Plan  COPD  exacerbation  Patient presents with a history of COPD presents to the hospital with complaints of worsening shortness of breath, cough, congestion, adn wheezing for the past 4-5 days. Was seen in Urgent Care 4 days ago and prescribed Z-pack for URI. Denies any relief with home breathing treatments or inhalers.  Afebrile without leukocytosis, pulse oximetry 92% on 2L O2 via NC. Patient given DuoNebs and IV Methylprednisolone 125mg in the ED without much relief in symptoms. Patient has a history of smoking cigarettes 1.5 packs per day for 30 years (quit 6 years ago).   Patient in COPD exacerbation secondary to recent URI.     Plan:  Procalcitonin not elevated; respiratory panel ordered   continue budesonide and supplemental oxygen as needed (wean as tolerated); PRN Dounebs, Abx  Obtain low-dose CT chest to better delineate pulmonary anatomy  We will need pulmonology follow up with pulmonary rehabilitation upon discharge  Essential hypertension  Patient's blood pressure range in the last 24 hours was: BP  Min: 114/62  Max: 169/81.The patient's inpatient anti-hypertensive regimen is listed below:  Current Antihypertensives  amLODIPine tablet 10 mg, Daily, Oral  losartan-hydrochlorothiazide 50-12.5 mg per tablet 1 tablet, Daily, Oral    Plan  - BP is controlled, no changes needed to their regimen  Mixed hyperlipidemia  Continue statin   Type 2 diabetes mellitus with complication, without long-term current use of insulin  Patient's FSGs are controlled on current medication regimen.  Last A1c reviewed-   Lab Results   Component Value Date    HGBA1C 7.3 (H) 05/16/2025     Most recent fingerstick glucose reviewed-   Recent Labs   Lab 06/07/25  1604 06/07/25  2147 06/08/25  0722 06/08/25  1120   POCTGLUCOSE 155* 152* 111* 151*     Current correctional scale  Medium  Maintain anti-hyperglycemic dose as follows-   Antihyperglycemics (From admission, onward)      Start     Stop Route Frequency Ordered    06/07/25 1514  insulin  aspart U-100 pen 0-10 Units         -- SubQ Before meals & nightly PRN 06/07/25 1415          MDSSI prn   Poct glucose ac/hs   ADA diet, BG goal 140-180  Hold Oral hypoglycemics while patient is in the hospital  Class 2 severe obesity due to excess calories with serious comorbidity and body mass index (BMI) of 37.0 to 37.9 in adult  Body mass index is 37.01 kg/m². Morbid obesity complicates all aspects of disease management from diagnostic modalities to treatment. Weight loss encouraged and health benefits explained to patient.   Acute hypoxic respiratory failure  Continue supplemental oxygen to maintain saturation greater than 92%  Encourage incentive spirometry  Treat underlying cause  PETEY (obstructive sleep apnea)  History noted, patient does not use CPAP  Sleep-pulmonology referral upon discharge  VTE Risk Mitigation (From admission, onward)           Ordered     enoxaparin injection 40 mg  Daily         06/07/25 1414     IP VTE HIGH RISK PATIENT  Once         06/07/25 1414     Place sequential compression device  Until discontinued         06/07/25 1414                    Discharge Planning   MILO:      Code Status: Full Code   Medical Readiness for Discharge Date:                            Paxton Ferraro MD  Department of Hospital Medicine   [facility]

## 2025-06-08 NOTE — SUBJECTIVE & OBJECTIVE
Interval History:  Patient stated that she had signs of shortness of breaths last night; endorsed 45 pack year smoking history.  We will obtain low-dose CT scan to further delineate pulmonary anatomy.    Review of Systems  Objective:     Vital Signs (Most Recent):  Temp: 98.5 °F (36.9 °C) (06/08/25 1118)  Pulse: 97 (06/08/25 1218)  Resp: 17 (06/08/25 1218)  BP: (!) 169/81 (06/08/25 1118)  SpO2: (!) 92 % (06/08/25 1218) Vital Signs (24h Range):  Temp:  [97.8 °F (36.6 °C)-98.5 °F (36.9 °C)] 98.5 °F (36.9 °C)  Pulse:  [] 97  Resp:  [17-20] 17  SpO2:  [90 %-95 %] 92 %  BP: (114-169)/(62-83) 169/81     Weight: 104 kg (229 lb 4.5 oz)  Body mass index is 37.01 kg/m².  No intake or output data in the 24 hours ending 06/08/25 1430      Physical Exam  Constitutional:       General: She is not in acute distress.     Appearance: She is not ill-appearing, toxic-appearing or diaphoretic.   Cardiovascular:      Rate and Rhythm: Normal rate and regular rhythm.      Pulses: Normal pulses.      Heart sounds: Normal heart sounds.   Pulmonary:      Breath sounds: Wheezing present.      Comments: Reduced air entry bilaterally; slightly increased work of breathing  Abdominal:      General: Abdomen is flat. Bowel sounds are normal. There is no distension.      Palpations: Abdomen is soft.   Musculoskeletal:      Right lower leg: No edema.      Left lower leg: No edema.   Neurological:      General: No focal deficit present.               Significant Labs: CBC:   Recent Labs   Lab 06/07/25  1005 06/08/25  0433   WBC 12.22 9.15   HGB 14.0 14.3   HCT 44.0 46.2    337     CMP:   Recent Labs   Lab 06/07/25  1005 06/08/25  0433    142   K 3.4* 3.9    100   CO2 24 27   * 135*   BUN 10 14   CREATININE 0.7 0.7   CALCIUM 9.2 9.6   PROT 7.3  --    ALBUMIN 3.4*  --    BILITOT 0.4  --    ALKPHOS 102  --    AST 20  --    ALT 21  --    ANIONGAP 13 15       Significant Imaging: I have reviewed all pertinent imaging  results/findings within the past 24 hours.

## 2025-06-08 NOTE — ASSESSMENT & PLAN NOTE
Patient presents with a history of COPD presents to the hospital with complaints of worsening shortness of breath, cough, congestion, adn wheezing for the past 4-5 days. Was seen in Urgent Care 4 days ago and prescribed Z-pack for URI. Denies any relief with home breathing treatments or inhalers.  Afebrile without leukocytosis, pulse oximetry 92% on 2L O2 via NC. Patient given DuoNebs and IV Methylprednisolone 125mg in the ED without much relief in symptoms. Patient has a history of smoking cigarettes 1.5 packs per day for 30 years (quit 6 years ago).   Patient in COPD exacerbation secondary to recent URI.     Plan:  Procalcitonin not elevated; respiratory panel ordered   continue budesonide and supplemental oxygen as needed (wean as tolerated); PRN Dounebs, Abx  Obtain low-dose CT chest to better delineate pulmonary anatomy  We will need pulmonology follow up with pulmonary rehabilitation upon discharge

## 2025-06-09 PROBLEM — R91.8 MULTIPLE PULMONARY NODULES DETERMINED BY COMPUTED TOMOGRAPHY OF LUNG: Status: ACTIVE | Noted: 2025-06-09

## 2025-06-09 LAB
ABSOLUTE EOSINOPHIL (OHS): 0 K/UL
ABSOLUTE MONOCYTE (OHS): 0.82 K/UL (ref 0.3–1)
ABSOLUTE NEUTROPHIL COUNT (OHS): 7.42 K/UL (ref 1.8–7.7)
ANION GAP (OHS): 9 MMOL/L (ref 8–16)
B PERT DNA NPH QL NAA+PROBE: NOT DETECTED
BASOPHILS # BLD AUTO: 0.03 K/UL
BASOPHILS NFR BLD AUTO: 0.3 %
BUN SERPL-MCNC: 15 MG/DL (ref 6–20)
C PNEUM DNA LOWER RESP QL NAA+NON-PROBE: NOT DETECTED
CALCIUM SERPL-MCNC: 8.9 MG/DL (ref 8.7–10.5)
CHLORIDE SERPL-SCNC: 103 MMOL/L (ref 95–110)
CO2 SERPL-SCNC: 28 MMOL/L (ref 23–29)
CREAT SERPL-MCNC: 0.6 MG/DL (ref 0.5–1.4)
ERYTHROCYTE [DISTWIDTH] IN BLOOD BY AUTOMATED COUNT: 15.9 % (ref 11.5–14.5)
FLUAV RNA NPH QL NAA+NON-PROBE: NOT DETECTED
FLUBV RNA NPH QL NAA+NON-PROBE: NOT DETECTED
GFR SERPLBLD CREATININE-BSD FMLA CKD-EPI: >60 ML/MIN/1.73/M2
GLUCOSE SERPL-MCNC: 162 MG/DL (ref 70–110)
HADV DNA NPH QL NAA+NON-PROBE: NOT DETECTED
HCOV 229E RNA NPH QL NAA+NON-PROBE: NOT DETECTED
HCOV HKU1 RNA NPH QL NAA+NON-PROBE: NOT DETECTED
HCOV NL63 RNA NPH QL NAA+NON-PROBE: NOT DETECTED
HCOV OC43 RNA NPH QL NAA+NON-PROBE: NOT DETECTED
HCT VFR BLD AUTO: 43.4 % (ref 37–48.5)
HGB BLD-MCNC: 13 GM/DL (ref 12–16)
HMPV RNA LOWER RESP QL NAA+NON-PROBE: NOT DETECTED
HMPV RNA NPH QL NAA+NON-PROBE: NOT DETECTED
HPIV1 RNA NPH QL NAA+NON-PROBE: NOT DETECTED
HPIV2 RNA NPH QL NAA+NON-PROBE: NOT DETECTED
HPIV3 RNA NPH QL NAA+NON-PROBE: NOT DETECTED
HPIV4 RNA NPH QL NAA+NON-PROBE: NOT DETECTED
IMM GRANULOCYTES # BLD AUTO: 0.03 K/UL (ref 0–0.04)
IMM GRANULOCYTES NFR BLD AUTO: 0.3 % (ref 0–0.5)
LYMPHOCYTES # BLD AUTO: 1.65 K/UL (ref 1–4.8)
MAGNESIUM SERPL-MCNC: 2 MG/DL (ref 1.6–2.6)
MCH RBC QN AUTO: 26.3 PG (ref 27–31)
MCHC RBC AUTO-ENTMCNC: 30 G/DL (ref 32–36)
MCV RBC AUTO: 88 FL (ref 82–98)
NUCLEATED RBC (/100WBC) (OHS): 0 /100 WBC
PHOSPHATE SERPL-MCNC: 3.1 MG/DL (ref 2.7–4.5)
PLATELET # BLD AUTO: 329 K/UL (ref 150–450)
PMV BLD AUTO: 8.7 FL (ref 9.2–12.9)
POCT GLUCOSE: 144 MG/DL (ref 70–110)
POCT GLUCOSE: 161 MG/DL (ref 70–110)
POCT GLUCOSE: 188 MG/DL (ref 70–110)
POCT GLUCOSE: 208 MG/DL (ref 70–110)
POCT GLUCOSE: 241 MG/DL (ref 70–110)
POTASSIUM SERPL-SCNC: 3.2 MMOL/L (ref 3.5–5.1)
RBC # BLD AUTO: 4.95 M/UL (ref 4–5.4)
RELATIVE EOSINOPHIL (OHS): 0 %
RELATIVE LYMPHOCYTE (OHS): 16.6 % (ref 18–48)
RELATIVE MONOCYTE (OHS): 8.2 % (ref 4–15)
RELATIVE NEUTROPHIL (OHS): 74.6 % (ref 38–73)
RSV RNA NPH QL NAA+NON-PROBE: NOT DETECTED
RSV RNA NPH QL NAA+NON-PROBE: NOT DETECTED
RV+EV RNA NPH QL NAA+NON-PROBE: DETECTED
SARS-COV-2 RNA RESP QL NAA+PROBE: NOT DETECTED
SODIUM SERPL-SCNC: 140 MMOL/L (ref 136–145)
SPECIMEN SOURCE: ABNORMAL
WBC # BLD AUTO: 9.95 K/UL (ref 3.9–12.7)

## 2025-06-09 PROCEDURE — 80048 BASIC METABOLIC PNL TOTAL CA: CPT

## 2025-06-09 PROCEDURE — 99900035 HC TECH TIME PER 15 MIN (STAT)

## 2025-06-09 PROCEDURE — 25000242 PHARM REV CODE 250 ALT 637 W/ HCPCS

## 2025-06-09 PROCEDURE — 63600175 PHARM REV CODE 636 W HCPCS

## 2025-06-09 PROCEDURE — 27000221 HC OXYGEN, UP TO 24 HOURS

## 2025-06-09 PROCEDURE — 83735 ASSAY OF MAGNESIUM: CPT

## 2025-06-09 PROCEDURE — 63700000 PHARM REV CODE 250 ALT 637 W/O HCPCS

## 2025-06-09 PROCEDURE — 36415 COLL VENOUS BLD VENIPUNCTURE: CPT

## 2025-06-09 PROCEDURE — 94640 AIRWAY INHALATION TREATMENT: CPT

## 2025-06-09 PROCEDURE — 84100 ASSAY OF PHOSPHORUS: CPT

## 2025-06-09 PROCEDURE — 94761 N-INVAS EAR/PLS OXIMETRY MLT: CPT

## 2025-06-09 PROCEDURE — 85025 COMPLETE CBC W/AUTO DIFF WBC: CPT

## 2025-06-09 PROCEDURE — 99223 1ST HOSP IP/OBS HIGH 75: CPT | Mod: ,,, | Performed by: INTERNAL MEDICINE

## 2025-06-09 PROCEDURE — 25000003 PHARM REV CODE 250

## 2025-06-09 PROCEDURE — 11000001 HC ACUTE MED/SURG PRIVATE ROOM

## 2025-06-09 RX ORDER — PREDNISONE 20 MG/1
20 TABLET ORAL DAILY
Status: DISCONTINUED | OUTPATIENT
Start: 2025-06-11 | End: 2025-06-10 | Stop reason: HOSPADM

## 2025-06-09 RX ORDER — PREDNISONE 20 MG/1
40 TABLET ORAL DAILY
Status: DISCONTINUED | OUTPATIENT
Start: 2025-06-09 | End: 2025-06-09

## 2025-06-09 RX ORDER — PREDNISONE 20 MG/1
40 TABLET ORAL DAILY
Status: COMPLETED | OUTPATIENT
Start: 2025-06-10 | End: 2025-06-10

## 2025-06-09 RX ADMIN — AMLODIPINE BESYLATE 10 MG: 5 TABLET ORAL at 09:06

## 2025-06-09 RX ADMIN — IPRATROPIUM BROMIDE AND ALBUTEROL SULFATE 3 ML: 2.5; .5 SOLUTION RESPIRATORY (INHALATION) at 07:06

## 2025-06-09 RX ADMIN — GUAIFENESIN 200 MG: 200 SOLUTION ORAL at 10:06

## 2025-06-09 RX ADMIN — AZELASTINE HYDROCHLORIDE 137 MCG: 137 SPRAY, METERED NASAL at 09:06

## 2025-06-09 RX ADMIN — BUDESONIDE 0.5 MG: 0.5 INHALANT RESPIRATORY (INHALATION) at 07:06

## 2025-06-09 RX ADMIN — PREDNISONE 40 MG: 20 TABLET ORAL at 03:06

## 2025-06-09 RX ADMIN — PANTOPRAZOLE SODIUM 40 MG: 40 TABLET, DELAYED RELEASE ORAL at 09:06

## 2025-06-09 RX ADMIN — GUAIFENESIN 200 MG: 200 SOLUTION ORAL at 05:06

## 2025-06-09 RX ADMIN — LOSARTAN POTASSIUM AND HYDROCHLOROTHIAZIDE 1 TABLET: 12.5; 5 TABLET ORAL at 09:06

## 2025-06-09 RX ADMIN — INSULIN ASPART 4 UNITS: 100 INJECTION, SOLUTION INTRAVENOUS; SUBCUTANEOUS at 06:06

## 2025-06-09 RX ADMIN — IPRATROPIUM BROMIDE AND ALBUTEROL SULFATE 3 ML: 2.5; .5 SOLUTION RESPIRATORY (INHALATION) at 01:06

## 2025-06-09 RX ADMIN — FLUOXETINE HYDROCHLORIDE 10 MG: 10 CAPSULE ORAL at 09:06

## 2025-06-09 RX ADMIN — IPRATROPIUM BROMIDE AND ALBUTEROL SULFATE 3 ML: 2.5; .5 SOLUTION RESPIRATORY (INHALATION) at 03:06

## 2025-06-09 RX ADMIN — AZITHROMYCIN DIHYDRATE 500 MG: 250 TABLET ORAL at 09:06

## 2025-06-09 RX ADMIN — INSULIN ASPART 2 UNITS: 100 INJECTION, SOLUTION INTRAVENOUS; SUBCUTANEOUS at 09:06

## 2025-06-09 RX ADMIN — METHYLPREDNISOLONE SODIUM SUCCINATE 40 MG: 40 INJECTION, POWDER, FOR SOLUTION INTRAMUSCULAR; INTRAVENOUS at 11:06

## 2025-06-09 RX ADMIN — ATORVASTATIN CALCIUM 40 MG: 40 TABLET, FILM COATED ORAL at 09:06

## 2025-06-09 RX ADMIN — BUPROPION HYDROCHLORIDE 300 MG: 150 TABLET, EXTENDED RELEASE ORAL at 09:06

## 2025-06-09 RX ADMIN — ARFORMOTEROL TARTRATE 15 MCG: 15 SOLUTION RESPIRATORY (INHALATION) at 07:06

## 2025-06-09 RX ADMIN — CEFTRIAXONE 2 G: 2 INJECTION, POWDER, FOR SOLUTION INTRAMUSCULAR; INTRAVENOUS at 09:06

## 2025-06-09 RX ADMIN — PREDNISONE 40 MG: 20 TABLET ORAL at 09:06

## 2025-06-09 RX ADMIN — ENOXAPARIN SODIUM 40 MG: 40 INJECTION SUBCUTANEOUS at 04:06

## 2025-06-09 NOTE — ASSESSMENT & PLAN NOTE
Patient's COPD is with exacerbation noted by continued dyspnea and worsening of baseline hypoxia currently.  Patient is currently on COPD Pathway. Continue scheduled inhalers Steroids, Antibiotics, and Supplemental oxygen and monitor respiratory status closely.   Exacerbation secondary to rhinovirus on azithromycin, methylprednisolone and supplemental oxygen. Transition to prednisone 40 mg daily for 2 more days starting tomorrow and then 7 additional days of 20 mg. Finish 3 days of azithromycin 500 mg tomorrow.   - switch ICS/LABA nebulizers to ICS/LABA inhaler with LAMA or check in patient home trelegy.   - continue PRN radhaonebs

## 2025-06-09 NOTE — ASSESSMENT & PLAN NOTE
Patient's FSGs are controlled on current medication regimen.  Last A1c reviewed-   Lab Results   Component Value Date    HGBA1C 7.3 (H) 05/16/2025     Most recent fingerstick glucose reviewed-   Recent Labs   Lab 06/08/25  2109 06/09/25  0735 06/09/25  1130 06/09/25  1615   POCTGLUCOSE 188* 144* 161* 241*     Current correctional scale  Medium  Maintain anti-hyperglycemic dose as follows-   Antihyperglycemics (From admission, onward)      Start     Stop Route Frequency Ordered    06/07/25 1514  insulin aspart U-100 pen 0-10 Units         -- SubQ Before meals & nightly PRN 06/07/25 1415          MDSSI prn   Poct glucose ac/hs   ADA diet, BG goal 140-180  Hold Oral hypoglycemics while patient is in the hospital

## 2025-06-09 NOTE — ASSESSMENT & PLAN NOTE
Patient's blood pressure range in the last 24 hours was: BP  Min: 112/69  Max: 137/77.The patient's inpatient anti-hypertensive regimen is listed below:  Current Antihypertensives  amLODIPine tablet 10 mg, Daily, Oral  losartan-hydrochlorothiazide 50-12.5 mg per tablet 1 tablet, Daily, Oral    Plan  - BP is controlled, no changes needed to their regimen

## 2025-06-09 NOTE — PLAN OF CARE
Problem: Adult Inpatient Plan of Care  Goal: Plan of Care Review  Outcome: Progressing  Flowsheets (Taken 6/9/2025 0815)  Plan of Care Reviewed With: patient  Goal: Patient-Specific Goal (Individualized)  Outcome: Progressing     Problem: COPD (Chronic Obstructive Pulmonary Disease)  Goal: Optimal Chronic Illness Coping  Outcome: Progressing  Intervention: Support and Optimize Psychosocial Response  Flowsheets (Taken 6/9/2025 0815)  Supportive Measures:   active listening utilized   self-care encouraged   verbalization of feelings encouraged  Family/Support System Care: self-care encouraged     Problem: Adult Inpatient Plan of Care  Goal: Plan of Care Review  Outcome: Progressing  Flowsheets (Taken 6/9/2025 0815)  Plan of Care Reviewed With: patient     Problem: Adult Inpatient Plan of Care  Goal: Plan of Care Review  Outcome: Progressing  Flowsheets (Taken 6/9/2025 0815)  Plan of Care Reviewed With: patient     Problem: Adult Inpatient Plan of Care  Goal: Patient-Specific Goal (Individualized)  Outcome: Progressing     Problem: Adult Inpatient Plan of Care  Goal: Patient-Specific Goal (Individualized)  Outcome: Progressing     Problem: COPD (Chronic Obstructive Pulmonary Disease)  Goal: Optimal Chronic Illness Coping  Outcome: Progressing  Intervention: Support and Optimize Psychosocial Response  Flowsheets (Taken 6/9/2025 0815)  Supportive Measures:   active listening utilized   self-care encouraged   verbalization of feelings encouraged  Family/Support System Care: self-care encouraged     Problem: COPD (Chronic Obstructive Pulmonary Disease)  Goal: Optimal Chronic Illness Coping  Outcome: Progressing     Problem: COPD (Chronic Obstructive Pulmonary Disease)  Goal: Optimal Chronic Illness Coping  Intervention: Support and Optimize Psychosocial Response  Flowsheets (Taken 6/9/2025 0815)  Supportive Measures:   active listening utilized   self-care encouraged   verbalization of feelings encouraged  Family/Support  System Care: self-care encouraged     Problem: COPD (Chronic Obstructive Pulmonary Disease)  Goal: Optimal Chronic Illness Coping  Intervention: Support and Optimize Psychosocial Response  Flowsheets (Taken 6/9/2025 1215)  Supportive Measures:   active listening utilized   self-care encouraged   verbalization of feelings encouraged  Family/Support System Care: self-care encouraged

## 2025-06-09 NOTE — ASSESSMENT & PLAN NOTE
Patient with Hypoxic Respiratory failure which is Acute.  she is on home oxygen at 0 LPM. Supplemental oxygen was provided and noted-      .   Signs/symptoms of respiratory failure include- tachypnea, increased work of breathing, and wheezing. Contributing diagnoses includes - COPD Labs and images were reviewed. Patient Has not had a recent ABG. Will treat underlying causes and adjust management of respiratory failure as follows-   - would discharge home with 2L NC with home concentrator and portable concentrator. Follow up with pulmonology outpatient with PFTs and walk test.

## 2025-06-09 NOTE — CONSULTS
[facility] [place] Tele[place]  Pulmonology  Consult Note    Patient Name: Kemi Dominguez  MRN: [id number]  Admission Date: 6/7/2025  Hospital Length of Stay: 2 days  Code Status: Full Code  Attending Physician: Paxton Ferraro MD  Primary Care Provider: Fiorella Acevedo FNP-C   Principal Problem: COPD exacerbation    Inpatient consult to Pulmonology  Consult performed by: Mikey Mcfarland MD  Consult ordered by: Paxton Ferraro MD        Subjective:     HPI:  Pt is a 61 yo CF pmh COPD on trelegy (No pfts in our system, but states she has had them performed with her previous Pulm), HTN, HLD, DM2 who presented for shortness of breath and cough. Had been seen in urgent care the previous week for cough that was associated with mild shortness of breath and sore throat. CXR with mild bibasilar atelectasis. Was found to be hypoxic and started on oxygen. Was started on methylprednisolone and azithromycin to treat COPD exacerbation. Walk test today showed desaturation to 86% and CT showed bilateral diffuse centrilobular micro nodules with some associated tree and bud formation. Had larger LLL 6 mm pulmonary nodule. Patient recently traveled via cruise ship to [place] and [place], but did not feel sick during or after. 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 Normally she is well controlled if she uses her Trelegy, with intermittent use of Airsupra. Was switched to these approximately 3 weeks ago from Advair and combivent. She notes that she was not using her Advair as she was supposed to requiring her combivent frequently. No history of other pulmonary disease with herself.     Past Medical History:   Diagnosis Date    Class 3 severe obesity due to excess calories with serious comorbidity and body mass index (BMI) of 40.0 to 44.9 in adult 10/17/2018    COPD (chronic obstructive pulmonary disease)     Diabetes mellitus, type 2     GERD (gastroesophageal reflux disease)     Hyperlipidemia     Hypertension      Sleep apnea     Vitamin D deficiency        Past Surgical History:   Procedure Laterality Date    ADENOIDECTOMY      BREAST BIOPSY      BREAST SURGERY Right     breast biopsy    COLONOSCOPY N/A 09/10/2021    Procedure: COLONOSCOPY;  Surgeon: Holland Royal MD;  Location: [facility];  Service: Endoscopy;  Laterality: N/A;    TONSILLECTOMY      TRANSFORAMINAL EPIDURAL INJECTION OF STEROID Left 2019    Procedure: Injection,steroid,epidural,transforaminal approach;  Surgeon: Gilberto Michelle MD;  Location: [facility] OR;  Service: Pain Management;  Laterality: Left;  L5-S1, S1    TRANSFORAMINAL EPIDURAL INJECTION OF STEROID Left 2019    Procedure: Injection,steroid,epidural,transforaminal approach;  Surgeon: Gilberto Michelle MD;  Location: [facility] OR;  Service: Pain Management;  Laterality: Left;  L5-S1, S1       Review of patient's allergies indicates:  No Known Allergies    Family History       Problem Relation (Age of Onset)    COPD Father    Coronary artery disease Father    Diabetes Father, Sister    Emphysema Mother    Stroke Mother          Tobacco Use    Smoking status: Former     Current packs/day: 0.00     Types: Cigarettes     Quit date: 2019     Years since quittin.2    Smokeless tobacco: Never   Substance and Sexual Activity    Alcohol use: Yes     Comment: rarely    Drug use: Yes     Types: Hydrocodone    Sexual activity: Never         Review of Systems   Constitutional:  Positive for activity change and fatigue. Negative for chills and fever.   HENT:  Positive for congestion and postnasal drip. Negative for sinus pressure, sinus pain and sore throat.    Respiratory:  Positive for cough and shortness of breath. Negative for chest tightness, wheezing and stridor.    Cardiovascular:  Negative for chest pain, palpitations and leg swelling.   Musculoskeletal:  Negative for arthralgias, gait problem and joint swelling.   Skin:  Negative for pallor and rash.   Neurological:  Negative for dizziness,  syncope and light-headedness.   Psychiatric/Behavioral:  Negative for agitation, confusion and hallucinations.      Objective:     Vital Signs (Most Recent):  Temp: 98.4 °F (36.9 °C) (06/09/25 1132)  Pulse: 90 (06/09/25 1307)  Resp: 18 (06/09/25 1307)  BP: 112/69 (06/09/25 1132)  SpO2: (!) 94 % (06/09/25 1307) Vital Signs (24h Range):  Temp:  [97.5 °F (36.4 °C)-98.4 °F (36.9 °C)] 98.4 °F (36.9 °C)  Pulse:  [72-90] 90  Resp:  [18] 18  SpO2:  [92 %-97 %] 94 %  BP: (111-137)/(68-77) 112/69     Weight: 104 kg (229 lb 4.5 oz)  Body mass index is 37.01 kg/m².      Intake/Output Summary (Last 24 hours) at 6/9/2025 1535  Last data filed at 6/9/2025 0914  Gross per 24 hour   Intake 240 ml   Output --   Net 240 ml        Physical Exam  Vitals and nursing note reviewed.   Constitutional:       Appearance: Normal appearance. She is obese. She is not ill-appearing or toxic-appearing.   HENT:      Head: Normocephalic.   Cardiovascular:      Rate and Rhythm: Regular rhythm. Tachycardia present.      Heart sounds: No murmur heard.     No friction rub. No gallop.   Pulmonary:      Effort: No respiratory distress.      Breath sounds: Wheezing (end expiratory wheezing.) present. No rhonchi or rales.   Abdominal:      General: Abdomen is flat.      Palpations: Abdomen is soft.   Musculoskeletal:         General: No swelling.      Right lower leg: No edema.      Left lower leg: No edema.   Skin:     Capillary Refill: Capillary refill takes less than 2 seconds.   Neurological:      General: No focal deficit present.      Mental Status: She is alert and oriented to person, place, and time.      Motor: No weakness.   Psychiatric:         Mood and Affect: Mood normal.         Behavior: Behavior normal.         Thought Content: Thought content normal.         Judgment: Judgment normal.        Vents:       Lines/Drains/Airways       None                 Significant Labs:    CBC/Anemia Profile:  Recent Labs   Lab 06/08/25  0433 06/09/25  0608  "  WBC 9.15 9.95   HGB 14.3 13.0   HCT 46.2 43.4    329   MCV 87 88   RDW 16.3* 15.9*     Chemistries:  Recent Labs   Lab 06/08/25  0433 06/09/25  0608    140   K 3.9 3.2*    103   CO2 27 28   BUN 14 15   CREATININE 0.7 0.6   CALCIUM 9.6 8.9   MG 2.2 2.0   PHOS 4.0 3.1       All pertinent labs within the past 24 hours have been reviewed.    Significant Imaging:   I have reviewed all pertinent imaging results/findings within the past 24 hours.  CT: I have reviewed all pertinent results/findings within the past 24 hours and my personal findings are:  bilateral diffuse centrilobular pulmonary nodules with some associated tree and bud formation. Mild RUL bronchiectasis. Areas of mucous plugging. RLL 4 mm pulmonary nodule. 6 mm RUL pulmonary nodule.    ABG  No results for input(s): "PH", "PO2", "PCO2", "HCO3", "BE" in the last 168 hours.  Assessment/Plan:     Pulmonary  * COPD exacerbation  Patient's COPD is with exacerbation noted by continued dyspnea and worsening of baseline hypoxia currently.  Patient is currently on COPD Pathway. Continue scheduled inhalers Steroids, Antibiotics, and Supplemental oxygen and monitor respiratory status closely.   Exacerbation secondary to rhinovirus on azithromycin, methylprednisolone and supplemental oxygen. Transition to prednisone 40 mg daily for 2 more days starting tomorrow and then 7 additional days of 20 mg. Finish 3 days of azithromycin 500 mg tomorrow.   - switch ICS/LABA nebulizers to ICS/LABA inhaler with LAMA or check in patient home trelegy.   - continue PRN duonebs    Multiple pulmonary nodules determined by computed tomography of lung  Diffuse bilateral centrilobular micro nodules with some associated tree and bud formation. Suspect secondary to rhinovirus in acute disease process. Would benefit from CT chest in 6-8 weeks to ensure resolving as differential is fairly broad. If not improved would likely benefit from bronchoscopy, however would likely " exacerbate underlying COPD exacerbation if did while inpatient.     Acute hypoxemic respiratory failure  Patient with Hypoxic Respiratory failure which is Acute.  she is on home oxygen at 0 LPM. Supplemental oxygen was provided and noted-      .   Signs/symptoms of respiratory failure include- tachypnea, increased work of breathing, and wheezing. Contributing diagnoses includes - COPD Labs and images were reviewed. Patient Has not had a recent ABG. Will treat underlying causes and adjust management of respiratory failure as follows-   - would discharge home with 2L NC with home concentrator and portable concentrator. Follow up with pulmonology outpatient with PFTs and walk test.           Thank you for your consult. I will sign off. Please contact us if you have any additional questions.     Mikey Mcfarland MD  Pulmonology  [facility] - [place] - Telemetry

## 2025-06-09 NOTE — PROGRESS NOTES
[facility] Medicine  Progress Note    Patient Name: Kemi Dominguez  MRN: [id number]  Patient Class: IP- Inpatient   Admission Date: 6/7/2025  Length of Stay: 2 days  Attending Physician: Paxton Ferraro MD  Primary Care Provider: Fiorella Acevedo FNP-C        Subjective     Principal Problem:COPD exacerbation        HPI:  Kemi Dominguez is a 60 y.o. female with a pmh of HTN, HLD, DM2, COPD not on home O2, PETEY, GERD presents to the hospital with worsening shortness of breath, cough, and congestion for the past several days.     Patient was recently seen at a Urgent Care 3 days ago for sob, cough, congestion, rhinorrhea and was prescribed Z-pack for an URI. Patient states her symptoms have not improved and endorses worsening shortness of breath with a productive cough (thick yellow sputum). Patient has been using DuoNebs at home without much relief. Patient also states she took Medrol Dosepak which she had at home.  Patient denies any other alleviating or exacerbating factors.  Patient denies headache, blurry vision, fever/chills, chest pain, nausea, vomiting, diarrhea, abdominal pain, hematemesis, hematochezia, hematuria, or any other associated symptoms.     In the ED: Patient afebrile without leukocytosis, slightly hypertensive on presentation 141/65, 89% O2 sat on RA which improved to 92-94% on 3 L oxygen via NC, CBC unremarkable, potassium 3.4, normal BNP, normal troponin, lactic acid normal, COVID/influenza negative, chest x-ray shows coarsed interstitial lung markings with suspected left basilar atelectasis and possible trace pleural effusion however no consolidation or pneumothorax.  Patient given duo neb treatment and IV methylprednisolone 125 mg in the ED. Case discussed with ED provider and patient admitted to hospital medicine for further medical management.    Overview/Hospital Course:  60 y.o. female with a pmh of HTN, HLD, DM2, COPD not on home O2, PETEY, GERD presents to the  hospital with worsening shortness of breath, cough, and congestion for the past several days and admitted for COPD exacerbation.  Respiratory panel positive for rhino virus    Interval History:  No acute event overnight.  Patient seen in bed this morning; stated that she feels much better compared to on presentation.  Failed room air challenge/walk test; desaturated with exertion.  Noted rhino virus and respiratory panel; pulmonology consulted given multiple pulmonary nodules    Review of Systems  Objective:     Vital Signs (Most Recent):  Temp: 98.1 °F (36.7 °C) (06/09/25 1617)  Pulse: 79 (06/09/25 1617)  Resp: 18 (06/09/25 1617)  BP: 121/73 (06/09/25 1617)  SpO2: 95 % (06/09/25 1617) Vital Signs (24h Range):  Temp:  [97.5 °F (36.4 °C)-98.4 °F (36.9 °C)] 98.1 °F (36.7 °C)  Pulse:  [72-90] 79  Resp:  [18] 18  SpO2:  [92 %-97 %] 95 %  BP: (112-137)/(68-77) 121/73     Weight: 104 kg (229 lb 4.5 oz)  Body mass index is 37.01 kg/m².    Intake/Output Summary (Last 24 hours) at 6/9/2025 1756  Last data filed at 6/9/2025 0914  Gross per 24 hour   Intake 240 ml   Output --   Net 240 ml         Physical Exam  Constitutional:       General: She is not in acute distress.     Appearance: She is not ill-appearing, toxic-appearing or diaphoretic.   Cardiovascular:      Rate and Rhythm: Normal rate and regular rhythm.      Pulses: Normal pulses.      Heart sounds: Normal heart sounds. No murmur heard.  Pulmonary:      Effort: Respiratory distress present.      Breath sounds: Wheezing present.      Comments: Increased work of breathing improved compared to on presentation  Abdominal:      General: Bowel sounds are normal.      Palpations: Abdomen is soft.   Musculoskeletal:         General: No swelling or tenderness.   Neurological:      General: No focal deficit present.      Mental Status: She is oriented to person, place, and time.               Significant Labs: All pertinent labs within the past 24 hours have been  reviewed.  CBC:   Recent Labs   Lab 06/08/25  0433 06/09/25  0608   WBC 9.15 9.95   HGB 14.3 13.0   HCT 46.2 43.4    329     CMP:   Recent Labs   Lab 06/08/25  0433 06/09/25  0608    140   K 3.9 3.2*    103   CO2 27 28   * 162*   BUN 14 15   CREATININE 0.7 0.6   CALCIUM 9.6 8.9   ANIONGAP 15 9       Significant Imaging:   CT Chest Without Contrast   Final Result      Numerous pulmonary centrilobular nodules throughout both lungs, most of which demonstrate tree-in-bud distribution, likely representing sequela of infectious or non infectious inflammatory small airways process.  Correlation is advised      Multiple additional scattered solid and ground-glass nodules throughout both lungs as above.  For multiple solid nodules all <6 mm, Fleischner Society 2017 guidelines recommend no routine follow up for a low risk patient, or follow up with non-contrast chest CT at 12 months after discovery in a high risk patient.      No focal consolidation, pleural effusion, or pneumothorax.      Suspected atelectasis about the lung bases and right middle lobe.         Electronically signed by: Bryce Cueva   Date:    06/08/2025   Time:    16:32      X-Ray Chest AP Portable   Final Result      Please see above.         Electronically signed by: Bryce Cueva   Date:    06/07/2025   Time:    10:40              Assessment & Plan  COPD exacerbation  Procalcitonin not elevated; respiratory panel positive for rhino virus  continue budesonide and supplemental oxygen as needed (wean as tolerated); PRN Dounebs, Abx  Continue prednisone 40 mg for 2 more days (end 6/11) and DC on 20 mg for 7 days  Patient may need home oxygen upon discharge  Low-dose chest CT showed multiple pulmonary nodules  Pulmonology consultant on inpatient with recommendation for outpatient follow up  We will need pulmonology follow up with pulmonary rehabilitation upon discharge  Essential hypertension  Patient's blood pressure range in the last 24  hours was: BP  Min: 112/69  Max: 137/77.The patient's inpatient anti-hypertensive regimen is listed below:  Current Antihypertensives  amLODIPine tablet 10 mg, Daily, Oral  losartan-hydrochlorothiazide 50-12.5 mg per tablet 1 tablet, Daily, Oral    Plan  - BP is controlled, no changes needed to their regimen  Mixed hyperlipidemia  Continue statin   Type 2 diabetes mellitus with complication, without long-term current use of insulin  Patient's FSGs are controlled on current medication regimen.  Last A1c reviewed-   Lab Results   Component Value Date    HGBA1C 7.3 (H) 05/16/2025     Most recent fingerstick glucose reviewed-   Recent Labs   Lab 06/08/25  2109 06/09/25  0735 06/09/25  1130 06/09/25  1615   POCTGLUCOSE 188* 144* 161* 241*     Current correctional scale  Medium  Maintain anti-hyperglycemic dose as follows-   Antihyperglycemics (From admission, onward)      Start     Stop Route Frequency Ordered    06/07/25 1514  insulin aspart U-100 pen 0-10 Units         -- SubQ Before meals & nightly PRN 06/07/25 1415          MDSSI prn   Poct glucose ac/hs   ADA diet, BG goal 140-180  Hold Oral hypoglycemics while patient is in the hospital  Class 2 severe obesity due to excess calories with serious comorbidity and body mass index (BMI) of 37.0 to 37.9 in adult  Body mass index is 37.01 kg/m². Morbid obesity complicates all aspects of disease management from diagnostic modalities to treatment. Weight loss encouraged and health benefits explained to patient.   Acute hypoxemic respiratory failure  Continue supplemental oxygen to maintain saturation greater than 92%  Encourage incentive spirometry  Treat underlying cause  PETEY (obstructive sleep apnea)  History noted, patient does not use CPAP  Sleep-pulmonology referral upon discharge  Multiple pulmonary nodules determined by computed tomography of lung  Follow up as an outpatient  VTE Risk Mitigation (From admission, onward)           Ordered     enoxaparin injection 40  mg  Daily         06/07/25 1414     IP VTE HIGH RISK PATIENT  Once         06/07/25 1414     Place sequential compression device  Until discontinued         06/07/25 1414                    Discharge Planning   MILO:      Code Status: Full Code   Medical Readiness for Discharge Date:   Discharge Plan A: Home with family                        Paxton Ferraro MD  Department of Hospital Medicine   [facility]     No

## 2025-06-09 NOTE — NURSING
Testing for Home O2    O2 Sats on RA at rest = 94%    O2 sats on RA with exercise  = 86%    O2 sats on 2 L O2 with exercise = 92%

## 2025-06-09 NOTE — SUBJECTIVE & OBJECTIVE
Past Medical History:   Diagnosis Date    Class 3 severe obesity due to excess calories with serious comorbidity and body mass index (BMI) of 40.0 to 44.9 in adult 10/17/2018    COPD (chronic obstructive pulmonary disease)     Diabetes mellitus, type 2     GERD (gastroesophageal reflux disease)     Hyperlipidemia     Hypertension     Sleep apnea     Vitamin D deficiency        Past Surgical History:   Procedure Laterality Date    ADENOIDECTOMY      BREAST BIOPSY      BREAST SURGERY Right     breast biopsy    COLONOSCOPY N/A 09/10/2021    Procedure: COLONOSCOPY;  Surgeon: Holland Royal MD;  Location: [facility];  Service: Endoscopy;  Laterality: N/A;    TONSILLECTOMY      TRANSFORAMINAL EPIDURAL INJECTION OF STEROID Left 2019    Procedure: Injection,steroid,epidural,transforaminal approach;  Surgeon: Gilberto Michelle MD;  Location: [facility] OR;  Service: Pain Management;  Laterality: Left;  L5-S1, S1    TRANSFORAMINAL EPIDURAL INJECTION OF STEROID Left 2019    Procedure: Injection,steroid,epidural,transforaminal approach;  Surgeon: Gilberto Michelle MD;  Location: [facility] OR;  Service: Pain Management;  Laterality: Left;  L5-S1, S1       Review of patient's allergies indicates:  No Known Allergies    Family History       Problem Relation (Age of Onset)    COPD Father    Coronary artery disease Father    Diabetes Father, Sister    Emphysema Mother    Stroke Mother          Tobacco Use    Smoking status: Former     Current packs/day: 0.00     Types: Cigarettes     Quit date: 2019     Years since quittin.2    Smokeless tobacco: Never   Substance and Sexual Activity    Alcohol use: Yes     Comment: rarely    Drug use: Yes     Types: Hydrocodone    Sexual activity: Never         Review of Systems   Constitutional:  Positive for activity change and fatigue. Negative for chills and fever.   HENT:  Positive for congestion and postnasal drip. Negative for sinus pressure, sinus pain and sore throat.    Respiratory:   Positive for cough and shortness of breath. Negative for chest tightness, wheezing and stridor.    Cardiovascular:  Negative for chest pain, palpitations and leg swelling.   Musculoskeletal:  Negative for arthralgias, gait problem and joint swelling.   Skin:  Negative for pallor and rash.   Neurological:  Negative for dizziness, syncope and light-headedness.   Psychiatric/Behavioral:  Negative for agitation, confusion and hallucinations.      Objective:     Vital Signs (Most Recent):  Temp: 98.4 °F (36.9 °C) (06/09/25 1132)  Pulse: 90 (06/09/25 1307)  Resp: 18 (06/09/25 1307)  BP: 112/69 (06/09/25 1132)  SpO2: (!) 94 % (06/09/25 1307) Vital Signs (24h Range):  Temp:  [97.5 °F (36.4 °C)-98.4 °F (36.9 °C)] 98.4 °F (36.9 °C)  Pulse:  [72-90] 90  Resp:  [18] 18  SpO2:  [92 %-97 %] 94 %  BP: (111-137)/(68-77) 112/69     Weight: 104 kg (229 lb 4.5 oz)  Body mass index is 37.01 kg/m².      Intake/Output Summary (Last 24 hours) at 6/9/2025 1535  Last data filed at 6/9/2025 0914  Gross per 24 hour   Intake 240 ml   Output --   Net 240 ml        Physical Exam  Vitals and nursing note reviewed.   Constitutional:       Appearance: Normal appearance. She is obese. She is not ill-appearing or toxic-appearing.   HENT:      Head: Normocephalic.   Cardiovascular:      Rate and Rhythm: Regular rhythm. Tachycardia present.      Heart sounds: No murmur heard.     No friction rub. No gallop.   Pulmonary:      Effort: No respiratory distress.      Breath sounds: Wheezing (end expiratory wheezing.) present. No rhonchi or rales.   Abdominal:      General: Abdomen is flat.      Palpations: Abdomen is soft.   Musculoskeletal:         General: No swelling.      Right lower leg: No edema.      Left lower leg: No edema.   Skin:     Capillary Refill: Capillary refill takes less than 2 seconds.   Neurological:      General: No focal deficit present.      Mental Status: She is alert and oriented to person, place, and time.      Motor: No weakness.    Psychiatric:         Mood and Affect: Mood normal.         Behavior: Behavior normal.         Thought Content: Thought content normal.         Judgment: Judgment normal.        Vents:       Lines/Drains/Airways       None                 Significant Labs:    CBC/Anemia Profile:  Recent Labs   Lab 06/08/25  0433 06/09/25  0608   WBC 9.15 9.95   HGB 14.3 13.0   HCT 46.2 43.4    329   MCV 87 88   RDW 16.3* 15.9*     Chemistries:  Recent Labs   Lab 06/08/25  0433 06/09/25  0608    140   K 3.9 3.2*    103   CO2 27 28   BUN 14 15   CREATININE 0.7 0.6   CALCIUM 9.6 8.9   MG 2.2 2.0   PHOS 4.0 3.1       All pertinent labs within the past 24 hours have been reviewed.    Significant Imaging:   I have reviewed all pertinent imaging results/findings within the past 24 hours.  CT: I have reviewed all pertinent results/findings within the past 24 hours and my personal findings are:  bilateral diffuse centrilobular pulmonary nodules with some associated tree and bud formation. Mild RUL bronchiectasis. Areas of mucous plugging. RLL 4 mm pulmonary nodule. 6 mm RUL pulmonary nodule.

## 2025-06-09 NOTE — ASSESSMENT & PLAN NOTE
Procalcitonin not elevated; respiratory panel positive for rhino virus  continue budesonide and supplemental oxygen as needed (wean as tolerated); PRN Dounebs, Abx  Continue prednisone 40 mg for 2 more days (end 6/11) and DC on 20 mg for 7 days  Patient may need home oxygen upon discharge  Low-dose chest CT showed multiple pulmonary nodules  Pulmonology consultant on inpatient with recommendation for outpatient follow up  We will need pulmonology follow up with pulmonary rehabilitation upon discharge

## 2025-06-09 NOTE — NURSING
[facility] [place]  Nurses Note -- 4 Eyes      6/9/2025       Skin assessed on: Q Shift      [x] No Pressure Injuries Present    []Prevention Measures Documented    [] Yes LDA  for Pressure Injury Previously documented     [] Yes New Pressure Injury Discovered   [] LDA for New Pressure Injury Added      Attending RN:  Estela Livingston RN     Second RN:  Marilou

## 2025-06-09 NOTE — HOSPITAL COURSE
60 y.o. female with a HTN, HLD, DM2, COPD not on home O2, PETEY, GERD presents to the hospital with worsening shortness of breath, cough, and congestion for the past several days and admitted for COPD exacerbation.  Respiratory panel positive for rhinovirus. CT with multiple pulmonary nodules. Started on steroids for COPD exacerbation. Pulmonary consulted. She is improving but still requiring O2 by NC with activity. OK for discharge to home with PRN home O2 with activity, steroid taper, and Pulmonary follow up.

## 2025-06-09 NOTE — SUBJECTIVE & OBJECTIVE
Interval History:  No acute event overnight.  Patient seen in bed this morning; stated that she feels much better compared to on presentation.  Failed room air challenge/walk test; desaturated with exertion.  Noted rhino virus and respiratory panel; pulmonology consulted given multiple pulmonary nodules    Review of Systems  Objective:     Vital Signs (Most Recent):  Temp: 98.1 °F (36.7 °C) (06/09/25 1617)  Pulse: 79 (06/09/25 1617)  Resp: 18 (06/09/25 1617)  BP: 121/73 (06/09/25 1617)  SpO2: 95 % (06/09/25 1617) Vital Signs (24h Range):  Temp:  [97.5 °F (36.4 °C)-98.4 °F (36.9 °C)] 98.1 °F (36.7 °C)  Pulse:  [72-90] 79  Resp:  [18] 18  SpO2:  [92 %-97 %] 95 %  BP: (112-137)/(68-77) 121/73     Weight: 104 kg (229 lb 4.5 oz)  Body mass index is 37.01 kg/m².    Intake/Output Summary (Last 24 hours) at 6/9/2025 1753  Last data filed at 6/9/2025 0914  Gross per 24 hour   Intake 240 ml   Output --   Net 240 ml         Physical Exam  Constitutional:       General: She is not in acute distress.     Appearance: She is not ill-appearing, toxic-appearing or diaphoretic.   Cardiovascular:      Rate and Rhythm: Normal rate and regular rhythm.      Pulses: Normal pulses.      Heart sounds: Normal heart sounds. No murmur heard.  Pulmonary:      Effort: Respiratory distress present.      Breath sounds: Wheezing present.      Comments: Increased work of breathing improved compared to on presentation  Abdominal:      General: Bowel sounds are normal.      Palpations: Abdomen is soft.   Musculoskeletal:         General: No swelling or tenderness.   Neurological:      General: No focal deficit present.      Mental Status: She is oriented to person, place, and time.               Significant Labs: All pertinent labs within the past 24 hours have been reviewed.  CBC:   Recent Labs   Lab 06/08/25  0433 06/09/25  0608   WBC 9.15 9.95   HGB 14.3 13.0   HCT 46.2 43.4    329     CMP:   Recent Labs   Lab 06/08/25  0433 06/09/25  0608     140   K 3.9 3.2*    103   CO2 27 28   * 162*   BUN 14 15   CREATININE 0.7 0.6   CALCIUM 9.6 8.9   ANIONGAP 15 9       Significant Imaging:   CT Chest Without Contrast   Final Result      Numerous pulmonary centrilobular nodules throughout both lungs, most of which demonstrate tree-in-bud distribution, likely representing sequela of infectious or non infectious inflammatory small airways process.  Correlation is advised      Multiple additional scattered solid and ground-glass nodules throughout both lungs as above.  For multiple solid nodules all <6 mm, Fleischner Society 2017 guidelines recommend no routine follow up for a low risk patient, or follow up with non-contrast chest CT at 12 months after discovery in a high risk patient.      No focal consolidation, pleural effusion, or pneumothorax.      Suspected atelectasis about the lung bases and right middle lobe.         Electronically signed by: Bryce Cueva   Date:    06/08/2025   Time:    16:32      X-Ray Chest AP Portable   Final Result      Please see above.         Electronically signed by: Bryce Cueva   Date:    06/07/2025   Time:    10:40

## 2025-06-09 NOTE — ASSESSMENT & PLAN NOTE
Diffuse bilateral centrilobular micro nodules with some associated tree and bud formation. Suspect secondary to rhinovirus in acute disease process. Would benefit from CT chest in 6-8 weeks to ensure resolving as differential is fairly broad. If not improved would likely benefit from bronchoscopy, however would likely exacerbate underlying COPD exacerbation if did while inpatient.

## 2025-06-09 NOTE — PLAN OF CARE
Problem: Adult Inpatient Plan of Care  Goal: Plan of Care Review  Outcome: Progressing  Goal: Patient-Specific Goal (Individualized)  Outcome: Progressing  Goal: Absence of Hospital-Acquired Illness or Injury  Outcome: Progressing  Goal: Optimal Comfort and Wellbeing  Outcome: Progressing  Goal: Readiness for Transition of Care  Outcome: Progressing     Problem: COPD (Chronic Obstructive Pulmonary Disease)  Goal: Optimal Chronic Illness Coping  Outcome: Progressing  Goal: Optimal Level of Functional [place]  Outcome: Progressing  Goal: Absence of Infection Signs and Symptoms  Outcome: Progressing  Goal: Improved Oral Intake  Outcome: Progressing  Goal: Effective Oxygenation and Ventilation  Outcome: Progressing     Problem: Diabetes Comorbidity  Goal: Blood Glucose Level Within Targeted Range  Outcome: Progressing

## 2025-06-10 VITALS
TEMPERATURE: 98 F | OXYGEN SATURATION: 94 % | SYSTOLIC BLOOD PRESSURE: 120 MMHG | WEIGHT: 229.25 LBS | RESPIRATION RATE: 19 BRPM | HEART RATE: 76 BPM | BODY MASS INDEX: 36.84 KG/M2 | HEIGHT: 66 IN | DIASTOLIC BLOOD PRESSURE: 72 MMHG

## 2025-06-10 PROBLEM — B34.8 RHINOVIRUS INFECTION: Status: ACTIVE | Noted: 2025-06-10

## 2025-06-10 LAB
ABSOLUTE EOSINOPHIL (OHS): 0 K/UL
ABSOLUTE MONOCYTE (OHS): 0.76 K/UL (ref 0.3–1)
ABSOLUTE NEUTROPHIL COUNT (OHS): 6.22 K/UL (ref 1.8–7.7)
ANION GAP (OHS): 12 MMOL/L (ref 8–16)
BASOPHILS # BLD AUTO: 0 K/UL
BASOPHILS NFR BLD AUTO: 0 %
BUN SERPL-MCNC: 14 MG/DL (ref 6–20)
CALCIUM SERPL-MCNC: 9.1 MG/DL (ref 8.7–10.5)
CHLORIDE SERPL-SCNC: 103 MMOL/L (ref 95–110)
CO2 SERPL-SCNC: 28 MMOL/L (ref 23–29)
CREAT SERPL-MCNC: 0.6 MG/DL (ref 0.5–1.4)
ERYTHROCYTE [DISTWIDTH] IN BLOOD BY AUTOMATED COUNT: 15.9 % (ref 11.5–14.5)
GFR SERPLBLD CREATININE-BSD FMLA CKD-EPI: >60 ML/MIN/1.73/M2
GLUCOSE SERPL-MCNC: 173 MG/DL (ref 70–110)
HCT VFR BLD AUTO: 42.7 % (ref 37–48.5)
HGB BLD-MCNC: 13.1 GM/DL (ref 12–16)
IMM GRANULOCYTES # BLD AUTO: 0.05 K/UL (ref 0–0.04)
IMM GRANULOCYTES NFR BLD AUTO: 0.6 % (ref 0–0.5)
LYMPHOCYTES # BLD AUTO: 1.12 K/UL (ref 1–4.8)
MAGNESIUM SERPL-MCNC: 2.1 MG/DL (ref 1.6–2.6)
MCH RBC QN AUTO: 27 PG (ref 27–31)
MCHC RBC AUTO-ENTMCNC: 30.7 G/DL (ref 32–36)
MCV RBC AUTO: 88 FL (ref 82–98)
NUCLEATED RBC (/100WBC) (OHS): 0 /100 WBC
OHS QRS DURATION: 104 MS
OHS QTC CALCULATION: 440 MS
PHOSPHATE SERPL-MCNC: 3.7 MG/DL (ref 2.7–4.5)
PLATELET # BLD AUTO: 343 K/UL (ref 150–450)
PMV BLD AUTO: 8.8 FL (ref 9.2–12.9)
POCT GLUCOSE: 117 MG/DL (ref 70–110)
POCT GLUCOSE: 83 MG/DL (ref 70–110)
POTASSIUM SERPL-SCNC: 3.7 MMOL/L (ref 3.5–5.1)
RBC # BLD AUTO: 4.86 M/UL (ref 4–5.4)
RELATIVE EOSINOPHIL (OHS): 0 %
RELATIVE LYMPHOCYTE (OHS): 13.7 % (ref 18–48)
RELATIVE MONOCYTE (OHS): 9.3 % (ref 4–15)
RELATIVE NEUTROPHIL (OHS): 76.4 % (ref 38–73)
SODIUM SERPL-SCNC: 143 MMOL/L (ref 136–145)
WBC # BLD AUTO: 8.15 K/UL (ref 3.9–12.7)

## 2025-06-10 PROCEDURE — 25000003 PHARM REV CODE 250

## 2025-06-10 PROCEDURE — 85025 COMPLETE CBC W/AUTO DIFF WBC: CPT

## 2025-06-10 PROCEDURE — 27000221 HC OXYGEN, UP TO 24 HOURS

## 2025-06-10 PROCEDURE — 94761 N-INVAS EAR/PLS OXIMETRY MLT: CPT

## 2025-06-10 PROCEDURE — 83735 ASSAY OF MAGNESIUM: CPT

## 2025-06-10 PROCEDURE — 63600175 PHARM REV CODE 636 W HCPCS

## 2025-06-10 PROCEDURE — 25000242 PHARM REV CODE 250 ALT 637 W/ HCPCS

## 2025-06-10 PROCEDURE — 36415 COLL VENOUS BLD VENIPUNCTURE: CPT

## 2025-06-10 PROCEDURE — 99900035 HC TECH TIME PER 15 MIN (STAT)

## 2025-06-10 PROCEDURE — 63700000 PHARM REV CODE 250 ALT 637 W/O HCPCS

## 2025-06-10 PROCEDURE — 99900031 HC PATIENT EDUCATION (STAT)

## 2025-06-10 PROCEDURE — 80048 BASIC METABOLIC PNL TOTAL CA: CPT

## 2025-06-10 PROCEDURE — 84100 ASSAY OF PHOSPHORUS: CPT

## 2025-06-10 PROCEDURE — 94640 AIRWAY INHALATION TREATMENT: CPT

## 2025-06-10 RX ORDER — HYDROCORTISONE 25 MG/G
CREAM TOPICAL 2 TIMES DAILY PRN
Start: 2025-06-10

## 2025-06-10 RX ORDER — PREDNISONE 20 MG/1
TABLET ORAL
Qty: 9 TABLET | Refills: 0 | Status: SHIPPED | OUTPATIENT
Start: 2025-06-10 | End: 2025-06-18

## 2025-06-10 RX ORDER — PROMETHAZINE HYDROCHLORIDE AND DEXTROMETHORPHAN HYDROBROMIDE 6.25; 15 MG/5ML; MG/5ML
5 SYRUP ORAL NIGHTLY PRN
Qty: 118 ML | Refills: 2 | Status: SHIPPED | OUTPATIENT
Start: 2025-06-10

## 2025-06-10 RX ORDER — FLUTICASONE PROPIONATE 50 MCG
1 SPRAY, SUSPENSION (ML) NASAL 2 TIMES DAILY PRN
Start: 2025-06-10

## 2025-06-10 RX ADMIN — ARFORMOTEROL TARTRATE 15 MCG: 15 SOLUTION RESPIRATORY (INHALATION) at 07:06

## 2025-06-10 RX ADMIN — AZELASTINE HYDROCHLORIDE 137 MCG: 137 SPRAY, METERED NASAL at 10:06

## 2025-06-10 RX ADMIN — FLUOXETINE HYDROCHLORIDE 10 MG: 10 CAPSULE ORAL at 10:06

## 2025-06-10 RX ADMIN — CEFTRIAXONE 2 G: 2 INJECTION, POWDER, FOR SOLUTION INTRAMUSCULAR; INTRAVENOUS at 10:06

## 2025-06-10 RX ADMIN — PREDNISONE 40 MG: 20 TABLET ORAL at 10:06

## 2025-06-10 RX ADMIN — LOSARTAN POTASSIUM AND HYDROCHLOROTHIAZIDE 1 TABLET: 12.5; 5 TABLET ORAL at 10:06

## 2025-06-10 RX ADMIN — PANTOPRAZOLE SODIUM 40 MG: 40 TABLET, DELAYED RELEASE ORAL at 10:06

## 2025-06-10 RX ADMIN — BUDESONIDE 0.5 MG: 0.5 INHALANT RESPIRATORY (INHALATION) at 07:06

## 2025-06-10 RX ADMIN — AZITHROMYCIN DIHYDRATE 500 MG: 250 TABLET ORAL at 10:06

## 2025-06-10 RX ADMIN — BUPROPION HYDROCHLORIDE 300 MG: 150 TABLET, EXTENDED RELEASE ORAL at 10:06

## 2025-06-10 RX ADMIN — AMLODIPINE BESYLATE 10 MG: 5 TABLET ORAL at 10:06

## 2025-06-10 RX ADMIN — GUAIFENESIN 200 MG: 200 SOLUTION ORAL at 05:06

## 2025-06-10 NOTE — PLAN OF CARE
Problem: Adult Inpatient Plan of Care  Goal: Plan of Care Review  Outcome: Progressing  Goal: Patient-Specific Goal (Individualized)  Outcome: Progressing  Goal: Absence of Hospital-Acquired Illness or Injury  Outcome: Progressing  Goal: Optimal Comfort and Wellbeing  Outcome: Progressing  Goal: Readiness for Transition of Care  Outcome: Progressing     Problem: COPD (Chronic Obstructive Pulmonary Disease)  Goal: Optimal Chronic Illness Coping  Outcome: Progressing  Goal: Optimal Level of Functional [place]  Outcome: Progressing  Goal: Absence of Infection Signs and Symptoms  Outcome: Progressing  Goal: Improved Oral Intake  Outcome: Progressing  Goal: Effective Oxygenation and Ventilation  Outcome: Progressing     Problem: Diabetes Comorbidity  Goal: Blood Glucose Level Within Targeted Range  Outcome: Progressing

## 2025-06-10 NOTE — NURSING
In preparation for discharge, d/c'ed patient's saline lock, applied gauze and tape to site, no redness, swelling noted. Patient sitting at the side of the bed. NAD noted.

## 2025-06-10 NOTE — PLAN OF CARE
Case Management Final Discharge Note    Discharge Disposition: Home    New DME ordered / company name: Theodore Siu with [employer], patient approved for one e-tank, respiratory notified to deliver to bedside.     Per pt, nurse Estela, home oxygen at bedside.     Relevant SDOH / Transition of Care Barriers:  none    Person available to provide assistance at home when needed and their contact information: fady pompa (Significant other)  [phone]     Scheduled followup appointment: PCP scheduled 6/17,25, added to AVS.     Referrals placed: Pulmonology- In basket message sent for clinic to contact patient to schedule.     Transportation: Private vehicle     Patient and family educated on discharge services and updated on DC plan. Bedside RN notified, patient clear to discharge from Case Management Perspective.      06/10/25 1314   Final Note   Assessment Type Final Discharge Note   Anticipated Discharge Disposition Home   Hospital Resources/Appts/Education Provided Provided patient/caregiver with written discharge plan information;Appointments scheduled and added to AVS   Post-Acute Status   Discharge Delays None known at this time

## 2025-06-10 NOTE — DISCHARGE SUMMARY
[facility] Medicine  Discharge Summary      Patient Name: Kemi Dominguez  MRN: [id number]  [facility]: [id number]  Patient Class: IP- Inpatient  Admission Date: 6/7/2025  Hospital Length of Stay: 3 days  Discharge Date and Time: 06/10/2025 9:51 AM  Attending Physician: Marlene Recinos MD   Discharging Provider: Marlene Recinos MD  Primary Care Provider: Fiorella Acevedo FNP-C    Primary Care Team: Networked reference to record PCT     HPI:   Kemi Dominguez is a 60 y.o. female with a pmh of HTN, HLD, DM2, COPD not on home O2, PETEY, GERD presents to the hospital with worsening shortness of breath, cough, and congestion for the past several days.     Patient was recently seen at a Urgent Care 3 days ago for sob, cough, congestion, rhinorrhea and was prescribed Z-pack for an URI. Patient states her symptoms have not improved and endorses worsening shortness of breath with a productive cough (thick yellow sputum). Patient has been using DuoNebs at home without much relief. Patient also states she took Medrol Dosepak which she had at home.  Patient denies any other alleviating or exacerbating factors.  Patient denies headache, blurry vision, fever/chills, chest pain, nausea, vomiting, diarrhea, abdominal pain, hematemesis, hematochezia, hematuria, or any other associated symptoms.     In the ED: Patient afebrile without leukocytosis, slightly hypertensive on presentation 141/65, 89% O2 sat on RA which improved to 92-94% on 3 L oxygen via NC, CBC unremarkable, potassium 3.4, normal BNP, normal troponin, lactic acid normal, COVID/influenza negative, chest x-ray shows coarsed interstitial lung markings with suspected left basilar atelectasis and possible trace pleural effusion however no consolidation or pneumothorax.  Patient given duo neb treatment and IV methylprednisolone 125 mg in the ED. Case discussed with ED provider and patient admitted to hospital medicine for further medical management.    * No  surgery found *      Hospital Course:   60 y.o. female with a HTN, HLD, DM2, COPD not on home O2, PETEY, GERD presents to the hospital with worsening shortness of breath, cough, and congestion for the past several days and admitted for COPD exacerbation.  Respiratory panel positive for rhinovirus. CT with multiple pulmonary nodules. Started on steroids for COPD exacerbation. Pulmonary consulted. She is improving but still requiring O2 by NC with activity. OK for discharge to home with PRN home O2 with activity, steroid taper, and Pulmonary follow up.      Goals of Care Treatment Preferences:  Code Status: Full Code      SDOH Screening:  The patient was screened for utility difficulties, food insecurity, transport difficulties, housing insecurity, and interpersonal safety and there were no concerns identified this admission.     Consults:   Consults (From admission, onward)          Status Ordering Provider     Inpatient consult to Pulmonology  Once        Provider:  Mikey Mcfarland FNP-C    Completed LAURA DURAN            Assessment & Plan  COPD exacerbation  Procalcitonin not elevated; respiratory panel positive for rhino virus  continue budesonide and supplemental oxygen as needed (wean as tolerated); PRN Dounebs, Abx  Continue prednisone 40 mg for 2 more days (end 6/11) and DC on 20 mg for 7 days- prescribed  Patient needs home oxygen upon discharge- prescribed   Low-dose chest CT showed multiple pulmonary nodules  Pulmonology consultant on inpatient with recommendation for outpatient follow up- referral placed  Essential hypertension  Patient's blood pressure range in the last 24 hours was: BP  Min: 112/69  Max: 159/86.The patient's inpatient anti-hypertensive regimen is listed below:  Current Antihypertensives  amLODIPine tablet 10 mg, Daily, Oral  losartan-hydrochlorothiazide 50-12.5 mg per tablet 1 tablet, Daily, Oral    Plan  - BP is controlled, no changes needed to their regimen  Mixed  hyperlipidemia  Continue statin   Type 2 diabetes mellitus with complication, without long-term current use of insulin  Patient's FSGs are controlled on current medication regimen.  Last A1c reviewed-   Lab Results   Component Value Date    HGBA1C 7.3 (H) 05/16/2025     Most recent fingerstick glucose reviewed-   Recent Labs   Lab 06/09/25  1130 06/09/25  1615 06/09/25 2001   POCTGLUCOSE 161* 241* 208*     Current correctional scale  Medium  Maintain anti-hyperglycemic dose as follows-   Antihyperglycemics (From admission, onward)      Start     Stop Route Frequency Ordered    06/07/25 1514  insulin aspart U-100 pen 0-10 Units         -- SubQ Before meals & nightly PRN 06/07/25 1415          MDSSI prn   Poct glucose ac/hs   ADA diet, BG goal 140-180  Hold Oral hypoglycemics while patient is in the hospital  Class 2 severe obesity due to excess calories with serious comorbidity and body mass index (BMI) of 37.0 to 37.9 in adult  Body mass index is 37.01 kg/m². Morbid obesity complicates all aspects of disease management from diagnostic modalities to treatment. Weight loss encouraged and health benefits explained to patient.   Acute hypoxemic respiratory failure  Continue supplemental oxygen to maintain saturation greater than 92%  Encourage incentive spirometry  Treat underlying cause  PETEY (obstructive sleep apnea)  History noted, patient does not use CPAP  Sleep-pulmonology referral upon discharge  Multiple pulmonary nodules determined by computed tomography of lung  Follow up as an outpatient  Rhinovirus infection  - likely cause of acute exacerbation    Final Active Diagnoses:    Diagnosis Date Noted POA    PRINCIPAL PROBLEM:  COPD exacerbation [J44.1] 10/17/2018 Yes    Rhinovirus infection [B34.8] 06/10/2025 Yes    Multiple pulmonary nodules determined by computed tomography of lung [R91.8] 06/09/2025 Yes    Acute hypoxemic respiratory failure [J96.01] 06/07/2025 Yes    PETEY (obstructive sleep apnea) [G47.33]  "06/07/2025 Yes    Essential hypertension [I10] 10/17/2018 Yes    Mixed hyperlipidemia [E78.2] 10/17/2018 Yes    Type 2 diabetes mellitus with complication, without long-term current use of insulin [E11.8] 10/17/2018 Yes    Class 2 severe obesity due to excess calories with serious comorbidity and body mass index (BMI) of 37.0 to 37.9 in adult [E66.812, E66.01, Z68.37] 10/17/2018 Not Applicable      Problems Resolved During this Admission:       Discharged Condition: good    Disposition: Home or Self Care    Follow Up: Pulmonary     Patient Instructions:      OXYGEN FOR HOME USE     Order Specific Question Answer Comments   Liter Flow 2    Duration With activity    Qualifying Test Performed at: Activity    Oxygen saturation at rest 94    Oxygen saturation with activity 86    Oxygen saturation with activity on oxygen 92    Portable mode: pulse dose acceptable    Mode: Portable concentrator    Route nasal cannula    Device: home concentrator with portable concentrator    Length of need (in months): 12 mos    Patient condition with qualifying saturation COPD    Height: 5' 6" (1.676 m)    Weight: 104 kg (229 lb 4.5 oz)    Alternative treatment measures have been tried or considered and deemed clinically ineffective. Yes      Ambulatory referral/consult to Pulmonology   Standing Status: Future   Referral Priority: Routine Referral Type: Consultation   Referral Reason: Specialty Services Required   Requested Specialty: Pulmonary Disease   Number of Visits Requested: 1     Diet diabetic     Notify your health care provider if you experience any of the following:  temperature >100.4     Notify your health care provider if you experience any of the following:  persistent nausea and vomiting or diarrhea     Notify your health care provider if you experience any of the following:  severe uncontrolled pain     Notify your health care provider if you experience any of the following:  redness, tenderness, or signs of infection " (pain, swelling, redness, odor or green/yellow discharge around incision site)     Notify your health care provider if you experience any of the following:  difficulty breathing or increased cough     Notify your health care provider if you experience any of the following:  severe persistent headache     Notify your health care provider if you experience any of the following:  worsening rash     Notify your health care provider if you experience any of the following:  persistent dizziness, light-headedness, or visual disturbances     Notify your health care provider if you experience any of the following:  increased confusion or weakness     Activity as tolerated       Significant Diagnostic Studies: N/A    Pending Diagnostic Studies:       Procedure Component Value Units Date/Time    Streptococcus pneumoniae Antigens, Urine [[id number]] Collected: 06/08/25 1111    Order Status: Sent Lab Status: In process Updated: 06/08/25 1136    Specimen: Urine, Clean Catch            Medications:  Reconciled Home Medications:      Medication List        START taking these medications      predniSONE 20 MG tablet  Commonly known as: DELTASONE  Take 2 tablets (40 mg total) by mouth once daily for 1 day, THEN 1 tablet (20 mg total) once daily for 7 days.  Start taking on: Nancy 10, 2025            CHANGE how you take these medications      fluticasone propionate 50 mcg/actuation nasal spray  Commonly known as: FLONASE  1 spray (50 mcg total) by Each Nostril route 2 (two) times daily as needed for Rhinitis.  What changed:   when to take this  reasons to take this  Another medication with the same name was removed. Continue taking this medication, and follow the directions you see here.     hydrocortisone 2.5 % cream  Apply topically 2 (two) times daily as needed (rash).  What changed:   when to take this  reasons to take this            CONTINUE taking these medications      albuterol-budesonide 90-80 mcg/actuation  Commonly known  as: Airsupra  Inhale 2 puffs into the lungs every 6 (six) hours as needed (sob, wheezing).     amLODIPine 10 MG tablet  Commonly known as: NORVASC  TAKE 1 TABLET BY MOUTH EVERY DAY     azelastine 137 mcg (0.1 %) nasal spray  Commonly known as: ASTELIN  USE 1 SPRAY IN EACH NOSTRIL TWICE DAILY     BC HEADACHE POWDER ORAL  Take by mouth as needed.     blood sugar diagnostic Strp  1 each by Misc.(Non-Drug; Combo Route) route once daily.     buPROPion 300 MG 24 hr tablet  Commonly known as: WELLBUTRIN XL  TAKE 1 TABLET BY MOUTH EVERY DAY     clotrimazole-betamethasone 1-0.05% cream  Commonly known as: LOTRISONE  Apply topically 2 (two) times daily as needed (rash).     FLUoxetine 10 MG capsule  TAKE 1 CAPSULE BY MOUTH EVERY DAY     JARDIANCE 25 mg tablet  Generic drug: empagliflozin  TAKE 1 TABLET BY MOUTH EVERY DAY     lancets 33 gauge Misc  1 lancet by Misc.(Non-Drug; Combo Route) route once daily.     linaCLOtide 72 mcg Cap capsule  Commonly known as: LINZESS  Take 1 capsule (72 mcg total) by mouth before breakfast.     metFORMIN 500 MG ER 24hr tablet  Commonly known as: GLUCOPHAGE-XR  TAKE 2 TABLETS BY MOUTH TWICE A DAY WITH MEALS     montelukast 10 mg tablet  Commonly known as: SINGULAIR  TAKE 1 TABLET BY MOUTH EVERY DAY     * MOUNJARO 2.5 mg/0.5 mL Pnij  Generic drug: tirzepatide  Inject 2.5 mg into the skin every 7 days.     * MOUNJARO 5 mg/0.5 mL Pnij  Generic drug: tirzepatide  Inject 5 mg into the skin every 7 days.  Start taking on: June 21, 2025     pantoprazole 40 MG tablet  Commonly known as: PROTONIX  TAKE 1 TABLET BY MOUTH EVERY DAY     promethazine-dextromethorphan 6.25-15 mg/5 mL Syrp  Commonly known as: PROMETHAZINE-DM  Take 5 mLs by mouth nightly as needed (cough).     rosuvastatin 20 MG tablet  Commonly known as: CRESTOR  TAKE 1 TABLET BY MOUTH EVERY DAY     TRELEGY ELLIPTA 100-62.5-25 mcg Dsdv  Generic drug: fluticasone-umeclidin-vilanter  Inhale 1 puff into the lungs once daily.      valsartan-hydrochlorothiazide 160-25 mg per tablet  Commonly known as: DIOVAN-HCT  TAKE 1 TABLET BY MOUTH EVERY DAY     VRAYLAR 1.5 mg Cap  Generic drug: cariprazine  TAKE 1 CAPSULE BY MOUTH EVERY DAY           * This list has 2 medication(s) that are the same as other medications prescribed for you. Read the directions carefully, and ask your doctor or other care provider to review them with you.                STOP taking these medications      amoxicillin-clavulanate 875-125mg 875-125 mg per tablet  Commonly known as: AUGMENTIN     ibuprofen 600 MG tablet  Commonly known as: ADVIL,MOTRIN     melatonin 5 mg  Commonly known as: MELATIN     mupirocin 2 % ointment  Commonly known as: BACTROBAN     SHINGRIX (PF) 50 mcg/0.5 mL injection  Generic drug: varicella-zoster [id number] (PF)     tiZANidine 4 MG tablet  Commonly known as: ZANAFLEX              Indwelling Lines/Drains at time of discharge:   Lines/Drains/Airways       None                   Time spent on the discharge of patient: 35 minutes         Marlene Recinos MD  Department of Hospital Medicine  [facility]

## 2025-06-10 NOTE — ASSESSMENT & PLAN NOTE
Procalcitonin not elevated; respiratory panel positive for rhino virus  continue budesonide and supplemental oxygen as needed (wean as tolerated); PRN Dounebs, Abx  Continue prednisone 40 mg for 2 more days (end 6/11) and DC on 20 mg for 7 days- prescribed  Patient needs home oxygen upon discharge- prescribed   Low-dose chest CT showed multiple pulmonary nodules  Pulmonology consultant on inpatient with recommendation for outpatient follow up- referral placed

## 2025-06-10 NOTE — DISCHARGE INSTRUCTIONS
Our goal at [facility] is to always give you outstanding care and exceptional service. You may receive a survey from [employer] by mail, text or e-mail in the next 24-48 hours asking about the care you received with us. The survey should only take 5-10 minutes to complete and is very important to us.     Your feedback provides us with a way to recognize our staff who work tirelessly to provide the best care! Also, your responses help us learn how to improve when your experience was below our aspiration of excellence. We are always looking for ways to improve your stay. We WILL use your feedback to continue making improvements to help us provide the highest quality care. We keep your personal information and feedback confidential. We appreciate your time completing this survey and can't wait to hear from you!!!    We look forward to your continued care with us! Thanks so much for choosing [facility] for your healthcare needs!

## 2025-06-10 NOTE — ASSESSMENT & PLAN NOTE
Patient's FSGs are controlled on current medication regimen.  Last A1c reviewed-   Lab Results   Component Value Date    HGBA1C 7.3 (H) 05/16/2025     Most recent fingerstick glucose reviewed-   Recent Labs   Lab 06/09/25  1130 06/09/25  1615 06/09/25 2001   POCTGLUCOSE 161* 241* 208*     Current correctional scale  Medium  Maintain anti-hyperglycemic dose as follows-   Antihyperglycemics (From admission, onward)      Start     Stop Route Frequency Ordered    06/07/25 1514  insulin aspart U-100 pen 0-10 Units         -- SubQ Before meals & nightly PRN 06/07/25 1415          MDSSI prn   Poct glucose ac/hs   ADA diet, BG goal 140-180  Hold Oral hypoglycemics while patient is in the hospital

## 2025-06-10 NOTE — ASSESSMENT & PLAN NOTE
Patient's blood pressure range in the last 24 hours was: BP  Min: 112/69  Max: 159/86.The patient's inpatient anti-hypertensive regimen is listed below:  Current Antihypertensives  amLODIPine tablet 10 mg, Daily, Oral  losartan-hydrochlorothiazide 50-12.5 mg per tablet 1 tablet, Daily, Oral    Plan  - BP is controlled, no changes needed to their regimen

## 2025-06-11 ENCOUNTER — PATIENT OUTREACH (OUTPATIENT)
Dept: FAMILY MEDICINE | Facility: CLINIC | Age: 60
End: 2025-06-11
Payer: COMMERCIAL

## 2025-06-11 ENCOUNTER — PATIENT OUTREACH (OUTPATIENT)
Dept: ADMINISTRATIVE | Facility: CLINIC | Age: 60
End: 2025-06-11
Payer: COMMERCIAL

## 2025-06-11 ENCOUNTER — TELEPHONE (OUTPATIENT)
Dept: FAMILY MEDICINE | Facility: CLINIC | Age: 60
End: 2025-06-11
Payer: COMMERCIAL

## 2025-06-11 NOTE — TELEPHONE ENCOUNTER
Discharge Information     Discharge Date:   06/10/2025    Primary Discharge Diagnosis:   with exacerbation      Discharge Summary:  Reviewed      Medication & Order Review     Were medication changes made or new medications added?   Yes    If so, has the patient filled the prescriptions?  Yes     Was Home Health ordered? No    If so, has Home Health contacted patient and/or initiated services?  No    Name of Home Health Agency? N/A    Durable Medical Equipment ordered?  Yes     If so, has the DME provider contacted patient and delivered equipment?  yes    Follow Up               Any problems since discharge? No    How is the patient feeling since returning home?  Pt states she is feeling better since home    Have you set up recommended follow up appointments?  (cardiology, surgery, etc.) yes    Schedule Hospital Follow-up appointment within 7-14 days (preferably 7).      Notes:  hosp f/u with pcp 06/17/2025            Kathy Aceves

## 2025-06-11 NOTE — PROGRESS NOTES
C3 nurse attempted to contact Kemi Dominguez  for a TCC post hospital discharge follow up call. No answer, LVM . The patient has a scheduled HOSFU appointment with Fiorella Acevedo FNP-C  on 6/17/25 @ 4639.

## 2025-06-11 NOTE — TELEPHONE ENCOUNTER
----- Message from RENATO Denny sent at 6/10/2025  1:02 PM CDT -----  Regarding: Hospital follow up  Please call to schedule hospital follow up appointment. Patient discharge today. Thank you

## 2025-06-12 NOTE — PROGRESS NOTES
C3 nurse spoke with Kemi Dominguez  for a TCC post hospital discharge follow up call. The patient has a scheduled appointment with Fiorella Acevedo FNP-C  on 6/17/25 @ 1440. Per appointment notes this visit will include the HOSFU.

## 2025-06-13 LAB — W STREPTOCOCCUS PNEUMONIAE ANTIGENS, URINE: NOT DETECTED

## 2025-06-17 ENCOUNTER — OFFICE VISIT (OUTPATIENT)
Dept: FAMILY MEDICINE | Facility: CLINIC | Age: 60
End: 2025-06-17
Payer: COMMERCIAL

## 2025-06-17 VITALS
TEMPERATURE: 99 F | WEIGHT: 235.25 LBS | SYSTOLIC BLOOD PRESSURE: 120 MMHG | HEIGHT: 66 IN | BODY MASS INDEX: 37.81 KG/M2 | OXYGEN SATURATION: 99 % | HEART RATE: 97 BPM | DIASTOLIC BLOOD PRESSURE: 60 MMHG

## 2025-06-17 DIAGNOSIS — J44.1 COPD EXACERBATION: ICD-10-CM

## 2025-06-17 DIAGNOSIS — Z09 HOSPITAL DISCHARGE FOLLOW-UP: Primary | ICD-10-CM

## 2025-06-17 DIAGNOSIS — J44.9 CHRONIC OBSTRUCTIVE PULMONARY DISEASE, UNSPECIFIED COPD TYPE: ICD-10-CM

## 2025-06-17 DIAGNOSIS — I10 ESSENTIAL HYPERTENSION: ICD-10-CM

## 2025-06-17 DIAGNOSIS — J96.01 ACUTE HYPOXEMIC RESPIRATORY FAILURE: ICD-10-CM

## 2025-06-17 DIAGNOSIS — R45.89 DEPRESSED MOOD: ICD-10-CM

## 2025-06-17 DIAGNOSIS — R91.8 MULTIPLE PULMONARY NODULES DETERMINED BY COMPUTED TOMOGRAPHY OF LUNG: ICD-10-CM

## 2025-06-17 PROCEDURE — 1159F MED LIST DOCD IN RCRD: CPT | Mod: CPTII,S$GLB,, | Performed by: NURSE PRACTITIONER

## 2025-06-17 PROCEDURE — 1160F RVW MEDS BY RX/DR IN RCRD: CPT | Mod: CPTII,S$GLB,, | Performed by: NURSE PRACTITIONER

## 2025-06-17 PROCEDURE — 3066F NEPHROPATHY DOC TX: CPT | Mod: CPTII,S$GLB,, | Performed by: NURSE PRACTITIONER

## 2025-06-17 PROCEDURE — 3061F NEG MICROALBUMINURIA REV: CPT | Mod: CPTII,S$GLB,, | Performed by: NURSE PRACTITIONER

## 2025-06-17 PROCEDURE — 99496 TRANSJ CARE MGMT HIGH F2F 7D: CPT | Mod: S$GLB,,, | Performed by: NURSE PRACTITIONER

## 2025-06-17 PROCEDURE — 3074F SYST BP LT 130 MM HG: CPT | Mod: CPTII,S$GLB,, | Performed by: NURSE PRACTITIONER

## 2025-06-17 PROCEDURE — 99999 PR PBB SHADOW E&M-EST. PATIENT-LVL V: CPT | Mod: PBBFAC,,, | Performed by: NURSE PRACTITIONER

## 2025-06-17 PROCEDURE — 3051F HG A1C>EQUAL 7.0%<8.0%: CPT | Mod: CPTII,S$GLB,, | Performed by: NURSE PRACTITIONER

## 2025-06-17 PROCEDURE — 3078F DIAST BP <80 MM HG: CPT | Mod: CPTII,S$GLB,, | Performed by: NURSE PRACTITIONER

## 2025-06-17 PROCEDURE — 1111F DSCHRG MED/CURRENT MED MERGE: CPT | Mod: CPTII,S$GLB,, | Performed by: NURSE PRACTITIONER

## 2025-06-17 RX ORDER — CARIPRAZINE 1.5 MG/1
1.5 CAPSULE, GELATIN COATED ORAL DAILY
Qty: 90 CAPSULE | Refills: 1 | Status: SHIPPED | OUTPATIENT
Start: 2025-06-17

## 2025-06-17 NOTE — PROGRESS NOTES
SUBJECTIVE:    Patient ID: Kemi Dominguez is a 60 y.o. female.    Chief Complaint: hosp f/u and copd with exascerbation    History of Present Illness    CHIEF COMPLAINT:  Patient presents for a hospital follow-up after a 3-day admission for an acute exacerbation of COPD with hypoxia.    HPI:  Patient reports a sore throat approximately 1 week prior to hospitalization, with symptoms progressing to the chest and increased coughing the following day. She was evaluated at urgent care and prescribed azithromycin. She also initiated a methylprednisolone Dosepak she had at home. Due to lack of improvement, she presented to the ER 2 days later.    In the ER, she required oxygen supplementation to maintain saturation above 94%. Chest XR was negative for pneumonia. A CT revealed nodules in both lungs.    She was hospitalized for 3 days, during which her SpO2 would decrease to the 80s with ambulation, even with supplemental oxygen. Upon discharge, she continued home oxygen use, initially using 2 L for 2 days, reducing to 1 liter on the third day, and beginning to wean off. Over the weekend, she only used oxygen during sleep.    She reports significant improvement, with SpO2 levels between 96-98% the previous day. She notes increased heart rate with exertion but rapid recovery of SpO2. She feels recovered but acknowledges the need for pacing herself.    She has been using Trelegy for approximately 3 months and finds it beneficial, reducing her need for AirSupra (rescue inhaler). She coughs immediately after Trelegy use.    MEDICATIONS:  Patient is on Trelegy for approximately 3 months for COPD management. She also uses AirSupra as needed for COPD management, though she reports reduced need with Trelegy use. Patient is on Norco. She recently started Azithromycin (Z-Enrique), prescribed by urgent care for respiratory symptoms. She self-started a Medrol pack (methylprednisolone) for recent respiratory symptoms. Patient discontinued  smoking in February 2019.    MEDICAL HISTORY:  Patient has a history of COPD, with an exacerbation in June 2023. She has a history of smoking but quit in February 2019.    IMAGING:  A chest XR was performed on 06/07, which showed no pneumonia. Following this, a CT was conducted, revealing nodules in both lungs. These nodules were not thought to be from an infectious process, and a 1-year follow-up was recommended.    SOCIAL HISTORY:  Smoking: Quit in February 2019 Occupation: Employed, works weekends (Saturday, Sunday, Monday)      ROS:  General: -fever, -chills, -fatigue, -weight gain, -weight loss  Eyes: -vision changes, -redness, -discharge  ENT: -ear pain, -nasal congestion, +sore throat  Cardiovascular: -chest pain, -palpitations, -lower extremity edema, +feelings of fast heart rate  Respiratory: +cough, +shortness of breath, +exertional dyspnea, +exercise intolerance  Gastrointestinal: -abdominal pain, -nausea, -vomiting, -diarrhea, -constipation, -blood in stool  Genitourinary: -dysuria, -hematuria, -frequency  Musculoskeletal: -joint pain, -muscle pain  Skin: -rash, -lesion  Neurological: -headache, -dizziness, -numbness, -tingling  Psychiatric: -anxiety, -depression, -sleep difficulty       Review of Systems    Admit Date: 6/7/25  Discharge Date: 6/10/25  Discharge Facility: Hospital      Family and/or Caretaker present at visit? No  Medication Reconciliation:  Medications changed/added/deleted. Discharged with PO steroids and oxygen  New Prescriptions filled after discharge: yes  Discharge summary reviewed:  yes  Diagnostic tests reviewed/disposition: I have reviewed all completed as well as pending diagnostic tests at the time of discharge  Disease/illness education: completed  Follow up appointments scheduled:  yes              with Pulmonology  Follow up labs/tests ordered:   not applicable  Home Health ordered on discharge: Patient does not have home health established from hospital visit.  They do not  need home health.  If needed, we will set up home health for the patient  Home Health company name: n/a  Establishment or re-establishment of referral orders for community resources: No other necessary community resources  DME ordered at discharge:   yes  How patient is feeling since discharge from the hospital?  improved     Discussion with other health care providers: No discussion with other health care providers necessary  Patient follow up phone call documented on separate encounter.    Admission on 06/07/2025, Discharged on 06/10/2025   Component Date Value Ref Range Status    QRS Duration 06/07/2025 104  ms Final    [facility] QTC Calculation 06/07/2025 440  ms Final    Sodium 06/07/2025 137  136 - 145 mmol/L Final    Potassium 06/07/2025 3.4 (L)  3.5 - 5.1 mmol/L Final    Chloride 06/07/2025 100  95 - 110 mmol/L Final    CO2 06/07/2025 24  23 - 29 mmol/L Final    Glucose 06/07/2025 156 (H)  70 - 110 mg/dL Final    BUN 06/07/2025 10  6 - 20 mg/dL Final    Creatinine 06/07/2025 0.7  0.5 - 1.4 mg/dL Final    Calcium 06/07/2025 9.2  8.7 - 10.5 mg/dL Final    Protein Total 06/07/2025 7.3  6.0 - 8.4 gm/dL Final    Albumin 06/07/2025 3.4 (L)  3.5 - 5.2 g/dL Final    Bilirubin Total 06/07/2025 0.4  0.1 - 1.0 mg/dL Final    ALP 06/07/2025 102  40 - 150 unit/L Final    AST 06/07/2025 20  11 - 45 unit/L Final    ALT 06/07/2025 21  10 - 44 unit/L Final    Anion Gap 06/07/2025 13  8 - 16 mmol/L Final    eGFR 06/07/2025 >60  >60 mL/min/1.73/m2 Final    Troponin-I 06/07/2025 0.012  <=0.026 ng/mL Final    BNP 06/07/2025 <10  0 - 99 pg/mL Final    Lactic Acid Level 06/07/2025 1.1  0.5 - 2.2 mmol/L Final    WBC 06/07/2025 12.22  3.90 - 12.70 K/uL Final    RBC 06/07/2025 5.11  4.00 - 5.40 M/uL Final    HGB 06/07/2025 14.0  12.0 - 16.0 gm/dL Final    HCT 06/07/2025 44.0  37.0 - 48.5 % Final    MCV 06/07/2025 86  82 - 98 fL Final    MCH 06/07/2025 27.4  27.0 - 31.0 pg Final    MCHC 06/07/2025 31.8 (L)  32.0 - 36.0 g/dL Final    RDW  06/07/2025 16.6 (H)  11.5 - 14.5 % Final    Platelet Count 06/07/2025 280  150 - 450 K/uL Final    MPV 06/07/2025 8.6 (L)  9.2 - 12.9 fL Final    Nucleated RBC 06/07/2025 0  <=0 /100 WBC Final    Neut % 06/07/2025 80.7 (H)  38 - 73 % Final    Lymph % 06/07/2025 9.6 (L)  18 - 48 % Final    Mono % 06/07/2025 8.8  4 - 15 % Final    Eos % 06/07/2025 0.3  <=8 % Final    Basophil % 06/07/2025 0.2  <=1.9 % Final    Imm Grans % 06/07/2025 0.4  0.0 - 0.5 % Final    Neut # 06/07/2025 9.85 (H)  1.8 - 7.7 K/uL Final    Lymph # 06/07/2025 1.17  1 - 4.8 K/uL Final    Mono # 06/07/2025 1.08 (H)  0.3 - 1 K/uL Final    Eos # 06/07/2025 0.04  <=0.5 K/uL Final    Baso # 06/07/2025 0.03  <=0.2 K/uL Final    Imm Grans # 06/07/2025 0.05 (H)  0.00 - 0.04 K/uL Final    POC Rapid COVID 06/07/2025 Negative  Negative Final     Acceptable 06/07/2025 Yes   Final    D-Dimer 06/07/2025 0.29  <0.50 mg/L FEU Final    POCT Glucose 06/07/2025 141 (H)  70 - 110 mg/dL Final    Procalcitonin 06/07/2025 0.10  <0.25 ng/mL Final    POCT Glucose 06/07/2025 155 (H)  70 - 110 mg/dL Final    POCT Glucose 06/07/2025 152 (H)  70 - 110 mg/dL Final    Sodium 06/08/2025 142  136 - 145 mmol/L Final    Potassium 06/08/2025 3.9  3.5 - 5.1 mmol/L Final    Chloride 06/08/2025 100  95 - 110 mmol/L Final    CO2 06/08/2025 27  23 - 29 mmol/L Final    Glucose 06/08/2025 135 (H)  70 - 110 mg/dL Final    BUN 06/08/2025 14  6 - 20 mg/dL Final    Creatinine 06/08/2025 0.7  0.5 - 1.4 mg/dL Final    Calcium 06/08/2025 9.6  8.7 - 10.5 mg/dL Final    Anion Gap 06/08/2025 15  8 - 16 mmol/L Final    eGFR 06/08/2025 >60  >60 mL/min/1.73/m2 Final    Magnesium  06/08/2025 2.2  1.6 - 2.6 mg/dL Final    Phosphorus Level 06/08/2025 4.0  2.7 - 4.5 mg/dL Final    WBC 06/08/2025 9.15  3.90 - 12.70 K/uL Final    RBC 06/08/2025 5.30  4.00 - 5.40 M/uL Final    HGB 06/08/2025 14.3  12.0 - 16.0 gm/dL Final    HCT 06/08/2025 46.2  37.0 - 48.5 % Final    MCV 06/08/2025 87  82 - 98 fL  Final    MCH 06/08/2025 27.0  27.0 - 31.0 pg Final    MCHC 06/08/2025 31.0 (L)  32.0 - 36.0 g/dL Final    RDW 06/08/2025 16.3 (H)  11.5 - 14.5 % Final    Platelet Count 06/08/2025 337  150 - 450 K/uL Final    MPV 06/08/2025 8.7 (L)  9.2 - 12.9 fL Final    Nucleated RBC 06/08/2025 0  <=0 /100 WBC Final    Neut % 06/08/2025 83.0 (H)  38 - 73 % Final    Lymph % 06/08/2025 7.9 (L)  18 - 48 % Final    Mono % 06/08/2025 8.2  4 - 15 % Final    Eos % 06/08/2025 0.0  <=8 % Final    Basophil % 06/08/2025 0.2  <=1.9 % Final    Imm Grans % 06/08/2025 0.7 (H)  0.0 - 0.5 % Final    Neut # 06/08/2025 7.60  1.8 - 7.7 K/uL Final    Lymph # 06/08/2025 0.72 (L)  1 - 4.8 K/uL Final    Mono # 06/08/2025 0.75  0.3 - 1 K/uL Final    Eos # 06/08/2025 0.00  <=0.5 K/uL Final    Baso # 06/08/2025 0.02  <=0.2 K/uL Final    Imm Grans # 06/08/2025 0.06 (H)  0.00 - 0.04 K/uL Final    POCT Glucose 06/08/2025 111 (H)  70 - 110 mg/dL Final    Respiratory Infection Panel Source 06/08/2025 Nasopharyngeal Swab   Final    Adenovirus 06/08/2025 Not Detected  Not Detected Final    Coronavirus 229E, Common Cold Virus 06/08/2025 Not Detected  Not Detected Final    Coronavirus HKU1, Common Cold Virus 06/08/2025 Not Detected  Not Detected Final    Coronavirus NL63, Common Cold Virus 06/08/2025 Not Detected  Not Detected Final    Coronavirus OC43, Common Cold Virus 06/08/2025 Not Detected  Not Detected Final    SARS-CoV2 (COVID-19) Qualitative P* 06/08/2025 Not Detected  Not Detected Final    Human Metapneumovirus 06/08/2025 Not Detected  Not Detected Final    Human Rhinovirus/Enterovirus 06/08/2025 Detected (A)  Not Detected Final    Influenza A 06/08/2025 Not Detected  Not Detected Final    Influenza B 06/08/2025 Not Detected  Not Detected Final    Parainfluenza Virus 1 06/08/2025 Not Detected  Not Detected Final    Parainfluenza Virus 2 06/08/2025 Not Detected  Not Detected Final    Parainfluenza Virus 3 06/08/2025 Not Detected  Not Detected Final     Parainfluenza Virus 4 06/08/2025 Not Detected  Not Detected Final    Respiratory Syncytial Virus 06/08/2025 Not Detected  Not Detected Final    Bordetella Parapertussis (AE2971) 06/08/2025 Not Detected  Not Detected Final    Bordetella pertussis (ptxP) 06/08/2025 Not Detected  Not Detected Final    Chlamydia pneumoniae 06/08/2025 Not Detected  Not Detected Final    Mycoplasma pneumoniae 06/08/2025 Not Detected  Not Detected Final    Streptococcus pneumoniae Antigens,* 06/08/2025 NOT DETECTED   Final    POCT Glucose 06/08/2025 151 (H)  70 - 110 mg/dL Final    POCT Glucose 06/08/2025 167 (H)  70 - 110 mg/dL Final    Sodium 06/09/2025 140  136 - 145 mmol/L Final    Potassium 06/09/2025 3.2 (L)  3.5 - 5.1 mmol/L Final    Chloride 06/09/2025 103  95 - 110 mmol/L Final    CO2 06/09/2025 28  23 - 29 mmol/L Final    Glucose 06/09/2025 162 (H)  70 - 110 mg/dL Final    BUN 06/09/2025 15  6 - 20 mg/dL Final    Creatinine 06/09/2025 0.6  0.5 - 1.4 mg/dL Final    Calcium 06/09/2025 8.9  8.7 - 10.5 mg/dL Final    Anion Gap 06/09/2025 9  8 - 16 mmol/L Final    eGFR 06/09/2025 >60  >60 mL/min/1.73/m2 Final    Magnesium  06/09/2025 2.0  1.6 - 2.6 mg/dL Final    Phosphorus Level 06/09/2025 3.1  2.7 - 4.5 mg/dL Final    WBC 06/09/2025 9.95  3.90 - 12.70 K/uL Final    RBC 06/09/2025 4.95  4.00 - 5.40 M/uL Final    HGB 06/09/2025 13.0  12.0 - 16.0 gm/dL Final    HCT 06/09/2025 43.4  37.0 - 48.5 % Final    MCV 06/09/2025 88  82 - 98 fL Final    MCH 06/09/2025 26.3 (L)  27.0 - 31.0 pg Final    MCHC 06/09/2025 30.0 (L)  32.0 - 36.0 g/dL Final    RDW 06/09/2025 15.9 (H)  11.5 - 14.5 % Final    Platelet Count 06/09/2025 329  150 - 450 K/uL Final    MPV 06/09/2025 8.7 (L)  9.2 - 12.9 fL Final    Nucleated RBC 06/09/2025 0  <=0 /100 WBC Final    Neut % 06/09/2025 74.6 (H)  38 - 73 % Final    Lymph % 06/09/2025 16.6 (L)  18 - 48 % Final    Mono % 06/09/2025 8.2  4 - 15 % Final    Eos % 06/09/2025 0.0  <=8 % Final    Basophil % 06/09/2025 0.3   <=1.9 % Final    Imm Grans % 06/09/2025 0.3  0.0 - 0.5 % Final    Neut # 06/09/2025 7.42  1.8 - 7.7 K/uL Final    Lymph # 06/09/2025 1.65  1 - 4.8 K/uL Final    Mono # 06/09/2025 0.82  0.3 - 1 K/uL Final    Eos # 06/09/2025 0.00  <=0.5 K/uL Final    Baso # 06/09/2025 0.03  <=0.2 K/uL Final    Imm Grans # 06/09/2025 0.03  0.00 - 0.04 K/uL Final    POCT Glucose 06/09/2025 144 (H)  70 - 110 mg/dL Final    POCT Glucose 06/08/2025 188 (H)  70 - 110 mg/dL Final    POCT Glucose 06/09/2025 161 (H)  70 - 110 mg/dL Final    POCT Glucose 06/09/2025 241 (H)  70 - 110 mg/dL Final    POCT Glucose 06/09/2025 208 (H)  70 - 110 mg/dL Final    Sodium 06/10/2025 143  136 - 145 mmol/L Final    Potassium 06/10/2025 3.7  3.5 - 5.1 mmol/L Final    Chloride 06/10/2025 103  95 - 110 mmol/L Final    CO2 06/10/2025 28  23 - 29 mmol/L Final    Glucose 06/10/2025 173 (H)  70 - 110 mg/dL Final    BUN 06/10/2025 14  6 - 20 mg/dL Final    Creatinine 06/10/2025 0.6  0.5 - 1.4 mg/dL Final    Calcium 06/10/2025 9.1  8.7 - 10.5 mg/dL Final    Anion Gap 06/10/2025 12  8 - 16 mmol/L Final    eGFR 06/10/2025 >60  >60 mL/min/1.73/m2 Final    Magnesium  06/10/2025 2.1  1.6 - 2.6 mg/dL Final    Phosphorus Level 06/10/2025 3.7  2.7 - 4.5 mg/dL Final    WBC 06/10/2025 8.15  3.90 - 12.70 K/uL Final    RBC 06/10/2025 4.86  4.00 - 5.40 M/uL Final    HGB 06/10/2025 13.1  12.0 - 16.0 gm/dL Final    HCT 06/10/2025 42.7  37.0 - 48.5 % Final    MCV 06/10/2025 88  82 - 98 fL Final    MCH 06/10/2025 27.0  27.0 - 31.0 pg Final    MCHC 06/10/2025 30.7 (L)  32.0 - 36.0 g/dL Final    RDW 06/10/2025 15.9 (H)  11.5 - 14.5 % Final    Platelet Count 06/10/2025 343  150 - 450 K/uL Final    MPV 06/10/2025 8.8 (L)  9.2 - 12.9 fL Final    Nucleated RBC 06/10/2025 0  <=0 /100 WBC Final    Neut % 06/10/2025 76.4 (H)  38 - 73 % Final    Lymph % 06/10/2025 13.7 (L)  18 - 48 % Final    Mono % 06/10/2025 9.3  4 - 15 % Final    Eos % 06/10/2025 0.0  <=8 % Final    Basophil % 06/10/2025  0.0  <=1.9 % Final    Imm Grans % 06/10/2025 0.6 (H)  0.0 - 0.5 % Final    Neut # 06/10/2025 6.22  1.8 - 7.7 K/uL Final    Lymph # 06/10/2025 1.12  1 - 4.8 K/uL Final    Mono # 06/10/2025 0.76  0.3 - 1 K/uL Final    Eos # 06/10/2025 0.00  <=0.5 K/uL Final    Baso # 06/10/2025 0.00  <=0.2 K/uL Final    Imm Grans # 06/10/2025 0.05 (H)  0.00 - 0.04 K/uL Final    POCT Glucose 06/10/2025 83  70 - 110 mg/dL Final    POCT Glucose 06/10/2025 117 (H)  70 - 110 mg/dL Final   Office Visit on 02/19/2025   Component Date Value Ref Range Status    Glucose 05/16/2025 99  65 - 99 mg/dL Final    BUN 05/16/2025 15  7 - 25 mg/dL Final    Creatinine 05/16/2025 0.60  0.50 - 1.03 mg/dL Final    eGFR 05/16/2025 104  > OR = 60 mL/min/1.73m2 Final    BUN/Creatinine Ratio 05/16/2025 SEE NOTE:  6 - 22 (calc) Final    Sodium 05/16/2025 138  135 - 146 mmol/L Final    Potassium 05/16/2025 4.1  3.5 - 5.3 mmol/L Final    Chloride 05/16/2025 100  98 - 110 mmol/L Final    CO2 05/16/2025 29  20 - 32 mmol/L Final    Calcium 05/16/2025 9.1  8.6 - 10.4 mg/dL Final    Total Protein 05/16/2025 6.2  6.1 - 8.1 g/dL Final    Albumin 05/16/2025 4.0  3.6 - 5.1 g/dL Final    Globulin, Total 05/16/2025 2.2  1.9 - 3.7 g/dL (calc) Final    Albumin/Globulin Ratio 05/16/2025 1.8  1.0 - 2.5 (calc) Final    Total Bilirubin 05/16/2025 0.3  0.2 - 1.2 mg/dL Final    Alkaline Phosphatase 05/16/2025 91  37 - 153 U/L Final    AST 05/16/2025 14  10 - 35 U/L Final    ALT 05/16/2025 19  6 - 29 U/L Final    Hemoglobin A1C 05/16/2025 7.3 (H)  <5.7 % Final    Color, UA 05/16/2025 YELLOW  YELLOW Final    Appearance, UA 05/16/2025 CLEAR  CLEAR Final    Specific [place], UA 05/16/2025 > OR = 1.045 (A)  1.001 - 1.035 Final    pH, UA 05/16/2025 6.0  5.0 - 8.0 Final    Glucose, UA 05/16/2025 3+ (A)  NEGATIVE Final    Bilirubin, UA 05/16/2025 NEGATIVE  NEGATIVE Final    Ketones, UA 05/16/2025 TRACE (A)  NEGATIVE Final    Occult Blood UA 05/16/2025 NEGATIVE  NEGATIVE Final    Protein, UA  05/16/2025 TRACE (A)  NEGATIVE Final    Nitrite, UA 05/16/2025 NEGATIVE  NEGATIVE Final    Leukocytes, UA 05/16/2025 NEGATIVE  NEGATIVE Final       Past Medical History:   Diagnosis Date    Class 3 severe obesity due to excess calories with serious comorbidity and body mass index (BMI) of 40.0 to 44.9 in adult 10/17/2018    COPD (chronic obstructive pulmonary disease)     Diabetes mellitus, type 2     GERD (gastroesophageal reflux disease)     Hyperlipidemia     Hypertension     Sleep apnea     Vitamin D deficiency      Past Surgical History:   Procedure Laterality Date    ADENOIDECTOMY      BREAST BIOPSY      BREAST SURGERY Right     breast biopsy    COLONOSCOPY N/A 09/10/2021    Procedure: COLONOSCOPY;  Surgeon: Holland Royal MD;  Location: [facility];  Service: Endoscopy;  Laterality: N/A;    TONSILLECTOMY      TRANSFORAMINAL EPIDURAL INJECTION OF STEROID Left 04/16/2019    Procedure: Injection,steroid,epidural,transforaminal approach;  Surgeon: Gilberto Michelle MD;  Location: [facility];  Service: Pain Management;  Laterality: Left;  L5-S1, S1    TRANSFORAMINAL EPIDURAL INJECTION OF STEROID Left 08/23/2019    Procedure: Injection,steroid,epidural,transforaminal approach;  Surgeon: Gilberto Michelle MD;  Location: [facility] OR;  Service: Pain Management;  Laterality: Left;  L5-S1, S1     Family History   Problem Relation Name Age of Onset    Stroke Mother      Emphysema Mother      Diabetes Father      Coronary artery disease Father      COPD Father      Diabetes Sister         Marital Status: Significant Other  Alcohol History:  reports current alcohol use.  Tobacco History:  reports that she quit smoking about 6 years ago. Her smoking use included cigarettes. She has never used smokeless tobacco.  Drug History:  reports current drug use. Drug: Hydrocodone.    Review of patient's allergies indicates:  No Known Allergies  Current Medications[1]  Objective:      Vitals:    06/17/25 1436   BP: 120/60   Pulse: 97   Temp: 98.7 °F  "(37.1 °C)   SpO2: 99%   Weight: 106.7 kg (235 lb 3.7 oz)   Height: 5' 6" (1.676 m)     Physical Exam  Physical Exam    General: No acute distress. Well-developed. Well-nourished.  Eyes: EOMI. Sclerae anicteric.  HENT: Normocephalic. Atraumatic. Nares patent. Moist oral mucosa.  Cardiovascular: Regular rate. Regular rhythm. No murmurs. No rubs. No gallops. Normal S1, S2.  Respiratory: Normal respiratory effort. Clear to auscultation bilaterally. No rales. No rhonchi. No wheezing.  Abdomen: Soft. Non-distended.   Musculoskeletal: No  obvious deformity.  Extremities: No lower extremity edema.  Neurological: Alert & oriented x3. No slurred speech. Normal gait.  Psychiatric: Normal mood. Normal affect. Good insight. Good judgment.  Skin: Warm. Dry. No rash.       Assessment:       Assessment & Plan    - Assessed recent hospitalization for COPD exacerbation with hypoxia due to rhinovirus infection.  - Evaluated effectiveness of current COPD management with Trelegy.  - Switched from Trelegy to Breztri for better lung penetration and coverage due to twice-daily dosing.  - Considered smoking cessation history and recent exacerbation severity in treatment plan.  - Reviewed CT Lungs results showing nodules in both lungs, determining follow-up scan needed in 1 year.    ## COPD WITH EXACERBATION:  - Patient experienced sore throat, cough, and chest discomfort leading to ER visit, with rhinovirus documented as the cause of exacerbation.  - Patient now feels recovered with SpO2 at 97%.  - Explained the irreversible lung damage that can occur from COPD exacerbations and discussed the goal of preventing exacerbations and buffering lung reactions to viral infections.  - Switched from Trelegy to Breztri (2 puffs twice daily) for better lung coverage and COPD control.  - Continued AirSupra as needed for quick relief.  - Discontinued Trelegy.  - Patient advised to pace during activities to manage increased heart rate.    ## ACUTE " RESPIRATORY FAILURE WITH HYPOXIA:  - Patient required oxygen during hospital stay when saturation dropped to the 80s.  - Post-discharge, oxygen was gradually reduced from 2 L to 1 liter, and eventually only used during sleep.  - Current SpO2 has improved to 96-98%, indicating recovery from hypoxia.  - Recommend continued use of oxygen during sleep for comfort.    ## ABNORMAL LUNG FINDING:  - CT revealed nodules in both lungs, not thought to be from an infectious process.  - Ordered follow-up CT of lungs in 1 year to monitor these nodules found during recent hospitalization.    ## HISTORY OF NICOTINE DEPENDENCE:  - Patient has history of smoking but has not smoked since February 2019.  - Discussed smoking history and its impact on lung health.    ## LONG TERM USE OF INHALED STEROIDS:  - Patient has been using Trelegy for 3 months but reports it causes immediate cough, indicating possible side effects.  - Switched to Breztri as noted above under COPD management.    ## OTHER:  - Refilled Norco.       Plan:       Hospital discharge follow-up  Completed    COPD exacerbation  Resolved    Acute hypoxemic respiratory failure  Improved  Resolved    Depressed mood  -     cariprazine (VRAYLAR) 1.5 mg Cap; Take 1 capsule (1.5 mg total) by mouth once daily.  Dispense: 90 capsule; Refill: 1    Chronic obstructive pulmonary disease, unspecified COPD type  -     budesonide-glycopyr-formoterol 160-9-4.8 mcg/actuation HFAA; Inhale 2 puffs into the lungs 2 (two) times daily.  Dispense: 10.7 g; Refill: 5  Stop trelegy and start breztri    Multiple pulmonary nodules determined by computed tomography of lung  Repeat CT in 12 months    Essential hypertension  Stable      Follow up if symptoms worsen or fail to improve and as scheduled.    This note was generated with the assistance of ambient listening technology. Verbal consent was obtained by the patient and accompanying visitor(s) for the recording of patient appointment to facilitate  this note. I attest to having reviewed and edited the generated note for accuracy, though some syntax or spelling errors may persist. Please contact the author of this note for any clarification.               [1]   Current Outpatient Medications:     albuterol-budesonide (AIRSUPRA) 90-80 mcg/actuation, Inhale 2 puffs into the lungs every 6 (six) hours as needed (sob, wheezing)., Disp: 10.7 g, Rfl: 5    amLODIPine (NORVASC) 10 MG tablet, TAKE 1 TABLET BY MOUTH EVERY DAY, Disp: 90 tablet, Rfl: 1    aspirin/salicylamide/caffeine (BC HEADACHE POWDER ORAL), Take by mouth as needed., Disp: , Rfl:     azelastine (ASTELIN) 137 mcg (0.1 %) nasal spray, USE 1 SPRAY IN EACH NOSTRIL TWICE DAILY, Disp: 30 mL, Rfl: 2    buPROPion (WELLBUTRIN XL) 300 MG 24 hr tablet, TAKE 1 TABLET BY MOUTH EVERY DAY, Disp: 90 tablet, Rfl: 1    clotrimazole-betamethasone 1-0.05% (LOTRISONE) cream, Apply topically 2 (two) times daily as needed (rash)., Disp: 45 g, Rfl: 1    FLUoxetine 10 MG capsule, TAKE 1 CAPSULE BY MOUTH EVERY DAY, Disp: 90 capsule, Rfl: 1    fluticasone propionate (FLONASE) 50 mcg/actuation nasal spray, 1 spray (50 mcg total) by Each Nostril route 2 (two) times daily as needed for Rhinitis., Disp: , Rfl:     hydrocortisone 2.5 % cream, Apply topically 2 (two) times daily as needed (rash)., Disp: , Rfl:     JARDIANCE 25 mg tablet, TAKE 1 TABLET BY MOUTH EVERY DAY, Disp: 90 tablet, Rfl: 1    linaCLOtide (LINZESS) 72 mcg Cap capsule, Take 1 capsule (72 mcg total) by mouth before breakfast., Disp: 90 each, Rfl: 1    metFORMIN (GLUCOPHAGE-XR) 500 MG ER 24hr tablet, TAKE 2 TABLETS BY MOUTH TWICE A DAY WITH MEALS, Disp: 360 tablet, Rfl: 1    montelukast (SINGULAIR) 10 mg tablet, TAKE 1 TABLET BY MOUTH EVERY DAY, Disp: 90 tablet, Rfl: 1    pantoprazole (PROTONIX) 40 MG tablet, TAKE 1 TABLET BY MOUTH EVERY DAY, Disp: 90 tablet, Rfl: 1    predniSONE (DELTASONE) 20 MG tablet, Take 2 tablets (40 mg total) by mouth once daily for 1 day, THEN  1 tablet (20 mg total) once daily for 7 days., Disp: 9 tablet, Rfl: 0    promethazine-dextromethorphan (PROMETHAZINE-DM) 6.25-15 mg/5 mL Syrp, Take 5 mLs by mouth nightly as needed (cough)., Disp: 118 mL, Rfl: 2    rosuvastatin (CRESTOR) 20 MG tablet, TAKE 1 TABLET BY MOUTH EVERY DAY, Disp: 90 tablet, Rfl: 1    tirzepatide (MOUNJARO) 2.5 mg/0.5 mL PnIj, Inject 2.5 mg into the skin every 7 days., Disp: 4 Pen, Rfl: 0    [START ON 6/21/2025] tirzepatide (MOUNJARO) 5 mg/0.5 mL PnIj, Inject 5 mg into the skin every 7 days., Disp: 12 Pen, Rfl: 0    valsartan-hydrochlorothiazide (DIOVAN-HCT) 160-25 mg per tablet, TAKE 1 TABLET BY MOUTH EVERY DAY, Disp: 90 tablet, Rfl: 1    blood sugar diagnostic Strp, 1 each by Misc.(Non-Drug; Combo Route) route once daily. (Patient not taking: Reported on 6/17/2025), Disp: 100 each, Rfl: 2    budesonide-glycopyr-formoterol 160-9-4.8 mcg/actuation HFAA, Inhale 2 puffs into the lungs 2 (two) times daily., Disp: 10.7 g, Rfl: 5    cariprazine (VRAYLAR) 1.5 mg Cap, Take 1 capsule (1.5 mg total) by mouth once daily., Disp: 90 capsule, Rfl: 1    lancets 33 gauge Misc, 1 lancet by Misc.(Non-Drug; Combo Route) route once daily. (Patient not taking: Reported on 6/12/2025), Disp: 100 each, Rfl: 3

## 2025-06-20 ENCOUNTER — OFFICE VISIT (OUTPATIENT)
Dept: OBSTETRICS AND GYNECOLOGY | Facility: CLINIC | Age: 60
End: 2025-06-20
Payer: COMMERCIAL

## 2025-06-20 ENCOUNTER — HOSPITAL ENCOUNTER (OUTPATIENT)
Dept: RADIOLOGY | Facility: HOSPITAL | Age: 60
Discharge: HOME | End: 2025-06-20
Attending: FAMILY MEDICINE
Payer: COMMERCIAL

## 2025-06-20 ENCOUNTER — RESULTS FOLLOW-UP (OUTPATIENT)
Dept: OBSTETRICS AND GYNECOLOGY | Facility: CLINIC | Age: 60
End: 2025-06-20

## 2025-06-20 VITALS
DIASTOLIC BLOOD PRESSURE: 74 MMHG | BODY MASS INDEX: 38.22 KG/M2 | SYSTOLIC BLOOD PRESSURE: 122 MMHG | WEIGHT: 236.75 LBS

## 2025-06-20 DIAGNOSIS — Z12.31 BREAST CANCER SCREENING BY MAMMOGRAM: ICD-10-CM

## 2025-06-20 DIAGNOSIS — N39.3 STRESS INCONTINENCE IN FEMALE: ICD-10-CM

## 2025-06-20 DIAGNOSIS — Z01.419 ENCOUNTER FOR WELL WOMAN EXAM WITH ROUTINE GYNECOLOGICAL EXAM: ICD-10-CM

## 2025-06-20 DIAGNOSIS — R33.9 INCOMPLETE BLADDER EMPTYING: ICD-10-CM

## 2025-06-20 DIAGNOSIS — Z01.419 WELL WOMAN EXAM WITH ROUTINE GYNECOLOGICAL EXAM: Primary | ICD-10-CM

## 2025-06-20 DIAGNOSIS — Z12.4 PAP SMEAR FOR CERVICAL CANCER SCREENING: ICD-10-CM

## 2025-06-20 PROCEDURE — 99999 PR PBB SHADOW E&M-EST. PATIENT-LVL V: CPT | Mod: PBBFAC,,, | Performed by: FAMILY MEDICINE

## 2025-06-20 PROCEDURE — 77067 SCR MAMMO BI INCL CAD: CPT | Mod: 26,,, | Performed by: RADIOLOGY

## 2025-06-20 PROCEDURE — 77063 BREAST TOMOSYNTHESIS BI: CPT | Mod: 26,,, | Performed by: RADIOLOGY

## 2025-06-20 PROCEDURE — 77067 SCR MAMMO BI INCL CAD: CPT | Mod: TC,PO

## 2025-06-20 NOTE — PROGRESS NOTES
"  HISTORY OF THE PRESENT ILLNESS    2025  Kemi Dominguez is a 60 y.o. here for Annual Exam and incomplete bladder emptying  She presents today for annual exam and pap smear. She reports she has not had this done in some time and knows she is also due for mammogram.   She reports she has trouble emptying her bladder at times. She reports she sometimes feels like her rectum is full and this makes bladder emptying harder.     G'sP's:   LMP: No LMP recorded. Patient is postmenopausal.  Relationship: not sexually active  Contraception:     PAP'S: no h/o abnormals  PAP: PAP neg / Date: years ago    Last Mammogram: 2024 Results:  negative, recommend 1-yr f/u    HRT: denies   BLEEDING: denies    Last colonoscopy:      LABS & RADS   Lab Results   Component Value Date    WBC 8.15 06/10/2025    HGB 13.1 06/10/2025    HCT 42.7 06/10/2025     06/10/2025    MCV 88 06/10/2025      Lab Results   Component Value Date    TSH 2.23 2018      No results found for: "LABURIN"  Lab Results   Component Value Date    HEPCAB NON-REACTIVE 2021     No results found for: "LABCHLA", "LABNGO"  ______________________________________       GYNECOLOGIC HISTORY    Genital HSV: no  STD Hx: denies    Age of Menarche:14  Age at first pregnancy:    Age at first live birth:   Age at Menopause:52     OBSTETRIC HISTORY  OB History    Para Term  AB Living   0 0 0      SAB IAB Ectopic Multiple Live Births              PAST MEDICAL HISTORY  -------------------------------------    Class 3 severe obesity due to excess calories with serious comorbidity and body mass index (BMI) of 40.0 to 44.9 in adult    COPD (chronic obstructive pulmonary disease)    Diabetes mellitus, type 2    GERD (gastroesophageal reflux disease)    Hyperlipidemia    Hypertension    Sleep apnea    Vitamin D deficiency         PAST SURGICAL HISTORY  ----------------------------    Adenoidectomy    Breast biopsy    Breast surgery "    breast biopsy    Colonoscopy    Procedure: COLONOSCOPY;  Surgeon: Holland Royal MD;  [facility] ENDO;  Service: Endoscopy;  Laterality: N/A;    Tonsillectomy    Transforaminal epidural injection of steroid    Procedure: Injection,steroid,epidural,transforaminal approach;  Surgeon: Gilberto Michelle MD;  Location: [facility] OR;  Service: Pain Management;  Laterality: Left;  L5-S1, S1    Transforaminal epidural injection of steroid    Procedure: Injection,steroid,epidural,transforaminal approach;  Surgeon: Gilberto Michelle MD;  Location: [facility] OR;  Service: Pain Management;  Laterality: Left;  L5-S1, S1         ALLERGIES  Review of patient's allergies indicates:  No Known Allergies    MEDICATIONS  Current Outpatient Medications   Medication Instructions    albuterol-budesonide (AIRSUPRA) 90-80 mcg/actuation 2 puffs, Inhalation, Every 6 hours PRN    amLODIPine (NORVASC) 10 mg, Oral    aspirin/salicylamide/caffeine (BC HEADACHE POWDER ORAL) As needed (PRN)    azelastine (ASTELIN) 137 mcg (0.1 %) nasal spray USE 1 SPRAY IN EACH NOSTRIL TWICE DAILY    blood sugar diagnostic Strp 1 each, Misc.(Non-Drug; Combo Route), Daily    budesonide-glycopyr-formoterol 160-9-4.8 mcg/actuation HFAA 2 puffs, Inhalation, 2 times daily    buPROPion (WELLBUTRIN XL) 300 mg, Oral    clotrimazole-betamethasone 1-0.05% (LOTRISONE) cream Topical (Top), 2 times daily PRN    FLUoxetine 10 mg, Oral    fluticasone propionate (FLONASE) 50 mcg, Each Nostril, 2 times daily PRN    hydrocortisone 2.5 % cream Topical (Top), 2 times daily PRN    JARDIANCE 25 mg tablet TAKE 1 TABLET BY MOUTH EVERY DAY    lancets 33 gauge Misc 1 lancet , Misc.(Non-Drug; Combo Route), Daily    linaCLOtide (LINZESS) 72 mcg, Oral, Before breakfast    metFORMIN (GLUCOPHAGE-XR) 1,000 mg, Oral, 2 times daily with meals    montelukast (SINGULAIR) 10 mg tablet TAKE 1 TABLET BY MOUTH EVERY DAY    MOUNJARO 2.5 mg, Subcutaneous, Every 7 days    [START ON 6/21/2025] MOUNJARO 5 mg,  Subcutaneous, Every 7 days    pantoprazole (PROTONIX) 40 MG tablet TAKE 1 TABLET BY MOUTH EVERY DAY    promethazine-dextromethorphan (PROMETHAZINE-DM) 6.25-15 mg/5 mL Syrp 5 mLs, Oral, Nightly PRN    rosuvastatin (CRESTOR) 20 mg, Oral    valsartan-hydrochlorothiazide (DIOVAN-HCT) 160-25 mg per tablet TAKE 1 TABLET BY MOUTH EVERY DAY    VRAYLAR 1.5 mg, Oral, Daily       SOCIAL HISTORY  Social History[1]   Lives with: significant other  Domestic Violence: no  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   Respiratory:  Negative for cough and shortness of breath.    Gastrointestinal:         Fullness in rectum   Genitourinary:  Negative for dysuria, flank pain, frequency, pelvic pain, urgency and urinary incontinence.        Incomplete bladder emptying     Psychiatric/Behavioral:  Negative for depression.      --------------------------------------------------------------------------------------------------------------    PHYSICAL EXAM  VITALS:  Vitals:    06/20/25 1030   BP: 122/74   BP Location: Left arm   Patient Position: Sitting   Weight: 107.4 kg (236 lb 12.4 oz)     Exam conducted with a chaperone present.     Physical Exam:   Constitutional: She is oriented to person, place, and time. She appears well-developed and well-nourished.    HENT:   Head: Normocephalic.    Eyes: Pupils are equal, round, and reactive to light. Conjunctivae and EOM are normal.      Pulmonary/Chest: Effort normal.          Genitourinary:    Inguinal canal, vagina, uterus, right adnexa, left adnexa and rectum normal.      Pelvic exam was performed with patient in the lithotomy position.   The external female genitalia was normal.   Genitalia hair distrobution normal .   Cervix is normal. Vagina was moist.Uterus  consistancy normal and Uerus contour normal            Musculoskeletal: Normal range of motion and moves all extremeties.       Neurological: She is alert and oriented to person, place, and time.    Skin: Skin is warm and dry.    Psychiatric: She has a normal mood and affect.          ASSESSMENT AND PLAN:  Kemi Dominguez 60 y.o.   Kemi was seen today for annual exam and incomplete bladder emptying.    Diagnoses and all orders for this visit:    Well woman exam with routine gynecological exam / Pap smear for cervical cancer screening  -     Liquid-Based Pap Smear, Screening    Encounter for well woman exam with routine gynecological exam   -     Ambulatory referral/consult to Obstetrics / Gynecology    Incomplete bladder emptying/ Stress incontinence in female  -     Ambulatory referral/consult to Urogynecology; Future    Breast cancer screening by mammogram  -     Mammo Digital Screening Bilat w/ Jeremy (XPD); Future        Cervical Cancer screening: f/u results of PAP / HPV --> if both negative then next screen in 5 yrs  HPV Vaccine: n/a (>45)    Mammogram: ordered and patient will schedule    From a gynecologic standpoint the patient is currently doing well without complaints.     Patient was counseled today on :  Pap guidelines  Recommend periodic pelvic exams with visual inspection and palpation  mammograms starting annually at age 40  Encouraged self breast awareness; RTC for breast concerns  Colonoscopy after the age of 50  Dexa Bone Scan and calcium and vitamin D supplementation in menopause and to see her PCP for other health maintenance.     RTC for periodic GYN exam, sooner prn      Melody T Griffing     Follow up if symptoms worsen or fail to improve.   Future Appointments   Date Time Provider Department Center   6/20/2025 12:45 PM [facility] OIC MAMMO1 [facility] IM [facility]   7/2/2025  2:00 PM Elder Lorenz MD [facility]   8/27/2025  1:40 PM [facility]          Tests to Keep You Healthy    Mammogram: SCHEDULED  Eye Exam: DUE  Colon Cancer Screening: Met on 9/10/2021  Cervical Cancer Screening: ORDERED  Last Blood Pressure <= 139/89 (2025): Yes  Last HbA1c < 8 (2025): Yes            [1]   Social History  Tobacco Use    Smoking status: Former     Current packs/day: 0.00     Types: Cigarettes     Quit date: 2019     Years since quittin.3    Smokeless tobacco: Never   Substance Use Topics    Alcohol use: Yes     Comment: rarely    Drug use: Yes     Types: Hydrocodone

## 2025-07-02 ENCOUNTER — OFFICE VISIT (OUTPATIENT)
Dept: PULMONOLOGY | Facility: CLINIC | Age: 60
End: 2025-07-02
Payer: COMMERCIAL

## 2025-07-02 VITALS
WEIGHT: 235.25 LBS | OXYGEN SATURATION: 97 % | HEART RATE: 80 BPM | SYSTOLIC BLOOD PRESSURE: 111 MMHG | BODY MASS INDEX: 37.81 KG/M2 | HEIGHT: 66 IN | DIASTOLIC BLOOD PRESSURE: 75 MMHG

## 2025-07-02 DIAGNOSIS — J84.9 ILD (INTERSTITIAL LUNG DISEASE): ICD-10-CM

## 2025-07-02 DIAGNOSIS — G47.33 OSA (OBSTRUCTIVE SLEEP APNEA): Primary | ICD-10-CM

## 2025-07-02 DIAGNOSIS — J43.8 OTHER EMPHYSEMA: ICD-10-CM

## 2025-07-02 DIAGNOSIS — J44.9 CHRONIC OBSTRUCTIVE PULMONARY DISEASE, UNSPECIFIED COPD TYPE: ICD-10-CM

## 2025-07-02 DIAGNOSIS — E66.01 CLASS 2 SEVERE OBESITY DUE TO EXCESS CALORIES WITH SERIOUS COMORBIDITY AND BODY MASS INDEX (BMI) OF 37.0 TO 37.9 IN ADULT: ICD-10-CM

## 2025-07-02 DIAGNOSIS — J84.9 INTERSTITIAL PULMONARY DISEASE, UNSPECIFIED: ICD-10-CM

## 2025-07-02 DIAGNOSIS — E66.812 CLASS 2 SEVERE OBESITY DUE TO EXCESS CALORIES WITH SERIOUS COMORBIDITY AND BODY MASS INDEX (BMI) OF 37.0 TO 37.9 IN ADULT: ICD-10-CM

## 2025-07-02 PROCEDURE — 3008F BODY MASS INDEX DOCD: CPT | Mod: CPTII,S$GLB,, | Performed by: INTERNAL MEDICINE

## 2025-07-02 PROCEDURE — 3074F SYST BP LT 130 MM HG: CPT | Mod: CPTII,S$GLB,, | Performed by: INTERNAL MEDICINE

## 2025-07-02 PROCEDURE — 99215 OFFICE O/P EST HI 40 MIN: CPT | Mod: S$GLB,,, | Performed by: INTERNAL MEDICINE

## 2025-07-02 PROCEDURE — 3061F NEG MICROALBUMINURIA REV: CPT | Mod: CPTII,S$GLB,, | Performed by: INTERNAL MEDICINE

## 2025-07-02 PROCEDURE — 99999 PR PBB SHADOW E&M-EST. PATIENT-LVL V: CPT | Mod: PBBFAC,,, | Performed by: INTERNAL MEDICINE

## 2025-07-02 PROCEDURE — 3066F NEPHROPATHY DOC TX: CPT | Mod: CPTII,S$GLB,, | Performed by: INTERNAL MEDICINE

## 2025-07-02 PROCEDURE — 3078F DIAST BP <80 MM HG: CPT | Mod: CPTII,S$GLB,, | Performed by: INTERNAL MEDICINE

## 2025-07-02 PROCEDURE — 1111F DSCHRG MED/CURRENT MED MERGE: CPT | Mod: CPTII,S$GLB,, | Performed by: INTERNAL MEDICINE

## 2025-07-02 PROCEDURE — 3051F HG A1C>EQUAL 7.0%<8.0%: CPT | Mod: CPTII,S$GLB,, | Performed by: INTERNAL MEDICINE

## 2025-07-02 PROCEDURE — 1159F MED LIST DOCD IN RCRD: CPT | Mod: CPTII,S$GLB,, | Performed by: INTERNAL MEDICINE

## 2025-07-02 NOTE — PROGRESS NOTES
Kemi Dominguez  was seen as a new patient for the evaluation of  abnormal ct chest.    CHIEF COMPLAINT:  Hospital Follow Up      HISTORY OF PRESENT ILLNESS: Kemi Dominguez is a 60 y.o. female  has a past medical history of Class 3 severe obesity due to excess calories with serious comorbidity and body mass index (BMI) of 40.0 to 44.9 in adult (10/17/2018), COPD (chronic obstructive pulmonary disease), Diabetes mellitus, type 2, GERD (gastroesophageal reflux disease), Hyperlipidemia, Hypertension, Sleep apnea, and Vitamin D deficiency.  Patient was hospitalized 6/9/25 with worsening sob and cough.  Patient as treated for copd exacerbation with steroid, nebs.  RIP positive for rhinovirus.  Flu/covid were negative.  Patient was seen by Dr. Mcfarland for abnormal CT.  Dr. Mcfarland recommended follow up ct in 6-8 weeks.  Patient was discharged oxygen.  Doing better since hospital discharge.  Able to wean off oxygen    Currently, patient with mild sore throat and mild cough.  Overall, much improve when compared to hospitalization.  Able to wean off oxygen.  Back to work as x-ray technician    Patient was diagnosed with copd.  Currently on Trelegy and airsupra    Additional Pulmonary History:   Occupational/Environmental Exposures:  x-ray technician  Exposure to Animals/Pets:  cats and dog  Foreign Travel History:  [place]25  History of exposures to TB:  denied   Family History of Lung Cancer:  denied   Tobacco:  quit in 2019; used to smoke 1.5 ppd x 30 years  Childhood history of Lung Disease:  asthma  Chest surgery or trauma:  denied      PAST MEDICAL HISTORY:    Active Ambulatory Problems     Diagnosis Date Noted    Essential hypertension 10/17/2018    Mixed hyperlipidemia 10/17/2018    Type 2 diabetes mellitus with complication, without long-term current use of insulin 10/17/2018    Class 2 severe obesity due to excess calories with serious comorbidity and body mass index (BMI) of  37.0 to 37.9 in adult 10/17/2018    Chronic back pain 10/17/2018    Other emphysema 10/17/2018    Lumbar radiculitis 04/16/2019    Acute hypoxemic respiratory failure 06/07/2025    PETEY (obstructive sleep apnea) 06/07/2025    Multiple pulmonary nodules determined by computed tomography of lung 06/09/2025    Rhinovirus infection 06/10/2025    ILD (interstitial lung disease) 07/02/2025     Resolved Ambulatory Problems     Diagnosis Date Noted    Encounter for screening 09/10/2021     Past Medical History:   Diagnosis Date    Class 3 severe obesity due to excess calories with serious comorbidity and body mass index (BMI) of 40.0 to 44.9 in adult 10/17/2018    COPD (chronic obstructive pulmonary disease)     Diabetes mellitus, type 2     GERD (gastroesophageal reflux disease)     Hyperlipidemia     Hypertension     Sleep apnea     Vitamin D deficiency                 PAST SURGICAL HISTORY:    Past Surgical History:   Procedure Laterality Date    ADENOIDECTOMY      BREAST BIOPSY      BREAST SURGERY Right     breast biopsy    COLONOSCOPY N/A 09/10/2021    Procedure: COLONOSCOPY;  Surgeon: Holland Royal MD;  Location: [facility];  Service: Endoscopy;  Laterality: N/A;    TONSILLECTOMY      TRANSFORAMINAL EPIDURAL INJECTION OF STEROID Left 04/16/2019    Procedure: Injection,steroid,epidural,transforaminal approach;  Surgeon: Gilbreto Michelle MD;  [facility];  Service: Pain Management;  Laterality: Left;  L5-S1, S1    TRANSFORAMINAL EPIDURAL INJECTION OF STEROID Left 08/23/2019    Procedure: Injection,steroid,epidural,transforaminal approach;  Surgeon: Gilberto Michelle MD;  [facility];  Service: Pain Management;  Laterality: Left;  L5-S1, S1         FAMILY HISTORY:                Family History   Problem Relation Name Age of Onset    Stroke Mother      Emphysema Mother      Diabetes Father      Coronary artery disease Father      COPD Father      Diabetes Sister         SOCIAL HISTORY:          Tobacco: Tobacco Use  "History[1]  alcohol use:    Social History     Substance and Sexual Activity   Alcohol Use Yes    Comment: rarely                   ALLERGIES:  Review of patient's allergies indicates:  No Known Allergies    CURRENT MEDICATIONS:  Current Medications[2]               REVIEW OF SYSTEMS:     Pulmonary related symptoms as per HPI.  Gen:  no weight loss, no fever, no night sweat  HEENT:  no visual changes, no sore throat, no hearing loss, occasional nasal congestion  CV:  No chest pain, no orthopnea, no PND  GI:  no melena, no hematochezia, no diarhea, no constipation.  On protonix  :  no dysuria, no hematuria, no hesistancy, no dribbling  Neuro:  no syncope, no vertigo, no tinitus  Psych:  No homocide or suicide ideation; no depression.  On wellbutrin, prozac and vrylar.    Endocrine:  No heat or cold intolerance.  Sleep:  +loud snoring; no witnessed apnea.  Dry mouth  upon awake.  Tire upon awake  Otherwise, a balance of systems reviewed is negative.          PHYSICAL EXAM:  Vitals:    07/02/25 1345   BP: 111/75   Pulse: 80   SpO2: 97%   Weight: 106.7 kg (235 lb 3.7 oz)   Height: 5' 6" (1.676 m)   PainSc:   3   PainLoc: Throat     Body mass index is 37.97 kg/m².     GENERAL:  well develop; no apparent distress  HEENT:  no nasal congestion; no discharge noted; class 3 modified mallampatti.   NECK:  supple; no palpable masses.  CARDIO: regular rate and rhythm  PULM:  clear to auscultation bilaterally; no intercostals retractions; no accessory muscle usage   ABDOMEN:  soft nontender/nondistended.  +bowel sound  EXTREMITIES no cce  NEURO:  CN II-XII intact.  5/5 motor in all extremities.  sensation grossly intact   to light touch.  PSYCH:  normal affect.  Alert and oriented x 4    LABS  Pulmonary Functions Testing Results(personally reviewed):  none  ABG (personally reviewed):  none  CXR (personally reviewed):  6/7/25 no consolidation or effusion  CT CHEST(personally reviewed):  6/8/25 centrilobular nodular infiltrate " with scattered tib and tib opacities in lower lobes.  No effusion or septal reticulation.      ASSESSMENT/PLAN  Problem List Items Addressed This Visit       Class 2 severe obesity due to excess calories with serious comorbidity and body mass index (BMI) of 37.0 to 37.9 in adult    Overview   was losing weight with Mounjaro.  Will resume.          ILD (interstitial lung disease)    Overview   ct chest with bilateral centrilobular infiltrate and tib configuration.  Also with mild basilar bronchiectasis.  Clinically improved and no longer needing oxygen.  Sputum afb.  Baseline pft walk test to determine if oxygen is still indicated         Relevant Orders    AFB Culture & Smear    Complete PFT with bronchodilator    Stress test, pulmonary    PETEY (obstructive sleep apnea) - Primary    Overview   patient was diagnosed with petey around  at [facility] but had poor tolerance to pap.   Given restless sleep, will send for requalification.  Copd warrants in lab evaluation.          Relevant Orders    Polysomnogram (CPAP will be added if patient meets diagnostic criteria.)    Other emphysema    Overview   diagnosed by Dr. Truong.  Currently on Brestri and Airsupra  Baseline pft          Other Visit Diagnoses         Chronic obstructive pulmonary disease, unspecified COPD type          Interstitial pulmonary disease, unspecified        Relevant Orders    CT Chest Without Contrast          Patient will No follow-ups on file.              [1]   Social History  Tobacco Use   Smoking Status Former    Current packs/day: 0.00    Types: Cigarettes    Quit date: 2019    Years since quittin.3   Smokeless Tobacco Never   [2]   Current Outpatient Medications   Medication Sig Dispense Refill    albuterol-budesonide (AIRSUPRA) 90-80 mcg/actuation Inhale 2 puffs into the lungs every 6 (six) hours as needed (sob, wheezing). 10.7 g 5    amLODIPine (NORVASC) 10 MG tablet TAKE 1 TABLET BY MOUTH EVERY DAY 90 tablet 1     aspirin/salicylamide/caffeine (BC HEADACHE POWDER ORAL) Take by mouth as needed.      azelastine (ASTELIN) 137 mcg (0.1 %) nasal spray USE 1 SPRAY IN EACH NOSTRIL TWICE DAILY 30 mL 2    blood sugar diagnostic Strp 1 each by Misc.(Non-Drug; Combo Route) route once daily. 100 each 2    budesonide-glycopyr-formoterol 160-9-4.8 mcg/actuation HFAA Inhale 2 puffs into the lungs 2 (two) times daily. 10.7 g 5    buPROPion (WELLBUTRIN XL) 300 MG 24 hr tablet TAKE 1 TABLET BY MOUTH EVERY DAY 90 tablet 1    cariprazine (VRAYLAR) 1.5 mg Cap Take 1 capsule (1.5 mg total) by mouth once daily. 90 capsule 1    clotrimazole-betamethasone 1-0.05% (LOTRISONE) cream Apply topically 2 (two) times daily as needed (rash). 45 g 1    FLUoxetine 10 MG capsule TAKE 1 CAPSULE BY MOUTH EVERY DAY 90 capsule 1    fluticasone propionate (FLONASE) 50 mcg/actuation nasal spray 1 spray (50 mcg total) by Each Nostril route 2 (two) times daily as needed for Rhinitis.      hydrocortisone 2.5 % cream Apply topically 2 (two) times daily as needed (rash).      JARDIANCE 25 mg tablet TAKE 1 TABLET BY MOUTH EVERY DAY 90 tablet 1    lancets 33 gauge Misc 1 lancet by Misc.(Non-Drug; Combo Route) route once daily. 100 each 3    linaCLOtide (LINZESS) 72 mcg Cap capsule Take 1 capsule (72 mcg total) by mouth before breakfast. 90 each 1    metFORMIN (GLUCOPHAGE-XR) 500 MG ER 24hr tablet TAKE 2 TABLETS BY MOUTH TWICE A DAY WITH MEALS 360 tablet 1    montelukast (SINGULAIR) 10 mg tablet TAKE 1 TABLET BY MOUTH EVERY DAY 90 tablet 1    pantoprazole (PROTONIX) 40 MG tablet TAKE 1 TABLET BY MOUTH EVERY DAY 90 tablet 1    promethazine-dextromethorphan (PROMETHAZINE-DM) 6.25-15 mg/5 mL Syrp Take 5 mLs by mouth nightly as needed (cough). 118 mL 2    rosuvastatin (CRESTOR) 20 MG tablet TAKE 1 TABLET BY MOUTH EVERY DAY 90 tablet 1    tirzepatide (MOUNJARO) 5 mg/0.5 mL PnIj Inject 5 mg into the skin every 7 days. 12 Pen 0    valsartan-hydrochlorothiazide (DIOVAN-HCT) 160-25  mg per tablet TAKE 1 TABLET BY MOUTH EVERY DAY 90 tablet 1    tirzepatide (MOUNJARO) 2.5 mg/0.5 mL PnIj Inject 2.5 mg into the skin every 7 days. (Patient not taking: Reported on 7/2/2025) 4 Pen 0     No current facility-administered medications for this visit.

## 2025-07-10 ENCOUNTER — TELEPHONE (OUTPATIENT)
Dept: SLEEP MEDICINE | Facility: HOSPITAL | Age: 60
End: 2025-07-10
Payer: COMMERCIAL

## 2025-07-10 ENCOUNTER — LAB VISIT (OUTPATIENT)
Dept: LAB | Facility: HOSPITAL | Age: 60
End: 2025-07-10
Attending: INTERNAL MEDICINE
Payer: COMMERCIAL

## 2025-07-10 DIAGNOSIS — K21.9 GASTROESOPHAGEAL REFLUX DISEASE WITHOUT ESOPHAGITIS: ICD-10-CM

## 2025-07-10 DIAGNOSIS — J44.9 CHRONIC OBSTRUCTIVE PULMONARY DISEASE, UNSPECIFIED COPD TYPE: ICD-10-CM

## 2025-07-10 DIAGNOSIS — I10 ESSENTIAL HYPERTENSION: ICD-10-CM

## 2025-07-10 DIAGNOSIS — R45.89 DEPRESSED MOOD: ICD-10-CM

## 2025-07-10 DIAGNOSIS — J84.9 ILD (INTERSTITIAL LUNG DISEASE): ICD-10-CM

## 2025-07-10 DIAGNOSIS — E11.8 TYPE 2 DIABETES MELLITUS WITH COMPLICATION, WITHOUT LONG-TERM CURRENT USE OF INSULIN: ICD-10-CM

## 2025-07-10 PROCEDURE — 87206 SMEAR FLUORESCENT/ACID STAI: CPT

## 2025-07-10 PROCEDURE — 87015 SPECIMEN INFECT AGNT CONCNTJ: CPT

## 2025-07-10 RX ORDER — VALSARTAN AND HYDROCHLOROTHIAZIDE 160; 25 MG/1; MG/1
1 TABLET ORAL
Qty: 90 TABLET | Refills: 1 | Status: SHIPPED | OUTPATIENT
Start: 2025-07-10

## 2025-07-10 RX ORDER — FLUOXETINE 10 MG/1
10 CAPSULE ORAL
Qty: 90 CAPSULE | Refills: 1 | Status: SHIPPED | OUTPATIENT
Start: 2025-07-10

## 2025-07-10 RX ORDER — MONTELUKAST SODIUM 10 MG/1
10 TABLET ORAL
Qty: 90 TABLET | Refills: 1 | Status: SHIPPED | OUTPATIENT
Start: 2025-07-10

## 2025-07-10 RX ORDER — PANTOPRAZOLE SODIUM 40 MG/1
40 TABLET, DELAYED RELEASE ORAL
Qty: 90 TABLET | Refills: 1 | Status: SHIPPED | OUTPATIENT
Start: 2025-07-10

## 2025-07-10 RX ORDER — EMPAGLIFLOZIN 25 MG/1
25 TABLET, FILM COATED ORAL
Qty: 90 TABLET | Refills: 1 | Status: SHIPPED | OUTPATIENT
Start: 2025-07-10

## 2025-07-11 LAB — ACID FAST MOD KINY STN SPEC: NORMAL

## 2025-07-16 ENCOUNTER — HOSPITAL ENCOUNTER (OUTPATIENT)
Dept: RESPIRATORY THERAPY | Facility: HOSPITAL | Age: 60
Discharge: HOME | End: 2025-07-16
Attending: INTERNAL MEDICINE
Payer: COMMERCIAL

## 2025-07-16 VITALS — RESPIRATION RATE: 18 BRPM | OXYGEN SATURATION: 98 % | HEART RATE: 72 BPM

## 2025-07-16 DIAGNOSIS — J84.9 ILD (INTERSTITIAL LUNG DISEASE): ICD-10-CM

## 2025-07-16 PROCEDURE — 25000242 PHARM REV CODE 250 ALT 637 W/ HCPCS: Performed by: INTERNAL MEDICINE

## 2025-07-16 PROCEDURE — 94618 PULMONARY STRESS TESTING: CPT

## 2025-07-16 PROCEDURE — 94729 DIFFUSING CAPACITY: CPT

## 2025-07-16 PROCEDURE — 94200 LUNG FUNCTION TEST (MBC/MVV): CPT

## 2025-07-16 RX ORDER — ALBUTEROL SULFATE 2.5 MG/.5ML
2.5 SOLUTION RESPIRATORY (INHALATION) ONCE
Status: COMPLETED | OUTPATIENT
Start: 2025-07-16 | End: 2025-07-16

## 2025-07-16 RX ADMIN — ALBUTEROL SULFATE 2.5 MG: 2.5 SOLUTION RESPIRATORY (INHALATION) at 07:07

## 2025-07-17 LAB — MYCOBACTERIUM SPEC QL CULT: NORMAL

## 2025-07-31 ENCOUNTER — OFFICE VISIT (OUTPATIENT)
Dept: ORTHOPEDICS | Facility: CLINIC | Age: 60
End: 2025-07-31
Payer: COMMERCIAL

## 2025-07-31 VITALS — BODY MASS INDEX: 37.81 KG/M2 | HEIGHT: 66 IN | WEIGHT: 235.25 LBS | RESPIRATION RATE: 18 BRPM

## 2025-07-31 DIAGNOSIS — M75.100 ROTATOR CUFF SYNDROME, UNSPECIFIED LATERALITY: ICD-10-CM

## 2025-07-31 DIAGNOSIS — M75.100 ROTATOR CUFF SYNDROME, UNSPECIFIED LATERALITY: Primary | ICD-10-CM

## 2025-07-31 DIAGNOSIS — M25.512 LEFT SHOULDER PAIN, UNSPECIFIED CHRONICITY: ICD-10-CM

## 2025-07-31 DIAGNOSIS — M75.102 TEAR OF LEFT ROTATOR CUFF, UNSPECIFIED TEAR EXTENT, UNSPECIFIED WHETHER TRAUMATIC: ICD-10-CM

## 2025-07-31 DIAGNOSIS — M25.512 LEFT SHOULDER PAIN, UNSPECIFIED CHRONICITY: Primary | ICD-10-CM

## 2025-07-31 PROCEDURE — 99999 PR PBB SHADOW E&M-EST. PATIENT-LVL IV: CPT | Mod: PBBFAC,,, | Performed by: ORTHOPAEDIC SURGERY

## 2025-07-31 RX ORDER — TRIAMCINOLONE ACETONIDE 40 MG/ML
40 INJECTION, SUSPENSION INTRA-ARTICULAR; INTRAMUSCULAR
Status: DISCONTINUED | OUTPATIENT
Start: 2025-07-31 | End: 2025-07-31 | Stop reason: HOSPADM

## 2025-07-31 RX ADMIN — TRIAMCINOLONE ACETONIDE 40 MG: 40 INJECTION, SUSPENSION INTRA-ARTICULAR; INTRAMUSCULAR at 09:07

## 2025-07-31 NOTE — PROCEDURES
Large Joint Aspiration/Injection: L subacromial bursa    Date/Time: 7/31/2025 9:15 AM    Performed by: Jf Palomo MD  Authorized by: Jf Palomo MD    Site marked: the procedure site was marked    Timeout: prior to procedure the correct patient, procedure, and site was verified      Local anesthesia used?: Yes    Local anesthetic: Ropivicaine.  Anesthetic total (ml):  3      Details:  Needle Size:  20 G  Ultrasonic Guidance for needle placement?: No    Approach:  Posterior  Location:  Shoulder  Site:  L subacromial bursa  Medications:  40 mg triamcinolone acetonide 40 mg/mL  Patient tolerance:  Patient tolerated the procedure well with no immediate complications

## 2025-07-31 NOTE — PROGRESS NOTES
Past Medical History:   Diagnosis Date    Class 3 severe obesity due to excess calories with serious comorbidity and body mass index (BMI) of 40.0 to 44.9 in adult 10/17/2018    COPD (chronic obstructive pulmonary disease)     Diabetes mellitus, type 2     GERD (gastroesophageal reflux disease)     Hyperlipidemia     Hypertension     Sleep apnea     Vitamin D deficiency        Past Surgical History:   Procedure Laterality Date    ADENOIDECTOMY      BREAST BIOPSY      BREAST SURGERY Right     breast biopsy    COLONOSCOPY N/A 09/10/2021    Procedure: COLONOSCOPY;  Surgeon: Holland Royal MD;  Location: [facility] ENDO;  Service: Endoscopy;  Laterality: N/A;    TONSILLECTOMY      TRANSFORAMINAL EPIDURAL INJECTION OF STEROID Left 04/16/2019    Procedure: Injection,steroid,epidural,transforaminal approach;  Surgeon: Gilberto Michelle MD;  Location: [facility] OR;  Service: Pain Management;  Laterality: Left;  L5-S1, S1    TRANSFORAMINAL EPIDURAL INJECTION OF STEROID Left 08/23/2019    Procedure: Injection,steroid,epidural,transforaminal approach;  Surgeon: Gilberto Michelle MD;  Location: [facility] OR;  Service: Pain Management;  Laterality: Left;  L5-S1, S1       Current Outpatient Medications   Medication Sig    albuterol-budesonide (AIRSUPRA) 90-80 mcg/actuation Inhale 2 puffs into the lungs every 6 (six) hours as needed (sob, wheezing).    amLODIPine (NORVASC) 10 MG tablet TAKE 1 TABLET BY MOUTH EVERY DAY    aspirin/salicylamide/caffeine (BC HEADACHE POWDER ORAL) Take by mouth as needed.    azelastine (ASTELIN) 137 mcg (0.1 %) nasal spray USE 1 SPRAY IN EACH NOSTRIL TWICE DAILY    blood sugar diagnostic Strp 1 each by Misc.(Non-Drug; Combo Route) route once daily.    budesonide-glycopyr-formoterol 160-9-4.8 mcg/actuation HFAA Inhale 2 puffs into the lungs 2 (two) times daily.    buPROPion (WELLBUTRIN XL) 300 MG 24 hr tablet TAKE 1 TABLET BY MOUTH EVERY DAY    cariprazine (VRAYLAR) 1.5 mg Cap Take 1 capsule (1.5 mg total) by mouth once  daily.    clotrimazole-betamethasone 1-0.05% (LOTRISONE) cream Apply topically 2 (two) times daily as needed (rash).    FLUoxetine 10 MG capsule TAKE 1 CAPSULE BY MOUTH EVERY DAY    fluticasone propionate (FLONASE) 50 mcg/actuation nasal spray 1 spray (50 mcg total) by Each Nostril route 2 (two) times daily as needed for Rhinitis.    hydrocortisone 2.5 % cream Apply topically 2 (two) times daily as needed (rash).    JARDIANCE 25 mg tablet TAKE 1 TABLET BY MOUTH EVERY DAY    lancets 33 gauge Misc 1 lancet by Misc.(Non-Drug; Combo Route) route once daily.    linaCLOtide (LINZESS) 72 mcg Cap capsule Take 1 capsule (72 mcg total) by mouth before breakfast.    metFORMIN (GLUCOPHAGE-XR) 500 MG ER 24hr tablet TAKE 2 TABLETS BY MOUTH TWICE A DAY WITH MEALS    montelukast (SINGULAIR) 10 mg tablet TAKE 1 TABLET BY MOUTH EVERY DAY    pantoprazole (PROTONIX) 40 MG tablet TAKE 1 TABLET BY MOUTH EVERY DAY    promethazine-dextromethorphan (PROMETHAZINE-DM) 6.25-15 mg/5 mL Syrp Take 5 mLs by mouth nightly as needed (cough).    rosuvastatin (CRESTOR) 20 MG tablet TAKE 1 TABLET BY MOUTH EVERY DAY    tirzepatide (MOUNJARO) 5 mg/0.5 mL PnIj Inject 5 mg into the skin every 7 days.    valsartan-hydrochlorothiazide (DIOVAN-HCT) 160-25 mg per tablet TAKE 1 TABLET BY MOUTH EVERY DAY     No current facility-administered medications for this visit.       Review of patient's allergies indicates:  No Known Allergies    Family History   Problem Relation Name Age of Onset    Stroke Mother      Emphysema Mother      Diabetes Father      Coronary artery disease Father      COPD Father      Diabetes Sister         Social History     Socioeconomic History    Marital status: Significant Other   Tobacco Use    Smoking status: Former     Current packs/day: 0.00     Types: Cigarettes     Quit date: 2019     Years since quittin.4    Smokeless tobacco: Never   Substance and Sexual Activity    Alcohol use: Yes     Comment: rarely    Drug use: Yes      Types: Hydrocodone    Sexual activity: Never     Social Drivers of Health     Financial Resource Strain: Low Risk  (6/8/2025)    Overall Financial Resource Strain (CARDIA)     Difficulty of Paying Living Expenses: Not very hard   Food Insecurity: No Food Insecurity (6/8/2025)    Hunger Vital Sign     Worried About Running Out of Food in the Last Year: Never true     Ran Out of Food in the Last Year: Never true   Transportation Needs: No Transportation Needs (6/8/2025)    PRAPARE - Transportation     Lack of Transportation (Medical): No     Lack of Transportation (Non-Medical): No   Physical Activity: Inactive (6/8/2025)    Exercise Vital Sign     Days of Exercise per Week: 0 days     Minutes of Exercise per Session: 0 min   Stress: No Stress Concern Present (6/7/2025)    [employer] - Occupational Stress Questionnaire     Feeling of Stress : Not at all   Housing Stability: Low Risk  (6/8/2025)    Housing Stability Vital Sign     Unable to Pay for Housing in the Last Year: No     Number of Times Moved in the Last Year: 0     Homeless in the Last Year: No       Chief Complaint:   Chief Complaint   Patient presents with    Follow-up     Left shoulder        History of present illness:  59-year-old right-hand-dominant female seen for left shoulder pain.  Pain started 3-4 months ago.  Patient denies a trauma or injury.  Patient has limited and painful range of motion particularly reaching overhead or behind her back.  Pain radiates down to the elbow.  PT previously.  It helped with the range of motion but did not help with the pain.  Describes pain is sharp and achy.  Patient takes aspirin and ibuprofen with minimal relief.  MRI shows partial-thickness rotator cuff tear.  She has done physical therapy a couple different times and does not really feel any better.  Pain at this time is a 4/10        Review of systems:  Musculoskeletal:  See HPI          Physical Examination:    Vital Signs:     Vitals:    07/31/25 0902   Resp: 18       Body mass index is 37.97 kg/m².    This a well-developed, well nourished patient in no acute distress.  They are alert and oriented and cooperative to examination.  Pt. walks without an antalgic gait.      Examination of the left shoulder shows no rashes or erythema. There are no masses, ecchymosis, or atrophy. The patient has minimal loss of active range of motion in forward flexion, external rotation, and internal rotation to the mid T-spine. The patient has moderately positive Hartmann and Neer test. - Speeds test. Nontender to palpation over a.c. joint. Passive range of motion: Forward flexion of 180°, external rotation at 90° of 90°, internal rotation of 50°, and external rotation at 0° of 50°. 2+ radial pulse. Intact axillary, radial, median and ulnar sensation. 5 out of 5 resisted forward flexion, external rotation, and negative lift off test.      X-rays:  X-rays left shoulder reviewed which show some mild AC arthritis    MRI of the left shoulder is reviewed and interpreted:Small partial-thickness bursal surface tear of the supraspinatus/infraspinatus tendons measuring 1.5 x 1.2 centimeters involving less than 50% of the tendon thickness.        Assessment:  Left rotator cuff syndrome    Plan:  I reviewed the findings with her today.  Agreed to try a 1 time subacromial injection as well as get her back into some physical therapy.  Shoulder is definitely stiffen enough on her.  I will see her back in 6 weeks.            All previous pertinent notes including ER visits, physical therapy visits, other orthopedic visits as well as other care for the same musculoskeletal problem were reviewed.  All pertinent lab values and previous imaging was reviewed pertinent to the current visit.    This note was created using [employer] voice recognition software that occasionally misinterpreted phrases or words.    Consult note is delivered via Epic messaging service.

## 2025-08-06 ENCOUNTER — HOSPITAL ENCOUNTER (OUTPATIENT)
Dept: RADIOLOGY | Facility: HOSPITAL | Age: 60
Discharge: HOME | End: 2025-08-06
Attending: INTERNAL MEDICINE
Payer: COMMERCIAL

## 2025-08-06 DIAGNOSIS — J84.9 INTERSTITIAL PULMONARY DISEASE, UNSPECIFIED: ICD-10-CM

## 2025-08-06 PROCEDURE — 71250 CT THORAX DX C-: CPT | Mod: 26,,, | Performed by: RADIOLOGY

## 2025-08-06 PROCEDURE — 71250 CT THORAX DX C-: CPT | Mod: TC

## 2025-08-21 ENCOUNTER — HOSPITAL ENCOUNTER (OUTPATIENT)
Dept: SLEEP MEDICINE | Facility: HOSPITAL | Age: 60
Discharge: HOME | End: 2025-08-21
Attending: INTERNAL MEDICINE
Payer: COMMERCIAL

## 2025-08-21 ENCOUNTER — PATIENT MESSAGE (OUTPATIENT)
Dept: FAMILY MEDICINE | Facility: CLINIC | Age: 60
End: 2025-08-21
Payer: COMMERCIAL

## 2025-08-21 DIAGNOSIS — G47.33 OSA (OBSTRUCTIVE SLEEP APNEA): ICD-10-CM

## 2025-08-21 PROCEDURE — 95811 POLYSOM 6/>YRS CPAP 4/> PARM: CPT

## 2025-08-27 ENCOUNTER — OFFICE VISIT (OUTPATIENT)
Dept: FAMILY MEDICINE | Facility: CLINIC | Age: 60
End: 2025-08-27
Payer: COMMERCIAL

## 2025-08-27 VITALS
HEART RATE: 75 BPM | WEIGHT: 232.56 LBS | BODY MASS INDEX: 37.38 KG/M2 | SYSTOLIC BLOOD PRESSURE: 130 MMHG | DIASTOLIC BLOOD PRESSURE: 70 MMHG | TEMPERATURE: 99 F | HEIGHT: 66 IN | OXYGEN SATURATION: 98 %

## 2025-08-27 DIAGNOSIS — M25.512 CHRONIC LEFT SHOULDER PAIN: ICD-10-CM

## 2025-08-27 DIAGNOSIS — E66.01 CLASS 2 SEVERE OBESITY DUE TO EXCESS CALORIES WITH SERIOUS COMORBIDITY AND BODY MASS INDEX (BMI) OF 37.0 TO 37.9 IN ADULT: ICD-10-CM

## 2025-08-27 DIAGNOSIS — E78.2 MIXED HYPERLIPIDEMIA: ICD-10-CM

## 2025-08-27 DIAGNOSIS — G89.29 CHRONIC LEFT SHOULDER PAIN: ICD-10-CM

## 2025-08-27 DIAGNOSIS — E11.8 TYPE 2 DIABETES MELLITUS WITH COMPLICATION, WITHOUT LONG-TERM CURRENT USE OF INSULIN: Primary | ICD-10-CM

## 2025-08-27 DIAGNOSIS — E55.9 VITAMIN D DEFICIENCY: ICD-10-CM

## 2025-08-27 DIAGNOSIS — I10 ESSENTIAL HYPERTENSION: ICD-10-CM

## 2025-08-27 DIAGNOSIS — J44.9 CHRONIC OBSTRUCTIVE PULMONARY DISEASE, UNSPECIFIED COPD TYPE: ICD-10-CM

## 2025-08-27 DIAGNOSIS — E66.812 CLASS 2 SEVERE OBESITY DUE TO EXCESS CALORIES WITH SERIOUS COMORBIDITY AND BODY MASS INDEX (BMI) OF 37.0 TO 37.9 IN ADULT: ICD-10-CM

## 2025-08-27 PROCEDURE — 3066F NEPHROPATHY DOC TX: CPT | Mod: CPTII,S$GLB,, | Performed by: NURSE PRACTITIONER

## 2025-08-27 PROCEDURE — 3061F NEG MICROALBUMINURIA REV: CPT | Mod: CPTII,S$GLB,, | Performed by: NURSE PRACTITIONER

## 2025-08-27 PROCEDURE — 1159F MED LIST DOCD IN RCRD: CPT | Mod: CPTII,S$GLB,, | Performed by: NURSE PRACTITIONER

## 2025-08-27 PROCEDURE — 99999 PR PBB SHADOW E&M-EST. PATIENT-LVL V: CPT | Mod: PBBFAC,,, | Performed by: NURSE PRACTITIONER

## 2025-08-27 PROCEDURE — 3078F DIAST BP <80 MM HG: CPT | Mod: CPTII,S$GLB,, | Performed by: NURSE PRACTITIONER

## 2025-08-27 PROCEDURE — 3075F SYST BP GE 130 - 139MM HG: CPT | Mod: CPTII,S$GLB,, | Performed by: NURSE PRACTITIONER

## 2025-08-27 PROCEDURE — 3008F BODY MASS INDEX DOCD: CPT | Mod: CPTII,S$GLB,, | Performed by: NURSE PRACTITIONER

## 2025-08-27 PROCEDURE — 99214 OFFICE O/P EST MOD 30 MIN: CPT | Mod: S$GLB,,, | Performed by: NURSE PRACTITIONER

## 2025-08-27 PROCEDURE — 1160F RVW MEDS BY RX/DR IN RCRD: CPT | Mod: CPTII,S$GLB,, | Performed by: NURSE PRACTITIONER

## 2025-08-27 PROCEDURE — 3044F HG A1C LEVEL LT 7.0%: CPT | Mod: CPTII,S$GLB,, | Performed by: NURSE PRACTITIONER

## 2025-08-27 RX ORDER — TIRZEPATIDE 7.5 MG/.5ML
7.5 INJECTION, SOLUTION SUBCUTANEOUS
Qty: 12 PEN | Refills: 1 | Status: SHIPPED | OUTPATIENT
Start: 2025-08-27

## 2025-08-28 ENCOUNTER — PATIENT MESSAGE (OUTPATIENT)
Dept: PULMONOLOGY | Facility: CLINIC | Age: 60
End: 2025-08-28
Payer: COMMERCIAL

## 2025-08-28 DIAGNOSIS — G47.33 OSA (OBSTRUCTIVE SLEEP APNEA): Primary | ICD-10-CM

## 2025-09-01 ENCOUNTER — TELEPHONE (OUTPATIENT)
Dept: PULMONOLOGY | Facility: CLINIC | Age: 60
End: 2025-09-01
Payer: COMMERCIAL

## 2025-09-01 DIAGNOSIS — J84.9 ILD (INTERSTITIAL LUNG DISEASE): Primary | ICD-10-CM

## 2025-09-04 ENCOUNTER — OFFICE VISIT (OUTPATIENT)
Dept: UROGYNECOLOGY | Facility: CLINIC | Age: 60
End: 2025-09-04
Payer: COMMERCIAL

## 2025-09-04 VITALS
SYSTOLIC BLOOD PRESSURE: 130 MMHG | WEIGHT: 232.56 LBS | HEIGHT: 66 IN | BODY MASS INDEX: 37.38 KG/M2 | HEART RATE: 75 BPM | DIASTOLIC BLOOD PRESSURE: 70 MMHG

## 2025-09-04 DIAGNOSIS — R15.0 INCOMPLETE DEFECATION: ICD-10-CM

## 2025-09-04 DIAGNOSIS — N95.2 ATROPHIC VAGINITIS: ICD-10-CM

## 2025-09-04 DIAGNOSIS — N39.46 MIXED STRESS AND URGE URINARY INCONTINENCE: Primary | ICD-10-CM

## 2025-09-04 DIAGNOSIS — R35.0 URINARY FREQUENCY: ICD-10-CM

## 2025-09-04 DIAGNOSIS — M62.89 HIGH-TONE PELVIC FLOOR DYSFUNCTION: ICD-10-CM

## 2025-09-04 LAB
BILIRUBIN, UA POC OHS: NEGATIVE
BLOOD, UA POC OHS: NEGATIVE
CLARITY, UA POC OHS: CLEAR
COLOR, UA POC OHS: ABNORMAL
GLUCOSE, UA POC OHS: NEGATIVE
KETONES, UA POC OHS: NEGATIVE
LEUKOCYTES, UA POC OHS: NEGATIVE
NITRITE, UA POC OHS: NEGATIVE
PH, UA POC OHS: 6.5
POC RESIDUAL URINE VOLUME: 0 ML (ref 0–100)
PROTEIN, UA POC OHS: NEGATIVE
SPECIFIC GRAVITY, UA POC OHS: <=1.005
UROBILINOGEN, UA POC OHS: 0.2

## 2025-09-04 PROCEDURE — 99999 PR PBB SHADOW E&M-EST. PATIENT-LVL V: CPT | Mod: PBBFAC,,, | Performed by: NURSE PRACTITIONER

## (undated) DEVICE — NDL SAFETY 25G X 1.5 ECLIPSE

## (undated) DEVICE — NDL SPINAL 22GX5

## (undated) DEVICE — NDL HYPODERMIC BLUNT 18G 1.5IN

## (undated) DEVICE — GLOVE PROTEXIS PI CLASSIC 6.5

## (undated) DEVICE — GLOVE SURG ULTRA TOUCH 7.5

## (undated) DEVICE — NDL SPINAL SPINOCAN 22GX3.5

## (undated) DEVICE — GLOVE PROTEXIS PI CLASSIC 7.5

## (undated) DEVICE — SYR DISP LL 5CC

## (undated) DEVICE — APPLICATOR CHLORAPREP CLR 10.5

## (undated) DEVICE — CHLORAPREP 10.5 ML APPLICATOR

## (undated) DEVICE — TUBING MINIBORE EXTENSION

## (undated) DEVICE — SYS LABEL CORRECT MED

## (undated) DEVICE — GLOVE SURG ULTRA TOUCH 6

## (undated) DEVICE — GLOVE SURGEONS ULTRA TOUCH 6.5